# Patient Record
Sex: FEMALE | Race: WHITE | Employment: FULL TIME
[De-identification: names, ages, dates, MRNs, and addresses within clinical notes are randomized per-mention and may not be internally consistent; named-entity substitution may affect disease eponyms.]

---

## 2017-02-01 ENCOUNTER — OFFICE VISIT (OUTPATIENT)
Dept: ONCOLOGY | Facility: CLINIC | Age: 52
End: 2017-02-01

## 2017-02-01 VITALS
RESPIRATION RATE: 20 BRPM | HEART RATE: 90 BPM | HEIGHT: 66 IN | SYSTOLIC BLOOD PRESSURE: 190 MMHG | WEIGHT: 272 LBS | DIASTOLIC BLOOD PRESSURE: 91 MMHG | TEMPERATURE: 97 F | BODY MASS INDEX: 43.71 KG/M2

## 2017-02-01 DIAGNOSIS — I82.90 VENOUS THROMBOEMBOLISM: ICD-10-CM

## 2017-02-01 DIAGNOSIS — I82.5Z9 CHRONIC DEEP VEIN THROMBOSIS (DVT) OF DISTAL VEIN OF LOWER EXTREMITY, UNSPECIFIED LATERALITY (HCC): Primary | ICD-10-CM

## 2017-02-01 PROCEDURE — 99214 OFFICE O/P EST MOD 30 MIN: CPT | Performed by: INTERNAL MEDICINE

## 2017-03-24 PROBLEM — Z12.11 COLON CANCER SCREENING: Status: ACTIVE | Noted: 2017-03-24

## 2017-03-27 ENCOUNTER — ANESTHESIA EVENT (OUTPATIENT)
Dept: OPERATING ROOM | Age: 52
End: 2017-03-27
Payer: COMMERCIAL

## 2017-03-27 ENCOUNTER — ANESTHESIA (OUTPATIENT)
Dept: OPERATING ROOM | Age: 52
End: 2017-03-27
Payer: COMMERCIAL

## 2017-03-27 ENCOUNTER — HOSPITAL ENCOUNTER (OUTPATIENT)
Age: 52
Setting detail: OUTPATIENT SURGERY
Discharge: HOME | End: 2017-03-27
Attending: INTERNAL MEDICINE | Admitting: INTERNAL MEDICINE
Payer: COMMERCIAL

## 2017-03-27 VITALS
WEIGHT: 270 LBS | DIASTOLIC BLOOD PRESSURE: 75 MMHG | SYSTOLIC BLOOD PRESSURE: 132 MMHG | RESPIRATION RATE: 18 BRPM | HEART RATE: 83 BPM | OXYGEN SATURATION: 97 % | TEMPERATURE: 98.4 F | HEIGHT: 66 IN | BODY MASS INDEX: 43.39 KG/M2

## 2017-03-27 VITALS
RESPIRATION RATE: 21 BRPM | OXYGEN SATURATION: 93 % | SYSTOLIC BLOOD PRESSURE: 134 MMHG | DIASTOLIC BLOOD PRESSURE: 73 MMHG

## 2017-03-27 PROCEDURE — 3609010600 HC COLONOSCOPY POLYPECTOMY SNARE/COLD BIOPSY: Performed by: INTERNAL MEDICINE

## 2017-03-27 PROCEDURE — 3609009900 HC COLONOSCOPY W/CONTROL BLEEDING ANY METHOD: Performed by: INTERNAL MEDICINE

## 2017-03-27 PROCEDURE — 7100000011 HC PHASE II RECOVERY - ADDTL 15 MIN: Performed by: INTERNAL MEDICINE

## 2017-03-27 PROCEDURE — 88305 TISSUE EXAM BY PATHOLOGIST: CPT

## 2017-03-27 PROCEDURE — 2580000003 HC RX 258: Performed by: INTERNAL MEDICINE

## 2017-03-27 PROCEDURE — 7100000010 HC PHASE II RECOVERY - FIRST 15 MIN: Performed by: INTERNAL MEDICINE

## 2017-03-27 PROCEDURE — 45385 COLONOSCOPY W/LESION REMOVAL: CPT | Performed by: INTERNAL MEDICINE

## 2017-03-27 PROCEDURE — 2500000003 HC RX 250 WO HCPCS: Performed by: NURSE ANESTHETIST, CERTIFIED REGISTERED

## 2017-03-27 PROCEDURE — 6360000002 HC RX W HCPCS: Performed by: NURSE ANESTHETIST, CERTIFIED REGISTERED

## 2017-03-27 PROCEDURE — 3700000001 HC ADD 15 MINUTES (ANESTHESIA): Performed by: INTERNAL MEDICINE

## 2017-03-27 PROCEDURE — C1773 RET DEV, INSERTABLE: HCPCS | Performed by: INTERNAL MEDICINE

## 2017-03-27 PROCEDURE — 3700000000 HC ANESTHESIA ATTENDED CARE: Performed by: INTERNAL MEDICINE

## 2017-03-27 RX ORDER — SODIUM CHLORIDE, SODIUM LACTATE, POTASSIUM CHLORIDE, CALCIUM CHLORIDE 600; 310; 30; 20 MG/100ML; MG/100ML; MG/100ML; MG/100ML
INJECTION, SOLUTION INTRAVENOUS CONTINUOUS
Status: DISCONTINUED | OUTPATIENT
Start: 2017-03-27 | End: 2017-03-27 | Stop reason: HOSPADM

## 2017-03-27 RX ORDER — LIDOCAINE HYDROCHLORIDE 20 MG/ML
INJECTION, SOLUTION INFILTRATION; PERINEURAL PRN
Status: DISCONTINUED | OUTPATIENT
Start: 2017-03-27 | End: 2017-03-27 | Stop reason: SDUPTHER

## 2017-03-27 RX ORDER — PROPOFOL 10 MG/ML
INJECTION, EMULSION INTRAVENOUS CONTINUOUS PRN
Status: DISCONTINUED | OUTPATIENT
Start: 2017-03-27 | End: 2017-03-27 | Stop reason: SDUPTHER

## 2017-03-27 RX ORDER — PROPOFOL 10 MG/ML
INJECTION, EMULSION INTRAVENOUS PRN
Status: DISCONTINUED | OUTPATIENT
Start: 2017-03-27 | End: 2017-03-27 | Stop reason: SDUPTHER

## 2017-03-27 RX ADMIN — PROPOFOL 100 MCG/KG/MIN: 10 INJECTION, EMULSION INTRAVENOUS at 15:57

## 2017-03-27 RX ADMIN — PROPOFOL 50 MG: 10 INJECTION, EMULSION INTRAVENOUS at 16:22

## 2017-03-27 RX ADMIN — PROPOFOL 50 MG: 10 INJECTION, EMULSION INTRAVENOUS at 16:35

## 2017-03-27 RX ADMIN — SODIUM CHLORIDE, POTASSIUM CHLORIDE, SODIUM LACTATE AND CALCIUM CHLORIDE: 600; 310; 30; 20 INJECTION, SOLUTION INTRAVENOUS at 15:37

## 2017-03-27 RX ADMIN — LIDOCAINE HYDROCHLORIDE 100 MG: 20 INJECTION, SOLUTION INFILTRATION; PERINEURAL at 15:57

## 2017-03-27 ASSESSMENT — PAIN SCALES - GENERAL
PAINLEVEL_OUTOF10: 0

## 2017-03-27 ASSESSMENT — PAIN - FUNCTIONAL ASSESSMENT: PAIN_FUNCTIONAL_ASSESSMENT: 0-10

## 2017-03-29 LAB — SURGICAL PATHOLOGY REPORT: NORMAL

## 2017-05-15 ENCOUNTER — EMPLOYEE WELLNESS (OUTPATIENT)
Dept: OTHER | Age: 52
End: 2017-05-15

## 2017-05-15 LAB
CHOLESTEROL/HDL RATIO: 4.9
CHOLESTEROL: 188 MG/DL
GLUCOSE BLD-MCNC: 114 MG/DL (ref 70–99)
HDLC SERPL-MCNC: 38 MG/DL
LDL CHOLESTEROL: 128 MG/DL (ref 0–130)
PATIENT FASTING?: ABNORMAL
TRIGL SERPL-MCNC: 108 MG/DL
VLDLC SERPL CALC-MCNC: ABNORMAL MG/DL (ref 1–30)

## 2017-06-19 ENCOUNTER — OFFICE VISIT (OUTPATIENT)
Dept: PULMONOLOGY | Age: 52
End: 2017-06-19
Payer: COMMERCIAL

## 2017-06-19 VITALS
OXYGEN SATURATION: 95 % | BODY MASS INDEX: 44.2 KG/M2 | DIASTOLIC BLOOD PRESSURE: 83 MMHG | HEIGHT: 66 IN | HEART RATE: 73 BPM | RESPIRATION RATE: 16 BRPM | SYSTOLIC BLOOD PRESSURE: 144 MMHG | TEMPERATURE: 97.3 F | WEIGHT: 275 LBS

## 2017-06-19 DIAGNOSIS — I82.90 VENOUS THROMBOEMBOLISM: Primary | ICD-10-CM

## 2017-06-19 DIAGNOSIS — Z79.01 LONG TERM (CURRENT) USE OF ANTICOAGULANTS: ICD-10-CM

## 2017-06-19 DIAGNOSIS — G47.33 SLEEP APNEA, OBSTRUCTIVE: ICD-10-CM

## 2017-06-19 DIAGNOSIS — E66.01 MORBID OBESITY DUE TO EXCESS CALORIES (HCC): ICD-10-CM

## 2017-06-19 DIAGNOSIS — J45.40 MODERATE PERSISTENT ASTHMA WITHOUT COMPLICATION: ICD-10-CM

## 2017-06-19 DIAGNOSIS — J30.9 ALLERGIC RHINITIS, UNSPECIFIED ALLERGIC RHINITIS TRIGGER, UNSPECIFIED RHINITIS SEASONALITY: ICD-10-CM

## 2017-06-19 DIAGNOSIS — D68.59 THROMBOPHILIA (HCC): ICD-10-CM

## 2017-06-19 DIAGNOSIS — I10 ESSENTIAL HYPERTENSION: ICD-10-CM

## 2017-06-19 PROCEDURE — 99215 OFFICE O/P EST HI 40 MIN: CPT | Performed by: INTERNAL MEDICINE

## 2017-06-27 ENCOUNTER — HOSPITAL ENCOUNTER (OUTPATIENT)
Dept: LAB | Age: 52
Discharge: HOME | End: 2017-06-27
Payer: COMMERCIAL

## 2017-06-27 DIAGNOSIS — I10 ESSENTIAL HYPERTENSION: ICD-10-CM

## 2017-06-27 LAB
ALBUMIN SERPL-MCNC: 3.8 G/DL (ref 3.5–5.2)
ALBUMIN/GLOBULIN RATIO: 1.2 (ref 1–2.5)
ALP BLD-CCNC: 61 U/L (ref 35–104)
ALT SERPL-CCNC: 20 U/L (ref 5–33)
ANION GAP SERPL CALCULATED.3IONS-SCNC: 15 MMOL/L (ref 9–17)
AST SERPL-CCNC: 24 U/L
BILIRUB SERPL-MCNC: 0.52 MG/DL (ref 0.3–1.2)
BUN BLDV-MCNC: 13 MG/DL (ref 6–20)
BUN/CREAT BLD: 18 (ref 9–20)
CALCIUM SERPL-MCNC: 8.9 MG/DL (ref 8.6–10.4)
CHLORIDE BLD-SCNC: 101 MMOL/L (ref 98–107)
CO2: 25 MMOL/L (ref 20–31)
CREAT SERPL-MCNC: 0.73 MG/DL (ref 0.5–0.9)
GFR AFRICAN AMERICAN: >60 ML/MIN
GFR NON-AFRICAN AMERICAN: >60 ML/MIN
GFR SERPL CREATININE-BSD FRML MDRD: ABNORMAL ML/MIN/{1.73_M2}
GFR SERPL CREATININE-BSD FRML MDRD: ABNORMAL ML/MIN/{1.73_M2}
GLUCOSE BLD-MCNC: 113 MG/DL (ref 70–99)
POTASSIUM SERPL-SCNC: 4.1 MMOL/L (ref 3.7–5.3)
SODIUM BLD-SCNC: 141 MMOL/L (ref 135–144)
TOTAL PROTEIN: 7 G/DL (ref 6.4–8.3)

## 2017-06-27 PROCEDURE — 36415 COLL VENOUS BLD VENIPUNCTURE: CPT

## 2017-06-27 PROCEDURE — 80053 COMPREHEN METABOLIC PANEL: CPT

## 2018-01-23 ENCOUNTER — HOSPITAL ENCOUNTER (OUTPATIENT)
Age: 53
Discharge: HOME | End: 2018-01-23
Payer: COMMERCIAL

## 2018-01-23 DIAGNOSIS — D68.59 THROMBOPHILIA (HCC): Primary | ICD-10-CM

## 2018-01-23 DIAGNOSIS — D68.59 THROMBOPHILIA (HCC): ICD-10-CM

## 2018-01-23 LAB
FOLATE: 7.9 NG/ML
HOMOCYSTEINE: 8.5 UMOL/L
VITAMIN B-12: 615 PG/ML (ref 211–946)

## 2018-01-23 PROCEDURE — 82746 ASSAY OF FOLIC ACID SERUM: CPT

## 2018-01-23 PROCEDURE — 36415 COLL VENOUS BLD VENIPUNCTURE: CPT

## 2018-01-23 PROCEDURE — 83090 ASSAY OF HOMOCYSTEINE: CPT

## 2018-01-23 PROCEDURE — 82607 VITAMIN B-12: CPT

## 2018-02-05 ENCOUNTER — OFFICE VISIT (OUTPATIENT)
Dept: PULMONOLOGY | Age: 53
End: 2018-02-05
Payer: COMMERCIAL

## 2018-02-05 VITALS
TEMPERATURE: 97.7 F | WEIGHT: 277 LBS | SYSTOLIC BLOOD PRESSURE: 154 MMHG | BODY MASS INDEX: 44.52 KG/M2 | OXYGEN SATURATION: 96 % | DIASTOLIC BLOOD PRESSURE: 105 MMHG | RESPIRATION RATE: 16 BRPM | HEIGHT: 66 IN

## 2018-02-05 DIAGNOSIS — I82.90 VENOUS THROMBOEMBOLISM: Primary | ICD-10-CM

## 2018-02-05 DIAGNOSIS — G47.33 SLEEP APNEA, OBSTRUCTIVE: ICD-10-CM

## 2018-02-05 DIAGNOSIS — I10 ESSENTIAL HYPERTENSION: ICD-10-CM

## 2018-02-05 DIAGNOSIS — J45.40 MODERATE PERSISTENT ASTHMA WITHOUT COMPLICATION: ICD-10-CM

## 2018-02-05 DIAGNOSIS — J30.89 OTHER ALLERGIC RHINITIS: ICD-10-CM

## 2018-02-05 DIAGNOSIS — D68.59 THROMBOPHILIA (HCC): ICD-10-CM

## 2018-02-05 DIAGNOSIS — E66.01 MORBID OBESITY DUE TO EXCESS CALORIES (HCC): ICD-10-CM

## 2018-02-05 PROCEDURE — 99215 OFFICE O/P EST HI 40 MIN: CPT | Performed by: INTERNAL MEDICINE

## 2018-02-05 NOTE — PROGRESS NOTES
negative  Hematological and Lymphatic ROS:  Completed and except as mentioned above were negative  Endocrine ROS: Completed and except as mentioned above were negative  Breast ROS:  Completed and except as mentioned above were negative  Respiratory ROS:  Completed and except as mentioned above were negative  Cardiovascular ROS:  Completed and except as mentioned above were negative  Gastrointestinal ROS: Completed and except as mentioned above were negative  Genito-Urinary ROS:  Completed and except as mentioned above were negative  Musculoskeletal ROS:  Completed and except as mentioned above were negative  Neurological ROS:  Completed and except as mentioned above were negative  Dermatological ROS:  Completed and except as mentioned above were negative    NO choking, gasping, excessive daytime sleepiness, disrupted sleep  No mva    Allergies: Allergies   Allergen Reactions    Levaquin [Levofloxacin In D5w]      FLUSHED AND WHEEZING    Pcn [Penicillins]        Medications:    Current Outpatient Prescriptions:     warfarin (COUMADIN) 4 MG tablet, Take two tablets Monday through Saturday and one tablet on Sunday along with 5 mg tablet, Disp: 90 tablet, Rfl: 0    losartan (COZAAR) 50 MG tablet, Take 1 tablet by mouth daily, Disp: 90 tablet, Rfl: 0    pantoprazole (PROTONIX) 40 MG tablet, Take 1 tablet by mouth daily, Disp: 90 tablet, Rfl: 0    budesonide-formoterol (SYMBICORT) 160-4.5 MCG/ACT AERO, Inhale 2 puffs into the lungs 2 times daily, Disp: 1 Inhaler, Rfl: 2    spironolactone (ALDACTONE) 25 MG tablet, Take 1 tablet by mouth daily, Disp: 90 tablet, Rfl: 0    COUMADIN 5 MG tablet, TAKE 1 TABLET BY MOUTH DAILY. , Disp: 90 tablet, Rfl: 0    ALPRAZolam (XANAX) 0.25 MG tablet, Take 1 tablet by mouth every 6 hours as needed for Sleep or Anxiety, Disp: 60 tablet, Rfl: 2    albuterol sulfate HFA (PROAIR HFA) 108 (90 BASE) MCG/ACT inhaler, Inhale 2 puffs into the lungs 4 times daily as needed for Wheezing or NEEDED. QUESTIONS ANSWERED to her satisfaction  EXPLAINED IMPORTANCE OF COMPLIANCE WITH THERAPY, especially given that she has had history of thromboembolic disease of the lung  8. NO REFILLS PROVIDED  9. VACCINATIONS  - HAD FLU VACCINE  10. REC ANTICOAGULATION FOR LIFE BY HEME  11. F/U IN 6 MONTHS. 12.  Refill provided-none. 13.  Regarding anticoagulation, holding for the surgery for cataracts, recommend stopping it 5 days before surgery and then have the INR checked 2 days before surgery and restart when okay with ophthalmology. 14.  Patient did not want any newer oral anticoagulants. Thank you for having us involved in the care of your patient. Please call us if you have any questions or concerns.         Laurie Chang MD             2/5/2018, 9:03 AM

## 2018-02-07 ENCOUNTER — OFFICE VISIT (OUTPATIENT)
Dept: ONCOLOGY | Age: 53
End: 2018-02-07
Payer: COMMERCIAL

## 2018-02-07 VITALS
RESPIRATION RATE: 16 BRPM | DIASTOLIC BLOOD PRESSURE: 98 MMHG | TEMPERATURE: 97.2 F | HEART RATE: 93 BPM | BODY MASS INDEX: 45.16 KG/M2 | SYSTOLIC BLOOD PRESSURE: 126 MMHG | HEIGHT: 66 IN | WEIGHT: 281 LBS

## 2018-02-07 DIAGNOSIS — I82.90 VENOUS THROMBOEMBOLISM: Primary | ICD-10-CM

## 2018-02-07 PROCEDURE — 99213 OFFICE O/P EST LOW 20 MIN: CPT | Performed by: INTERNAL MEDICINE

## 2018-02-07 NOTE — PROGRESS NOTES
unprovoked DVT in the whole family. PAST MEDICAL HISTORY: has a past medical history of Acute pulmonary embolism ([facility] [address] Anxiety; Asthma; Constipation, chronic; CPAP (continuous positive airway pressure) dependence; Diabetes mellitus ([facility] [address] Embolism - blood clot; Esophageal reflux; Factor V Leiden ([facility] [address] Heart murmur; HTN (hypertension); Hyperlipidemia; Irritable bladder; MTHFR mutation (HCC); MVP (mitral valve prolapse); Palpitations; Pulmonary embolism ([facility] [address] Rhinitis, allergic; Sleep apnea; and Venous thromboembolism. PAST SURGICAL HISTORY: has a past surgical history that includes Cholecystectomy; Mandible fracture surgery (1983); Tubal ligation; Upper gastrointestinal endoscopy (1/2010); Colonoscopy (2006); colsc flx w/rmvl of tumor polyp lesion snare tq (3/27/2017); colsc flexible w/control bleeding any method (3/27/2017); and Colonoscopy (03/27/2017). CURRENT MEDICATIONS:  has a current medication list which includes the following prescription(s): warfarin, losartan, pantoprazole, budesonide-formoterol, spironolactone, coumadin, alprazolam, albuterol sulfate hfa, prevident 5000 booster plus, cetirizine, folic acid-pyridoxine-cyancobalamin, and folbic rf. ALLERGIES:  is allergic to levaquin [levofloxacin in d5w] and pcn [penicillins]. FAMILY HISTORY: Negative for any hematological or oncological conditions. SOCIAL HISTORY:  reports that she has never smoked. She has never used smokeless tobacco. She reports that she drinks alcohol. She reports that she does not use drugs. REVIEW OF SYSTEMS:  General: no fever or night sweats, Weight is stable. Eyes: No double or blurred vision. Ears: no tinnitus or hearing problem,    Throat: no dysphagia or sore throat   Respiratory: No chest pain, no shortness of breath, no cough or hemoptysis. Cardiovascular: Denies chest pain, PND or orthopnea. No L E swelling or palpitations.    Gastrointestinal:    No nausea or vomiting, abdominal pain, diarrhea or constipation. Genitourinary: Denies dysuria, hematuria, frequency, urgency or incontinence. Neurological: Denies headaches, decreased LOC, no sensory or motor focal deficits. Musculoskeletal:  No arthralgia no back pain or joint swelling. Skin: There are no rashes or bleeding. Psychiatric:  No anxiety, no depression. Endocrine: no diabetes or thyroid disease. Hematologic: no bleeding , no adenopathy. PHYSICAL EXAM: Shows a well appearing 46y.o.-year-old female who is not in pain or distress. Vital Signs: Blood pressure (!) 126/98, pulse 93, temperature 97.2 °F (36.2 °C), temperature source Temporal, resp. rate 16, height 5' 6\" (1.676 m), weight 281 lb (127.5 kg). HEENT: Normocephalic and atraumatic. Pupils are equal, round, reactive to light and accommodation. Extraocular muscles are intact. Neck: Showed no JVD, no carotid bruit . Lungs: Clear to auscultation bilaterally. Heart: Regular without any murmur. Abdomen: Soft, nontender. No hepatosplenomegaly. Extremities: Lower extremities show no edema, clubbing, or cyanosis. Neuro exam: intact cranial nerves bilaterally no motor or sensory deficit, gait is normal. Lymphatic: no adenopathy appreciated in the supraclavicular, axillary, cervical or inguinal area        Review of laboratory data:   Lab Results   Component Value Date    WBC 16.0 (H) 06/24/2015    HGB 13.6 06/24/2015    HCT 41.6 06/24/2015    MCV 83.7 06/24/2015     06/24/2015     IMPRESSION:   1. Provoked deep venous thrombosis/PE  2. Thrombophilia with heterozygous factor V Leiden mutation and compound heterozygous MTH FR mutation     Plan:   I had a long discussion with the patient, recent literature suggests that provoked thromboses should not be treated with lifelong anticoagulation even in the presence of hereditary thrombophilia.   Obviously, part of the treatment is management of the patient anxiety especially with her extensive family history of venous thromboembolism. I had a long discussion that the patient is at an increased risk of venous thromboembolism and that will be decreased Coumadin. After a very prolonged discussion, and considering pros and cons of continued anticoagulation versus stopping. The patient will continue Coumadin. She would have cataract surgery soon  We will see her back in a year for a follow-up   The patient was clearly advised to stay away from exogenous estrogen and smoking.   In she will promised to do that                               [facility]                          Cell: [phone]

## 2018-03-20 ENCOUNTER — HOSPITAL ENCOUNTER (OUTPATIENT)
Dept: VASCULAR LAB | Age: 53
Discharge: HOME | End: 2018-03-22
Payer: COMMERCIAL

## 2018-03-20 VITALS — BODY MASS INDEX: 44.68 KG/M2 | WEIGHT: 278 LBS

## 2018-03-20 DIAGNOSIS — M79.604 PAIN OF RIGHT LOWER EXTREMITY: ICD-10-CM

## 2018-03-20 PROCEDURE — 93971 EXTREMITY STUDY: CPT

## 2018-04-01 PROBLEM — H25.12 NUCLEAR SCLEROTIC CATARACT OF LEFT EYE: Status: ACTIVE | Noted: 2018-04-01

## 2018-04-02 ENCOUNTER — HOSPITAL ENCOUNTER (OUTPATIENT)
Age: 53
Setting detail: OUTPATIENT SURGERY
Discharge: HOME | End: 2018-04-02
Attending: OPHTHALMOLOGY | Admitting: OPHTHALMOLOGY
Payer: COMMERCIAL

## 2018-04-02 VITALS
RESPIRATION RATE: 18 BRPM | SYSTOLIC BLOOD PRESSURE: 121 MMHG | HEART RATE: 85 BPM | TEMPERATURE: 97.5 F | DIASTOLIC BLOOD PRESSURE: 88 MMHG | OXYGEN SATURATION: 94 % | HEIGHT: 66 IN | WEIGHT: 276 LBS | BODY MASS INDEX: 44.36 KG/M2

## 2018-04-02 PROBLEM — H25.12 NUCLEAR SCLEROTIC CATARACT OF LEFT EYE: Status: RESOLVED | Noted: 2018-04-01 | Resolved: 2018-04-02

## 2018-04-02 PROCEDURE — 2580000003 HC RX 258: Performed by: OPHTHALMOLOGY

## 2018-04-02 PROCEDURE — V2632 POST CHMBR INTRAOCULAR LENS: HCPCS | Performed by: OPHTHALMOLOGY

## 2018-04-02 PROCEDURE — 6370000000 HC RX 637 (ALT 250 FOR IP): Performed by: OPHTHALMOLOGY

## 2018-04-02 PROCEDURE — 7100000011 HC PHASE II RECOVERY - ADDTL 15 MIN: Performed by: OPHTHALMOLOGY

## 2018-04-02 PROCEDURE — 7100000010 HC PHASE II RECOVERY - FIRST 15 MIN: Performed by: OPHTHALMOLOGY

## 2018-04-02 PROCEDURE — 99153 MOD SED SAME PHYS/QHP EA: CPT | Performed by: OPHTHALMOLOGY

## 2018-04-02 PROCEDURE — 3600000013 HC SURGERY LEVEL 3 ADDTL 15MIN: Performed by: OPHTHALMOLOGY

## 2018-04-02 PROCEDURE — 6360000002 HC RX W HCPCS: Performed by: OPHTHALMOLOGY

## 2018-04-02 PROCEDURE — 3600000003 HC SURGERY LEVEL 3 BASE: Performed by: OPHTHALMOLOGY

## 2018-04-02 PROCEDURE — 99152 MOD SED SAME PHYS/QHP 5/>YRS: CPT | Performed by: OPHTHALMOLOGY

## 2018-04-02 DEVICE — LENS INTRAOCULAR BCNVX 17.5+ DIOPT 6X12.5 MM ACRYL ENVISTA: Type: IMPLANTABLE DEVICE | Status: FUNCTIONAL

## 2018-04-02 RX ORDER — SODIUM CHLORIDE 0.9 % (FLUSH) 0.9 %
10 SYRINGE (ML) INJECTION PRN
Status: CANCELLED | OUTPATIENT
Start: 2018-04-02

## 2018-04-02 RX ORDER — SODIUM CHLORIDE 0.9 % (FLUSH) 0.9 %
10 SYRINGE (ML) INJECTION EVERY 12 HOURS SCHEDULED
Status: CANCELLED | OUTPATIENT
Start: 2018-04-02

## 2018-04-02 RX ORDER — SODIUM CHLORIDE 0.9 % (FLUSH) 0.9 %
10 SYRINGE (ML) INJECTION PRN
Status: DISCONTINUED | OUTPATIENT
Start: 2018-04-02 | End: 2018-04-02 | Stop reason: HOSPADM

## 2018-04-02 RX ORDER — TROPICAMIDE 10 MG/ML
1 SOLUTION/ DROPS OPHTHALMIC 2 TIMES DAILY PRN
Status: DISCONTINUED | OUTPATIENT
Start: 2018-04-02 | End: 2018-04-02 | Stop reason: HOSPADM

## 2018-04-02 RX ORDER — PHENYLEPHRINE HCL 2.5 %
1 DROPS OPHTHALMIC (EYE) 2 TIMES DAILY PRN
Status: DISCONTINUED | OUTPATIENT
Start: 2018-04-02 | End: 2018-04-02 | Stop reason: HOSPADM

## 2018-04-02 RX ORDER — ONDANSETRON 2 MG/ML
4 INJECTION INTRAMUSCULAR; INTRAVENOUS EVERY 6 HOURS PRN
Status: CANCELLED | OUTPATIENT
Start: 2018-04-02

## 2018-04-02 RX ORDER — ACETAMINOPHEN 325 MG/1
650 TABLET ORAL EVERY 4 HOURS PRN
Status: CANCELLED | OUTPATIENT
Start: 2018-04-02

## 2018-04-02 RX ORDER — SODIUM CHLORIDE 9 MG/ML
INJECTION, SOLUTION INTRAVENOUS CONTINUOUS
Status: DISCONTINUED | OUTPATIENT
Start: 2018-04-02 | End: 2018-04-02 | Stop reason: HOSPADM

## 2018-04-02 RX ORDER — TETRACAINE HYDROCHLORIDE 5 MG/ML
1 SOLUTION OPHTHALMIC ONCE
Status: COMPLETED | OUTPATIENT
Start: 2018-04-02 | End: 2018-04-02

## 2018-04-02 RX ORDER — FENTANYL CITRATE 50 UG/ML
INJECTION, SOLUTION INTRAMUSCULAR; INTRAVENOUS PRN
Status: DISCONTINUED | OUTPATIENT
Start: 2018-04-02 | End: 2018-04-02 | Stop reason: HOSPADM

## 2018-04-02 RX ORDER — MIDAZOLAM HYDROCHLORIDE 1 MG/ML
INJECTION INTRAMUSCULAR; INTRAVENOUS PRN
Status: DISCONTINUED | OUTPATIENT
Start: 2018-04-02 | End: 2018-04-02 | Stop reason: HOSPADM

## 2018-04-02 RX ORDER — SODIUM CHLORIDE 0.9 % (FLUSH) 0.9 %
10 SYRINGE (ML) INJECTION EVERY 12 HOURS SCHEDULED
Status: DISCONTINUED | OUTPATIENT
Start: 2018-04-02 | End: 2018-04-02 | Stop reason: HOSPADM

## 2018-04-02 RX ADMIN — TROPICAMIDE 1 DROP: 10 SOLUTION/ DROPS OPHTHALMIC at 08:05

## 2018-04-02 RX ADMIN — PHENYLEPHRINE HYDROCHLORIDE 1 DROP: 25 SOLUTION/ DROPS OPHTHALMIC at 08:05

## 2018-04-02 RX ADMIN — PHENYLEPHRINE HYDROCHLORIDE 1 DROP: 25 SOLUTION/ DROPS OPHTHALMIC at 08:15

## 2018-04-02 RX ADMIN — TROPICAMIDE 1 DROP: 10 SOLUTION/ DROPS OPHTHALMIC at 08:15

## 2018-04-02 RX ADMIN — SODIUM CHLORIDE: 9 INJECTION, SOLUTION INTRAVENOUS at 08:25

## 2018-04-02 ASSESSMENT — PAIN - FUNCTIONAL ASSESSMENT: PAIN_FUNCTIONAL_ASSESSMENT: 0-10

## 2018-04-02 ASSESSMENT — PAIN SCALES - GENERAL
PAINLEVEL_OUTOF10: 0
PAINLEVEL_OUTOF10: 0
PAINLEVEL_OUTOF10: 2

## 2018-04-03 ENCOUNTER — EMPLOYEE WELLNESS (OUTPATIENT)
Dept: OTHER | Age: 53
End: 2018-04-03

## 2018-04-03 LAB
CHOLESTEROL/HDL RATIO: 4.3
CHOLESTEROL: 171 MG/DL
GLUCOSE BLD-MCNC: 115 MG/DL (ref 70–99)
HDLC SERPL-MCNC: 40 MG/DL
LDL CHOLESTEROL: 110 MG/DL (ref 0–130)
PATIENT FASTING?: YES
TRIGL SERPL-MCNC: 106 MG/DL
VLDLC SERPL CALC-MCNC: ABNORMAL MG/DL (ref 1–30)

## 2018-04-10 VITALS — BODY MASS INDEX: 44.55 KG/M2 | WEIGHT: 276 LBS

## 2018-06-20 PROBLEM — F41.1 GAD (GENERALIZED ANXIETY DISORDER): Status: ACTIVE | Noted: 2018-06-20

## 2018-08-01 ENCOUNTER — OFFICE VISIT (OUTPATIENT)
Dept: OBGYN | Age: 53
End: 2018-08-01
Payer: COMMERCIAL

## 2018-08-01 VITALS
WEIGHT: 278 LBS | DIASTOLIC BLOOD PRESSURE: 82 MMHG | BODY MASS INDEX: 44.68 KG/M2 | HEIGHT: 66 IN | SYSTOLIC BLOOD PRESSURE: 136 MMHG

## 2018-08-01 DIAGNOSIS — Z00.00 WELLNESS EXAMINATION: Primary | ICD-10-CM

## 2018-08-01 DIAGNOSIS — Z01.419 WOMEN'S ANNUAL ROUTINE GYNECOLOGICAL EXAMINATION: ICD-10-CM

## 2018-08-01 LAB — HGB, POC: 13.1

## 2018-08-01 PROCEDURE — 99396 PREV VISIT EST AGE 40-64: CPT | Performed by: ADVANCED PRACTICE MIDWIFE

## 2018-08-01 ASSESSMENT — ENCOUNTER SYMPTOMS
VOMITING: 0
ABDOMINAL PAIN: 0
SHORTNESS OF BREATH: 0
NAUSEA: 0
BACK PAIN: 0
DIARRHEA: 0
CONSTIPATION: 0
SORE THROAT: 0

## 2018-08-01 NOTE — PROGRESS NOTES
pantoprazole (PROTONIX) 40 MG tablet, Take 1 tablet by mouth daily, Disp: 90 tablet, Rfl: 0    losartan (COZAAR) 50 MG tablet, Take 1 tablet by mouth daily, Disp: 90 tablet, Rfl: 0    spironolactone (ALDACTONE) 25 MG tablet, Take 1 tablet by mouth daily, Disp: 90 tablet, Rfl: 0    albuterol sulfate HFA (PROAIR HFA) 108 (90 Base) MCG/ACT inhaler, Inhale 2 puffs into the lungs 4 times daily as needed for Wheezing or Shortness of Breath, Disp: 1 Inhaler, Rfl: 11    PREVIDENT 5000 BOOSTER PLUS 1.1 % PSTE, , Disp: , Rfl: 0    cetirizine (ZYRTEC) 10 MG tablet, Take 10 mg by mouth daily.   , Disp: , Rfl:     History   Sexual Activity    Sexual activity: Yes    Partners: Male    Birth control/ protection: Surgical       Any bleeding or pain with intercourse: No    Last Yearly:  2016    Last pap: 2016    Last HPV: 2015    Last Mammogram: 2017    Last Dexascan na    Do you do self breast exams: No    Past Medical History:   Diagnosis Date    Acute pulmonary embolism ([facility] [address] 11/2/2015    Anxiety     Asthma     Constipation, chronic     CPAP (continuous positive airway pressure) dependence     Diabetes mellitus ([facility] [address]     pt states she was prediabetic at one point    Embolism - blood clot     right lung and right leg    Esophageal reflux     Factor V Leiden ([facility] [address]     Heart murmur     HTN (hypertension)     Hyperlipidemia     Irritable bladder     MTHFR mutation (HCC)     MVP (mitral valve prolapse)     Palpitations     Pulmonary embolism (HCC) 10/15/2012    Rhinitis, allergic 10/15/2012    Sleep apnea     Venous thromboembolism 5/2/2016       Past Surgical History:   Procedure Laterality Date    CATARACT REMOVAL WITH IMPLANT Left 04/02/2018    CHOLECYSTECTOMY      around 2000    COLONOSCOPY  2006    COLONOSCOPY  03/27/2017    -polyp(adenomatous polyp)    MANDIBLE FRACTURE SURGERY  1983    NC COLSC FLEXIBLE W/CONTROL BLEEDING ANY METHOD  3/27/2017    COLONOSCOPY CONTROL HEMORRHAGE performed by Yeimi Graham MD at [address] FLX W/RMVL OF TUMOR POLYP LESION SNARE TQ  3/27/2017    COLONOSCOPY POLYPECTOMY SNARE/COLD BIOPSY performed by Yeimi Graham MD at [facility] Left 4/2/2018    EYE CATARACT EMULSIFICATION IOL IMPLANT performed by Jae Cheng MD at [address] [facility] ENDOSCOPY  1/2010    with biopsy       Family History   Problem Relation Age of Onset    Cancer Mother         breast ca   [facility] Diabetes Mother     Depression Mother     Thyroid Disease Mother     High Blood Pressure Mother     Heart Disease Mother     Cancer Father         prostate ca    COPD Father     Heart Failure Father     Heart Disease Father     High Blood Pressure Father     Deep Vein Thrombosis Sister     Breast Cancer Sister     Deep Vein Thrombosis Sister        Chief Complaint   Patient presents with    Gynecologic Exam     Yearly exam. Last pap 06/08/2016 negative, HPV not detected 05/2015. PE:  Vital Signs  Blood pressure 136/82, height 5' 6\" (1.676 m), weight 278 lb (126.1 kg), last menstrual period 07/21/2018, not currently breastfeeding. Labs:    Results for orders placed or performed in visit on 08/01/18   POCT hemoglobin   Result Value Ref Range    Hemoglobin 13.1          NURSE: Lizbet PETER    HPI: here for annual exam, denies c/o, had plantar fascitis this year left foot    Review of systems:  PT denies fever, chills, nausea and vomiting     Review of Systems   Constitutional: Negative. HENT: Negative for congestion and sore throat. Respiratory: Negative for shortness of breath. Cardiovascular: Negative for chest pain. Gastrointestinal: Negative for abdominal pain, constipation, diarrhea, nausea and vomiting. Genitourinary: Negative for dysuria. Musculoskeletal: Negative for back pain. Skin: Negative for rash. Neurological: Negative for headaches.    Psychiatric/Behavioral: Negative for depression. Objective  Lymphatic:   no lymphadenopathy  Heent:   negative   Cor: regular rate and rhythm, no murmurs              Pul:clear to auscultation bilaterally- no wheezes, rales or rhonchi, normal air movement, no respiratory distress      GI: Abdomen soft, non-tender. BS normal. No masses,  No organomegaly           Extremities: normal strength, tone, and muscle mass   Breasts: Breast:normal appearance, no masses or tenderness   Pelvic Exam: GENITAL/URINARY:  External Genitalia:  General appearance; normal, Hair distribution; normal, Lesions absent  Urethral Meatus:  Size normal, Location normal, Lesions absent, Prolapse absent  Urethra: Fullness absent, Masses absent  Bladder:  Fullness absent, Masses absent, Tenderness absent, Cystocele absent  Vagina:  General appearance normal, Estrogen effect normal, Discharge absent, Lesions absent, Pelvic support normal  Cervix:  General appearance normal, Lesions absent, Discharge absent, Tenderness absent, Enlargement absent, Nodularity absent  Uterus:  Size normal, Tenderness absent  [place]: Masses absent, Tenderness absent  Anus/Perineum:  Lesions absent and Masses absent                                    Vaginal discharge: no vaginal discharge                            Assessment and Plan          Diagnosis Orders   1. Wellness examination  POCT hemoglobin    POCT Urinalysis no Micro   2. Women's annual routine gynecological examination  BRENNA DIGITAL SCREEN W CAD BILATERAL             I have discontinued Ms. Radha Fournier RF and folic acid-pyridoxine-cyancobalamin. I am also having her maintain her cetirizine, PREVIDENT 5000 BOOSTER PLUS, losartan, spironolactone, albuterol sulfate HFA, pantoprazole, budesonide-formoterol, COUMADIN, and COUMADIN. Return in about 1 year (around 8/1/2019) for yearly. She was also counseled on her preventative health maintenance recommendations and follow-up.      There are no Patient Instructions on file for this visit.     Laura Mccarthy,8/1/2018 9:30 AM

## 2018-08-06 ENCOUNTER — OFFICE VISIT (OUTPATIENT)
Dept: PULMONOLOGY | Age: 53
End: 2018-08-06
Payer: COMMERCIAL

## 2018-08-06 VITALS
OXYGEN SATURATION: 96 % | BODY MASS INDEX: 44.2 KG/M2 | DIASTOLIC BLOOD PRESSURE: 88 MMHG | WEIGHT: 275 LBS | HEIGHT: 66 IN | SYSTOLIC BLOOD PRESSURE: 161 MMHG | TEMPERATURE: 98.5 F | HEART RATE: 79 BPM | RESPIRATION RATE: 16 BRPM

## 2018-08-06 DIAGNOSIS — J30.89 OTHER ALLERGIC RHINITIS: ICD-10-CM

## 2018-08-06 DIAGNOSIS — G47.33 SLEEP APNEA, OBSTRUCTIVE: ICD-10-CM

## 2018-08-06 DIAGNOSIS — E66.01 MORBID OBESITY DUE TO EXCESS CALORIES (HCC): ICD-10-CM

## 2018-08-06 DIAGNOSIS — D68.59 THROMBOPHILIA (HCC): ICD-10-CM

## 2018-08-06 DIAGNOSIS — Z79.01 LONG TERM (CURRENT) USE OF ANTICOAGULANTS: ICD-10-CM

## 2018-08-06 DIAGNOSIS — I10 ESSENTIAL HYPERTENSION: ICD-10-CM

## 2018-08-06 DIAGNOSIS — I82.90 VENOUS THROMBOEMBOLISM: Primary | ICD-10-CM

## 2018-08-06 DIAGNOSIS — J45.40 MODERATE PERSISTENT ASTHMA WITHOUT COMPLICATION: ICD-10-CM

## 2018-08-06 PROCEDURE — 99215 OFFICE O/P EST HI 40 MIN: CPT | Performed by: INTERNAL MEDICINE

## 2018-08-06 NOTE — PATIENT INSTRUCTIONS
SURVEY:    You may be receiving a survey from [employer] regarding your visit today. Please complete the survey to enable us to provide the highest quality of care to you and your family. If you cannot score us a very good on any question, please call the office to discuss how we could have made your experience a very good one. Thank you. Please recheck your blood pressure when you go home and make sure you take your prescribed medication if applicable . Please follow up with your PCP or ER if needed.

## 2018-08-06 NOTE — PROGRESS NOTES
PULMONARY OP  PROGRESS NOTE      Patient:  Alycia Bee  YOB: 1965    MRN: [id number]     Acct:        Pt seen and Chart reviewed. Ms. Alycia Bee is here in followup for    Diagnosis Orders   1. Venous thromboembolism     2. Sleep apnea, obstructive     3. Moderate persistent asthma without complication     4. Essential hypertension     5. Thrombophilia ([address]     6. Morbid obesity due to excess calories (HCC)     7. Other allergic rhinitis     8. Long term (current) use of anticoagulants        Patient has been doing well since last visit. Pt has not been hospitalized or been to er since last visit. Has been using meds as recommended. Using cpap at 9 cm h2o since having sleep study. Has been using CPAP Almost everyday  She may miss it here and there  On coumadin for VTE  Denied any shortness of breath or wheezing. No cough or any sputum production. No hemoptysis. No epistaxis or sore throat. No daytime fatigue or tiredness or sleepiness. No orthopnea or PND. No hematemesis, melena or hematochezia. No vaginal bleeding. No motor vehicle accidents. No symptoms of narcolepsy. Occasionally feels stuffy related to change in weather. No nighttime symptoms of bronchial asthma. Hardly needs any albuterol  Continues to make efforts to lose weight  Had high blood pressure upon arrival and upon rechecking   She was going to have it rechecked by Dr. Lydia Yang tomorrow. Patient having symptoms of acid reflux intermittently.   She has had a change in her suppertime secondary to a hobby and she is currently scheduled her suppertime to her usual time and see if that would help her get better    Subjective:   Review of Systems -   General ROS: Completed and except as mentioned above were negative   Psychological ROS:  Completed and except as mentioned above were negative  Ophthalmic ROS:  Completed and except as mentioned above were INSTRUCTIONS  USE HUMIDIFIER AS NEEDED. QUESTIONS ANSWERED to her satisfaction  EXPLAINED IMPORTANCE OF COMPLIANCE WITH THERAPY, especially given that she has had history of thromboembolic disease of the lung  8. NO REFILLS PROVIDED  9. VACCINATIONS  - HAD FLU VACCINE  10. REC ANTICOAGULATION FOR LIFE BY HEME. Patient does not want to change to the newer Oral anticoagulants  11. F/U IN 6 MONTHS. 12.  Refill provided-none. 13.  Recommend changing the supper time to 4 hours before bedtime to help decrease acid reflux symptoms. 14.  Patient had the blood pressure rechecked at home or at Dr. Jose Denise office. Thank you for having us involved in the care of your patient. Please call us if you have any questions or concerns.         Altaf Calvert MD             8/6/2018, 9:22 AM

## 2018-08-27 ENCOUNTER — HOSPITAL ENCOUNTER (OUTPATIENT)
Dept: WOMENS IMAGING | Age: 53
Discharge: HOME | End: 2018-08-29
Payer: COMMERCIAL

## 2018-08-27 DIAGNOSIS — Z01.419 WOMEN'S ANNUAL ROUTINE GYNECOLOGICAL EXAMINATION: ICD-10-CM

## 2018-08-27 PROCEDURE — 77067 SCR MAMMO BI INCL CAD: CPT

## 2018-09-26 PROBLEM — Z12.11 COLON CANCER SCREENING: Status: RESOLVED | Noted: 2017-03-24 | Resolved: 2018-09-26

## 2019-01-21 DIAGNOSIS — D68.59 THROMBOPHILIA (HCC): Primary | ICD-10-CM

## 2019-01-31 ENCOUNTER — HOSPITAL ENCOUNTER (OUTPATIENT)
Age: 54
Discharge: HOME | End: 2019-01-31
Payer: COMMERCIAL

## 2019-01-31 DIAGNOSIS — D68.59 THROMBOPHILIA (HCC): ICD-10-CM

## 2019-01-31 LAB — HOMOCYSTEINE: 10.4 UMOL/L

## 2019-01-31 PROCEDURE — 83090 ASSAY OF HOMOCYSTEINE: CPT

## 2019-01-31 PROCEDURE — 36415 COLL VENOUS BLD VENIPUNCTURE: CPT

## 2019-03-01 ENCOUNTER — OFFICE VISIT (OUTPATIENT)
Dept: ONCOLOGY | Age: 54
End: 2019-03-01
Payer: COMMERCIAL

## 2019-03-01 VITALS
SYSTOLIC BLOOD PRESSURE: 118 MMHG | DIASTOLIC BLOOD PRESSURE: 71 MMHG | TEMPERATURE: 98.1 F | WEIGHT: 246 LBS | RESPIRATION RATE: 18 BRPM | BODY MASS INDEX: 39.71 KG/M2 | HEART RATE: 76 BPM

## 2019-03-01 DIAGNOSIS — I82.90 VENOUS THROMBOEMBOLISM: ICD-10-CM

## 2019-03-01 DIAGNOSIS — D68.59 THROMBOPHILIA (HCC): Primary | ICD-10-CM

## 2019-03-01 PROCEDURE — 99213 OFFICE O/P EST LOW 20 MIN: CPT | Performed by: INTERNAL MEDICINE

## 2019-03-06 ENCOUNTER — EMPLOYEE WELLNESS (OUTPATIENT)
Dept: OTHER | Age: 54
End: 2019-03-06

## 2019-03-06 LAB
CHOLESTEROL/HDL RATIO: 4.1
CHOLESTEROL: 165 MG/DL
GLUCOSE BLD-MCNC: 111 MG/DL (ref 70–99)
HDLC SERPL-MCNC: 40 MG/DL
LDL CHOLESTEROL: 107 MG/DL (ref 0–130)
PATIENT FASTING?: YES
TRIGL SERPL-MCNC: 89 MG/DL
VLDLC SERPL CALC-MCNC: ABNORMAL MG/DL (ref 1–30)

## 2019-05-04 ENCOUNTER — ANTI-COAG VISIT (OUTPATIENT)
Dept: PHARMACY | Age: 54
End: 2019-05-04

## 2019-05-04 DIAGNOSIS — Z79.01 LONG TERM CURRENT USE OF ANTICOAGULANT THERAPY: ICD-10-CM

## 2019-05-04 DIAGNOSIS — I82.90 VENOUS THROMBOEMBOLISM: ICD-10-CM

## 2019-05-04 DIAGNOSIS — D68.59 THROMBOPHILIA (HCC): ICD-10-CM

## 2019-05-06 VITALS — WEIGHT: 242 LBS | BODY MASS INDEX: 39.06 KG/M2

## 2019-05-31 ENCOUNTER — HOSPITAL ENCOUNTER (OUTPATIENT)
Dept: PHARMACY | Age: 54
Setting detail: THERAPIES SERIES
Discharge: HOME | End: 2019-05-31
Payer: COMMERCIAL

## 2019-05-31 DIAGNOSIS — D68.59 THROMBOPHILIA (HCC): ICD-10-CM

## 2019-05-31 DIAGNOSIS — Z79.01 LONG TERM CURRENT USE OF ANTICOAGULANT THERAPY: ICD-10-CM

## 2019-05-31 DIAGNOSIS — I82.90 VENOUS THROMBOEMBOLISM: ICD-10-CM

## 2019-06-03 ENCOUNTER — ANTI-COAG VISIT (OUTPATIENT)
Dept: PHARMACY | Age: 54
End: 2019-06-03

## 2019-06-03 DIAGNOSIS — D68.59 THROMBOPHILIA (HCC): ICD-10-CM

## 2019-06-03 DIAGNOSIS — Z79.01 LONG TERM CURRENT USE OF ANTICOAGULANT THERAPY: ICD-10-CM

## 2019-06-03 DIAGNOSIS — I82.90 VENOUS THROMBOEMBOLISM: ICD-10-CM

## 2019-08-07 ENCOUNTER — HOSPITAL ENCOUNTER (OUTPATIENT)
Dept: LAB | Age: 54
Discharge: HOME | End: 2019-08-07
Payer: COMMERCIAL

## 2019-08-07 DIAGNOSIS — I10 ESSENTIAL HYPERTENSION: ICD-10-CM

## 2019-08-07 LAB
ALBUMIN SERPL-MCNC: 3.9 G/DL (ref 3.5–5.2)
ALBUMIN/GLOBULIN RATIO: 1.2 (ref 1–2.5)
ALP BLD-CCNC: 56 U/L (ref 35–104)
ALT SERPL-CCNC: 14 U/L (ref 5–33)
ANION GAP SERPL CALCULATED.3IONS-SCNC: 10 MMOL/L (ref 9–17)
AST SERPL-CCNC: 17 U/L
BILIRUB SERPL-MCNC: 0.67 MG/DL (ref 0.3–1.2)
BUN BLDV-MCNC: 14 MG/DL (ref 6–20)
BUN/CREAT BLD: 16 (ref 9–20)
CALCIUM SERPL-MCNC: 9 MG/DL (ref 8.6–10.4)
CHLORIDE BLD-SCNC: 105 MMOL/L (ref 98–107)
CO2: 25 MMOL/L (ref 20–31)
CREAT SERPL-MCNC: 0.88 MG/DL (ref 0.5–0.9)
GFR AFRICAN AMERICAN: >60 ML/MIN
GFR NON-AFRICAN AMERICAN: >60 ML/MIN
GFR SERPL CREATININE-BSD FRML MDRD: ABNORMAL ML/MIN/{1.73_M2}
GFR SERPL CREATININE-BSD FRML MDRD: ABNORMAL ML/MIN/{1.73_M2}
GLUCOSE BLD-MCNC: 112 MG/DL (ref 70–99)
POTASSIUM SERPL-SCNC: 4.2 MMOL/L (ref 3.7–5.3)
SODIUM BLD-SCNC: 140 MMOL/L (ref 135–144)
TOTAL PROTEIN: 7.1 G/DL (ref 6.4–8.3)
TSH SERPL DL<=0.05 MIU/L-ACNC: 2.26 MIU/L (ref 0.3–5)

## 2019-08-07 PROCEDURE — 84443 ASSAY THYROID STIM HORMONE: CPT

## 2019-08-07 PROCEDURE — 36415 COLL VENOUS BLD VENIPUNCTURE: CPT

## 2019-08-07 PROCEDURE — 80053 COMPREHEN METABOLIC PANEL: CPT

## 2019-08-08 ENCOUNTER — HOSPITAL ENCOUNTER (OUTPATIENT)
Age: 54
Setting detail: SPECIMEN
Discharge: HOME | End: 2019-08-08
Payer: COMMERCIAL

## 2019-08-08 ENCOUNTER — OFFICE VISIT (OUTPATIENT)
Dept: OBGYN | Age: 54
End: 2019-08-08
Payer: COMMERCIAL

## 2019-08-08 VITALS
BODY MASS INDEX: 39.7 KG/M2 | WEIGHT: 247 LBS | SYSTOLIC BLOOD PRESSURE: 126 MMHG | HEIGHT: 66 IN | DIASTOLIC BLOOD PRESSURE: 84 MMHG

## 2019-08-08 DIAGNOSIS — Z01.419 WOMEN'S ANNUAL ROUTINE GYNECOLOGICAL EXAMINATION: ICD-10-CM

## 2019-08-08 DIAGNOSIS — Z01.419 WOMEN'S ANNUAL ROUTINE GYNECOLOGICAL EXAMINATION: Primary | ICD-10-CM

## 2019-08-08 PROCEDURE — G0145 SCR C/V CYTO,THINLAYER,RESCR: HCPCS

## 2019-08-08 PROCEDURE — 99396 PREV VISIT EST AGE 40-64: CPT | Performed by: ADVANCED PRACTICE MIDWIFE

## 2019-08-08 ASSESSMENT — ENCOUNTER SYMPTOMS
BACK PAIN: 0
DIARRHEA: 0
ABDOMINAL PAIN: 0
CONSTIPATION: 0
SORE THROAT: 0
SHORTNESS OF BREATH: 0
NAUSEA: 0

## 2019-08-08 ASSESSMENT — PATIENT HEALTH QUESTIONNAIRE - PHQ9
SUM OF ALL RESPONSES TO PHQ QUESTIONS 1-9: 0
1. LITTLE INTEREST OR PLEASURE IN DOING THINGS: 0
SUM OF ALL RESPONSES TO PHQ9 QUESTIONS 1 & 2: 0
2. FEELING DOWN, DEPRESSED OR HOPELESS: 0
SUM OF ALL RESPONSES TO PHQ QUESTIONS 1-9: 0

## 2019-08-08 NOTE — PROGRESS NOTES
25 MG tablet, Take 1 tablet by mouth daily, Disp: 90 tablet, Rfl: 1    albuterol sulfate HFA (PROAIR HFA) 108 (90 Base) MCG/ACT inhaler, Inhale 2 puffs into the lungs 4 times daily as needed for Wheezing or Shortness of Breath, Disp: 1 Inhaler, Rfl: 11    PREVIDENT 5000 BOOSTER PLUS 1.1 % PSTE, , Disp: , Rfl: 0    cetirizine (ZYRTEC) 10 MG tablet, Take 10 mg by mouth daily.   , Disp: , Rfl:     budesonide-formoterol (SYMBICORT) 160-4.5 MCG/ACT AERO, Inhale 2 puffs into the lungs 2 times daily, Disp: 1 Inhaler, Rfl: 2    Social History     Substance and Sexual Activity   Sexual Activity Yes    Partners: Male    Birth control/protection: Surgical       Any bleeding or pain with intercourse: No    Last Yearly:  2018    Last pap: 2016    Last HPV: 2015    Last Mammogram: 08/27/2018    Last Dexascan na    Last colorectal screen- type:colonoscopy  date  2017    Do you do self breast exams: Yes    Past Medical History:   Diagnosis Date    Acute pulmonary embolism ([address] 11/2/2015    Anxiety     Asthma     Constipation, chronic     CPAP (continuous positive airway pressure) dependence     Diabetes mellitus ([address]     pt states she was prediabetic at one point    Embolism - blood clot     right lung and right leg    Esophageal reflux     Factor V Leiden ([facility] [address]     Heart murmur     HTN (hypertension)     Hyperlipidemia     Irritable bladder     MTHFR mutation (HCC)     MVP (mitral valve prolapse)     Palpitations     Pulmonary embolism (HCC) 10/15/2012    Rhinitis, allergic 10/15/2012    Sleep apnea     Venous thromboembolism 5/2/2016       Past Surgical History:   Procedure Laterality Date    CATARACT REMOVAL WITH IMPLANT Left 04/02/2018    CHOLECYSTECTOMY      around 2000    COLONOSCOPY  2006    COLONOSCOPY  03/27/2017    -polyp(adenomatous polyp)    MANDIBLE FRACTURE SURGERY  1983    NM COLSC FLEXIBLE W/CONTROL BLEEDING ANY METHOD  3/27/2017    COLONOSCOPY CONTROL HEMORRHAGE performed

## 2019-08-14 LAB — CYTOLOGY REPORT: NORMAL

## 2019-09-05 ENCOUNTER — HOSPITAL ENCOUNTER (OUTPATIENT)
Dept: WOMENS IMAGING | Age: 54
Discharge: HOME | End: 2019-09-07
Payer: COMMERCIAL

## 2019-09-05 DIAGNOSIS — Z01.419 WOMEN'S ANNUAL ROUTINE GYNECOLOGICAL EXAMINATION: ICD-10-CM

## 2019-09-05 PROCEDURE — 77063 BREAST TOMOSYNTHESIS BI: CPT

## 2020-02-20 ENCOUNTER — APPOINTMENT (OUTPATIENT)
Dept: NON INVASIVE DIAGNOSTICS | Age: 55
DRG: 311 | End: 2020-02-20
Attending: FAMILY MEDICINE
Payer: COMMERCIAL

## 2020-02-20 ENCOUNTER — APPOINTMENT (OUTPATIENT)
Dept: GENERAL RADIOLOGY | Age: 55
DRG: 311 | End: 2020-02-20
Attending: FAMILY MEDICINE
Payer: COMMERCIAL

## 2020-02-20 ENCOUNTER — HOSPITAL ENCOUNTER (INPATIENT)
Age: 55
LOS: 1 days | Discharge: HOME | DRG: 311 | End: 2020-02-21
Attending: FAMILY MEDICINE | Admitting: FAMILY MEDICINE
Payer: COMMERCIAL

## 2020-02-20 PROBLEM — R07.9 CHEST PAIN: Status: ACTIVE | Noted: 2020-02-20

## 2020-02-20 PROBLEM — R00.0 TACHYCARDIA: Status: ACTIVE | Noted: 2020-02-20

## 2020-02-20 PROBLEM — I24.9 ACUTE CORONARY SYNDROME (HCC): Status: ACTIVE | Noted: 2020-02-20

## 2020-02-20 LAB
EKG ATRIAL RATE: 113 BPM
EKG P AXIS: 43 DEGREES
EKG P-R INTERVAL: 196 MS
EKG Q-T INTERVAL: 344 MS
EKG QRS DURATION: 124 MS
EKG QTC CALCULATION (BAZETT): 459 MS
EKG R AXIS: -39 DEGREES
EKG T AXIS: 33 DEGREES
EKG VENTRICULAR RATE: 107 BPM
HCT VFR BLD CALC: 44 % (ref 36.3–47.1)
HEMOGLOBIN: 14.5 G/DL (ref 11.9–15.1)
LV EF: 55 %
LVEF MODALITY: NORMAL
MCH RBC QN AUTO: 28.6 PG (ref 25.2–33.5)
MCHC RBC AUTO-ENTMCNC: 33 G/DL (ref 28.4–34.8)
MCV RBC AUTO: 86.8 FL (ref 82.6–102.9)
NRBC AUTOMATED: 0 PER 100 WBC
PDW BLD-RTO: 13.2 % (ref 11.8–14.4)
PLATELET # BLD: 283 K/UL (ref 138–453)
PMV BLD AUTO: 10.1 FL (ref 8.1–13.5)
RBC # BLD: 5.07 M/UL (ref 3.95–5.11)
TROPONIN INTERP: NORMAL
TROPONIN INTERP: NORMAL
TROPONIN T: NORMAL NG/ML
TROPONIN T: NORMAL NG/ML
TROPONIN, HIGH SENSITIVITY: <6 NG/L (ref 0–14)
TROPONIN, HIGH SENSITIVITY: <6 NG/L (ref 0–14)
WBC # BLD: 9.5 K/UL (ref 3.5–11.3)

## 2020-02-20 PROCEDURE — 93306 TTE W/DOPPLER COMPLETE: CPT

## 2020-02-20 PROCEDURE — 84484 ASSAY OF TROPONIN QUANT: CPT

## 2020-02-20 PROCEDURE — 85027 COMPLETE CBC AUTOMATED: CPT

## 2020-02-20 PROCEDURE — G0378 HOSPITAL OBSERVATION PER HR: HCPCS

## 2020-02-20 PROCEDURE — 94760 N-INVAS EAR/PLS OXIMETRY 1: CPT

## 2020-02-20 PROCEDURE — 1200000000 HC SEMI PRIVATE

## 2020-02-20 PROCEDURE — 93005 ELECTROCARDIOGRAM TRACING: CPT | Performed by: FAMILY MEDICINE

## 2020-02-20 PROCEDURE — 71046 X-RAY EXAM CHEST 2 VIEWS: CPT

## 2020-02-20 PROCEDURE — 36415 COLL VENOUS BLD VENIPUNCTURE: CPT

## 2020-02-20 PROCEDURE — 2580000003 HC RX 258: Performed by: FAMILY MEDICINE

## 2020-02-20 PROCEDURE — 6370000000 HC RX 637 (ALT 250 FOR IP): Performed by: FAMILY MEDICINE

## 2020-02-20 PROCEDURE — 94664 DEMO&/EVAL PT USE INHALER: CPT

## 2020-02-20 PROCEDURE — G0379 DIRECT REFER HOSPITAL OBSERV: HCPCS

## 2020-02-20 PROCEDURE — 99253 IP/OBS CNSLTJ NEW/EST LOW 45: CPT | Performed by: FAMILY MEDICINE

## 2020-02-20 PROCEDURE — 93010 ELECTROCARDIOGRAM REPORT: CPT | Performed by: FAMILY MEDICINE

## 2020-02-20 RX ORDER — FAMOTIDINE 10 MG
20 TABLET ORAL 2 TIMES DAILY
Status: DISCONTINUED | OUTPATIENT
Start: 2020-02-20 | End: 2020-02-21 | Stop reason: HOSPADM

## 2020-02-20 RX ORDER — ATORVASTATIN CALCIUM 40 MG/1
40 TABLET, FILM COATED ORAL NIGHTLY
Status: DISCONTINUED | OUTPATIENT
Start: 2020-02-20 | End: 2020-02-21 | Stop reason: HOSPADM

## 2020-02-20 RX ORDER — ACETAMINOPHEN 650 MG/1
650 SUPPOSITORY RECTAL EVERY 6 HOURS PRN
Status: DISCONTINUED | OUTPATIENT
Start: 2020-02-20 | End: 2020-02-21 | Stop reason: HOSPADM

## 2020-02-20 RX ORDER — LOSARTAN POTASSIUM 50 MG/1
50 TABLET ORAL DAILY
Status: DISCONTINUED | OUTPATIENT
Start: 2020-02-21 | End: 2020-02-21 | Stop reason: HOSPADM

## 2020-02-20 RX ORDER — POLYETHYLENE GLYCOL 3350 17 G/17G
17 POWDER, FOR SOLUTION ORAL DAILY PRN
Status: DISCONTINUED | OUTPATIENT
Start: 2020-02-20 | End: 2020-02-21 | Stop reason: HOSPADM

## 2020-02-20 RX ORDER — ASPIRIN 81 MG/1
81 TABLET, CHEWABLE ORAL DAILY
Status: DISCONTINUED | OUTPATIENT
Start: 2020-02-21 | End: 2020-02-21 | Stop reason: HOSPADM

## 2020-02-20 RX ORDER — SODIUM CHLORIDE 0.9 % (FLUSH) 0.9 %
10 SYRINGE (ML) INJECTION EVERY 12 HOURS SCHEDULED
Status: DISCONTINUED | OUTPATIENT
Start: 2020-02-20 | End: 2020-02-21 | Stop reason: HOSPADM

## 2020-02-20 RX ORDER — ASPIRIN 81 MG/1
324 TABLET, CHEWABLE ORAL ONCE
Status: COMPLETED | OUTPATIENT
Start: 2020-02-20 | End: 2020-02-20

## 2020-02-20 RX ORDER — SPIRONOLACTONE 25 MG/1
25 TABLET ORAL DAILY
Status: DISCONTINUED | OUTPATIENT
Start: 2020-02-21 | End: 2020-02-21 | Stop reason: HOSPADM

## 2020-02-20 RX ORDER — CETIRIZINE HYDROCHLORIDE 10 MG/1
10 TABLET ORAL DAILY PRN
Status: DISCONTINUED | OUTPATIENT
Start: 2020-02-20 | End: 2020-02-21 | Stop reason: HOSPADM

## 2020-02-20 RX ORDER — SODIUM CHLORIDE 0.9 % (FLUSH) 0.9 %
10 SYRINGE (ML) INJECTION PRN
Status: DISCONTINUED | OUTPATIENT
Start: 2020-02-20 | End: 2020-02-21 | Stop reason: HOSPADM

## 2020-02-20 RX ORDER — SODIUM CHLORIDE 9 MG/ML
INJECTION, SOLUTION INTRAVENOUS CONTINUOUS
Status: DISCONTINUED | OUTPATIENT
Start: 2020-02-20 | End: 2020-02-21 | Stop reason: HOSPADM

## 2020-02-20 RX ORDER — PROMETHAZINE HYDROCHLORIDE 25 MG/1
12.5 TABLET ORAL EVERY 6 HOURS PRN
Status: DISCONTINUED | OUTPATIENT
Start: 2020-02-20 | End: 2020-02-21 | Stop reason: HOSPADM

## 2020-02-20 RX ORDER — BUDESONIDE AND FORMOTEROL FUMARATE DIHYDRATE 160; 4.5 UG/1; UG/1
2 AEROSOL RESPIRATORY (INHALATION) 2 TIMES DAILY
Status: DISCONTINUED | OUTPATIENT
Start: 2020-02-20 | End: 2020-02-21 | Stop reason: HOSPADM

## 2020-02-20 RX ORDER — ACETAMINOPHEN 325 MG/1
650 TABLET ORAL EVERY 6 HOURS PRN
Status: DISCONTINUED | OUTPATIENT
Start: 2020-02-20 | End: 2020-02-21 | Stop reason: HOSPADM

## 2020-02-20 RX ORDER — PANTOPRAZOLE SODIUM 40 MG/1
40 TABLET, DELAYED RELEASE ORAL
Status: DISCONTINUED | OUTPATIENT
Start: 2020-02-20 | End: 2020-02-21 | Stop reason: HOSPADM

## 2020-02-20 RX ORDER — NITROGLYCERIN 0.4 MG/1
0.4 TABLET SUBLINGUAL EVERY 5 MIN PRN
Status: DISCONTINUED | OUTPATIENT
Start: 2020-02-20 | End: 2020-02-21 | Stop reason: HOSPADM

## 2020-02-20 RX ORDER — ONDANSETRON 2 MG/ML
4 INJECTION INTRAMUSCULAR; INTRAVENOUS EVERY 6 HOURS PRN
Status: DISCONTINUED | OUTPATIENT
Start: 2020-02-20 | End: 2020-02-21 | Stop reason: HOSPADM

## 2020-02-20 RX ORDER — DOXYCYCLINE HYCLATE 100 MG/1
100 CAPSULE ORAL 2 TIMES DAILY
Status: DISCONTINUED | OUTPATIENT
Start: 2020-02-20 | End: 2020-02-21 | Stop reason: HOSPADM

## 2020-02-20 RX ORDER — ALBUTEROL SULFATE 90 UG/1
2 AEROSOL, METERED RESPIRATORY (INHALATION) 4 TIMES DAILY PRN
Status: DISCONTINUED | OUTPATIENT
Start: 2020-02-20 | End: 2020-02-21 | Stop reason: HOSPADM

## 2020-02-20 RX ADMIN — Medication 10 ML: at 11:49

## 2020-02-20 RX ADMIN — SODIUM CHLORIDE: 9 INJECTION, SOLUTION INTRAVENOUS at 11:44

## 2020-02-20 RX ADMIN — ACETAMINOPHEN 650 MG: 325 TABLET, FILM COATED ORAL at 17:51

## 2020-02-20 RX ADMIN — METOPROLOL TARTRATE 25 MG: 25 TABLET ORAL at 11:45

## 2020-02-20 RX ADMIN — FAMOTIDINE 20 MG: 10 TABLET ORAL at 20:39

## 2020-02-20 RX ADMIN — APIXABAN 5 MG: 5 TABLET, FILM COATED ORAL at 20:39

## 2020-02-20 RX ADMIN — ATORVASTATIN CALCIUM 40 MG: 40 TABLET, FILM COATED ORAL at 20:39

## 2020-02-20 RX ADMIN — ASPIRIN 81 MG 324 MG: 81 TABLET ORAL at 11:45

## 2020-02-20 RX ADMIN — FAMOTIDINE 20 MG: 10 TABLET ORAL at 11:45

## 2020-02-20 RX ADMIN — PANTOPRAZOLE SODIUM 40 MG: 40 TABLET, DELAYED RELEASE ORAL at 20:39

## 2020-02-20 ASSESSMENT — PAIN SCALES - GENERAL
PAINLEVEL_OUTOF10: 0
PAINLEVEL_OUTOF10: 6
PAINLEVEL_OUTOF10: 0

## 2020-02-20 ASSESSMENT — PAIN DESCRIPTION - PAIN TYPE: TYPE: ACUTE PAIN

## 2020-02-20 ASSESSMENT — PAIN DESCRIPTION - DESCRIPTORS: DESCRIPTORS: HEADACHE

## 2020-02-20 ASSESSMENT — PAIN DESCRIPTION - LOCATION: LOCATION: HEAD

## 2020-02-20 NOTE — PROGRESS NOTES
hours.    Invalid input(s): CA,  AST,  BILITOT, OSMO   Lab Results   Component Value Date    TRINH 3.9 08/07/2019        Nutrition Risk Level:  Moderate    Nutrient Needs:  · Estimated Daily Total Kcal: 7225-5320  · Estimated Daily Protein (g): 70-80(1.2-1.4)  · Estimated Daily Total Fluid (ml/day): > 1800ml    Nutrition Diagnosis:   · Problem: Predicted suboptimal energy intake  · Etiology: related to Alteration in GI function     Signs and symptoms:  as evidenced by Nausea(constipation, report of excessive belching)    Objective Information:  · Nutrition-Focused Physical Findings: Obese  · Wound Type: None  · Current Nutrition Therapies:  · Oral Diet Orders: Cardiac   · Oral Diet intake: Unable to assess  · Oral Nutrition Supplement (ONS) Orders: None  · ONS intake: Unable to assess  · Anthropometric Measures:  · Ht: 5' 6\" (167.6 cm)   · Current Body Wt: 261 lb 7.5 oz (118.6 kg)  · Admission Body Wt: 261 lb 7.5 oz (118.6 kg)  · Usual Body Wt: 247 lb (112 kg)(on 8/8/19)  · % Weight Change:  ,  Wt gain of 5.3% x 6 months  · Ideal Body Wt: 130 lb (59 kg), % Ideal Body 201%  · BMI Classification: BMI > or equal to 40.0 Obese Class III    Nutrition Interventions:   Continue current diet  Continued Inpatient Monitoring, Education Completed, Coordination of Care    Nutrition Evaluation:   · Evaluation: Goals set   · Goals: PO > 75% of meals    · Monitoring: Meal Intake, Diet Tolerance, Weight, Pertinent Labs, Constipation      Electronically signed by Pawan Alegria RD, [id number] on 2/20/20 at 2:22 PM    Contact Number: [phone]

## 2020-02-20 NOTE — PLAN OF CARE
Problem: Cardiac:  Goal: Ability to maintain an adequate cardiac output will improve  Description  Ability to maintain an adequate cardiac output will improve  Outcome: Ongoing  Note:   Telemetry monitoring per orders. Vitals per routine. Goal: Risk factors for ineffective tissue perfusion will decrease  Description  Risk factors for ineffective tissue perfusion will decrease  Outcome: Ongoing  Note:   Skin pink and warm. Pulses palpable. Problem: Fluid Volume:  Goal: Will show no signs or symptoms of fluid imbalance  Description  Will show no signs or symptoms of fluid imbalance  Outcome: Ongoing  Note:   Monitor intake and output. IV fluids per orders.

## 2020-02-20 NOTE — CONSULTS
Aj Ellison am scribing for and in the presence of Kylee Saldaña. Toy WEISS, MS, F.A.C.C..    Patient: Belem Moreno  : 1965  Date of Admission: 2020  Primary Care Physician: Martha Saunders  Today's Date: 2020    REASON FOR CONSULTATION/CC: Chest discomfort     HPI: I had the pleasure of seeing Ms. Keena Caba today who is a 47 y.o. female with a history of intermittent chest discomfort leading to this consultation. She has felt heart palpitations in the past that has caused her some shortness of breath. The last month she has had a burning feeling between her shoulder blades. It lasts for a few minutes to a couple hours. She is unsure if anything makes it worse. Her breathing is getting better, but she was sick with a stomach bug and upper respirator infection. She is eating and drinking better. She broke out into a sweat around . She is unsure if this is heart related or flu related. No previous heart related history other than a heart murmur. Patient is on Eliquis due to pulmonary embolism. Her brother has a history of bypass surgery, however no other family history. Chest Discomfort: Ms. Keena Caba describes the discomfort as a sharp quality; occurring randomly; of mild-moderate intensity; Associated symptoms: none; Exacerbating factors: none; Remediating factors: none Duration: seconds    She denied any current chest pain, shortness of breath, abdominal pain, bleeding problems, problems with her medications or any other concerns at this time.      Past Medical History:   Diagnosis Date    Acute pulmonary embolism ([address] 2015    Anxiety     Asthma     Constipation, chronic     CPAP (continuous positive airway pressure) dependence     Diabetes mellitus ([address]     pt states she was prediabetic at one point    Embolism - blood clot     right lung and right leg    Esophageal reflux     Factor V Leiden ([address]     Heart murmur     HTN (hypertension)     Hyperlipidemia     Irritable bladder     MTHFR mutation (HCC)     MVP (mitral valve prolapse)     Palpitations     Pulmonary embolism (HCC) 10/15/2012    Rhinitis, allergic 10/15/2012    Sleep apnea     Venous thromboembolism 5/2/2016       CURRENT ALLERGIES: Levaquin [levofloxacin in d5w] and Pcn [penicillins] REVIEW OF SYSTEMS: 14 systems were reviewed. Pertinent positives and negatives as above, all else negative. Past Surgical History:   Procedure Laterality Date    CATARACT REMOVAL WITH IMPLANT Left 04/02/2018    CHOLECYSTECTOMY      around 2000    COLONOSCOPY  2006    COLONOSCOPY  03/27/2017    -polyp(adenomatous polyp)    MANDIBLE FRACTURE SURGERY  1983    MT COLSC FLEXIBLE W/CONTROL BLEEDING ANY METHOD  3/27/2017    COLONOSCOPY CONTROL HEMORRHAGE performed by Nurys Kyle MD at [address] W/RMVL OF TUMOR POLYP LESION SNARE TQ  3/27/2017    COLONOSCOPY POLYPECTOMY SNARE/COLD BIOPSY performed by Nurys Kyle MD at [address] W/O ECP Left 4/2/2018    EYE CATARACT EMULSIFICATION IOL IMPLANT performed by Benita Thomas MD at [facility] ENDOSCOPY  1/2010    with biopsy    Social History:  Social History     Tobacco Use    Smoking status: Never Smoker    Smokeless tobacco: Never Used   Substance Use Topics    Alcohol use:  Yes     Alcohol/week: 0.0 standard drinks     Comment: 1 glass of wine/month    Drug use: No        OUTPATIENT MEDICATIONS:  Outpatient Medications Marked as Taking for the 2/20/20 encounter [facility] Encounter)   Medication Sig Dispense Refill    doxycycline hyclate (VIBRA-TABS) 100 MG tablet Take 1 tablet by mouth 2 times daily for 10 days 20 tablet 0    apixaban (ELIQUIS) 5 MG TABS tablet Take 1 tablet by mouth 2 times daily 180 tablet 1    pantoprazole (PROTONIX) 40 MG tablet Take 1 tablet by mouth daily (Patient taking differently: Take 40 mg by mouth nightly ) 90 tablet 0    spironolactone (ALDACTONE) 25 MG tablet Take 1 tablet by mouth daily 90 tablet 1    budesonide-formoterol (SYMBICORT) 160-4.5 MCG/ACT AERO Inhale 2 puffs into the lungs 2 times daily 1 Inhaler 2    albuterol sulfate HFA (PROAIR HFA) 108 (90 Base) MCG/ACT inhaler Inhale 2 puffs into the lungs 4 times daily as needed for Wheezing or Shortness of Breath 1 Inhaler 0    losartan (COZAAR) 25 MG tablet Take 2 tablets by mouth daily 180 tablet 1    PREVIDENT 5000 BOOSTER PLUS 1.1 % PSTE   0       cetirizine (ZYRTEC) tablet 10 mg, Daily PRN  [START ON 2/21/2020] losartan (COZAAR) tablet 50 mg, Daily  albuterol sulfate  (90 Base) MCG/ACT inhaler 2 puff, 4x Daily PRN  [START ON 2/21/2020] spironolactone (ALDACTONE) tablet 25 mg, Daily  budesonide-formoterol (SYMBICORT) 160-4.5 MCG/ACT inhaler 2 puff, BID  apixaban (ELIQUIS) tablet 5 mg, BID  pantoprazole (PROTONIX) tablet 40 mg, QAM AC  doxycycline hyclate (VIBRAMYCIN) capsule 100 mg, BID  0.9 % sodium chloride infusion, Continuous  sodium chloride flush 0.9 % injection 10 mL, 2 times per day  sodium chloride flush 0.9 % injection 10 mL, PRN  acetaminophen (TYLENOL) tablet 650 mg, Q6H PRN  acetaminophen (TYLENOL) suppository 650 mg, Q6H PRN  polyethylene glycol (GLYCOLAX) packet 17 g, Daily PRN  promethazine (PHENERGAN) tablet 12.5 mg, Q6H PRN  ondansetron (ZOFRAN) injection 4 mg, Q6H PRN  famotidine (PEPCID) tablet 20 mg, BID  atorvastatin (LIPITOR) tablet 40 mg, Nightly  metoprolol tartrate (LOPRESSOR) tablet 25 mg, BID  nitroGLYCERIN (NITROSTAT) SL tablet 0.4 mg, Q5 Min PRN        FAMILY HISTORY: family history includes Breast Cancer in her sister; COPD in her father; Cancer in her father and mother; Deep Vein Thrombosis in her sister and sister; Depression in her mother; Diabetes in her mother; Heart Disease in her father and mother; Heart Failure in her father; High Blood Pressure in her father and mother; Thyroid Disease in her mother.      PHYSICAL EXAM:   BP (!) 163/87   Pulse 101   Temp 98 °F (36.7 °C) (Temporal)   Resp 20   Ht 5' 6\" (1.676 m)   Wt 261 lb 8 oz (118.6 kg)   SpO2 97%   BMI 42.21 kg/m²  Body mass index is 42.21 kg/m². Constitutional: She is oriented to person, place, and time. She appears well-developed and well-nourished. In no acute distress. HEENT: Normocephalic and atraumatic. No JVD present. Carotid bruit is not present. No mass and no thyromegaly present. No lymphadenopathy present. Cardiovascular: Normal rate, regular rhythm, normal heart sounds. Exam reveals no gallop and no friction rubs. 1/6 systolic murmur, 2nd intercostal space on the RIGHT just lateral to the sternum. Pulmonary/Chest: Effort normal and breath sounds normal. No respiratory distress. She has no wheezes, rhonchi or rales. Abdominal: Soft, non-tender. Bowel sounds and aorta are normal. She exhibits no organomegaly, mass or bruit. Extremities: None. No cyanosis or clubbing. 2+ radial and carotid pulses. Distal extremity pulses: 2+ bilaterally. .  Neurological: She is alert and oriented to person, place, and time. No evidence of gross cranial nerve deficit. Coordination appeared normal.   Skin: Skin is warm and dry. There is no rash or diaphoresis. Psychiatric: She has a normal mood and affect.  Her speech is normal and behavior is normal.        MOST RECENT LABS ON RECORD:   Lab Results   Component Value Date    WBC 9.5 02/20/2020    HGB 14.5 02/20/2020    HCT 44.0 02/20/2020     02/20/2020    CHOL 165 03/06/2019    TRIG 89 03/06/2019    HDL 40 (L) 03/06/2019    ALT 14 08/07/2019    AST 17 08/07/2019     08/07/2019    K 4.2 08/07/2019     08/07/2019    CREATININE 0.88 08/07/2019    BUN 14 08/07/2019    CO2 25 08/07/2019    TSH 2.26 08/07/2019    INR 1.8 05/03/2019    LABA1C 5.2 09/08/2012       ASSESSMENT:  Patient Active Problem List    Diagnosis Date Noted    Chest pain 02/20/2020    Acute coronary syndrome ([address] 02/20/2020    Tachycardia

## 2020-02-20 NOTE — PROGRESS NOTES
RESPIRATORY ASSESSMENT PROTOCOL                                                                                              Patient Name: Martha Will Room#: [id number] : 1965     Admitting diagnosis: Acute coronary syndrome ([facility] [I24.9]       Medical History:   Past Medical History:   Diagnosis Date    Acute pulmonary embolism ([facility] 2015    Anxiety     Asthma     Constipation, chronic     CPAP (continuous positive airway pressure) dependence     Diabetes mellitus ([facility]     pt states she was prediabetic at one point    Embolism - blood clot     right lung and right leg    Esophageal reflux     Factor V Leiden ([facility]     Heart murmur     HTN (hypertension)     Hyperlipidemia     Irritable bladder     MTHFR mutation (HCC)     MVP (mitral valve prolapse)     Palpitations     Pulmonary embol[facility] 10/15/2012    Rhinitis, allergic 10/15/2012    Sleep apnea     Venous thromboembolism 2016       PATIENT ASSESSMENT    LABORATORY DATA  Hematology:   Lab Results   Component Value Date    WBC 9.5 2020    RBC 5.07 2020    RBC 4.67 2011    HGB 14.5 2020    HCT 44.0 2020     2020     2011     Chemistry:  No results found for: PHART, ITP3ARY, PO2ART, Y8EZCGZO, DWB6HUT, PBEA    Blood Culture:   Sputum Culture:     VITALS  Pulse: 101   Resp: 20  BP: (!) 163/87  SpO2: 97 % O2 Device: None (Room air)  Temp: 98 °F (36.7 °C)  Comment:   SKIN COLOR  [x] Normal  [] Pale  [] Dusky  [] Cyanotic      RESPIRATORY PATTERN  [x] Normal  [] Dyspnea  [] Cheyne-Gómez  [] Kussmaul  [] Biots  AMBULATORY  [x] Yes  [] No  [] With Assistance    PEAK FLOW  Predicted:     Personal Best:        VITAL CAPACITY  Predicted value:  ml  Actual Value:  ml  30% of Predicted:  ml  Patient Acuity 0 1 2 3 4 Score   Level of Concious (LOC) [x]  Alert & Oriented or Pt normal LOC []  Confused;follows directions []  Confused & uncooper-ative []  Obtunded []  Comatose 0 Respiratory Rate  (RR) [x]  Reg. rate & pattern. 12 - 20 bpm  []  Increased RR. Greater than 20 bpm   []  SOB w/ exertion or RR greater than 24 bpm []  Access- ory muscle use at rest. Abn.  resp. []  SOB at rest.   0   Bilateral Breath Sounds (BBS) [x]  Clear []  Diminish-ed bases  []  Diminish-ed t/o, or rales   []  Sporadic, scattered wheezes or rhonchi []  Persistentwheezes and, or absent BBS 0   Cough [x]  Strong, effective, & non-prod. []  Effective & prod. Less than 25 ml (2 TBSP) over past 24 hrs []  Ineffective & non-prod to less than 25 ML over past 24 hrs []  Ineffective and, or greater than 25 ml sputum prod. past 24 hrs. []  Nonspon- taneous; Requires suctioning 0   Pulmonary History  (PULM HX) []  No smoking and no chronic pulmonaryhistory []  Former smoker. Quit over 12 mos. ago []  Current smoker or quit w/ in 12 mos [x]  Pulm. History and, or 20 pk/yr smoking hx []  Admitted w/ acute pulm. dx and, or has been admitted w/ pulm. dx 2 or more times over past 12 mos 3   Surgical History this Admit  (SURG HX) [x]  No surgery []  General surgery []  Lower abdominal []  Thoracic or upper abdominal   []  Thoracic w/ pulm. disease 0   Chest X-Ray (CXR)/CT Scan [x]  Clear or not applicable []  Not available []  Atelect- asis or pleural effusions []  Localized infiltrate or pulm. edema []  Con-solidated Infiltrates, bilateral, or in more than 1 lobe 0   Slow or Forced VC, FEV1 OR PEFR (PULM FXN)  [x]  80% or greater, or not indicated []  Pt. unable to perform []  FEV1 or PEFR or VC 51-79%.   []  FEV1 or PEFR or VC  30-49%   []  FEV1 or PEFR or VC less than 30%   0   TOTAL ACUITY: 3       CARE PLAN    If Acuity Level is 2, 3, or 4 in any of the following:    [] BILATERAL BREATH SOUNDS (BBS)     [x] PULMONARY HISTORY (PULM HX)  [] PULMONARY FUNCTION (PULM FX)    Goal: Improve respiratory functions in patients with airway disease and decrease WOB    [x] AEROSOL PROTOCOL    Total Acuity:   16-32  []  Secondary

## 2020-02-20 NOTE — H&P
[facility]  History and Physical        Patient:  Adan Strickland  MRN: [id number]    Chief Complaint:  Chest tihgtness,tachycardia and palpitations    History Obtained From:  patient, electronic medical record    PCP: John Keller MD    History of Present Illness: The patient is a 47 y.o. female  With h/o htn, dvt,pe on chronic anticoagulation,deangelo who presents with increasing palpitations associated with elevated bp and feeling of chest tightness starting this am. She has cold symptoms and has been taking doxycycline and rescue inhalor.     Past Medical History:        Diagnosis Date    Acute pulmonary embolism ([facility] 11/2/2015    Anxiety     Asthma     Constipation, chronic     CPAP (continuous positive airway pressure) dependence     Diabetes mellitus ([facility]     pt states she was prediabetic at one point    Embolism - blood clot     right lung and right leg    Esophageal reflux     Factor V Leiden ([facility]     Heart murmur     HTN (hypertension)     Hyperlipidemia     Irritable bladder     MTHFR mutation (HCC)     MVP (mitral valve prolapse)     Palpitations     Pulmonary embolism (HCC) 10/15/2012    Rhinitis, allergic 10/15/2012    Sleep apnea     Venous thromboembolism 5/2/2016       Past Surgical History:        Procedure Laterality Date    CATARACT REMOVAL WITH IMPLANT Left 04/02/2018    CHOLECYSTECTOMY      around 2000    COLONOSCOPY  2006    COLONOSCOPY  03/27/2017    -polyp(adenomatous polyp)    MANDIBLE FRACTURE SURGERY  1983    FL COLSC FLEXIBLE W/CONTROL BLEEDING ANY METHOD  3/27/2017    COLONOSCOPY CONTROL HEMORRHAGE performed by Alexander Hernandez MD at [address] [place] [address] W/RMVL OF TUMOR POLYP LESION SNARE TQ  3/27/2017    COLONOSCOPY POLYPECTOMY SNARE/COLD BIOPSY performed by Alexander Hernandez MD at [facility] IO LENS PROSTH W/O ECP Left 4/2/2018    EYE CATARACT EMULSIFICATION IOL IMPLANT performed by Luisa Anaya MD at [address] GASTROINTESTINAL ENDOSCOPY  1/2010    with biopsy       Family History:       Problem Relation Age of Onset   [facility] Cancer Mother         breast ca    Diabetes Mother     Depression Mother     Thyroid Disease Mother     High Blood Pressure Mother     Heart Disease Mother     Cancer Father         prostate ca    COPD Father     Heart Failure Father     Heart Disease Father     High Blood Pressure Father     Deep Vein Thrombosis Sister     Breast Cancer Sister     Deep Vein Thrombosis Sister        Social History:   TOBACCO:   reports that she has never smoked. She has never used smokeless tobacco.  ETOH:   reports current alcohol use. OCCUPATION: sedentary    Allergies:  Levaquin [levofloxacin in d5w] and Pcn [penicillins]    Medications Prior to Admission:    Prior to Admission medications    Medication Sig Start Date End Date Taking?  Authorizing Provider   doxycycline hyclate (VIBRA-TABS) 100 MG tablet Take 1 tablet by mouth 2 times daily for 10 days 2/14/20 2/24/20 Yes Pallavi Levine MD   apixaban (ELIQUIS) 5 MG TABS tablet Take 1 tablet by mouth 2 times daily 2/10/20  Yes Pallavi Levine MD   pantoprazole (PROTONIX) 40 MG tablet Take 1 tablet by mouth daily  Patient taking differently: Take 40 mg by mouth nightly  2/10/20  Yes Pallavi Levine MD   spironolactone (ALDACTONE) 25 MG tablet Take 1 tablet by mouth daily 1/6/20  Yes Pallavi Levine MD   budesonide-formoterol (SYMBICORT) 160-4.5 MCG/ACT AERO Inhale 2 puffs into the lungs 2 times daily 1/6/20 4/7/20 Yes Pallavi Levine MD   albuterol sulfate HFA (PROAIR HFA) 108 (90 Base) MCG/ACT inhaler Inhale 2 puffs into the lungs 4 times daily as needed for Wheezing or Shortness of Breath 9/4/19  Yes Arti Burden MD   losartan (COZAAR) 25 MG tablet Take 2 tablets by mouth daily 4/29/19  Yes Pallavi Levine MD   PREVIDENT 5000 BOOSTER PLUS 1.1 % PSTE  3/12/15  Yes Historical Provider, MD cetirizine (ZYRTEC) 10 MG tablet Take 10 mg by mouth daily. Historical Provider, MD       Review of Systems:  Constitutional:negative  for fevers, and negative for chills. Eyes: negative for visual disturbance   ENT: negative for sore throat, positive nasal congestion, and negative for earache  Respiratory: positive for shortness of breath, positive for cough, and negative for wheezing  Cardiovascular: positive for chest pain, positive for palpitations, and negative for syncope  Gastrointestinal: negative for abdominal pain, negative for nausea,negative for vomiting, negative for diarrhea, negative for constipation, and negative for hematochezia or melena  Genitourinary: negative for dysuria, negative for urinary urgency, negative for urinary frequency, and negative for hematuria  Skin: negative for skin rash, and negative for skin lesions  Neurological: negative for unilateral weakness, numbness or tingling. Physical Exam:    Vitals:   Vitals:    02/20/20 1144   BP: (!) 163/87   Pulse: 101   Resp:    Temp:    SpO2:      Weight: 261 lb 8 oz (118.6 kg)   Height: 5' 6\" (167.6 cm)     GEN:  alert and oriented to person, place and time, well-developed and well-nourished, in no acute distress  EYES: No gross abnormalities.  and PERRL  NECK: normal, supple, no lymphadenopathy,  no carotid bruits  PULM: clear to auscultation bilaterally- no wheezes, rales or rhonchi, normal air movement, no respiratory distress  COR: no murmurs, no gallops and tachycardia  ABD:  soft, non-tender, non-distended, normal bowel sounds, no masses or organomegaly  EXT:   no cyanosis, clubbing or edema present    NEURO: follows commands, FONTANA, no deficits  SKIN:  no rashes or significant lesions  -----------------------------------------------------------------  Diagnostic Data:   Lab Results   Component Value Date    WBC 9.5 02/20/2020    HGB 14.5 02/20/2020     02/20/2020       Lab Results   Component Value Date    BUN 14 08/07/2019    CREATININE 0.88 08/07/2019     08/07/2019    K 4.2 08/07/2019    CALCIUM 9.0 08/07/2019     08/07/2019    CO2 25 08/07/2019    LABGLOM >60 08/07/2019       No results found for: Chen Morehouse, EPITHUA, LEUKOCYTESUR, SPECGRAV, GLUCOSEU, KETUA, PROTEINU, HGBUR, CASTUA, CRYSTUA, BACTERIA, YEAST    Lab Results   Component Value Date    TROPONINT NOT REPORTED 02/20/2020       Xr Chest Standard (2 Vw)    Result Date: 2/20/2020  EXAMINATION: TWO XRAY VIEWS OF THE CHEST 2/20/2020 10:47 am COMPARISON: 09/07/2012 HISTORY: ORDERING SYSTEM PROVIDED HISTORY: chestpain TECHNOLOGIST PROVIDED HISTORY: If not done in er chestpain FINDINGS: Trachea is essentially midline. No lung infiltrate or consolidation. No pneumothorax or pleural effusion. Heart size is normal.     No acute abnormality. Assessment:    Principal Problem:    Acute coronary syndrome (HCC)  Active Problems:    Hyperlipidemia    Sleep apnea, obstructive    DVT, lower extremity, distal, chronic (HCC)    Essential hypertension    Tachycardia  Resolved Problems:    * No resolved hospital problems.  *      Patient Active Problem List    Diagnosis Date Noted    Chest pain 02/20/2020    Acute coronary syndrome ([facility] 02/20/2020    Tachycardia 02/20/2020    ASHTYN (generalized anxiety disorder) 06/20/2018    Colon polyp     Venous thromboembolism 05/02/2016    Long term current use of anticoagulant therapy 04/20/2016    Acute pulmonary embolism ([address] 11/02/2015    Essential hypertension 08/26/2015    DVT, lower extremity, distal, chronic ([facility] 04/20/2015    Sleep apnea, obstructive 03/02/2015    Hyperlipidemia     Thrombophilia ([facility] 07/31/2013    Constipation 05/06/2013    Obesity 05/06/2013    Bronchial asthma 10/15/2012    Sleep apnea 10/15/2012    Rhinitis, allergic 10/15/2012       Plan:     · This patient requires inpatient admission because of acute coronary syndrome requiring cardiac monitoring,ekg,troponis   · Factors

## 2020-02-21 ENCOUNTER — APPOINTMENT (OUTPATIENT)
Dept: NON INVASIVE DIAGNOSTICS | Age: 55
DRG: 311 | End: 2020-02-21
Attending: FAMILY MEDICINE
Payer: COMMERCIAL

## 2020-02-21 VITALS
DIASTOLIC BLOOD PRESSURE: 59 MMHG | TEMPERATURE: 97.4 F | HEART RATE: 70 BPM | RESPIRATION RATE: 16 BRPM | BODY MASS INDEX: 41.91 KG/M2 | SYSTOLIC BLOOD PRESSURE: 135 MMHG | OXYGEN SATURATION: 96 % | HEIGHT: 66 IN | WEIGHT: 260.8 LBS

## 2020-02-21 LAB
CHOLESTEROL/HDL RATIO: 3.8
CHOLESTEROL: 122 MG/DL
HCT VFR BLD CALC: 41.4 % (ref 36.3–47.1)
HDLC SERPL-MCNC: 32 MG/DL
HEMOGLOBIN: 13.5 G/DL (ref 11.9–15.1)
LDL CHOLESTEROL: 77 MG/DL (ref 0–130)
MCH RBC QN AUTO: 28.8 PG (ref 25.2–33.5)
MCHC RBC AUTO-ENTMCNC: 32.6 G/DL (ref 28.4–34.8)
MCV RBC AUTO: 88.5 FL (ref 82.6–102.9)
NRBC AUTOMATED: 0 PER 100 WBC
PDW BLD-RTO: 13.6 % (ref 11.8–14.4)
PLATELET # BLD: 245 K/UL (ref 138–453)
PMV BLD AUTO: 9.9 FL (ref 8.1–13.5)
RBC # BLD: 4.68 M/UL (ref 3.95–5.11)
TRIGL SERPL-MCNC: 65 MG/DL
VLDLC SERPL CALC-MCNC: ABNORMAL MG/DL (ref 1–30)
WBC # BLD: 8 K/UL (ref 3.5–11.3)

## 2020-02-21 PROCEDURE — 6370000000 HC RX 637 (ALT 250 FOR IP): Performed by: FAMILY MEDICINE

## 2020-02-21 PROCEDURE — 36415 COLL VENOUS BLD VENIPUNCTURE: CPT

## 2020-02-21 PROCEDURE — 99233 SBSQ HOSP IP/OBS HIGH 50: CPT | Performed by: FAMILY MEDICINE

## 2020-02-21 PROCEDURE — 2580000003 HC RX 258: Performed by: FAMILY MEDICINE

## 2020-02-21 PROCEDURE — 80061 LIPID PANEL: CPT

## 2020-02-21 PROCEDURE — G0378 HOSPITAL OBSERVATION PER HR: HCPCS

## 2020-02-21 PROCEDURE — 94640 AIRWAY INHALATION TREATMENT: CPT

## 2020-02-21 PROCEDURE — 85027 COMPLETE CBC AUTOMATED: CPT

## 2020-02-21 PROCEDURE — 93017 CV STRESS TEST TRACING ONLY: CPT

## 2020-02-21 PROCEDURE — 78452 HT MUSCLE IMAGE SPECT MULT: CPT

## 2020-02-21 PROCEDURE — 3430000000 HC RX DIAGNOSTIC RADIOPHARMACEUTICAL: Performed by: FAMILY MEDICINE

## 2020-02-21 PROCEDURE — A9500 TC99M SESTAMIBI: HCPCS | Performed by: FAMILY MEDICINE

## 2020-02-21 RX ORDER — LOSARTAN POTASSIUM 25 MG/1
25 TABLET ORAL DAILY
Qty: 90 TABLET | Refills: 1 | Status: SHIPPED
Start: 2020-02-21 | End: 2020-02-27 | Stop reason: SINTOL

## 2020-02-21 RX ORDER — ASPIRIN 81 MG/1
81 TABLET, CHEWABLE ORAL DAILY
Qty: 30 TABLET | Refills: 3 | Status: ON HOLD | OUTPATIENT
Start: 2020-02-22 | End: 2021-07-12 | Stop reason: ALTCHOICE

## 2020-02-21 RX ADMIN — Medication 10 MILLICURIE: at 08:55

## 2020-02-21 RX ADMIN — SODIUM CHLORIDE: 9 INJECTION, SOLUTION INTRAVENOUS at 06:53

## 2020-02-21 RX ADMIN — ACETAMINOPHEN 650 MG: 325 TABLET, FILM COATED ORAL at 08:43

## 2020-02-21 RX ADMIN — APIXABAN 5 MG: 5 TABLET, FILM COATED ORAL at 08:43

## 2020-02-21 RX ADMIN — Medication 30 MILLICURIE: at 10:58

## 2020-02-21 RX ADMIN — BUDESONIDE AND FORMOTEROL FUMARATE DIHYDRATE 2 PUFF: 160; 4.5 AEROSOL RESPIRATORY (INHALATION) at 09:58

## 2020-02-21 ASSESSMENT — PAIN DESCRIPTION - LOCATION
LOCATION: HEAD
LOCATION: HEAD

## 2020-02-21 ASSESSMENT — PAIN DESCRIPTION - DESCRIPTORS
DESCRIPTORS: HEADACHE
DESCRIPTORS: NAGGING;HEADACHE

## 2020-02-21 ASSESSMENT — PAIN DESCRIPTION - PAIN TYPE
TYPE: ACUTE PAIN
TYPE: ACUTE PAIN

## 2020-02-21 ASSESSMENT — PAIN SCALES - GENERAL
PAINLEVEL_OUTOF10: 4
PAINLEVEL_OUTOF10: 4
PAINLEVEL_OUTOF10: 5

## 2020-02-21 NOTE — PROCEDURES
sestamibi and  exercise was continued for 1 minute. Gating post-stress tomographic  imaging was performed 30-45 minutes after stress. STRESS ECG RESULTS:  The resting electrocardiogram demonstrated normal  sinus rhythm without definitive ST-segment abnormalities suggestive of  myocardial ischemia. At peak exercise and during recovery, the patient developed:    No significant ST segment changes suggestive of myocardial ischemia with  no premature atrial contractions (PACs) and no premature ventricular  contractions (PVCs). NUCLEAR IMAGING RESULTS:  The overall quality of the study is fair. Mild/moderate attenuation artifact was seen. There is no evidence of  abnormal lung uptake. Additionally, the right ventricle appears normal.  The left ventricular cavity is noted to be normal in size on the stress  images. There is no evidence of transient ischemic dilatation (TID) of  the left ventricle. Gated SPECT imaging reveals normal myocardial thickening and wall motion  with a calculated left ventricular ejection fraction of 69%. The rest images demonstrated a moderate/large perfusion abnormality of  mild/moderate intensity in the apical, anteroapical region(s) which is  most likely due to artifact. On stress imaging, a small/moderate perfusion abnormality of mild  intensity was noted in the apical region(s) which is most likely due to  artifact. IMPRESSION:  1. Most likely normal myocardial perfusion imaging with soft tissue  artifact, but without evidence of significant myocardial ischemia or  infarction. 2.  Global left ventricular systolic function was normal with an EF of  69% without regional wall motion abnormalities. 3.  No significant electrocardiographic evidence of myocardial ischemia  during EKG monitoring without significant associated arrhythmias. The patient's Duke Treadmill score is 8, which correlates with a low  risk for significant coronary artery disease.     Overall, these results are most consistent with a low risk for  significant coronary artery disease. JAZMINE MCNAMARA    D: 02/21/2020 14:23:38       T: 02/21/2020 14:24:18     BERRY/JANICE  Job#: [id number]     Doc#: Unknown    CC:  Jessica Raphael

## 2020-02-21 NOTE — PROGRESS NOTES
Discussed discharge plans with the patient. Patient is a 47year old female here with acute coronary syndrome requiring cardiac monitoring,EKG,troponin . She is alert and oriented. Patient is  and lives at home with her . She has a C-pap machine. Patient does the cooking and the cleaning. She is independent with her ADL's. Patient manages her own medications and drives. Her PCP is Dr. Gracie Graf. She has medical insurance that helps with medication costs. Patient works a full time job. The discharge plan is home with no services. Patient does not have advance directives but has the information to get them done. LSW to monitor and assist with any needs or concerns as they arise.     Yobani File, CHANCE

## 2020-02-21 NOTE — CARE COORDINATION
Explained patients right to have family, representative or physician notified of their admission. Patient has Declined for physician to be notified. Patient has Declined for family/representative to be notified. Dr Elías Martinez is PCP and Attending. Family is aware of admission.   NETTA Huynh RN

## 2020-02-21 NOTE — DISCHARGE SUMMARY
Physician Discharge Summary  Lianet King MD       Patient ID:  Shani Gutierrez  [id number]  1965    Admission date: 2/20/2020    Discharge date: 2/21/2020     Admitting Physician: Bernabe Olszewski, MD     Primary Care Physician: Bernabe Olszewski, MD     Primary Discharge Diagnoses:   Patient Active Problem List    Diagnosis Date Noted    Chest pain 02/20/2020    Acute coronary syndrome ([address] 02/20/2020    Tachycardia 02/20/2020    ASHTYN (generalized anxiety disorder) 06/20/2018    Colon polyp     Venous thromboembolism 05/02/2016    Long term current use of anticoagulant therapy 04/20/2016    Acute pulmonary embolism ([address] 11/02/2015    Essential hypertension 08/26/2015    DVT, lower extremity, distal, chronic ([address] 04/20/2015    Sleep apnea, obstructive 03/02/2015    Hyperlipidemia     Thrombophilia [facility] 07/31/2013    Constipation 05/06/2013    Obesity 05/06/2013    Bronchial asthma 10/15/2012    Sleep apnea 10/15/2012    Rhinitis, allergic 10/15/2012       Additional Diagnoses:       Diagnosis Date    Acute pulmonary embolism ([address] 11/2/2015    Anxiety     Asthma     Constipation, chronic     CPAP (continuous positive airway pressure) dependence     Diabetes mellitus ([address]     pt states she was prediabetic at one point    Embolism - blood clot     right lung and right leg    Esophageal reflux     Factor V Leiden ([address]     Heart murmur     HTN (hypertension)     Hyperlipidemia     Irritable bladder     MTHFR mutation (HCC)     MVP (mitral valve prolapse)     Palpitations     Pulmonary embolism ([facility] 10/15/2012    Rhinitis, allergic 10/15/2012    Sleep apnea     Venous thromboembolism 5/2/2016       Physical exam:      -----------------------------------------------------------------  Exam:  GEN:   A & O x3, no apparent distress  EYES: No gross abnormalities.   NECK: normal, supple, no lymphadenopathy,  no carotid bruits  PULM: clear to auscultation bilaterally- no wheezes, pantoprazole (PROTONIX) 40 MG tablet  Take 1 tablet by mouth daily             PREVIDENT 5000 BOOSTER PLUS 1.1 % PSTE               spironolactone (ALDACTONE) 25 MG tablet  Take 1 tablet by mouth daily                 · Resume all home medications unless otherwise directed  · Add metoprolol 12.5mg bid  · Stop taking     Patient Instructions:   · Activity: activity as tolerated  · Diet: low fat, low cholesterol diet  · Wound Care: none needed  · Other:   · Follow up with Dr Maricruz Norris  ·  in 5 as directed      Time Spent on discharge services is 25 minutes in the examination, evaluation, counseling and review of medications and discharge plan.     Signed:  Maricruz Norris M.D.  2/21/2020  5:24 PM

## 2020-02-21 NOTE — PROGRESS NOTES
Progress Note  Lianet King MD    SUBJECTIVE: Patient is seen for Principal Problem:    Acute coronary syndrome ([address]  Active Problems:    Hyperlipidemia    Sleep apnea, obstructive    DVT, lower extremity, distal, chronic (HCC)    Essential hypertension    Tachycardia  Resolved Problems:    * No resolved hospital problems. *     no chest pain,palpitationor shortness of breath    OBJECTIVE:    Vitals:   Vitals:    02/21/20 0630   BP: (!) 135/59   Pulse: 70   Resp: 16   Temp: 97.4 °F (36.3 °C)   SpO2: 96%     Weight: 260 lb 12.8 oz (118.3 kg)   Height: 5' 6\" (167.6 cm)   -----------------------------------------------------------------  Exam:    CONSTITUTIONAL:  awake, alert, cooperative, no apparent distress, and appears stated age  EYES:  Lids and lashes normal, pupils equal, round and reactive to light, extra ocular muscles intact, sclera clear, conjunctiva normal  ENT:  Normocephalic, without obvious abnormality, atraumatic, sinuses nontender on palpation, external ears without lesions, oral pharynx with moist mucus membranes, tonsils without erythema or exudates, gums normal and good dentition. NECK:  Supple, symmetrical, trachea midline, no adenopathy, thyroid symmetric, not enlarged and no tenderness, skin normal  HEMATOLOGIC/LYMPHATICS:  no cervical lymphadenopathy  LUNGS:  No increased work of breathing, good air exchange, clear to auscultation bilaterally, no crackles or wheezing  CARDIOVASCULAR:  Normal apical impulse, regular rate and rhythm, normal S1 and S2, no S3 or S4, and no murmur noted  ABDOMEN:  No scars, normal bowel sounds, soft, non-distended, non-tender, no masses palpated, no hepatosplenomegally    MUSCULOSKELETAL:  there is no redness, warmth, or swelling of the joints  NEUROLOGIC:  Awake, alert, oriented to name, place and time. Cranial nerves II-XII are grossly intact. Motor is 5 out of 5 bilaterally. Cerebellar finger to nose, heel to shin intact. Sensory is intact.   Babinski down going, Romberg negative, and gait is normal.  SKIN:  no bruising or bleeding  EXT:     no cyanosis, clubbing or edema present    -----------------------------------------------------------------  Diagnostic Data: Reviewed    More Recent Labs:    Results for orders placed or performed during the hospital encounter of 02/20/20   Troponin   Result Value Ref Range    Troponin, High Sensitivity <6 0 - 14 ng/L    Troponin T NOT REPORTED <0.03 ng/mL    Troponin Interp NOT REPORTED    Troponin   Result Value Ref Range    Troponin, High Sensitivity <6 0 - 14 ng/L    Troponin T NOT REPORTED <0.03 ng/mL    Troponin Interp NOT REPORTED    CBC   Result Value Ref Range    WBC 9.5 3.5 - 11.3 k/uL    RBC 5.07 3.95 - 5.11 m/uL    Hemoglobin 14.5 11.9 - 15.1 g/dL    Hematocrit 44.0 36.3 - 47.1 %    MCV 86.8 82.6 - 102.9 fL    MCH 28.6 25.2 - 33.5 pg    MCHC 33.0 28.4 - 34.8 g/dL    RDW 13.2 11.8 - 14.4 %    Platelets 271 537 - 469 k/uL    MPV 10.1 8.1 - 13.5 fL    NRBC Automated 0.0 0.0 per 100 WBC   Lipid panel - fasting   Result Value Ref Range    Cholesterol 122 <200 mg/dL    HDL 32 (L) >40 mg/dL    LDL Cholesterol 77 0 - 130 mg/dL    Chol/HDL Ratio 3.8 <5    Triglycerides 65 <150 mg/dL    VLDL NOT REPORTED 1 - 30 mg/dL   CBC   Result Value Ref Range    WBC 8.0 3.5 - 11.3 k/uL    RBC 4.68 3.95 - 5.11 m/uL    Hemoglobin 13.5 11.9 - 15.1 g/dL    Hematocrit 41.4 36.3 - 47.1 %    MCV 88.5 82.6 - 102.9 fL    MCH 28.8 25.2 - 33.5 pg    MCHC 32.6 28.4 - 34.8 g/dL    RDW 13.6 11.8 - 14.4 %    Platelets 350 705 - 759 k/uL    MPV 9.9 8.1 - 13.5 fL    NRBC Automated 0.0 0.0 per 100 WBC   EKG 12 lead   Result Value Ref Range    Ventricular Rate 107 BPM    Atrial Rate 113 BPM    P-R Interval 196 ms    QRS Duration 124 ms    Q-T Interval 344 ms    QTc Calculation (Bazett) 459 ms    P Axis 43 degrees    R Axis -39 degrees    T Axis 33 degrees       ASSESSMENT:   Patient Active Problem List    Diagnosis Date Noted    Chest pain 02/20/2020  Acute coronary syndrome ([facility] 02/20/2020    Tachycardia 02/20/2020    ASHTYN (generalized anxiety disorder) 06/20/2018    Colon polyp     Venous thromboembolism 05/02/2016    Long term current use of anticoagulant therapy 04/20/2016    Acute pulmonary embolism ([facility] 11/02/2015    Essential hypertension 08/26/2015    DVT, lower extremity, distal, chronic ([facility] 04/20/2015    Sleep apnea, obstructive 03/02/2015    Hyperlipidemia     Thrombophilia ([facility] 07/31/2013    Constipation 05/06/2013    Obesity 05/06/2013    Bronchial asthma 10/15/2012    Sleep apnea 10/15/2012    Rhinitis, allergic 10/15/2012         PLAN:  · Await stress test  · Monitor bp and heart rate      Fernando Spangler M.D.

## 2020-02-22 ENCOUNTER — CARE COORDINATION (OUTPATIENT)
Dept: CASE MANAGEMENT | Age: 55
End: 2020-02-22

## 2020-02-22 NOTE — PROGRESS NOTES
Marlon Carey am scribing for and in the presence of Dragan Franklin. Toy WEISS, MS, F.A.C.C..    Patient: Julian So  : 1965  Date of Admission: 2020  Primary Care Physician: Lilo Welch  Today's Date: 2020    REASON FOR CONSULTATION/CC: Chest discomfort     HPI: I had the pleasure of seeing Ms. Nelly Gutierrez today who is a 47 y.o. female with a history of intermittent chest discomfort leading to this consultation. She has felt heart palpitations in the past that has caused her some shortness of breath. The last month she has had a burning feeling between her shoulder blades. It lasts for a few minutes to a couple hours. She is unsure if anything makes it worse. Her breathing is getting better, but she was sick with a stomach bug and upper respirator infection. She is eating and drinking better. She broke out into a sweat around . She is unsure if this is heart related or flu related. No previous heart related history other than a heart murmur. Patient is on Eliquis due to pulmonary embolism. Her brother has a history of bypass surgery, however no other family history. Ms. Nelly Gutierrez reports that she has not and any repeat chest pain since admission. She denied any current chest pain, shortness of breath, abdominal pain, bleeding problems, problems with her medications or any other concerns at this time.      Past Medical History:   Diagnosis Date    Acute pulmonary embolism ([address] 2015    Anxiety     Asthma     Constipation, chronic     CPAP (continuous positive airway pressure) dependence     Diabetes mellitus ([address]     pt states she was prediabetic at one point    Embolism - blood clot     right lung and right leg    Esophageal reflux     Factor V Leiden ([address]     Heart murmur     HTN (hypertension)     Hyperlipidemia     Irritable bladder     MTHFR mutation (HCC)     MVP (mitral valve prolapse)     Palpitations     Pulmonary embolism ([address] 10/15/2012    Rhinitis, 90 tablet 0    spironolactone (ALDACTONE) 25 MG tablet Take 1 tablet by mouth daily 90 tablet 1    budesonide-formoterol (SYMBICORT) 160-4.5 MCG/ACT AERO Inhale 2 puffs into the lungs 2 times daily 1 Inhaler 2    albuterol sulfate HFA (PROAIR HFA) 108 (90 Base) MCG/ACT inhaler Inhale 2 puffs into the lungs 4 times daily as needed for Wheezing or Shortness of Breath 1 Inhaler 0    PREVIDENT 5000 BOOSTER PLUS 1.1 % PSTE   0       No current facility-administered medications for this encounter. FAMILY HISTORY: family history includes Breast Cancer in her sister; COPD in her father; Cancer in her father and mother; Deep Vein Thrombosis in her sister and sister; Depression in her mother; Diabetes in her mother; Heart Disease in her father and mother; Heart Failure in her father; High Blood Pressure in her father and mother; Thyroid Disease in her mother. PHYSICAL EXAM:   BP (!) 135/59   Pulse 70   Temp 97.4 °F (36.3 °C) (Temporal)   Resp 16   Ht 5' 6\" (1.676 m)   Wt 260 lb 12.8 oz (118.3 kg)   SpO2 96%   BMI 42.09 kg/m²  Body mass index is 42.09 kg/m². Constitutional: She is oriented to person, place, and time. She appears well-developed and well-nourished. In no acute distress. HEENT: Normocephalic and atraumatic. No JVD present. Carotid bruit is not present. No mass and no thyromegaly present. No lymphadenopathy present. Cardiovascular: Normal rate, regular rhythm, normal heart sounds. Exam reveals no gallop and no friction rubs. 1/6 systolic murmur, 2nd intercostal space on the RIGHT just lateral to the sternum. Pulmonary/Chest: Effort normal and breath sounds normal. No respiratory distress. She has no wheezes, rhonchi or rales. Abdominal: Soft, non-tender. Bowel sounds and aorta are normal. She exhibits no organomegaly, mass or bruit. Extremities: None. No cyanosis or clubbing. 2+ radial and carotid pulses. Distal extremity pulses: 2+ bilaterally. .  Neurological: She is alert and oriented to person, place, and time. No evidence of gross cranial nerve deficit. Coordination appeared normal.   Skin: Skin is warm and dry. There is no rash or diaphoresis. Psychiatric: She has a normal mood and affect.  Her speech is normal and behavior is normal.        MOST RECENT LABS ON RECORD:   Lab Results   Component Value Date    WBC 8.0 02/21/2020    HGB 13.5 02/21/2020    HCT 41.4 02/21/2020     02/21/2020    CHOL 122 02/21/2020    TRIG 65 02/21/2020    HDL 32 (L) 02/21/2020    ALT 14 08/07/2019    AST 17 08/07/2019     08/07/2019    K 4.2 08/07/2019     08/07/2019    CREATININE 0.88 08/07/2019    BUN 14 08/07/2019    CO2 25 08/07/2019    TSH 2.26 08/07/2019    INR 1.8 05/03/2019    LABA1C 5.2 09/08/2012       ASSESSMENT:  Patient Active Problem List    Diagnosis Date Noted    Chest pain 02/20/2020     Priority: Low    Acute coronary syndrome ([facility] 02/20/2020     Priority: Low    Tachycardia 02/20/2020     Priority: Low    ASHTYN (generalized anxiety disorder) 06/20/2018     Priority: Low    Colon polyp      Priority: Low    Venous thromboembolism 05/02/2016     Priority: Low    Long term current use of anticoagulant therapy 04/20/2016     Priority: Low    Acute pulmonary embolism ([facility] 11/02/2015     Priority: Low    Essential hypertension 08/26/2015     Priority: Low    DVT, lower extremity, distal, chronic ([facility] 04/20/2015     Priority: Low    Sleep apnea, obstructive 03/02/2015     Priority: Low    Hyperlipidemia      Priority: Low    Thrombophilia ([facility] 07/31/2013     Priority: Low    Constipation 05/06/2013     Priority: Low    Obesity 05/06/2013     Priority: Low    Bronchial asthma 10/15/2012     Priority: Low    Sleep apnea 10/15/2012     Priority: Low    Rhinitis, allergic 10/15/2012     Priority: Low        PLAN:  Most likely non cardiac chest pain: Suspect that this is due to hypertensive urgency  Despite the patient's intermittent chest discomfort, I

## 2020-02-22 NOTE — CARE COORDINATION
[facility] Transitions Initial Follow Up Call    Call within 2 business days of discharge: Yes    Patient: Gucci Azevedo Patient : 1965   MRN: <[id number]>  Reason for Admission:   Discharge Date: 20 RARS: Readmission Risk Score: 9      Last Discharge [facility]       Complaint Diagnosis Description Type Department Provider    20   Admission (Discharged) Brianda Borges MD           Spoke with: [address]: [facility]    Non-face-to-face services provided:  Obtained and reviewed discharge summary and/or continuity of care documents  Assessment and support for treatment adherence and medication management-reviewed discharge instructions and medications, 1111F order completed//ALEX   Writer spoke to patient, she stated she is doing well, no needs or concerns, reviewed discharge instructions and medications 1111F order completed, has TCM appointment scheduled for 2020, is going back to work on Monday, 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, doesn't want any more care transitions calls at this time, writer provided contact information and encouraged her to call with any needs, care transitions completed//ALEX    Care Transitions 24 Hour Call    Do you have any ongoing symptoms?:  No  Do you have a copy of your discharge instructions?:  Yes  Do you have all of your prescriptions and are they filled?:  Yes  Have you scheduled your follow up appointment?:  Yes  Were you discharged with any Home Care or Post Acute Services:  No  Do you feel like you have everything you need to keep you well at home?:  Yes  Care Transitions Interventions                                 Follow Up  Future Appointments   Date Time Provider Jael Reid   2020  8:30 AM MD Selina Avelar   3/18/2020  8:45 AM MD mikal Gutierrez [facility]   2020  8:45 AM MD Selina Avelar   2020  8:00 AM ZACARIAS Arroyo CNM OB/[facility]N [facility]       Tank Szymanski RN

## 2020-03-10 ENCOUNTER — HOSPITAL ENCOUNTER (OUTPATIENT)
Age: 55
Discharge: HOME | End: 2020-03-10
Payer: COMMERCIAL

## 2020-03-10 LAB — HOMOCYSTEINE: 8.4 UMOL/L

## 2020-03-10 PROCEDURE — 83090 ASSAY OF HOMOCYSTEINE: CPT

## 2020-03-10 PROCEDURE — 36415 COLL VENOUS BLD VENIPUNCTURE: CPT

## 2020-05-06 ENCOUNTER — OFFICE VISIT (OUTPATIENT)
Dept: ONCOLOGY | Age: 55
End: 2020-05-06
Payer: COMMERCIAL

## 2020-05-06 VITALS
SYSTOLIC BLOOD PRESSURE: 143 MMHG | HEART RATE: 67 BPM | DIASTOLIC BLOOD PRESSURE: 90 MMHG | TEMPERATURE: 97.8 F | RESPIRATION RATE: 18 BRPM | WEIGHT: 267 LBS | BODY MASS INDEX: 42.91 KG/M2 | HEIGHT: 66 IN

## 2020-05-06 PROCEDURE — 99213 OFFICE O/P EST LOW 20 MIN: CPT | Performed by: INTERNAL MEDICINE

## 2020-07-24 ENCOUNTER — EMPLOYEE WELLNESS (OUTPATIENT)
Dept: OTHER | Age: 55
End: 2020-07-24

## 2020-07-24 LAB
CHOLESTEROL/HDL RATIO: 4.4
CHOLESTEROL: 181 MG/DL
ESTIMATED AVERAGE GLUCOSE: 117 MG/DL
GLUCOSE BLD-MCNC: 126 MG/DL (ref 70–99)
HBA1C MFR BLD: 5.7 % (ref 4.8–5.9)
HDLC SERPL-MCNC: 41 MG/DL
LDL CHOLESTEROL: 121 MG/DL (ref 0–130)
PATIENT FASTING?: YES
TRIGL SERPL-MCNC: 97 MG/DL
VLDLC SERPL CALC-MCNC: ABNORMAL MG/DL (ref 1–30)

## 2020-08-11 PROBLEM — Z86.0100 HISTORY OF COLON POLYPS: Status: ACTIVE | Noted: 2020-08-11

## 2020-08-11 PROBLEM — Z86.010 HISTORY OF COLON POLYPS: Status: ACTIVE | Noted: 2020-08-11

## 2020-09-10 PROBLEM — Z12.11 COLON CANCER SCREENING: Status: RESOLVED | Noted: 2017-03-24 | Resolved: 2020-09-10

## 2020-10-19 VITALS — WEIGHT: 278 LBS | BODY MASS INDEX: 44.87 KG/M2

## 2020-11-16 ENCOUNTER — HOSPITAL ENCOUNTER (OUTPATIENT)
Dept: LAB | Age: 55
Discharge: HOME | End: 2020-11-16
Payer: COMMERCIAL

## 2020-11-16 ENCOUNTER — HOSPITAL ENCOUNTER (OUTPATIENT)
Dept: WOMENS IMAGING | Age: 55
Discharge: HOME | End: 2020-11-18
Payer: COMMERCIAL

## 2020-11-16 LAB
ABSOLUTE EOS #: 0.17 K/UL (ref 0–0.44)
ABSOLUTE IMMATURE GRANULOCYTE: <0.03 K/UL (ref 0–0.3)
ABSOLUTE LYMPH #: 2.81 K/UL (ref 1.1–3.7)
ABSOLUTE MONO #: 0.51 K/UL (ref 0.1–1.2)
ALBUMIN SERPL-MCNC: 3.9 G/DL (ref 3.5–5.2)
ALBUMIN/GLOBULIN RATIO: 1.3 (ref 1–2.5)
ALP BLD-CCNC: 58 U/L (ref 35–104)
ALT SERPL-CCNC: 16 U/L (ref 5–33)
ANION GAP SERPL CALCULATED.3IONS-SCNC: 9 MMOL/L (ref 9–17)
AST SERPL-CCNC: 18 U/L
BASOPHILS # BLD: 0 % (ref 0–2)
BASOPHILS ABSOLUTE: 0.03 K/UL (ref 0–0.2)
BILIRUB SERPL-MCNC: 0.4 MG/DL (ref 0.3–1.2)
BUN BLDV-MCNC: 12 MG/DL (ref 6–20)
BUN/CREAT BLD: 14 (ref 9–20)
CALCIUM SERPL-MCNC: 9 MG/DL (ref 8.6–10.4)
CHLORIDE BLD-SCNC: 101 MMOL/L (ref 98–107)
CO2: 26 MMOL/L (ref 20–31)
CREAT SERPL-MCNC: 0.84 MG/DL (ref 0.5–0.9)
DIFFERENTIAL TYPE: NORMAL
EOSINOPHILS RELATIVE PERCENT: 2 % (ref 1–4)
GFR AFRICAN AMERICAN: >60 ML/MIN
GFR NON-AFRICAN AMERICAN: >60 ML/MIN
GFR SERPL CREATININE-BSD FRML MDRD: ABNORMAL ML/MIN/{1.73_M2}
GFR SERPL CREATININE-BSD FRML MDRD: ABNORMAL ML/MIN/{1.73_M2}
GLUCOSE BLD-MCNC: 148 MG/DL (ref 70–99)
HCT VFR BLD CALC: 41.9 % (ref 36.3–47.1)
HEMOGLOBIN: 13.8 G/DL (ref 11.9–15.1)
IMMATURE GRANULOCYTES: 0 %
LYMPHOCYTES # BLD: 31 % (ref 24–43)
MCH RBC QN AUTO: 29.7 PG (ref 25.2–33.5)
MCHC RBC AUTO-ENTMCNC: 32.9 G/DL (ref 28.4–34.8)
MCV RBC AUTO: 90.1 FL (ref 82.6–102.9)
MONOCYTES # BLD: 6 % (ref 3–12)
NRBC AUTOMATED: 0 PER 100 WBC
PDW BLD-RTO: 13.3 % (ref 11.8–14.4)
PLATELET # BLD: 243 K/UL (ref 138–453)
PLATELET ESTIMATE: NORMAL
PMV BLD AUTO: 9.9 FL (ref 8.1–13.5)
POTASSIUM SERPL-SCNC: 3.8 MMOL/L (ref 3.7–5.3)
RBC # BLD: 4.65 M/UL (ref 3.95–5.11)
RBC # BLD: NORMAL 10*6/UL
SEG NEUTROPHILS: 61 % (ref 36–65)
SEGMENTED NEUTROPHILS ABSOLUTE COUNT: 5.42 K/UL (ref 1.5–8.1)
SODIUM BLD-SCNC: 136 MMOL/L (ref 135–144)
TOTAL PROTEIN: 6.8 G/DL (ref 6.4–8.3)
WBC # BLD: 9 K/UL (ref 3.5–11.3)
WBC # BLD: NORMAL 10*3/UL

## 2020-11-16 PROCEDURE — 80053 COMPREHEN METABOLIC PANEL: CPT

## 2020-11-16 PROCEDURE — 85025 COMPLETE CBC W/AUTO DIFF WBC: CPT

## 2020-11-16 PROCEDURE — 36415 COLL VENOUS BLD VENIPUNCTURE: CPT

## 2020-11-16 PROCEDURE — 77063 BREAST TOMOSYNTHESIS BI: CPT

## 2021-03-02 ENCOUNTER — HOSPITAL ENCOUNTER (OUTPATIENT)
Dept: PHYSICAL THERAPY | Age: 56
Setting detail: THERAPIES SERIES
Discharge: HOME | End: 2021-03-02
Payer: COMMERCIAL

## 2021-03-02 PROCEDURE — 97140 MANUAL THERAPY 1/> REGIONS: CPT

## 2021-03-02 PROCEDURE — 97162 PT EVAL MOD COMPLEX 30 MIN: CPT

## 2021-03-02 PROCEDURE — G0283 ELEC STIM OTHER THAN WOUND: HCPCS

## 2021-03-02 ASSESSMENT — PAIN DESCRIPTION - LOCATION: LOCATION: HIP;LEG;BACK

## 2021-03-02 ASSESSMENT — PAIN SCALES - GENERAL: PAINLEVEL_OUTOF10: 6

## 2021-03-03 NOTE — PROGRESS NOTES
Phone: [address] [facility]          Fax: [phone]                      Outpatient Physical Therapy                                                                      Evaluation  Date: 3/2/2021  Patient: Matt Flores  : 1965  [facility] #: [id number]  Referring Practitioner: Dr. Kamila Fernandez    Referral Date : 21     Medical Diagnosis: Acute left-sided low back pain with left-sided sciatica (M54.42)    Treatment Diagnosis: L-sided low back pain with sciatica  Onset Date: 21  PT Insurance Information: Medical [place]  Total # of Visits Approved: 12   Total # of Visits to Date: 1  No Show: 0  Canceled Appointment: 0     Subjective  Subjective: Pt states L-sided low back and leg pain started at the end of 2021 with no known cause or mechanism of injury. Pt states she does work on [employer] a lot and is typically seated and leaning forward and has tried to adjust her setup to increase height so that she doesn't have to bend forward as far. Pt states she typically has the worse pain down the leg to her foot when she sleeps at night, but also gets discomfort throughout the day which can be aggravated by certain seated positions and when trying to extend knee while seated. Pt states the pain typically goes down to the knee but can extend all the way to her foot at night. Pt states she gets cramping sensation in both lower legs as well.   Comments: Pt is a [employer] Employee who works as an .  Additional Pertinent Hx: HTN, asthma, acid reflux, anxiety/panic attacks; hx of blood clots -- on Eliquis  Pain Screening  Patient Currently in Pain: Yes  Pain Assessment  Pain Assessment: 0-10  Pain Level: 6  Pain Location: Hip, Leg, Back  Pain Orientation: Left  Pain Descriptors: Aching, Cramping, Sharp  Pain Frequency: Intermittent          Objective     Observation/Palpation  Palpation: Min/Mod TTP along L sacral border  Observation: Increased lumbar lordosis    RLE General PROM: Hip IR 20*, ER 40*  LLE General PROM: Hip IR 25*, ER 30*    Spine  Lumbar: Forward flexion fingertips reaching to mid-tib region. Ext and SB WFL with no pain. Strength RLE  Comment: Hip flex 4/5, ABD 4-/5  Strength LLE  Comment: Hip flex 4-/5, ABD 4-/5    Additional Measures  Special Tests: (+) Slump; (+) MENDY's; (+) Supine to Long Sit    Functional Outcome Measures      Self-reported disability: 15%       Assessment  Assessment: Pt is a 55 y/o female who presents to PT with sudden onset of L-side low back pain with radicular symptoms down LLE. Pt currently demonstrates decreased hip and core strength, decreased hip ROM bilaterally, TTP along L sacral border and piriformis, and tests positive with the following special tests: (+) Slump; (+) FABERs; (+) Supine to Long Sit. Pt will benefit from skilled PT services to address the above impairments and to work toward the established functional goals. Prognosis: Good        Decision Making: Medium Complexity    Patient Education  Patient Education: PT POC, HEP and sleeping modifications. Pt verbalized/demonstrated good understanding:     [X] Yes         [] No, pt required further clarification. Goals  Short term goals  Time Frame for Short term goals: 3 weeks  Short term goal 1: Pt will initiate HEP. Short term goal 2: Pt will initiate manual techniques/modalities as needed to decrease pain. Long term goals  Time Frame for Long term goals : 6 weeks  Long term goal 1: Pt will be independent and compliant with HEP. Long term goal 2: Pt will test (-) negative with Slump test to decrease neural sensitivity/irritation. Long term goal 3: Pt will test (-) Supine to Long Sit indicating improved pelvic alignment. Long term goal 4: Pt will report >/= 75% improvement in LLE symptoms to indicate decreased neural compression and improve sleep quality. Patient goals :  \"To make my legs feel better\"        Minutes Tracking:  Time In: 1630  Time Out: [address] [facility]  Minutes: 88  Timed Code Treatment Minutes: [address] [place], [place], DPT    3/2/2021

## 2021-03-03 NOTE — PLAN OF CARE
Good    Treatment Plan   Times per week: 2  Plan weeks: 4-6  [x] HP/CP      [x] Electrical Stim   [x] Therapeutic Exercise      [] Gait Training  [] Aquatics   [x] Ultrasound         [x] Patient Education/HEP   [x] Manual Therapy  [x] Traction    [x] Neuro-trell        [x] Soft Tissue Mobs            [] Home TENS  [] Iontophoresis    [] Orthotic casting/fitting      [] Dry Needling             Electronically signed by: Yojana Rascon PT, DPT    Date: 3/2/2021      ______________________________________ Date: 3/2/2021   Physician Signature

## 2021-03-09 ENCOUNTER — HOSPITAL ENCOUNTER (OUTPATIENT)
Dept: PHYSICAL THERAPY | Age: 56
Setting detail: THERAPIES SERIES
Discharge: HOME | End: 2021-03-09
Payer: COMMERCIAL

## 2021-03-09 PROCEDURE — 97140 MANUAL THERAPY 1/> REGIONS: CPT

## 2021-03-09 PROCEDURE — 97110 THERAPEUTIC EXERCISES: CPT

## 2021-03-09 PROCEDURE — G0283 ELEC STIM OTHER THAN WOUND: HCPCS

## 2021-03-09 NOTE — PROGRESS NOTES
[facility]           Fax: [phone]                           Outpatient Physical Therapy                                                                            Daily Note    Patient: Yolanda Lozada : 1965  CSN #: [id number]   Referring Practitioner:  Dr. Alice Streeter    Referral Date : 21     Date: 3/9/2021    Diagnosis: Acute left-sided low back pain with left-sided sciatica (M54.42)  Treatment Diagnosis: L-sided low back pain with sciatica    Onset Date: 21  PT Insurance Information: Medical [place]  Total # of Visits Approved: 12 Per Physician Order  Total # of Visits to Date: 2  No Show: 0  Canceled Appointment: 0      Pre-Treatment Pain:  0/10  Subjective: Pt states she thinks adding the pillow between knees at night has helped, but thinks she needs to add a thicker pillow or a second pillow to get more support. Pt states she still has most of her pain while sleeping. Exercises:  Exercise 1: HEP: piriformis stretch, bridges, sidelying clamshell; sleeping modifications  Exercise 2: Bike 8mins  Exercise 3: Piriformis stretch 3x30\"  Exercise 4: Bridges 2x10  Exercise 5: Sidelying clamshell 2x10  Exercise 6: Supine nerve glides x10    Manual:  Soft Tissue Mobalization: Warm thermoprobe L low back/SIJ and piriformis    Modalities:  Moist heat: x15min  E-stim (parameters): IFC+MHP x15min L SIJ/piriformis for pain-- R sidelying       Assessment  Assessment: Reviewed HEP with corrections made as needed for proper form. Pt cont to be TTP along L sacral border. Cont with warm thermoprobe for STM wtih IFC and MHP applied at end of tx for pain. Will progress as tolerated. Activity Tolerance  Activity Tolerance: Patient Tolerated treatment well    Patient Education  Patient Education: Reviewed HEP and instructed in ther ex progressions. Pt verbalized/demonstrated good understanding:     [x] Yes         [] No, pt required further clarification. Post Treatment Pain:  0/10      Plan  Times per week: 2  Plan weeks: 4-6      Goals  (Total # of Visits to Date: 2)      Short term goals  Time Frame for Short term goals: 3 weeks  Short term goal 1: Pt will initiate HEP. - met  Short term goal 2: Pt will initiate manual techniques/modalities as needed to decrease pain. - met/cont    Long term goals  Time Frame for Long term goals : 6 weeks  Long term goal 1: Pt will be independent and compliant with HEP. Long term goal 2: Pt will test (-) negative with Slump test to decrease neural sensitivity/irritation. Long term goal 3: Pt will test (-) Supine to Long Sit indicating improved pelvic alignment. Long term goal 4: Pt will report >/= 75% improvement in LLE symptoms to indicate decreased neural compression and improve sleep quality.     Minutes Tracking:  Time In: 6253  Time Out: 1740  Minutes: 67  Timed Code Treatment Minutes: [address] [place], [place], DPT     Date: 3/9/2021

## 2021-03-12 ENCOUNTER — HOSPITAL ENCOUNTER (OUTPATIENT)
Dept: PHYSICAL THERAPY | Age: 56
Setting detail: THERAPIES SERIES
Discharge: HOME | End: 2021-03-12
Payer: COMMERCIAL

## 2021-03-12 PROCEDURE — 97110 THERAPEUTIC EXERCISES: CPT

## 2021-03-12 PROCEDURE — G0283 ELEC STIM OTHER THAN WOUND: HCPCS

## 2021-03-12 PROCEDURE — 97140 MANUAL THERAPY 1/> REGIONS: CPT

## 2021-03-16 ENCOUNTER — HOSPITAL ENCOUNTER (OUTPATIENT)
Dept: PHYSICAL THERAPY | Age: 56
Setting detail: THERAPIES SERIES
Discharge: HOME | End: 2021-03-16
Payer: COMMERCIAL

## 2021-03-16 PROCEDURE — 97140 MANUAL THERAPY 1/> REGIONS: CPT

## 2021-03-16 PROCEDURE — G0283 ELEC STIM OTHER THAN WOUND: HCPCS

## 2021-03-16 PROCEDURE — 97110 THERAPEUTIC EXERCISES: CPT

## 2021-03-19 ENCOUNTER — HOSPITAL ENCOUNTER (OUTPATIENT)
Dept: PHYSICAL THERAPY | Age: 56
Setting detail: THERAPIES SERIES
Discharge: HOME | End: 2021-03-19
Payer: COMMERCIAL

## 2021-03-19 PROCEDURE — 97140 MANUAL THERAPY 1/> REGIONS: CPT

## 2021-03-19 PROCEDURE — G0283 ELEC STIM OTHER THAN WOUND: HCPCS

## 2021-03-19 PROCEDURE — 97110 THERAPEUTIC EXERCISES: CPT

## 2021-03-19 NOTE — PROGRESS NOTES
Phone: Milka           Fax: [phone]                           Outpatient Physical Therapy                                                                            Daily Note    Patient: Fanny Baker : 1965  CSN #: [id number]   Referring Practitioner:  Dr. Rod Munoz    Referral Date : 21     Date: 3/19/2021    Diagnosis: Acute left-sided low back pain with left-sided sciatica (M54.42)  Treatment Diagnosis: L-sided low back pain with sciatica    Onset Date: 21  PT Insurance Information: Medical [place]  Total # of Visits Approved: 12 Per Physician Order  Total # of Visits to Date: 5  No Show: 0  Canceled Appointment: 0      Pre-Treatment Pain:  0/10  Subjective: Pt cont to notice improvements in symptoms. Occasionally discomfort that wakes her at night. No pain upon arrival.    Exercises:  Exercise 2: Bike 10 mins  Exercise 3: Piriformis stretch 3x30\"  Exercise 4: Bridges 2x10  Exercise 6: Supine nerve glides x10  Exercise 7: sitting trunk flex with swiss ball - 10x 3sec    Manual:  Manual traction: Manual lumbar traction with LE's on PB  Other: Manual nerve glides on LLE    Modalities:  Moist heat: x15min  E-stim (parameters): IFC+MHP x15min L SIJ/piriformis for pain--  supine this date       Assessment  Assessment: (-) Supine to Long Sit test this date. Cont with manual lumbar traction and LLE nerve glides to decrease LLE symptoms. IFC and MHP applied at end of tx for post-ex soreness/pain. Will progress as tolerated. Activity Tolerance  Activity Tolerance: Patient Tolerated treatment well    Patient Education  Patient Education: Exercise rationale  Pt verbalized/demonstrated good understanding:     [x] Yes         [] No, pt required further clarification.        Post Treatment Pain:  0/10      Plan  Times per week: 2  Plan weeks: 4-6      Goals  (Total # of Visits to Date: 5)      Short term goals  Time Frame for Short term goals: 3

## 2021-03-23 ENCOUNTER — HOSPITAL ENCOUNTER (OUTPATIENT)
Dept: PHYSICAL THERAPY | Age: 56
Setting detail: THERAPIES SERIES
Discharge: HOME | End: 2021-03-23
Payer: COMMERCIAL

## 2021-03-23 PROCEDURE — 97110 THERAPEUTIC EXERCISES: CPT

## 2021-03-23 PROCEDURE — G0283 ELEC STIM OTHER THAN WOUND: HCPCS

## 2021-03-23 PROCEDURE — 97140 MANUAL THERAPY 1/> REGIONS: CPT

## 2021-03-24 NOTE — PROGRESS NOTES
Phone: Milka           Fax: [phone]                           Outpatient Physical Therapy                                                                            Daily Note    Patient: Brigette Velarde : 1965  [facility] #: [id number]   Referring Practitioner:  Dr. Lee Westfall    Referral Date : 21     Date: 3/23/2021    Diagnosis: Acute left-sided low back pain with left-sided sciatica (M54.42)  Treatment Diagnosis: L-sided low back pain with sciatica    Onset Date: 21  PT Insurance Information: Medical [place]  Total # of Visits Approved: 12 Per Physician Order  Total # of Visits to Date: 6  No Show: 0  Canceled Appointment: 0      Pre-Treatment Pain:  4/10  Subjective: [place] good after last session. States she was working on some susan pots over the weekend and noticed some increase soreness in left LB and hip region. Exercises:  Exercise 2: Bike 10 mins  Exercise 3: Piriformis stretch 3x30\"  Exercise 6: Supine nerve glides x10  Exercise 7: sitting trunk flex with swiss ball - 10x 3sec  Exercise 8: standing abdominal press - 10x 3sec    Manual:  Soft Tissue Mobalization: Warm thermoprobe L low back/SIJ and piriformis    Modalities:  Moist heat: x15min  E-stim (parameters): IFC+MHP x15min L SIJ/piriformis for pain--  supine this date       Assessment  Assessment: Pt tolerates ex well. Continues with min tenderness at left L5/S1 and left SI joint. Will continue to progress as pt tolerates. Activity Tolerance  Activity Tolerance: Patient Tolerated treatment well    Patient Education  Patient Education: continued stretching at home along with nerve glides  Pt verbalized/demonstrated good understanding:     [x] Yes         [] No, pt required further clarification.        Post Treatment Pain:  3/10      Plan  Times per week: 2  Plan weeks: 4-6      Goals  (Total # of Visits to Date: 6)      Short term goals  Time Frame for Short term goals: 3 weeks  Short term goal 1: Pt will initiate HEP. - met  Short term goal 2: Pt will initiate manual techniques/modalities as needed to decrease pain. - met/cont    Long term goals  Time Frame for Long term goals : 6 weeks  Long term goal 1: Pt will be independent and compliant with HEP. Long term goal 2: Pt will test (-) negative with Slump test to decrease neural sensitivity/irritation. Long term goal 3: Pt will test (-) Supine to Long Sit indicating improved pelvic alignment. - met  Long term goal 4: Pt will report >/= 75% improvement in LLE symptoms to indicate decreased neural compression and improve sleep quality.     Minutes Tracking:  Time In: 5060  Time Out: 1809  Minutes: 59  Timed Code Treatment Minutes: [address] , PT, DPT, CMPT      Date: 3/23/2021

## 2021-03-26 ENCOUNTER — HOSPITAL ENCOUNTER (OUTPATIENT)
Dept: PHYSICAL THERAPY | Age: 56
Setting detail: THERAPIES SERIES
Discharge: HOME | End: 2021-03-26
Payer: COMMERCIAL

## 2021-03-26 PROCEDURE — 97110 THERAPEUTIC EXERCISES: CPT

## 2021-03-26 NOTE — PROGRESS NOTES
Phone: Milka           Fax: [phone]                           Outpatient Physical Therapy                                                                            Daily Note    Patient: Trinidad Castillo : 1965  CSN #: [id number]   Referring Practitioner:  Dr. Anthony Goodson    Referral Date : 21     Date: 3/26/2021    Diagnosis: Acute left-sided low back pain with left-sided sciatica (M54.42)  Treatment Diagnosis: L-sided low back pain with sciatica    Onset Date: 21  PT Insurance Information: Medical [place]  Total # of Visits Approved: 12 Per Physician Order  Total # of Visits to Date: 7  No Show: 0  Canceled Appointment: 0      Pre-Treatment Pain:  2-3/10  Subjective: Pt states she had cramping in both calves this morning, which pt states her calves are still a little sore. Pt states pain level currently 2-3/10 upon arrival.    Exercises:  Exercise 2: Bike 10 mins  Exercise 3: Piriformis stretch 3x30\"  Exercise 4: Bridges 2x15  Exercise 6: Supine nerve glides x10  Exercise 7: sitting trunk flex with swiss ball - 10x 5sec  Exercise 8: standing abdominal press - 15x 3sec  Exercise 9: Sidestepping at countertop GTB around ankles x3 laps  Exercise 10: Slantboard stretch 3x30\"    Modalities:  Moist heat: x15min  E-stim (parameters): IFC+MHP x15min L SIJ/piriformis for pain--  supine this date       Assessment  Assessment: Gradual progressions made for core and hip strengthening this date, which pt tolerates well. Reviewed nerve glides as part of HEP. Instructed pt in calf stretch as well d/t increased tension and cramping. Pt denies need for modalities following tx this date. Activity Tolerance  Activity Tolerance: Patient Tolerated treatment well    Patient Education  Patient Education: Exercise progressions  Pt verbalized/demonstrated good understanding:     [x] Yes         [] No, pt required further clarification.        Post Treatment Pain: 2/10      Plan  Times per week: 2  Plan weeks: 4-6      Goals  (Total # of Visits to Date: 7)      Short term goals  Time Frame for Short term goals: 3 weeks  Short term goal 1: Pt will initiate HEP. - met  Short term goal 2: Pt will initiate manual techniques/modalities as needed to decrease pain. - met/cont    Long term goals  Time Frame for Long term goals : 6 weeks  Long term goal 1: Pt will be independent and compliant with HEP. Long term goal 2: Pt will test (-) negative with Slump test to decrease neural sensitivity/irritation. Long term goal 3: Pt will test (-) Supine to Long Sit indicating improved pelvic alignment. - met  Long term goal 4: Pt will report >/= 75% improvement in LLE symptoms to indicate decreased neural compression and improve sleep quality.     Minutes Tracking:  Time In: 4843  Time Out: 1701  Minutes: 46  Timed Code Treatment Minutes: Jose Simental, DPT     Date: 3/26/2021

## 2021-03-29 NOTE — PROGRESS NOTES
goals  Time Frame for Long term goals : 6 weeks  Long term goal 1: Pt will be independent and compliant with HEP. Long term goal 2: Pt will test (-) negative with Slump test to decrease neural sensitivity/irritation. Long term goal 3: Pt will test (-) Supine to Long Sit indicating improved pelvic alignment. Long term goal 4: Pt will report >/= 75% improvement in LLE symptoms to indicate decreased neural compression and improve sleep quality.     Minutes Tracking:  Time In: 0715  Time Out: 622 [address]  Minutes: 64  Timed Code Treatment Minutes: Pedrito Mendoza [address] , PT, DPT, CMPT      Date: 3/12/2021

## 2021-03-30 ENCOUNTER — HOSPITAL ENCOUNTER (OUTPATIENT)
Dept: PHYSICAL THERAPY | Age: 56
Setting detail: THERAPIES SERIES
Discharge: HOME | End: 2021-03-30
Payer: COMMERCIAL

## 2021-03-30 PROCEDURE — 97110 THERAPEUTIC EXERCISES: CPT

## 2021-03-30 PROCEDURE — G0283 ELEC STIM OTHER THAN WOUND: HCPCS

## 2021-04-01 NOTE — PROGRESS NOTES
Phone: Milka           Fax: [phone]                           Outpatient Physical Therapy                                                                            Daily Note    Patient: Bhargav Padilla : 1965  CSN #: [id number]   Referring Practitioner:  Dr. Hamzah Harrison    Referral Date : 21     Date: 3/30/2021    Diagnosis: Acute left-sided low back pain with left-sided sciatica (M54.42)  Treatment Diagnosis: L-sided low back pain with sciatica    Onset Date: 21  PT Insurance Information: Medical [place]  Total # of Visits Approved: 12 Per Physician Order  Total # of Visits to Date: 8  No Show: 0  Canceled Appointment: 0      Pre-Treatment Pain:  3/10  Subjective: Pt states she has not had any leg cramps since last session. States she also has not had any of the left LE pain that initially brought her in. Exercises:  Exercise 2: Bike 10 mins - 4.0  Exercise 7: sitting trunk flex with swiss ball - 10x 5sec  Exercise 8: standing abdominal press - 15x 3sec  Exercise 9: Sidestepping at [facility] around ankles x3 laps  Exercise 10: Slantboard stretch 3x30\"  Exercise 11: tband - ext and paloff press - 15x each green  Exercise 12: PPT with marches - 2x10; PPT with SLR - 2x10      Modalities:  Moist heat: x15min  E-stim (parameters): IFC+MHP x15min L SIJ/piriformis for pain--  supine this date       Assessment  Assessment: Progressed ex this date with good doug. Held MT this date. Min cueing for proper technique with ex. Will continue to progress as pt tolerates. Activity Tolerance  Activity Tolerance: Patient Tolerated treatment well    Patient Education  Patient Education: progression of ex  Pt verbalized/demonstrated good understanding:     [x] Yes         [] No, pt required further clarification.        Post Treatment Pain:  1/10      Plan  Times per week: 2  Plan weeks: 4-6      Goals  (Total # of Visits to Date: 8)      Short term goals  Time Frame for Short term goals: 3 weeks  Short term goal 1: Pt will initiate HEP. - met  Short term goal 2: Pt will initiate manual techniques/modalities as needed to decrease pain. - met/cont    Long term goals  Time Frame for Long term goals : 6 weeks  Long term goal 1: Pt will be independent and compliant with HEP. Long term goal 2: Pt will test (-) negative with Slump test to decrease neural sensitivity/irritation. Long term goal 3: Pt will test (-) Supine to Long Sit indicating improved pelvic alignment. - met  Long term goal 4: Pt will report >/= 75% improvement in LLE symptoms to indicate decreased neural compression and improve sleep quality.     Minutes Tracking:  Time In: 4319  Time Out: 05947 Karina Navas  Minutes: 63  Timed Code Treatment Minutes: Pedrito Mendoza [address] , PT, DPT, CMPT      Date: 3/30/2021

## 2021-04-02 ENCOUNTER — APPOINTMENT (OUTPATIENT)
Dept: PHYSICAL THERAPY | Age: 56
End: 2021-04-02
Payer: COMMERCIAL

## 2021-04-06 ENCOUNTER — HOSPITAL ENCOUNTER (OUTPATIENT)
Dept: PHYSICAL THERAPY | Age: 56
Setting detail: THERAPIES SERIES
Discharge: HOME | End: 2021-04-06
Payer: COMMERCIAL

## 2021-04-06 PROCEDURE — 97110 THERAPEUTIC EXERCISES: CPT

## 2021-04-06 PROCEDURE — G0283 ELEC STIM OTHER THAN WOUND: HCPCS

## 2021-04-06 NOTE — PROGRESS NOTES
Phone: Milka           Fax: [phone]                           Outpatient Physical Therapy                                                                            Daily Note    Patient: Kady Estrada : 1965  CSN #: [id number]   Referring Practitioner:  Dr. Wai Whelan    Referral Date : 21     Date: 2021    Diagnosis: Acute left-sided low back pain with left-sided sciatica (M54.42)  Treatment Diagnosis: L-sided low back pain with sciatica    Onset Date: 21  PT Insurance Information: Medical [place]  Total # of Visits Approved: 12 Per Physician Order  Total # of Visits to Date: 9  No Show: 0  Canceled Appointment: 0      Pre-Treatment Pain:  0/10  Subjective: Pt states pain has been better, but still having some dull, achey pain occasionally in the outside of L thigh. No pain in back and no pain radiating down to foot. Exercises:  Exercise 1: HEP: piriformis stretch, bridges, sidelying clamshell; sleeping modifications  Exercise 2: Bike 10 mins - 4.0  Exercise 3: Piriformis stretch 3x30\" -- manual this date  Exercise 6: Supine nerve glides x10 -- manual this date  Exercise 8: standing abdominal press - 15x 3sec  Exercise 9: Sidestepping at countertop GTB around ankles x3 laps  Exercise 11: tband - ext and paloff press - 15x each green  Exercise 12: PPT with marches - 2x10; PPT with SLR - 2x10    Manual:  Other: Manual nerve glides on LLE and manual piriformis stretch on LLE. Modalities:  Moist heat: x15min  E-stim (parameters): IFC+MHP x15min L SIJ/piriformis for pain--  supine this date       Assessment  Assessment: Pt reports 80% improvement in pain and overall function since initiation of PT. Pt cont to have dull ache along L lateral thigh, but pt reports less intense and less frequent. Updated HEP with theraband core exercises for cont progression at home. IFC and MHP applied at end of tx for post-exercise soreness.  Will progres as tolerated. Activity Tolerance  Activity Tolerance: Patient Tolerated treatment well    Patient Education  Patient Education: Updated HEP  Pt verbalized/demonstrated good understanding:     [x] Yes         [] No, pt required further clarification. Post Treatment Pain:  0/10      Plan  Times per week: 2  Plan weeks: 4-6      Goals  (Total # of Visits to Date: 5)      Short term goals  Time Frame for Short term goals: 3 weeks  Short term goal 1: Pt will initiate HEP. - met  Short term goal 2: Pt will initiate manual techniques/modalities as needed to decrease pain. - met/cont    Long term goals  Time Frame for Long term goals : 6 weeks  Long term goal 1: Pt will be independent and compliant with HEP. Long term goal 2: Pt will test (-) negative with Slump test to decrease neural sensitivity/irritation. Long term goal 3: Pt will test (-) Supine to Long Sit indicating improved pelvic alignment. - met  Long term goal 4: Pt will report >/= 75% improvement in LLE symptoms to indicate decreased neural compression and improve sleep quality. - met    Minutes Tracking:  Time In: 1710  Time Out: 1815  Minutes: 65  Timed Code Treatment Minutes: 82 Jose Krause, DPT      Date: 4/6/2021

## 2021-04-09 ENCOUNTER — HOSPITAL ENCOUNTER (OUTPATIENT)
Dept: PHYSICAL THERAPY | Age: 56
Setting detail: THERAPIES SERIES
Discharge: HOME | End: 2021-04-09
Payer: COMMERCIAL

## 2021-04-09 PROCEDURE — G0283 ELEC STIM OTHER THAN WOUND: HCPCS

## 2021-04-09 PROCEDURE — 97110 THERAPEUTIC EXERCISES: CPT

## 2021-04-09 NOTE — DISCHARGE SUMMARY
be independent and compliant with HEP. - met  Long term goal 2: Pt will test (-) negative with Slump test to decrease neural sensitivity/irritation. -met  Long term goal 3: Pt will test (-) Supine to Long Sit indicating improved pelvic alignment. - met  Long term goal 4: Pt will report >/= 75% improvement in LLE symptoms to indicate decreased neural compression and improve sleep quality. - met      Reason for Discharge  [x] Goals Achieved                        []  Poor Follow Through/Attendance                  [x]  Optimal Function Achieved     []  Patient Discharged Self    []  Hospitalization                         []  Physician discharge      Thank you for this referral      Puma Romano, PT, DPT               Date: 4/9/2021

## 2021-04-09 NOTE — PROGRESS NOTES
Phone: [facility]           Fax: [phone]                           Outpatient Physical Therapy                                                                            Daily Note    Patient: Kady Estrada : 1965  CSN #: [id number]   Referring Practitioner:  Dr. Wai Whelan    Referral Date : 21     Date: 2021    Diagnosis: Acute left-sided low back pain with left-sided sciatica (M54.42)  Treatment Diagnosis: L-sided low back pain with sciatica    Onset Date: 21  PT Insurance Information: Medical [place]  Total # of Visits Approved: 12 Per Physician Order  Total # of Visits to Date: 10  No Show: 0  Canceled Appointment: 0      Pre-Treatment Pain:  0/10  Subjective: Pt denies pain upon arrival. Pt states she thinks she is ready to be done with therapy and cont on her own. Exercises:  Exercise 1: HEP: piriformis stretch, bridges, sidelying clamshell; sleeping modifications; updated PPT, PPT with march, PPT with SLR; side stepping with TB, supine nerve glides  Exercise 2: Bike 10 mins - 4.0  Exercise 3: Piriformis stretch 3x30\" -- manual this date  Exercise 4: Bridges 2x15  Exercise 6: Supine nerve glides x10  Exercise 11: tband - ext and paloff press - 15x each green  Exercise 12: PPT x10; PPT with marches - 2x10; PPT with SLR - 2x10    Modalities:  Moist heat: x15min  E-stim (parameters): IFC+MHP x15min L SIJ/piriformis for pain--  supine this date       Assessment  Assessment: Pt has completed 10 PT visits to date. Pt pleased with overall progress and reports >/= 80% improvement in pain and overall function since initiation of PT. Pt has mild discomfort in L-side low back with Slump testing, but no radicular symptoms reported. Updated HEP this date, which pt has been compliant with thus far. Pt has met all established functional goals at this time and is in agreement with D/C from PT.     Activity Tolerance  Activity Tolerance: Patient Tolerated treatment well    Patient Education  Patient Education: Updated HEP and plan for D/C. Pt verbalized/demonstrated good understanding:     [x] Yes         [] No, pt required further clarification. Post Treatment Pain:  0/10      Plan  Times per week: 2  Plan weeks: 4-6      Goals  (Total # of Visits to Date: 10)      Short term goals  Time Frame for Short term goals: 3 weeks  Short term goal 1: Pt will initiate HEP. - met  Short term goal 2: Pt will initiate manual techniques/modalities as needed to decrease pain. - met/cont    Long term goals  Time Frame for Long term goals : 6 weeks  Long term goal 1: Pt will be independent and compliant with HEP. - met  Long term goal 2: Pt will test (-) negative with Slump test to decrease neural sensitivity/irritation. -met  Long term goal 3: Pt will test (-) Supine to Long Sit indicating improved pelvic alignment. - met  Long term goal 4: Pt will report >/= 75% improvement in LLE symptoms to indicate decreased neural compression and improve sleep quality. - met    Minutes Tracking:  Time In: 1615  Time Out: [address] [facility]  Minutes: 62  Timed Code Treatment Minutes: [id number] [facility], [address] [facility], DPT     Date: 4/9/2021

## 2021-04-13 ENCOUNTER — APPOINTMENT (OUTPATIENT)
Dept: PHYSICAL THERAPY | Age: 56
End: 2021-04-13
Payer: COMMERCIAL

## 2021-04-16 ENCOUNTER — APPOINTMENT (OUTPATIENT)
Dept: PHYSICAL THERAPY | Age: 56
End: 2021-04-16
Payer: COMMERCIAL

## 2021-04-28 ENCOUNTER — OFFICE VISIT (OUTPATIENT)
Dept: OBGYN | Age: 56
End: 2021-04-28
Payer: COMMERCIAL

## 2021-04-28 ENCOUNTER — HOSPITAL ENCOUNTER (OUTPATIENT)
Age: 56
Setting detail: SPECIMEN
Discharge: HOME | End: 2021-04-28
Payer: COMMERCIAL

## 2021-04-28 VITALS
BODY MASS INDEX: 45.96 KG/M2 | WEIGHT: 286 LBS | HEIGHT: 66 IN | DIASTOLIC BLOOD PRESSURE: 84 MMHG | SYSTOLIC BLOOD PRESSURE: 136 MMHG

## 2021-04-28 DIAGNOSIS — N91.5 HYPOMENORRHEA: ICD-10-CM

## 2021-04-28 DIAGNOSIS — Z12.4 SCREENING FOR CERVICAL CANCER: ICD-10-CM

## 2021-04-28 DIAGNOSIS — Z01.419 ENCOUNTER FOR ANNUAL ROUTINE GYNECOLOGICAL EXAMINATION: Primary | ICD-10-CM

## 2021-04-28 PROCEDURE — G0145 SCR C/V CYTO,THINLAYER,RESCR: HCPCS

## 2021-04-28 PROCEDURE — 99396 PREV VISIT EST AGE 40-64: CPT | Performed by: ADVANCED PRACTICE MIDWIFE

## 2021-04-28 RX ORDER — BUDESONIDE AND FORMOTEROL FUMARATE DIHYDRATE 160; 4.5 UG/1; UG/1
2 AEROSOL RESPIRATORY (INHALATION)
COMMUNITY
End: 2021-09-02 | Stop reason: SDUPTHER

## 2021-04-28 ASSESSMENT — ENCOUNTER SYMPTOMS
BACK PAIN: 0
ABDOMINAL PAIN: 0
SHORTNESS OF BREATH: 0

## 2021-04-28 NOTE — PROGRESS NOTES
YEARLY PHYSICAL    Date of service: 2021    Stevie Garcia  Is a 54 y.o.   female    PT's PCP is: Miranda Velarde MD     : 1965                                             Subjective:       Patient's last menstrual period was 2021 (approximate). Are your menses regular: yes    OB History    Para Term  AB Living   2 2       2   SAB TAB Ectopic Molar Multiple Live Births             2      # Outcome Date GA Lbr Daron/2nd Weight Sex Delivery Anes PTL Lv   2 Para 97 40w0d  7 lb 6 oz (3.345 kg) F Vag-Spont   EARLE      Complications: Diabetes in pregnancy   1 Para 93 40w0d  8 lb (3.629 kg) F Vag-Spont   EARLE        Social History     Tobacco Use   Smoking Status Never Smoker   Smokeless Tobacco Never Used        Social History     Substance and Sexual Activity   Alcohol Use Yes    Alcohol/week: 0.0 standard drinks    Comment: 1 glass of wine/month       Family History   Problem Relation Age of Onset    Cancer Mother         breast ca    Diabetes Mother     Depression Mother     Thyroid Disease Mother     High Blood Pressure Mother     Heart Disease Mother     Cancer Father         prostate ca    COPD Father     Heart Failure Father     Heart Disease Father     High Blood Pressure Father     Deep Vein Thrombosis Sister     Breast Cancer Sister     Deep Vein Thrombosis Sister        Any family history of breast or ovarian cancer:  Yes    Any family history of blood clots: Yes    Have you had a positive covid test: No    Have you had the covid immunization: No      Allergies: Levaquin [levofloxacin in d5w] and Pcn [penicillins]      Current Outpatient Medications:     spironolactone (ALDACTONE) 25 MG tablet, TAKE 1 TABLET BY MOUTH DAILY, Disp: 90 tablet, Rfl: 1    pantoprazole (PROTONIX) 40 MG tablet, Take 1 tablet by mouth daily, Disp: 90 tablet, Rfl: 0    olmesartan (BENICAR) 20 MG tablet, Take 1 tablet by mouth daily, Disp: 90 tablet, Rfl: 3    ELIQUIS 5 MG TABS tablet, TAKE 1 TABLET BY MOUTH TWICE DAILY, Disp: 180 tablet, Rfl: 1    albuterol sulfate HFA (PROAIR HFA) 108 (90 Base) MCG/ACT inhaler, Inhale 2 puffs into the lungs 4 times daily as needed for Wheezing or Shortness of Breath, Disp: 1 Inhaler, Rfl: 0    metoprolol tartrate (LOPRESSOR) 25 MG tablet, Take 0.5 tablets by mouth 2 times daily, Disp: 60 tablet, Rfl: 3    PREVIDENT 5000 BOOSTER PLUS 1.1 % PSTE, , Disp: , Rfl: 0    cetirizine (ZYRTEC) 10 MG tablet, Take 10 mg by mouth daily.   , Disp: , Rfl:     budesonide-formoterol (SYMBICORT) 160-4.5 MCG/ACT AERO, Inhale 2 puffs into the lungs, Disp: , Rfl:     budesonide-formoterol (SYMBICORT) 160-4.5 MCG/ACT AERO, Inhale 2 puffs into the lungs 2 times daily, Disp: 1 Inhaler, Rfl: 5    Na Sulfate-K Sulfate-Mg Sulf (SUPREP BOWEL PREP KIT) 17.5-3.13-1.6 GM/177ML SOLN, Take as directed (Patient not taking: Reported on 4/28/2021), Disp: 2 Bottle, Rfl: 0    aspirin 81 MG chewable tablet, Take 1 tablet by mouth daily (Patient not taking: Reported on 5/6/2020), Disp: 30 tablet, Rfl: 3    Social History     Substance and Sexual Activity   Sexual Activity Yes    Partners: Male    Birth control/protection: Surgical       Any bleeding or pain with intercourse: No    Last Yearly:  2019    Last pap: 2019    Last HPV: 2015    Last Mammogram: 11/2020    Last [place] R[place] never    Last colorectal screen- type:colonoscopy*  date  2017    Do you do self breast exams: Yes    Past Medical History:   Diagnosis Date    Acute pulmonary embolism ([facility] [address] 11/2/2015    Anxiety     Asthma     Constipation, chronic     CPAP (continuous positive airway pressure) dependence     Diabetes mellitus ([facility] [address]     pt states she was prediabetic at one point    Embolism - blood clot     right lung and right leg    Esophageal reflux     Factor V Leiden ([facility] [address]     Heart murmur     HTN (hypertension)     Hyperlipidemia     examination     2. Hypomenorrhea  Follicle Stimulating Hormone    Luteinizing Hormone    Estradiol   3. Screening for cervical cancer  PAP SMEAR       f/u hormonal labs dependent on results      I am having Tess Echeverria maintain her cetirizine, PreviDent 5000 Booster Plus, aspirin, metoprolol tartrate, albuterol sulfate HFA, Suprep Bowel Prep Kit, Eliquis, budesonide-formoterol, olmesartan, pantoprazole, spironolactone, and budesonide-formoterol. Return in about 1 year (around 4/28/2022) for yearly. She was also counseled on her preventative health maintenance recommendations and follow-up. There are no Patient Instructions on file for this visit.     Jarvis Mccarthy,4/28/2021 9:50 AM

## 2021-04-30 ENCOUNTER — HOSPITAL ENCOUNTER (OUTPATIENT)
Age: 56
Discharge: HOME | End: 2021-04-30
Payer: COMMERCIAL

## 2021-04-30 DIAGNOSIS — N91.5 HYPOMENORRHEA: ICD-10-CM

## 2021-04-30 PROCEDURE — 82670 ASSAY OF TOTAL ESTRADIOL: CPT

## 2021-04-30 PROCEDURE — 36415 COLL VENOUS BLD VENIPUNCTURE: CPT

## 2021-04-30 PROCEDURE — 83002 ASSAY OF GONADOTROPIN (LH): CPT

## 2021-04-30 PROCEDURE — 83001 ASSAY OF GONADOTROPIN (FSH): CPT

## 2021-05-01 LAB
ESTRADIOL LEVEL: 25 PG/ML (ref 5–50)
FOLLICLE STIMULATING HORMONE: 8.1 U/L (ref 25.8–134.8)
LH: 12.8 U/L (ref 7.7–58.5)

## 2021-05-03 LAB — CYTOLOGY REPORT: NORMAL

## 2021-05-19 ENCOUNTER — OFFICE VISIT (OUTPATIENT)
Dept: OBGYN | Age: 56
End: 2021-05-19
Payer: COMMERCIAL

## 2021-05-19 ENCOUNTER — HOSPITAL ENCOUNTER (OUTPATIENT)
Age: 56
Setting detail: SPECIMEN
Discharge: HOME | End: 2021-05-19
Payer: COMMERCIAL

## 2021-05-19 VITALS
SYSTOLIC BLOOD PRESSURE: 138 MMHG | HEIGHT: 66 IN | WEIGHT: 285 LBS | BODY MASS INDEX: 45.8 KG/M2 | DIASTOLIC BLOOD PRESSURE: 86 MMHG

## 2021-05-19 DIAGNOSIS — N92.6 IRREGULAR MENSES: Primary | ICD-10-CM

## 2021-05-19 DIAGNOSIS — N92.6 IRREGULAR MENSES: ICD-10-CM

## 2021-05-19 LAB
CONTROL: NORMAL
PREGNANCY TEST URINE, POC: NEGATIVE

## 2021-05-19 PROCEDURE — 81025 URINE PREGNANCY TEST: CPT | Performed by: ADVANCED PRACTICE MIDWIFE

## 2021-05-19 PROCEDURE — 88305 TISSUE EXAM BY PATHOLOGIST: CPT

## 2021-05-19 PROCEDURE — 58100 BIOPSY OF UTERUS LINING: CPT | Performed by: ADVANCED PRACTICE MIDWIFE

## 2021-05-19 NOTE — PROGRESS NOTES
PROBLEM VISIT     Date of service: 2021    Greer Noguera  Is a 54 y.o.  female    PT's PCP is: Rainer Evans MD     : 1965                                             Subjective:       Patient's last menstrual period was 2021 (exact date). OB History    Para Term  AB Living   2 2       2   SAB TAB Ectopic Molar Multiple Live Births             2      # Outcome Date GA Lbr Daron/2nd Weight Sex Delivery Anes PTL Lv   2 Para 97 40w0d  7 lb 6 oz (3.345 kg) F Vag-Spont   EARLE      Complications: Diabetes in pregnancy   1 Para 93 40w0d  8 lb (3.629 kg) F Vag-Spont   EARLE        Social History     Tobacco Use   Smoking Status Never Smoker   Smokeless Tobacco Never Used        Social History     Substance and Sexual Activity   Alcohol Use Yes    Alcohol/week: 0.0 standard drinks    Comment: 1 glass of wine/month       Social History     Substance and Sexual Activity   Sexual Activity Yes    Partners: Male    Birth control/protection: Surgical       Allergies: Levaquin [levofloxacin in d5w] and Pcn [penicillins]    Chief Complaint   Patient presents with    Menstrual Problem     Follow up in house ultrasound, follow up labs. h/o irregular bleeding. C/O sinus congestion. Last Yearly:  2021    Last pap: 2021    Last HPV:     Have you had a positive covid test: No    Have you had the covid immunization: No    PE:  Vital Signs  Blood pressure 138/86, height 5' 6\" (1.676 m), weight 285 lb (129.3 kg), last menstrual period 2021, not currently breastfeeding. Estimated body mass index is 46 kg/m² as calculated from the following:    Height as of this encounter: 5' 6\" (1.676 m). Weight as of this encounter: 285 lb (129.3 kg).     No data recorded      NURSE: Faisal PETER    HPI: here for f/u to w/u of irregular menses      PT denies fever, chills, nausea and vomiting       Objective: No acute distress  Excellent

## 2021-05-20 LAB — SURGICAL PATHOLOGY REPORT: NORMAL

## 2021-05-24 NOTE — RESULT ENCOUNTER NOTE
Pt. notified of results and voices understanding. Scheduled for appointment with Dr. Manuela Quintero 06/22/2021.
[Negative] : Heme/Lymph

## 2021-05-27 ENCOUNTER — OFFICE VISIT (OUTPATIENT)
Dept: PRIMARY CARE CLINIC | Age: 56
End: 2021-05-27
Payer: COMMERCIAL

## 2021-05-27 VITALS
BODY MASS INDEX: 46.55 KG/M2 | WEIGHT: 288.4 LBS | DIASTOLIC BLOOD PRESSURE: 80 MMHG | SYSTOLIC BLOOD PRESSURE: 136 MMHG | RESPIRATION RATE: 16 BRPM | HEART RATE: 84 BPM

## 2021-05-27 DIAGNOSIS — Z12.11 SCREENING FOR COLON CANCER: ICD-10-CM

## 2021-05-27 DIAGNOSIS — I10 ESSENTIAL HYPERTENSION: Primary | ICD-10-CM

## 2021-05-27 DIAGNOSIS — K21.00 GASTROESOPHAGEAL REFLUX DISEASE WITH ESOPHAGITIS WITHOUT HEMORRHAGE: ICD-10-CM

## 2021-05-27 DIAGNOSIS — H66.93 ACUTE OTITIS MEDIA, BILATERAL: ICD-10-CM

## 2021-05-27 DIAGNOSIS — J06.9 ACUTE UPPER RESPIRATORY INFECTION: ICD-10-CM

## 2021-05-27 PROCEDURE — 99214 OFFICE O/P EST MOD 30 MIN: CPT | Performed by: FAMILY MEDICINE

## 2021-05-27 RX ORDER — AZITHROMYCIN 250 MG/1
250 TABLET, FILM COATED ORAL SEE ADMIN INSTRUCTIONS
Qty: 6 TABLET | Refills: 0 | Status: SHIPPED | OUTPATIENT
Start: 2021-05-27 | End: 2021-06-01

## 2021-05-27 SDOH — ECONOMIC STABILITY: TRANSPORTATION INSECURITY
IN THE PAST 12 MONTHS, HAS LACK OF TRANSPORTATION KEPT YOU FROM MEETINGS, WORK, OR FROM GETTING THINGS NEEDED FOR DAILY LIVING?: NO

## 2021-05-27 SDOH — ECONOMIC STABILITY: FOOD INSECURITY: WITHIN THE PAST 12 MONTHS, YOU WORRIED THAT YOUR FOOD WOULD RUN OUT BEFORE YOU GOT MONEY TO BUY MORE.: NEVER TRUE

## 2021-05-27 ASSESSMENT — SOCIAL DETERMINANTS OF HEALTH (SDOH): HOW HARD IS IT FOR YOU TO PAY FOR THE VERY BASICS LIKE FOOD, HOUSING, MEDICAL CARE, AND HEATING?: NOT HARD AT ALL

## 2021-05-27 NOTE — PROGRESS NOTES
Hypertension: Patient here for follow-up of elevated blood pressure. She is not exercising and is not adherent to low salt diet. Blood pressure is well controlled at home. Cardiac symptoms fatigue and palpitations. Patient denies chest pain. Cardiovascular risk factors: hypertension and obesity (BMI >= 30 kg/m2). Use of agents associated with hypertension: none. Hyperlipidemia: Patient presents with hyperlipidemia. There is a family history of hyperlipidemia. Anxiety:  Patient states no issues at this time. Patient states that she was having sinus issues about two weeks ago. She said it moved from her head into her chest. She said that it feels itchy so it is making her cough. She said that she is using her Symbicort and her other inhaler. She also mentioned the other day when she was at work she went to go blow her nose, and her ear shut off and she got dizzy. Patient states that she randomly gets nauseous. She said that it comes out of nowhere. She said she doesn't throw up, but she has to jump up and get some water. She said that she did go through therapy for her hip and leg. She said they told her to sleep with a pillow in between her legs. She said when she goes to adjust if the pillow falls out and she goes to her back she has some pain. She said that is the only time she seems to have pain.        Allergies:  Levaquin [levofloxacin in d5w] and Pcn [penicillins]    Past Medical History:    Past Medical History:   Diagnosis Date    Acute pulmonary embolism ([facility] 11/2/2015    Anxiety     Asthma     Constipation, chronic     CPAP (continuous positive airway pressure) dependence     Diabetes mellitus ([address]     pt states she was prediabetic at one point    Embolism - blood clot     right lung and right leg    Esophageal reflux     Factor V Leiden ([facility] [address] [facility]     Heart murmur     HTN (hypertension)     Hyperlipidemia     Irritable bladder     MTHFR mutation (HCC)     MVP the same as other medications prescribed for you. Read the directions carefully, and ask your doctor or other care provider to review them with you. Where to Get Your Medications      These medications were sent to [address] Le[facility], [address] [place] [address]  [address]    Phone: [phone]   · azithromycin 250 MG tablet         Objective:  Physical Exam:  VitalsBP 136/80 (Site: Left Upper Arm, Position: Sitting)   Pulse 84   Resp 16   Wt 288 lb 6.4 oz (130.8 kg)   LMP 04/28/2021 (Exact Date)   BMI 46.55 kg/m²   GEN:   A & O x3, no apparent distress  EYES: No gross abnormalities. ENT:right and left TM red, dull, bulging, neck without nodes, pharynx erythematous without exudate and nasal mucosa congested  NECK: normal, supple, no lymphadenopathy,  no carotid bruits  PULM: clear to auscultation bilaterally- no wheezes, rales or rhonchi, normal air movement, no respiratory distress  COR: regular rate & rhythm, no murmurs and no gallops  ABD:  soft, non-tender, non-distended, normal bowel sounds, no masses or organomegaly  : deferred  EXT: normal strength, tone, and muscle mass  NEURO: Motor and sensory grossly intact  SKIN:  No skin lesions or rashes      Labs:  Lab Results   Component Value Date    BUN 12 11/16/2020    CREATININE 0.84 11/16/2020     11/16/2020    K 3.8 11/16/2020    CALCIUM 9.0 11/16/2020     11/16/2020    CO2 26 11/16/2020    LABGLOM >60 11/16/2020    CHOL 181 07/24/2020    LDLCHOLESTEROL 121 07/24/2020    HDL 41 07/24/2020    TRIG 97 07/24/2020    ALT 16 11/16/2020    AST 18 11/16/2020       Assessment:  1. Essential hypertension    2. Acute upper respiratory infection    3. Acute otitis media, bilateral    4. Gastroesophageal reflux disease with esophagitis without hemorrhage    5.  Screening for colon cancer      Patient Active Problem List   Diagnosis Code    Bronchial asthma J45.909    Sleep apnea G47.30    Rhinitis, allergic J30.9    Constipation K59.00    Obesity E66.9    Thrombophilia (HCC) D68.59    Hyperlipidemia E78.5    Sleep apnea, obstructive G47.33    DVT, lower extremity, distal, chronic (HCC) I82.5Z9    Essential hypertension I10    Acute pulmonary embolism (HCC) I26.99    Long term current use of anticoagulant therapy Z79.01    Venous thromboembolism I82.90    Colon polyp K63.5    ASHTYN (generalized anxiety disorder) F41.1    Chest pain R07.9    Acute coronary syndrome (HCC) I24.9    Tachycardia R00.0    Hypertensive urgency, malignant I16.0    History of colon polyps Z86.010     Labs reviewed : none  Other tests reviewed: none  Plan:   Diagnosis Orders   1. Essential hypertension     2. Acute upper respiratory infection  azithromycin (ZITHROMAX) 250 MG tablet   3. Acute otitis media, bilateral  azithromycin (ZITHROMAX) 250 MG tablet   4. Gastroesophageal reflux disease with esophagitis without hemorrhage  Reed Epley, MD, Gastroenterology, [place]   5. Screening for colon cancer  Reed Epley, MD, Gastroenterology, [place]       · Continue current medications  · Add zpack  · Continue prilosec  · Advised egd and colonoscopy  · Exercise for her hio pain  · Encouraged low sodium, low cholesterol, weight loss diet.     · Goal for blood pressure controll is 120/80  · Recommended regular exercise as tolerated, 3-5 times per day  · Labs: fasting lipids, ALT, AST and BMP in 3 months  · Follow up in 3 months  ·     Orders Placed This Encounter   Procedures   Reed Epley, MD, Gastroenterology, [place]     Referral Priority:   Routine     Referral Type:   Eval and Treat     Referral Reason:   Specialty Services Required     Referred to Provider:   Talon Silverman MD     Requested Specialty:   Gastroenterology     Number of Visits Requested:   1       Current Outpatient Medications   Medication Sig Dispense Refill    azithromycin (ZITHROMAX) 250 MG tablet Take 1 tablet by mouth See Admin Instructions for 5 days 500mg on day 1 followed by 250mg on days 2 - 5 6 tablet 0    budesonide-formoterol (SYMBICORT) 160-4.5 MCG/ACT AERO Inhale 2 puffs into the lungs      spironolactone (ALDACTONE) 25 MG tablet TAKE 1 TABLET BY MOUTH DAILY 90 tablet 1    pantoprazole (PROTONIX) 40 MG tablet Take 1 tablet by mouth daily 90 tablet 0    olmesartan (BENICAR) 20 MG tablet Take 1 tablet by mouth daily 90 tablet 3    budesonide-formoterol (SYMBICORT) 160-4.5 MCG/ACT AERO Inhale 2 puffs into the lungs 2 times daily 1 Inhaler 5    ELIQUIS 5 MG TABS tablet TAKE 1 TABLET BY MOUTH TWICE DAILY 180 tablet 1    Na Sulfate-K Sulfate-Mg Sulf (SUPREP BOWEL PREP KIT) 17.5-3.13-1.6 GM/177ML SOLN Take as directed (Patient not taking: Reported on 4/28/2021) 2 Bottle 0    albuterol sulfate HFA (PROAIR HFA) 108 (90 Base) MCG/ACT inhaler Inhale 2 puffs into the lungs 4 times daily as needed for Wheezing or Shortness of Breath 1 Inhaler 0    aspirin 81 MG chewable tablet Take 1 tablet by mouth daily (Patient not taking: Reported on 5/6/2020) 30 tablet 3    metoprolol tartrate (LOPRESSOR) 25 MG tablet Take 0.5 tablets by mouth 2 times daily 60 tablet 3    PREVIDENT 5000 BOOSTER PLUS 1.1 % PSTE   0    cetirizine (ZYRTEC) 10 MG tablet Take 10 mg by mouth daily. No current facility-administered medications for this visit. No follow-ups on file.       Electronically signed by Ann Moses MD on 5/27/2021 at 8:55 AM

## 2021-05-27 NOTE — PATIENT INSTRUCTIONS
SURVEY:    You may be receiving a survey from [employer] regarding your visit today. You may get this in the mail, through your MyChart, or in your email. Please complete the survey to enable us to provide the highest quality of care to you and your family. If you cannot score us a very good (5 Stars) on any question, please call the office to discuss how we could of made your experience exceptional.    Thank you!     BRITTANI Flores [facility], [address] [place] [address]    Phone: [phone]  Fax: [phone]    Office Hours:   [facility] 8-5 Wednesday: 9-11

## 2021-06-07 ENCOUNTER — TELEPHONE (OUTPATIENT)
Dept: GASTROENTEROLOGY | Age: 56
End: 2021-06-07

## 2021-06-07 NOTE — TELEPHONE ENCOUNTER
Patient LVM for a call back to schedule from her referral.  Referral is to Dr Sina Frias.     [phone]

## 2021-06-14 LAB
CHOLESTEROL/HDL RATIO: 4.3
CHOLESTEROL: 169 MG/DL
GLUCOSE BLD-MCNC: 112 MG/DL (ref 70–99)
HDLC SERPL-MCNC: 39 MG/DL
LDL CHOLESTEROL: 108 MG/DL (ref 0–130)
PATIENT FASTING?: YES
TRIGL SERPL-MCNC: 112 MG/DL
VLDLC SERPL CALC-MCNC: ABNORMAL MG/DL (ref 1–30)

## 2021-06-15 ENCOUNTER — OFFICE VISIT (OUTPATIENT)
Dept: GASTROENTEROLOGY | Age: 56
End: 2021-06-15
Payer: COMMERCIAL

## 2021-06-15 ENCOUNTER — TELEPHONE (OUTPATIENT)
Dept: GASTROENTEROLOGY | Age: 56
End: 2021-06-15

## 2021-06-15 VITALS
WEIGHT: 288 LBS | BODY MASS INDEX: 46.48 KG/M2 | HEART RATE: 92 BPM | TEMPERATURE: 97.2 F | DIASTOLIC BLOOD PRESSURE: 91 MMHG | SYSTOLIC BLOOD PRESSURE: 157 MMHG

## 2021-06-15 DIAGNOSIS — D36.9 ADENOMATOUS POLYPS: Primary | ICD-10-CM

## 2021-06-15 DIAGNOSIS — K21.9 GASTROESOPHAGEAL REFLUX DISEASE, UNSPECIFIED WHETHER ESOPHAGITIS PRESENT: ICD-10-CM

## 2021-06-15 PROCEDURE — 99204 OFFICE O/P NEW MOD 45 MIN: CPT | Performed by: INTERNAL MEDICINE

## 2021-06-15 RX ORDER — FAMOTIDINE 40 MG/1
40 TABLET, FILM COATED ORAL EVERY EVENING
Qty: 30 TABLET | Refills: 3 | Status: SHIPPED | OUTPATIENT
Start: 2021-06-15 | End: 2022-07-01 | Stop reason: SDUPTHER

## 2021-06-15 RX ORDER — POLYETHYLENE GLYCOL 3350 17 G/17G
17 POWDER, FOR SOLUTION ORAL DAILY
COMMUNITY
Start: 2021-05-01

## 2021-06-15 ASSESSMENT — ENCOUNTER SYMPTOMS
DIARRHEA: 0
ABDOMINAL DISTENTION: 0
NAUSEA: 0
ANAL BLEEDING: 0
CONSTIPATION: 1
TROUBLE SWALLOWING: 0
BLOOD IN STOOL: 0
RECTAL PAIN: 0
COUGH: 1
CHOKING: 0
WHEEZING: 0
VOMITING: 0
ABDOMINAL PAIN: 0

## 2021-06-15 NOTE — PROGRESS NOTES
resume Coumadin tomorrow, I felt it was best to apply a hemostatic clip to the area. It was very difficult to place a clip accurately due to marked respiratory motion and the first clip did not totally obliterate the vessel. We placed a second clip which was deployed prior to verifying that it had been accurately placed and it was not helpful in obliterating the vessel. Therefore, a third clip was placed in between the 2 previously applied ones with complete obliteration of the vessel. Colonoscope was then totally removed. The patient tolerated the procedure well.     Electronically signed by Sonia Hogan MD  on 3/27/2017 at 4:50 PM        Past Medical,Family, and Social History reviewed and does contribute to the patient presentingcondition. Patient's PMH/PSH,SH,PSYCH Hx, MEDs, ALLERGIES, and ROS were all reviewed and updated in the appropriate sections.     PAST MEDICAL HISTORY:  Past Medical History:   Diagnosis Date    Acute pulmonary embolism ([address] 11/2/2015    Anxiety     Asthma     Constipation, chronic     CPAP (continuous positive airway pressure) dependence     Diabetes mellitus ([address]     pt states she was prediabetic at one point    Embolism - blood clot     right lung and right leg    Esophageal reflux     Factor V Leiden ([address]     Heart murmur     HTN (hypertension)     Hyperlipidemia     Irritable bladder     MTHFR mutation     MVP (mitral valve prolapse)     Palpitations     Pulmonary embolism (HCC) 10/15/2012    Rhinitis, allergic 10/15/2012    Sleep apnea     Venous thromboembolism 5/2/2016       Past Surgical History:   Procedure Laterality Date    CATARACT REMOVAL WITH IMPLANT Left 04/02/2018    CHOLECYSTECTOMY      around 2000    COLONOSCOPY  2006    COLONOSCOPY  03/27/2017    -polyp(adenomatous polyp)    MANDIBLE FRACTURE SURGERY  1983    SC COLSC FLEXIBLE W/CONTROL BLEEDING ANY METHOD  3/27/2017    COLONOSCOPY CONTROL HEMORRHAGE performed by Huyen Branch Monique Lee MD at [address] FLX W/VL OF TUMOR POLYP LESION SNARE TQ  3/27/2017    COLONOSCOPY POLYPECTOMY SNARE/COLD BIOPSY performed by Gaby Miller MD at [facility] INSJ IO LENS PROSTH W/O ECP Left 4/2/2018    EYE CATARACT EMULSIFICATION IOL IMPLANT performed by Melanie Tinajero MD at [facility] ENDOSCOPY  1/2010    with biopsy       CURRENT MEDICATIONS:    Current Outpatient Medications:     polyethylene glycol (MIRALAX) 17 g PACK packet, Take 17 g by mouth daily, Disp: , Rfl:     budesonide-formoterol (SYMBICORT) 160-4.5 MCG/ACT AERO, Inhale 2 puffs into the lungs, Disp: , Rfl:     spironolactone (ALDACTONE) 25 MG tablet, TAKE 1 TABLET BY MOUTH DAILY, Disp: 90 tablet, Rfl: 1    pantoprazole (PROTONIX) 40 MG tablet, Take 1 tablet by mouth daily, Disp: 90 tablet, Rfl: 0    olmesartan (BENICAR) 20 MG tablet, Take 1 tablet by mouth daily, Disp: 90 tablet, Rfl: 3    ELIQUIS 5 MG TABS tablet, TAKE 1 TABLET BY MOUTH TWICE DAILY, Disp: 180 tablet, Rfl: 1    albuterol sulfate HFA (PROAIR HFA) 108 (90 Base) MCG/ACT inhaler, Inhale 2 puffs into the lungs 4 times daily as needed for Wheezing or Shortness of Breath, Disp: 1 Inhaler, Rfl: 0    aspirin 81 MG chewable tablet, Take 1 tablet by mouth daily, Disp: 30 tablet, Rfl: 3    metoprolol tartrate (LOPRESSOR) 25 MG tablet, Take 0.5 tablets by mouth 2 times daily, Disp: 60 tablet, Rfl: 3    PREVIDENT 5000 BOOSTER PLUS 1.1 % PSTE, , Disp: , Rfl: 0    cetirizine (ZYRTEC) 10 MG tablet, Take 10 mg by mouth daily.   , Disp: , Rfl:     budesonide-formoterol (SYMBICORT) 160-4.5 MCG/ACT AERO, Inhale 2 puffs into the lungs 2 times daily, Disp: 1 Inhaler, Rfl: 5    ALLERGIES:   Allergies   Allergen Reactions    Levaquin [Levofloxacin In D5w]      FLUSHED AND WHEEZING    Pcn [Penicillins]        FAMILY HISTORY:       Problem Relation Age of Onset    Cancer Mother         breast ca    Diabetes Mother     Depression Mother     Thyroid Disease Mother     High Blood Pressure Mother     Heart Disease Mother     Cancer Father         prostate ca    COPD Father     Heart Failure Father     Heart Disease Father     High Blood Pressure Father     Deep Vein Thrombosis Sister     Breast Cancer Sister     Deep Vein Thrombosis Sister          SOCIAL HISTORY:   Social History     Socioeconomic History    Marital status:      Spouse name: Not on file    Number of children: Not on file    Years of education: Not on file    Highest education level: Not on file   Occupational History    Not on file   Tobacco Use    Smoking status: Never Smoker    Smokeless tobacco: Never Used   Vaping Use    Vaping Use: Never used   Substance and Sexual Activity    Alcohol use: Yes     Alcohol/week: 0.0 standard drinks     Comment: 1 glass of wine/month    Drug use: No    Sexual activity: Yes     Partners: Male     Birth control/protection: Surgical   Other Topics Concern    Not on file   Social History Narrative    Not on file     Social Determinants of Health     Financial Resource Strain: Low Risk     Difficulty of Paying Living Expenses: Not hard at all   Food Insecurity: No Food Insecurity    Worried About Running Out of Food in the Last Year: Never true    [address] in the Last Year: Never true   Transportation Needs: No Transportation Needs    Lack of Transportation (Medical): No    Lack of Transportation (Non-Medical):  No   Physical Activity:     Days of Exercise per Week:     Minutes of Exercise per Session:    Stress:     Feeling of Stress :    Social Connections:     Frequency of Communication with Friends and Family:     Frequency of Social Gatherings with Friends and Family:     Attends [employer] Services:     Active Member of Clubs or Organizations:     Attends Club or Organization Meetings:     Marital Status:    Intimate Partner Violence:     Fear of Current or Ex-Partner:     Emotionally Abused:     Physically Abused:     Sexually Abused:        REVIEW OF SYSTEMS: A 12-point review of systemswas obtained and pertinent positives and negatives were enumerated above in the history of present illness. All other reviewed systems / symptoms were negative. Review of Systems   Constitutional: Negative for appetite change, fatigue and unexpected weight change. HENT: Negative for trouble swallowing (gagging). Respiratory: Positive for cough. Negative for choking and wheezing. Cardiovascular: Positive for chest pain (burning). Negative for palpitations and leg swelling. Gastrointestinal: Positive for constipation. Negative for abdominal distention, abdominal pain, anal bleeding, blood in stool, diarrhea, nausea, rectal pain and vomiting. Genitourinary: Negative for difficulty urinating. Allergic/Immunologic: Negative for environmental allergies and food allergies. Neurological: Negative for dizziness, weakness, light-headedness, numbness and headaches. Hematological: Does not bruise/bleed easily. Psychiatric/Behavioral: Negative for sleep disturbance. The patient is not nervous/anxious.             LABORATORY DATA: Reviewed  Lab Results   Component Value Date    WBC 9.0 11/16/2020    HGB 13.8 11/16/2020    HCT 41.9 11/16/2020    MCV 90.1 11/16/2020     11/16/2020     11/16/2020    K 3.8 11/16/2020     11/16/2020    CO2 26 11/16/2020    BUN 12 11/16/2020    CREATININE 0.84 11/16/2020    LABPROT 7.1 11/09/2012    LABALBU 3.9 11/16/2020    BILITOT 0.40 11/16/2020    ALKPHOS 58 11/16/2020    AST 18 11/16/2020    ALT 16 11/16/2020    INR 1.8 05/03/2019         Lab Results   Component Value Date    RBC 4.65 11/16/2020    HGB 13.8 11/16/2020    MCV 90.1 11/16/2020    MCH 29.7 11/16/2020    MCHC 32.9 11/16/2020    RDW 13.3 11/16/2020    MPV 9.9 11/16/2020    BASOPCT 0 11/16/2020    LYMPHSABS 2.81 11/16/2020    MONOSABS 0.51 11/16/2020    [place] 5.42 11/16/2020    EOSABS 0.17 11/16/2020    BASOSABS 0.03 11/16/2020         DIAGNOSTIC TESTING:     US Non OB Transvaginal    Result Date: 6/14/2021  UTERUS:anteverted, enlarged heterogeneous echo pattern; fibroid visualized inferior/LILIAN sadia- 2.3cm x 1.9cm x 1.7cm   ENDO:9mm in thickness   RT. OVARY:not visualized   LT. OVARY:not visualized   Attempted to visualize ovaries both TA & TV, not seen - due to over-lying bowel         BP (!) 157/91   Pulse 92   Temp 97.2 °F (36.2 °C)   Wt 288 lb (130.6 kg)   BMI 46.48 kg/m²     PHYSICAL EXAMINATION: Vital signs reviewed per the nursing documentation. Body mass index is 46.48 kg/m². Physical Exam  Vitals and nursing note reviewed. Constitutional:       General: She is not in acute distress. Appearance: She is well-developed. She is not diaphoretic. HENT:      Head: Normocephalic. Mouth/Throat:      Pharynx: No oropharyngeal exudate. Eyes:      General: No scleral icterus. Pupils: Pupils are equal, round, and reactive to light. Neck:      Thyroid: No thyromegaly. Vascular: No JVD. Trachea: No tracheal deviation. Cardiovascular:      Rate and Rhythm: Normal rate and regular rhythm. Heart sounds: Normal heart sounds. No murmur heard. Pulmonary:      Effort: Pulmonary effort is normal. No respiratory distress. Breath sounds: Normal breath sounds. No wheezing. Abdominal:      General: Bowel sounds are normal. There is no distension. Palpations: Abdomen is soft. Tenderness: There is no abdominal tenderness. There is no guarding or rebound. Comments: No ascites   Musculoskeletal:         General: Normal range of motion. Cervical back: Normal range of motion and neck supple. Skin:     General: Skin is warm. Coloration: Skin is not pale. Findings: No erythema or rash.       Comments: She is not diaphoretic   Neurological:      Mental Status: She is alert and oriented to person, place, and time.      Deep Tendon Reflexes: Reflexes are normal and symmetric. Psychiatric:         Behavior: Behavior normal.         Thought Content: Thought content normal.         Judgment: Judgment normal.         IMPRESSION: Ms. Kaylene Clark is a 54 y.o. female with      Diagnosis Orders   1. Adenomatous polyps  COLONOSCOPY W/ OR W/O BIOPSY   2. Gastroesophageal reflux disease, unspecified whether esophagitis present  EGD     Pepcid at bedtime, and switch Protonix to the morning before breakfast,  Patient promised she will talk to cardiology, and see if she needs any further testing to rule out any congestive heart failure  She is already on water pill with Aldactone I do want to increase it I told her to check with her cardiologist  We will proceed with the above make sure she does not have any esophagitis or stricturing we will add Pepcid at bedtime as stated        Diet/life style/natural hx /complication of the dx were all explained in details   Past medical, past surgical, social history, psychiatric history, medications or allergies, all reviewed and  updated    Spent 45 minutes providing patient education and counseling. Thank you for allowing me to participate in the care of Ms. Kaylene Clark. For any further questions please do not hesitate to contact me. I have reviewed and agree with the MA/RN ROS. Note is dictated utilizing voice recognition software. Unfortunately this leads to occasional typographical errors. Please contact our office if you have any questions.     Svitlana Lazo MD  [facility]  O: #[phone]

## 2021-06-15 NOTE — TELEPHONE ENCOUNTER
Writer spoke to pt over the phone informing pt she is sched for covid testing at [address] [place] [address] 7/8/21 @ 9am.  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

## 2021-06-15 NOTE — TELEPHONE ENCOUNTER
Pt called and LVM stating she was in office today and wants to know if 7/12/21 is still available for procedure.

## 2021-06-15 NOTE — TELEPHONE ENCOUNTER
Writer returned pt's phone call and spoke to pt over the phone in regards to rescheduling proc date. Pt is resched w/ Jessica at [facility] Mon 7/12/21 @ 11:45am proc time, 10:15am arrival time. Bowel prep instructions w/ new dates given to pt over the phone. Writer will call pt back w/ new covid testing date. Pt voices her understanding.

## 2021-06-17 DIAGNOSIS — J45.40 MODERATE PERSISTENT ASTHMA WITHOUT COMPLICATION: Primary | ICD-10-CM

## 2021-06-17 RX ORDER — ALBUTEROL SULFATE 90 UG/1
2 AEROSOL, METERED RESPIRATORY (INHALATION) 4 TIMES DAILY PRN
Qty: 1 INHALER | Refills: 0 | Status: SHIPPED | OUTPATIENT
Start: 2021-06-17 | End: 2022-07-26 | Stop reason: SDUPTHER

## 2021-06-21 ENCOUNTER — TELEPHONE (OUTPATIENT)
Dept: PULMONOLOGY | Age: 56
End: 2021-06-21

## 2021-06-21 ENCOUNTER — OFFICE VISIT (OUTPATIENT)
Dept: PULMONOLOGY | Age: 56
End: 2021-06-21
Payer: COMMERCIAL

## 2021-06-21 VITALS
TEMPERATURE: 98.1 F | SYSTOLIC BLOOD PRESSURE: 120 MMHG | HEIGHT: 66 IN | WEIGHT: 287 LBS | DIASTOLIC BLOOD PRESSURE: 67 MMHG | OXYGEN SATURATION: 95 % | RESPIRATION RATE: 16 BRPM | HEART RATE: 83 BPM | BODY MASS INDEX: 46.12 KG/M2

## 2021-06-21 DIAGNOSIS — I82.90 VENOUS THROMBOEMBOLISM: Primary | ICD-10-CM

## 2021-06-21 DIAGNOSIS — D68.59 THROMBOPHILIA (HCC): ICD-10-CM

## 2021-06-21 DIAGNOSIS — J45.40 MODERATE PERSISTENT ASTHMA WITHOUT COMPLICATION: ICD-10-CM

## 2021-06-21 DIAGNOSIS — I10 ESSENTIAL HYPERTENSION: ICD-10-CM

## 2021-06-21 DIAGNOSIS — G47.33 OSA (OBSTRUCTIVE SLEEP APNEA): ICD-10-CM

## 2021-06-21 DIAGNOSIS — R09.82 POST-NASAL DRIP: ICD-10-CM

## 2021-06-21 DIAGNOSIS — E66.01 MORBID OBESITY DUE TO EXCESS CALORIES (HCC): ICD-10-CM

## 2021-06-21 DIAGNOSIS — Z79.01 LONG TERM CURRENT USE OF ANTICOAGULANT THERAPY: ICD-10-CM

## 2021-06-21 PROCEDURE — 99214 OFFICE O/P EST MOD 30 MIN: CPT | Performed by: INTERNAL MEDICINE

## 2021-06-21 NOTE — PATIENT INSTRUCTIONS
SURVEY:    You may be receiving a survey from [employer] regarding your visit today. Please complete the survey to enable us to provide the highest quality of care to you and your family. If you cannot score us a very good on any question, please call the office to discuss how we could have made your experience a very good one. Thank you.

## 2021-06-21 NOTE — TELEPHONE ENCOUNTER
Manuelito Moeller called in and wanted to make Dr. Sharon Scott aware that she is not going to start the [facility] inhaler. She contacted [facility] and the Symbicort will be cheaper for her, and they don't recommend Breo with a beta blocker. She is going to continue with Symbicort.

## 2021-06-22 ENCOUNTER — TELEPHONE (OUTPATIENT)
Dept: PRIMARY CARE CLINIC | Age: 56
End: 2021-06-22

## 2021-06-22 VITALS — DIASTOLIC BLOOD PRESSURE: 86 MMHG | SYSTOLIC BLOOD PRESSURE: 138 MMHG

## 2021-07-06 ENCOUNTER — TELEPHONE (OUTPATIENT)
Dept: GASTROENTEROLOGY | Age: 56
End: 2021-07-06

## 2021-07-06 NOTE — TELEPHONE ENCOUNTER
Savannah Bahena [facility] stating she needs bowel prep instructions. Returned call to Savannah Bahena who gives email address of [email or web address]. New instructions sent.

## 2021-07-08 ENCOUNTER — HOSPITAL ENCOUNTER (OUTPATIENT)
Dept: LAB | Age: 56
Setting detail: SPECIMEN
Discharge: HOME | End: 2021-07-08
Payer: COMMERCIAL

## 2021-07-08 DIAGNOSIS — Z01.818 PREOP TESTING: ICD-10-CM

## 2021-07-08 DIAGNOSIS — Z01.818 PREOP TESTING: Primary | ICD-10-CM

## 2021-07-08 PROCEDURE — C9803 HOPD COVID-19 SPEC COLLECT: HCPCS

## 2021-07-08 PROCEDURE — U0005 INFEC AGEN DETEC AMPLI PROBE: HCPCS

## 2021-07-08 PROCEDURE — U0003 INFECTIOUS AGENT DETECTION BY NUCLEIC ACID (DNA OR RNA); SEVERE ACUTE RESPIRATORY SYNDROME CORONAVIRUS 2 (SARS-COV-2) (CORONAVIRUS DISEASE [COVID-19]), AMPLIFIED PROBE TECHNIQUE, MAKING USE OF HIGH THROUGHPUT TECHNOLOGIES AS DESCRIBED BY CMS-2020-01-R: HCPCS

## 2021-07-09 ENCOUNTER — ANESTHESIA EVENT (OUTPATIENT)
Dept: OPERATING ROOM | Age: 56
End: 2021-07-09
Payer: COMMERCIAL

## 2021-07-09 LAB
SARS-COV-2: NORMAL
SARS-COV-2: NOT DETECTED
SOURCE: NORMAL

## 2021-07-12 ENCOUNTER — ANESTHESIA (OUTPATIENT)
Dept: OPERATING ROOM | Age: 56
End: 2021-07-12
Payer: COMMERCIAL

## 2021-07-12 ENCOUNTER — HOSPITAL ENCOUNTER (OUTPATIENT)
Age: 56
Setting detail: OUTPATIENT SURGERY
Discharge: HOME | End: 2021-07-12
Attending: INTERNAL MEDICINE | Admitting: INTERNAL MEDICINE
Payer: COMMERCIAL

## 2021-07-12 VITALS
WEIGHT: 284 LBS | HEART RATE: 102 BPM | BODY MASS INDEX: 45.64 KG/M2 | OXYGEN SATURATION: 92 % | SYSTOLIC BLOOD PRESSURE: 150 MMHG | DIASTOLIC BLOOD PRESSURE: 84 MMHG | TEMPERATURE: 97.2 F | HEIGHT: 66 IN | RESPIRATION RATE: 21 BRPM

## 2021-07-12 VITALS — DIASTOLIC BLOOD PRESSURE: 109 MMHG | OXYGEN SATURATION: 93 % | SYSTOLIC BLOOD PRESSURE: 200 MMHG

## 2021-07-12 DIAGNOSIS — R11.2 INTRACTABLE NAUSEA AND VOMITING: Primary | ICD-10-CM

## 2021-07-12 LAB — HCG, PREGNANCY URINE (POC): NEGATIVE

## 2021-07-12 PROCEDURE — 43239 EGD BIOPSY SINGLE/MULTIPLE: CPT | Performed by: INTERNAL MEDICINE

## 2021-07-12 PROCEDURE — 3609010600 HC COLONOSCOPY POLYPECTOMY SNARE/COLD BIOPSY: Performed by: INTERNAL MEDICINE

## 2021-07-12 PROCEDURE — 7100000010 HC PHASE II RECOVERY - FIRST 15 MIN: Performed by: INTERNAL MEDICINE

## 2021-07-12 PROCEDURE — 3700000001 HC ADD 15 MINUTES (ANESTHESIA): Performed by: INTERNAL MEDICINE

## 2021-07-12 PROCEDURE — 3609012400 HC EGD TRANSORAL BIOPSY SINGLE/MULTIPLE: Performed by: INTERNAL MEDICINE

## 2021-07-12 PROCEDURE — 6360000002 HC RX W HCPCS: Performed by: NURSE ANESTHETIST, CERTIFIED REGISTERED

## 2021-07-12 PROCEDURE — 88305 TISSUE EXAM BY PATHOLOGIST: CPT

## 2021-07-12 PROCEDURE — 7100000011 HC PHASE II RECOVERY - ADDTL 15 MIN: Performed by: INTERNAL MEDICINE

## 2021-07-12 PROCEDURE — 2580000003 HC RX 258: Performed by: ANESTHESIOLOGY

## 2021-07-12 PROCEDURE — 7100000001 HC PACU RECOVERY - ADDTL 15 MIN: Performed by: INTERNAL MEDICINE

## 2021-07-12 PROCEDURE — 2709999900 HC NON-CHARGEABLE SUPPLY: Performed by: INTERNAL MEDICINE

## 2021-07-12 PROCEDURE — 6360000002 HC RX W HCPCS: Performed by: ANESTHESIOLOGY

## 2021-07-12 PROCEDURE — 81025 URINE PREGNANCY TEST: CPT

## 2021-07-12 PROCEDURE — 3700000000 HC ANESTHESIA ATTENDED CARE: Performed by: INTERNAL MEDICINE

## 2021-07-12 PROCEDURE — 45380 COLONOSCOPY AND BIOPSY: CPT | Performed by: INTERNAL MEDICINE

## 2021-07-12 PROCEDURE — 2500000003 HC RX 250 WO HCPCS: Performed by: NURSE ANESTHETIST, CERTIFIED REGISTERED

## 2021-07-12 PROCEDURE — 7100000000 HC PACU RECOVERY - FIRST 15 MIN: Performed by: INTERNAL MEDICINE

## 2021-07-12 RX ORDER — SUCCINYLCHOLINE/SOD CL,ISO/PF 100 MG/5ML
SYRINGE (ML) INTRAVENOUS PRN
Status: DISCONTINUED | OUTPATIENT
Start: 2021-07-12 | End: 2021-07-12 | Stop reason: SDUPTHER

## 2021-07-12 RX ORDER — PROMETHAZINE HYDROCHLORIDE 25 MG/ML
12.5 INJECTION, SOLUTION INTRAMUSCULAR; INTRAVENOUS ONCE
Status: COMPLETED | OUTPATIENT
Start: 2021-07-12 | End: 2021-07-12

## 2021-07-12 RX ORDER — DEXAMETHASONE SODIUM PHOSPHATE 10 MG/ML
INJECTION, SOLUTION INTRAMUSCULAR; INTRAVENOUS PRN
Status: DISCONTINUED | OUTPATIENT
Start: 2021-07-12 | End: 2021-07-12 | Stop reason: SDUPTHER

## 2021-07-12 RX ORDER — MEPERIDINE HYDROCHLORIDE 50 MG/ML
12.5 INJECTION INTRAMUSCULAR; INTRAVENOUS; SUBCUTANEOUS EVERY 5 MIN PRN
Status: DISCONTINUED | OUTPATIENT
Start: 2021-07-12 | End: 2021-07-12 | Stop reason: HOSPADM

## 2021-07-12 RX ORDER — SODIUM CHLORIDE, SODIUM LACTATE, POTASSIUM CHLORIDE, CALCIUM CHLORIDE 600; 310; 30; 20 MG/100ML; MG/100ML; MG/100ML; MG/100ML
INJECTION, SOLUTION INTRAVENOUS CONTINUOUS
Status: DISCONTINUED | OUTPATIENT
Start: 2021-07-12 | End: 2021-07-12 | Stop reason: HOSPADM

## 2021-07-12 RX ORDER — DIPHENHYDRAMINE HYDROCHLORIDE 50 MG/ML
12.5 INJECTION INTRAMUSCULAR; INTRAVENOUS
Status: DISCONTINUED | OUTPATIENT
Start: 2021-07-12 | End: 2021-07-12 | Stop reason: HOSPADM

## 2021-07-12 RX ORDER — SODIUM CHLORIDE 0.9 % (FLUSH) 0.9 %
10 SYRINGE (ML) INJECTION EVERY 12 HOURS SCHEDULED
Status: DISCONTINUED | OUTPATIENT
Start: 2021-07-12 | End: 2021-07-12 | Stop reason: HOSPADM

## 2021-07-12 RX ORDER — PROMETHAZINE HYDROCHLORIDE 25 MG/ML
6.25 INJECTION, SOLUTION INTRAMUSCULAR; INTRAVENOUS
Status: DISCONTINUED | OUTPATIENT
Start: 2021-07-12 | End: 2021-07-12 | Stop reason: HOSPADM

## 2021-07-12 RX ORDER — ESMOLOL HYDROCHLORIDE 10 MG/ML
INJECTION INTRAVENOUS PRN
Status: DISCONTINUED | OUTPATIENT
Start: 2021-07-12 | End: 2021-07-12 | Stop reason: SDUPTHER

## 2021-07-12 RX ORDER — MIDAZOLAM HYDROCHLORIDE 1 MG/ML
INJECTION INTRAMUSCULAR; INTRAVENOUS PRN
Status: DISCONTINUED | OUTPATIENT
Start: 2021-07-12 | End: 2021-07-12 | Stop reason: SDUPTHER

## 2021-07-12 RX ORDER — PROMETHAZINE HYDROCHLORIDE 25 MG/ML
INJECTION, SOLUTION INTRAMUSCULAR; INTRAVENOUS
Status: DISCONTINUED
Start: 2021-07-12 | End: 2021-07-12 | Stop reason: HOSPADM

## 2021-07-12 RX ORDER — SODIUM CHLORIDE 9 MG/ML
25 INJECTION, SOLUTION INTRAVENOUS PRN
Status: DISCONTINUED | OUTPATIENT
Start: 2021-07-12 | End: 2021-07-12 | Stop reason: HOSPADM

## 2021-07-12 RX ORDER — HYDRALAZINE HYDROCHLORIDE 20 MG/ML
5 INJECTION INTRAMUSCULAR; INTRAVENOUS EVERY 10 MIN PRN
Status: DISCONTINUED | OUTPATIENT
Start: 2021-07-12 | End: 2021-07-12 | Stop reason: HOSPADM

## 2021-07-12 RX ORDER — PROPOFOL 10 MG/ML
INJECTION, EMULSION INTRAVENOUS PRN
Status: DISCONTINUED | OUTPATIENT
Start: 2021-07-12 | End: 2021-07-12 | Stop reason: SDUPTHER

## 2021-07-12 RX ORDER — ONDANSETRON 2 MG/ML
INJECTION INTRAMUSCULAR; INTRAVENOUS PRN
Status: DISCONTINUED | OUTPATIENT
Start: 2021-07-12 | End: 2021-07-12 | Stop reason: SDUPTHER

## 2021-07-12 RX ORDER — HYDROCODONE BITARTRATE AND ACETAMINOPHEN 5; 325 MG/1; MG/1
1 TABLET ORAL
Status: DISCONTINUED | OUTPATIENT
Start: 2021-07-12 | End: 2021-07-12 | Stop reason: HOSPADM

## 2021-07-12 RX ORDER — METOCLOPRAMIDE HYDROCHLORIDE 5 MG/ML
10 INJECTION INTRAMUSCULAR; INTRAVENOUS
Status: DISCONTINUED | OUTPATIENT
Start: 2021-07-12 | End: 2021-07-12 | Stop reason: HOSPADM

## 2021-07-12 RX ORDER — SODIUM CHLORIDE 9 MG/ML
INJECTION, SOLUTION INTRAVENOUS CONTINUOUS
Status: DISCONTINUED | OUTPATIENT
Start: 2021-07-12 | End: 2021-07-12 | Stop reason: HOSPADM

## 2021-07-12 RX ORDER — SODIUM CHLORIDE 0.9 % (FLUSH) 0.9 %
10 SYRINGE (ML) INJECTION PRN
Status: DISCONTINUED | OUTPATIENT
Start: 2021-07-12 | End: 2021-07-12 | Stop reason: HOSPADM

## 2021-07-12 RX ADMIN — SODIUM CHLORIDE, POTASSIUM CHLORIDE, SODIUM LACTATE AND CALCIUM CHLORIDE: 600; 310; 30; 20 INJECTION, SOLUTION INTRAVENOUS at 11:00

## 2021-07-12 RX ADMIN — MIDAZOLAM HYDROCHLORIDE 2 MG: 1 INJECTION, SOLUTION INTRAMUSCULAR; INTRAVENOUS at 11:22

## 2021-07-12 RX ADMIN — ESMOLOL HYDROCHLORIDE 30 MG: 10 INJECTION, SOLUTION INTRAVENOUS at 11:12

## 2021-07-12 RX ADMIN — ONDANSETRON 4 MG: 2 INJECTION, SOLUTION INTRAMUSCULAR; INTRAVENOUS at 11:03

## 2021-07-12 RX ADMIN — PROMETHAZINE HYDROCHLORIDE 12.5 MG: 25 INJECTION INTRAMUSCULAR; INTRAVENOUS at 10:52

## 2021-07-12 RX ADMIN — Medication 100 MG: at 11:16

## 2021-07-12 RX ADMIN — PROPOFOL INJECTABLE EMULSION 200 MG: 10 INJECTION, EMULSION INTRAVENOUS at 11:16

## 2021-07-12 RX ADMIN — DEXAMETHASONE SODIUM PHOSPHATE 5 MG: 10 INJECTION, SOLUTION INTRAMUSCULAR; INTRAVENOUS at 11:24

## 2021-07-12 ASSESSMENT — PULMONARY FUNCTION TESTS
PIF_VALUE: 22
PIF_VALUE: 22
PIF_VALUE: 4
PIF_VALUE: 2
PIF_VALUE: 21
PIF_VALUE: 3
PIF_VALUE: 22
PIF_VALUE: 22
PIF_VALUE: 18
PIF_VALUE: 17
PIF_VALUE: 22
PIF_VALUE: 2
PIF_VALUE: 1
PIF_VALUE: 16
PIF_VALUE: 22
PIF_VALUE: 1
PIF_VALUE: 9
PIF_VALUE: 3
PIF_VALUE: 1
PIF_VALUE: 17
PIF_VALUE: 16
PIF_VALUE: 23
PIF_VALUE: 21
PIF_VALUE: 22
PIF_VALUE: 23
PIF_VALUE: 22
PIF_VALUE: 3
PIF_VALUE: 22
PIF_VALUE: 1
PIF_VALUE: 9
PIF_VALUE: 22
PIF_VALUE: 21
PIF_VALUE: 1
PIF_VALUE: 3
PIF_VALUE: 22
PIF_VALUE: 16
PIF_VALUE: 16
PIF_VALUE: 1
PIF_VALUE: 21
PIF_VALUE: 17
PIF_VALUE: 1
PIF_VALUE: 4
PIF_VALUE: 1
PIF_VALUE: 1
PIF_VALUE: 16
PIF_VALUE: 10
PIF_VALUE: 2
PIF_VALUE: 16
PIF_VALUE: 1
PIF_VALUE: 5
PIF_VALUE: 14
PIF_VALUE: 5
PIF_VALUE: 14
PIF_VALUE: 21
PIF_VALUE: 1

## 2021-07-12 ASSESSMENT — PAIN SCALES - GENERAL: PAINLEVEL_OUTOF10: 0

## 2021-07-12 NOTE — H&P
Procedure History and Physical    Pre-Procedural Diagnosis:    1. Adenomatous polyps  COLONOSCOPY W/ OR W/O BIOPSY   2. Gastroesophageal reflux disease, unspecified whether esophagitis present  EGD         Indications:  same    Procedure Planned: endoscopy and colonoscopy     History Obtained From:  patient    HISTORY OF PRESENT ILLNESS:       The patient is a 54 y.o. female who presents for the above procedure.         Past Medical History:    Past Medical History:   Diagnosis Date    Acute pulmonary embolism ([facility] [address] 11/2/2015    Anxiety     Asthma     Constipation, chronic     CPAP (continuous positive airway pressure) dependence     Diabetes mellitus ([facility] [address]     pt states she was prediabetic at one point    Embolism - blood clot     right lung and right leg    Esophageal reflux     Factor V Leiden ([facility] [address]     Heart murmur     HTN (hypertension)     Hyperlipidemia     Irritable bladder     MTHFR mutation     MVP (mitral valve prolapse)     Palpitations     Pulmonary embolism (HCC) 10/15/2012    Rhinitis, allergic 10/15/2012    Sleep apnea     Venous thromboembolism 5/2/2016       Past Surgical History:    Past Surgical History:   Procedure Laterality Date    CATARACT REMOVAL WITH IMPLANT Left 04/02/2018    CHOLECYSTECTOMY      around 2000    COLONOSCOPY  2006    COLONOSCOPY  03/27/2017    -polyp(adenomatous polyp)    MANDIBLE FRACTURE SURGERY  1983    NV COLSC FLEXIBLE W/CONTROL BLEEDING ANY METHOD  3/27/2017    COLONOSCOPY CONTROL HEMORRHAGE performed by Zeke Johnson MD at [address] FLX W/RMVL OF TUMOR POLYP LESION SNARE TQ  3/27/2017    COLONOSCOPY POLYPECTOMY SNARE/COLD BIOPSY performed by Zeke Johnson MD at [facility] RMVL INSJ IO LENS PROSTH W/O ECP Left 4/2/2018    EYE CATARACT EMULSIFICATION IOL IMPLANT performed by Dayo Luna MD at [address] [place] [address] ENDOSCOPY  1/2010    with biopsy Medications:  Current Facility-Administered Medications   Medication Dose Route Frequency Provider Last Rate Last Admin    0.9 % sodium chloride infusion   Intravenous Continuous Vj Blevins MD        lactated ringers infusion   Intravenous Continuous Vj Blevins MD        sodium chloride flush 0.9 % injection 10 mL  10 mL Intravenous 2 times per day Vj Blevins MD        sodium chloride flush 0.9 % injection 10 mL  10 mL Intravenous PRN Vj Blevins MD        0.9 % sodium chloride infusion  25 mL Intravenous PRN Vj Blevins MD        promethazine (PHENERGAN) 25 MG/ML injection                Allergies: Allergies   Allergen Reactions    Levaquin [Levofloxacin In D5w]      FLUSHED AND WHEEZING    Pcn [Penicillins]                  Social   Social History     Tobacco Use    Smoking status: Never Smoker    Smokeless tobacco: Never Used   Substance Use Topics    Alcohol use: Yes     Alcohol/week: 0.0 standard drinks     Comment: 1 glass of wine/month        PSYCH HISTORY:  Depression No  Anxiety No  Suicide No       Family History   Problem Relation Age of Onset    Cancer Mother         breast ca    Diabetes Mother     Depression Mother     Thyroid Disease Mother     High Blood Pressure Mother     Heart Disease Mother     Cancer Father         prostate ca    COPD Father     Heart Failure Father     Heart Disease Father     High Blood Pressure Father     Deep Vein Thrombosis Sister     Breast Cancer Sister     Deep Vein Thrombosis Sister       No family history of colon cancer, Crohn's disease, or ulcerative colitis    Problems with Sedation/Anesthesia in the past? no    REVIEW OF SYSTEMS:  12 point review of systems negative other than mentioned above.       PHYSICAL EXAM:    Vitals:  BP (!) 142/92   Pulse 113   Temp 98.5 °F (36.9 °C)   Resp 13   Ht 5' 6\" (1.676 m)   Wt 284 lb (128.8 kg)   LMP 06/17/2021   SpO2 98%   BMI 45.84 kg/m²     Focused Exam related to procedure:    General appearance: NAD, conversant   Eyes: anicteric sclerae, moist conjunctivae; no lid-lag; PERRLA   Lungs: CTA, with normal respiratory effort and no intercostal retractions   CV: RRR, no MRGs   Abdomen: Soft, non-tender; no masses or HSM   Skin: Normal temperature, turgor and texture; no rash, ulcers or subcutaneous nodules     DATA:  CBC:   Lab Results   Component Value Date    WBC 9.0 11/16/2020    HGB 13.8 11/16/2020    HCT 41.9 11/16/2020    MCV 90.1 11/16/2020     11/16/2020     BUN/Cr:   Lab Results   Component Value Date    BUN 12 11/16/2020   ,   Lab Results   Component Value Date    CREATININE 0.84 11/16/2020     Potassium:   Lab Results   Component Value Date    K 3.8 11/16/2020     PT/INR:   Lab Results   Component Value Date    INR 1.8 05/03/2019    INR 2.1 04/04/2019    INR 1.8 03/05/2019    PROTIME 21.6 05/03/2019    PROTIME 25.1 04/04/2019    PROTIME 21.6 03/05/2019       ASSESSMENT AND PLAN:       1. Patient is a 54 y.o. female with above specified procedure planned. Expected Sedation/Anesthesia Type: MAC    2. ASA ([address] Anesthesiology) Anesthesia Status: Class 1 - A normal healthy patient    3. Mallampati: I (soft palate, uvula, fauces, tonsillar pillars visible)  4. Procedure options, risks and benefits reviewed with Patient. Patient expresses understanding.     5.  Consent has been signed:  Yes    Hannah Pineda MD

## 2021-07-12 NOTE — ANESTHESIA PRE PROCEDURE
Department of Anesthesiology  Preprocedure Note       Name:  Donell Laboy   Age:  54 y.o.  :  1965                                          MRN:  [id number]         Date:  2021      Surgeon: Jeannie Tillman):  Chang Mann MD    Procedure: Procedure(s):  EGD BIOPSY  COLONOSCOPY DIAGNOSTIC    Medications prior to admission:   Prior to Admission medications    Medication Sig Start Date End Date Taking?  Authorizing Provider   Fluticasone furoate-vilanterol (BREO ELLIPTA) 200-25 MCG/INH AEPB inhaler Inhale 1 puff into the lungs daily 21  Monique Reddy MD   albuterol sulfate HFA (PROAIR HFA) 108 (90 Base) MCG/ACT inhaler Inhale 2 puffs into the lungs 4 times daily as needed for Wheezing or Shortness of Breath 21   Dorothea Castano MD   polyethylene glycol (MIRALAX) 17 g PACK packet Take 17 g by mouth daily 21   Historical Provider, MD   famotidine (PEPCID) 40 MG tablet Take 1 tablet by mouth every evening 6/15/21   Agapito Lopez MD   budesonide-formoterol (SYMBICORT) 160-4.5 MCG/ACT AERO Inhale 2 puffs into the lungs    Historical Provider, MD   spironolactone (ALDACTONE) 25 MG tablet TAKE 1 TABLET BY MOUTH DAILY 21   Dorothea Castano MD   pantoprazole (PROTONIX) 40 MG tablet Take 1 tablet by mouth daily 21   Dorothea Castano MD   olmesartan (BENICAR) 20 MG tablet Take 1 tablet by mouth daily 21   Dorothea Castano MD   budesonide-formoterol (SYMBICORT) 160-4.5 MCG/ACT AERO Inhale 2 puffs into the lungs 2 times daily 12/4/20 3/6/21  Dorothea Castano MD   ELIQUIS 5 MG TABS tablet TAKE 1 TABLET BY MOUTH TWICE DAILY 20   Dorothea Castano MD   aspirin 81 MG chewable tablet Take 1 tablet by mouth daily 20   Dorothea Castano MD   metoprolol tartrate (LOPRESSOR) 25 MG tablet Take 0.5 tablets by mouth 2 times daily 20   Dorothea Castano MD   PREVIDENT 5000 BOOSTER PLUS 1.1 % PSTE  3/12/15   Historical Provider, MD   cetirizine (ZYRTEC) 10 MG tablet Take 10 mg by mouth daily. Historical Provider, MD       Current medications:    No current facility-administered medications for this encounter. Allergies:     Allergies   Allergen Reactions    Levaquin [Levofloxacin In D5w]      FLUSHED AND WHEEZING    Pcn [Penicillins]        Problem List:    Patient Active Problem List   Diagnosis Code    Bronchial asthma J45.909    Sleep apnea G47.30    Rhinitis, allergic J30.9    Constipation K59.00    Obesity E66.9    Thrombophilia ([address] D68.59    Hyperlipidemia E78.5    Sleep apnea, obstructive G47.33    DVT, lower extremity, distal, chronic (HCC) I82.5Z9    Essential hypertension I10    Acute pulmonary embolism ([facility] [address] I26.99    Long term current use of anticoagulant therapy Z79.01    Venous thromboembolism I82.90    Colon polyp K63.5    ASHTYN (generalized anxiety disorder) F41.1    Chest pain R07.9    Acute coronary syndrome (HCC) I24.9    Tachycardia R00.0    Hypertensive urgency, malignant I16.0    History of colon polyps Z86.010       Past Medical History:        Diagnosis Date    Acute pulmonary embolism (HCC) 11/2/2015    Anxiety     Asthma     Constipation, chronic     CPAP (continuous positive airway pressure) dependence     Diabetes mellitus ([facility] [address]     pt states she was prediabetic at one point    Embolism - blood clot     right lung and right leg    Esophageal reflux     Factor V Leiden (HCC)     Heart murmur     HTN (hypertension)     Hyperlipidemia     Irritable bladder     MTHFR mutation     MVP (mitral valve prolapse)     Palpitations     Pulmonary embolism (HCC) 10/15/2012    Rhinitis, allergic 10/15/2012    Sleep apnea     Venous thromboembolism 5/2/2016       Past Surgical History:        Procedure Laterality Date    CATARACT REMOVAL WITH IMPLANT Left 04/02/2018    CHOLECYSTECTOMY      around 2000    COLONOSCOPY  2006    COLONOSCOPY  03/27/2017    -polyp(adenomatous polyp)    MANDIBLE FRACTURE SURGERY [address] BLEEDING ANY METHOD  3/27/2017    COLONOSCOPY CONTROL HEMORRHAGE performed by Corie Díaz MD at [address] FLX W/RMVL OF TUMOR POLYP LESION SNARE TQ  3/27/2017    COLONOSCOPY POLYPECTOMY SNARE/COLD BIOPSY performed by Corie Díaz MD at [address] [facility] W/O ECP Left 4/2/2018    EYE CATARACT EMULSIFICATION IOL IMPLANT performed by Doroteo Chance MD at [address] [facility] ENDOSCOPY  1/2010    with biopsy       Social History:    Social History     Tobacco Use    Smoking status: Never Smoker    Smokeless tobacco: Never Used   Substance Use Topics    Alcohol use: Yes     Alcohol/week: 0.0 standard drinks     Comment: 1 glass of wine/month                                Counseling given: Not Answered      Vital Signs (Current):   Vitals:    07/12/21 1037   BP: (!) 142/92   Pulse: 113   Resp: 13   Temp: 98.5 °F (36.9 °C)   SpO2: 98%   Weight: 284 lb (128.8 kg)   Height: 5' 6\" (1.676 m)                                              BP Readings from Last 3 Encounters:   07/12/21 (!) 142/92   06/22/21 138/86   06/21/21 120/67       NPO Status:                                                                                 BMI:   Wt Readings from Last 3 Encounters:   07/12/21 284 lb (128.8 kg)   06/21/21 287 lb (130.2 kg)   06/15/21 288 lb (130.6 kg)     Body mass index is 45.84 kg/m².     CBC:   Lab Results   Component Value Date    WBC 9.0 11/16/2020    RBC 4.65 11/16/2020    RBC 4.67 11/02/2011    HGB 13.8 11/16/2020    HCT 41.9 11/16/2020    MCV 90.1 11/16/2020    RDW 13.3 11/16/2020     11/16/2020     11/02/2011       CMP:   Lab Results   Component Value Date     11/16/2020    K 3.8 11/16/2020     11/16/2020    CO2 26 11/16/2020    BUN 12 11/16/2020    CREATININE 0.84 11/16/2020    GFRAA >60 11/16/2020    LABGLOM >60 11/16/2020    GLUCOSE 112 06/14/2021    GLUCOSE 99 11/02/2011    PROT 6.8 11/16/2020    CALCIUM 9.0 11/16/2020    BILITOT 0.40 11/16/2020    ALKPHOS 58 11/16/2020    AST 18 11/16/2020    ALT 16 11/16/2020       POC Tests: No results for input(s): POCGLU, POCNA, POCK, POCCL, POCBUN, POCHEMO, POCHCT in the last 72 hours. Coags:   Lab Results   Component Value Date    PROTIME 21.6 05/03/2019    INR 1.8 05/03/2019    INR 1.0 09/12/2012    APTT 32.4 09/12/2012       HCG (If Applicable):   Lab Results   Component Value Date    PREGTESTUR negative 05/19/2021    HCG NEGATIVE 07/12/2021        ABGs: No results found for: PHART, PO2ART, XWF7JYH, UBF0RRD, BEART, A4TUNDXY     Type & Screen (If Applicable):  No results found for: LABABO, LABRH    Drug/Infectious Status (If Applicable):  No results found for: HIV, HEPCAB    COVID-19 Screening (If Applicable):   Lab Results   Component Value Date    COVID19 Not Detected 07/08/2021           Anesthesia Evaluation  Patient summary reviewed and Nursing notes reviewed no history of anesthetic complications:   Airway: Mallampati: IV        Dental:      Comment: Permanent lower bridge    Pulmonary:normal exam    (+) sleep apnea:  asthma:                            Cardiovascular:    (+) hypertension:, hyperlipidemia                  Neuro/Psych:   (+) psychiatric history:depression/anxiety             GI/Hepatic/Renal:   (+) GERD:, morbid obesity          Endo/Other:    (+) DiabetesType II DM, , blood dyscrasia (factor 5 leiden MTHFR)::., .                 Abdominal:             Vascular:   + DVT, . Other Findings:           Anesthesia Plan      MAC     ASA 3       Induction: intravenous. MIPS: Postoperative opioids intended and Prophylactic antiemetics administered. Anesthetic plan and risks discussed with patient. Plan discussed with CRNA.                   Josephine Ochoa MD   7/12/2021

## 2021-07-12 NOTE — OP NOTE
Operative Note  PROCEDURE NOTE    DATE OF PROCEDURE: 7/12/2021    SURGEON: Gómez Valentin MD  Facility : [facility]   ASSISTANT: None  Anesthesia: General anesthesia  PREOPERATIVE DIAGNOSIS:   History of polyps    POSTOPERATIVE DIAGNOSIS: as described below    OPERATION: Total colonoscopy     ANESTHESIA: Moderate Sedation    ESTIMATED BLOOD LOSS: less than 50     COMPLICATIONS: None. SPECIMENS:  Was Obtained:   1 small sessile polyp 7 mm in the cecum removed with cold biopsy  Redundant colon  Diverticulosis  Large external hemorrhoids    HISTORY: The patient is a 54y.o. year old female with history of above preop diagnosis. I recommended colonoscopy with possible biopsy or polypectomy and I explained the risk, benefits, expected outcome, and alternatives to the procedure. Risks included but are not limited to bleeding, infection, respiratory distress, hypotension, and perforation of the colon and possibility of missing a lesion. The patient understands and is in agreement. The patient was counseled at length about the risks of natalie Covid-19 during their perioperative period and any recovery window from their procedure. The patient was made aware that natalie Covid-19  may worsen their prognosis for recovering from their procedure  and lend to a higher morbidity and/or mortality risk. All material risks, benefits, and reasonable alternatives including postponing the procedure were discussed. The patient does wish to proceed with the procedure at this time. PROCEDURE: The patient was given IV conscious sedation. The patient's SPO2 remained above 90% throughout the procedure. The colonoscope was inserted per rectum and advanced under direct vision to the cecum without difficulty. Post sedation note : The patient's SPO2 remained above 90% throughout the procedure. the vital signs remained stable , and no immediate complication form the procedure noted, patient will be ready for d/c when criteria is met . The prep was fair. Findings:  Terminal ileum: normal    Cecum/Ascending colon: abnormal: 1 small sessile polyp 7 mm in the cecum removed with cold biopsy      Transverse colon: normal    Descending/Sigmoid colon: abnormal: Redundant colon  Diverticulosis      Rectum/Anus: examined in normal and retroflexed positions and was abnormal: Large external hemorrhoids    Withdrawal Time was (minutes): 12    The colon was decompressed and the scope was removed. The patient tolerated the procedure well. Recommendations/Plan:   1. Lifestyle and dietary modifications as discussed  2. F/U Biopsies  3. F/U In OfficeYes  4. Discussed with the family  5.  Repeat colonoscopy [id number]    Electronically signed by Myles Brown MD  on 7/12/2021 at 11:50 AM

## 2021-07-12 NOTE — OP NOTE
Operative Note    PROCEDURE NOTE    DATE OF PROCEDURE: 7/12/2021     SURGEON: Anjel Keith MD  Facility: [facility]   ASSISTANT: None  Anesthesia: General anesthesia  PREOPERATIVE DIAGNOSIS:   Gagging  GERD  The patient had a spell of severe gagging when we are ready to start the procedure for which our anesthesia colleagues decided to put her under general anesthesia because she would not stop retching and gagging    Diagnosis:  Innumerable number of polyps in the fundus and the body of the stomach consistent with fundic gland polyp with simple couple of those were ranging in size from 5mm to 20 mm     Gastritis in the antrum biopsies were taken    The mucosa of the duodenal bulb was edematous with nodular appearance could be related to primary brain hyperplasia biopsies were  Taken         POSTOPERATIVE DIAGNOSIS: As described below    OPERATION: Upper GI endoscopy with Biopsy    ANESTHESIA: Moderate Sedation     ESTIMATED BLOOD LOSS: Less than 50 ml    COMPLICATIONS: None. SPECIMENS:  Was Obtained:   As below     HISTORY: The patient is a 54y.o. year old female with history of above preop diagnosis. I recommended esophagogastroduodenoscopy with possible biopsy and I explained the risk, benefits, expected outcome, and alternatives to the procedure. Risks included but are not limited to bleeding, infection, respiratory distress, hypotension, and perforation of the esophagus, stomach, or duodenum. Patient understands and is in agreement. The patient was counseled at length about the risks of natalie Covid-19 during their perioperative period and any recovery window from their procedure. The patient was made aware that natalie Covid-19  may worsen their prognosis for recovering from their procedure  and lend to a higher morbidity and/or mortality risk. All material risks, benefits, and reasonable alternatives including postponing the procedure were discussed.  The patient does wish to proceed with the procedure at this time. PROCEDURE: The patient was given IV conscious sedation. The patient's SPO2 remained above 90% throughout the procedure. The gastroscope was inserted orally and advanced under direct vision through the esophagus, through the stomach, through the pylorus, and into the descending duodenum. Post sedation note : The patient's SPO2 remained above 90% throughout the procedure. the vital signs remained stable , and no immediate complication form the procedure noted, patient will be ready for d/c when criteria is met . Findings:    Retropharyngeal area was grossly normal appearing    Esophagus: normal    Stomach: Innumerable number of polyps in the fundus and the body of the stomach consistent with fundic gland polyp with simple couple of those were ranging in size from 5mm to 20 mm     Gastritis in the antrum biopsies were taken        Duodenum:     Descending: normal    Bulb: abnormal: The mucosa of the duodenal bulb was edematous with nodular appearance could be related to primary brain hyperplasia biopsies were  Taken     The scope was removed and the patient tolerated the procedure well. Recommendations/Plan:   1. F/U Biopsies  2. F/U In Office in 3-4 weeks  3.  Discussed with the family    Electronically signed by Kimberly Mallory MD  on 7/12/2021 at 11:27 AM

## 2021-07-12 NOTE — ANESTHESIA POSTPROCEDURE EVALUATION
POST- ANESTHESIA EVALUATION       Pt Name: Anton Shows  MRN: [id number]  YOB: 1965  Date of evaluation: 7/12/2021  Time:  1:38 PM      BP (!) 150/84   Pulse 102   Temp 97.2 °F (36.2 °C) (Temporal)   Resp 21   Ht 5' 6\" (1.676 m)   Wt 284 lb (128.8 kg)   LMP 06/17/2021   SpO2 92%   BMI 45.84 kg/m²      Consciousness Level  Awake  Cardiopulmonary Status  Stable  Pain Adequately Treated YES  Nausea / Vomiting  NO  Adequate Hydration  YES  Anesthesia Related Complications NONE      Electronically signed by Sunday Landau, MD on 7/12/2021 at 1:38 PM       Department of Anesthesiology  Postprocedure Note    Patient: Anton Shows  MRN: [id number]  YOB: 1965  Date of evaluation: 7/12/2021  Time:  1:38 PM     Procedure Summary     Date: 07/12/21 Room / Location: [place] [id number] / [facility]    Anesthesia Start: 1100 Anesthesia Stop: 1205    Procedures:       EGD BIOPSY OF STOMACH, GASTRIC POLYPECTOMY AND DUODENUM BIOPSY (N/A )      COLONOSCOPY POLYPECTOMY CECUM COLD BIOPSY (N/A ) Diagnosis: (ADENOMATOUS POLYP  GERD)    Surgeons: Kimberly Mallory MD Responsible Provider: Sunday Landau, MD    Anesthesia Type: MAC ASA Status: 3          Anesthesia Type: MAC    Nate Phase I: Nate Score: 9    Nate Phase II: Nate Score: 10    Last vitals: Reviewed and per EMR flowsheets.        Anesthesia Post Evaluation

## 2021-07-13 ENCOUNTER — TELEPHONE (OUTPATIENT)
Dept: GASTROENTEROLOGY | Age: 56
End: 2021-07-13

## 2021-07-13 NOTE — TELEPHONE ENCOUNTER
Patient LM about her EGD/Colon and MRI of the brain order    Writer returned patients call and confirmed he did remove 1 polyp during her colonoscopy. Biopsy was taken during the EGD as well. Current pathology is pending, don't have the results. Informed patient we will figure out exactly why Dr. Porter Lemon placed the MRI of the brain order for her and give her a call back. Patient thanked Tushar Calvert for the returned call.

## 2021-07-14 LAB — SURGICAL PATHOLOGY REPORT: NORMAL

## 2021-07-15 NOTE — TELEPHONE ENCOUNTER
Pt called in and stated she is going to get her test done and just wants to confirm that if there is anything urgent with her results that we would call. Writer confirmed that we would, also confirmed that pt is on the cancellation list as well.

## 2021-07-15 NOTE — TELEPHONE ENCOUNTER
Writer spoke with Dr Khang Sorensen. He wants to rule out any brain issues/tumors that may be contributing to pt nausea and vomiting. Writer LM for pt stating dr ordered MRI to rule some stuff out. If she has any questions, she can return call to office.

## 2021-07-19 ENCOUNTER — HOSPITAL ENCOUNTER (OUTPATIENT)
Age: 56
End: 2021-07-19
Payer: COMMERCIAL

## 2021-07-20 ENCOUNTER — HOSPITAL ENCOUNTER (EMERGENCY)
Age: 56
Discharge: HOME | End: 2021-07-20
Attending: INTERNAL MEDICINE
Payer: COMMERCIAL

## 2021-07-20 ENCOUNTER — APPOINTMENT (OUTPATIENT)
Dept: CT IMAGING | Age: 56
End: 2021-07-20
Payer: COMMERCIAL

## 2021-07-20 VITALS
DIASTOLIC BLOOD PRESSURE: 67 MMHG | SYSTOLIC BLOOD PRESSURE: 126 MMHG | HEIGHT: 66 IN | BODY MASS INDEX: 45.32 KG/M2 | OXYGEN SATURATION: 96 % | TEMPERATURE: 98 F | HEART RATE: 111 BPM | WEIGHT: 282 LBS | RESPIRATION RATE: 23 BRPM

## 2021-07-20 DIAGNOSIS — R42 DIZZINESS: Primary | ICD-10-CM

## 2021-07-20 DIAGNOSIS — I10 ESSENTIAL HYPERTENSION: ICD-10-CM

## 2021-07-20 LAB
ABSOLUTE EOS #: 0.2 K/UL (ref 0–0.4)
ABSOLUTE IMMATURE GRANULOCYTE: NORMAL K/UL (ref 0–0.3)
ABSOLUTE LYMPH #: 3.2 K/UL (ref 1–4.8)
ABSOLUTE MONO #: 0.8 K/UL (ref 0–1)
ALBUMIN SERPL-MCNC: 4.1 G/DL (ref 3.5–5.2)
ALBUMIN/GLOBULIN RATIO: ABNORMAL (ref 1–2.5)
ALP BLD-CCNC: 75 U/L (ref 35–104)
ALT SERPL-CCNC: 19 U/L (ref 5–33)
ANION GAP SERPL CALCULATED.3IONS-SCNC: 9 MMOL/L (ref 9–17)
AST SERPL-CCNC: 19 U/L
BASOPHILS # BLD: 0 % (ref 0–2)
BASOPHILS ABSOLUTE: 0 K/UL (ref 0–0.2)
BILIRUB SERPL-MCNC: 0.54 MG/DL (ref 0.3–1.2)
BUN BLDV-MCNC: 13 MG/DL (ref 6–20)
BUN/CREAT BLD: 16 (ref 9–20)
CALCIUM SERPL-MCNC: 9.7 MG/DL (ref 8.6–10.4)
CHLORIDE BLD-SCNC: 100 MMOL/L (ref 98–107)
CO2: 27 MMOL/L (ref 20–31)
CREAT SERPL-MCNC: 0.82 MG/DL (ref 0.5–0.9)
DIFFERENTIAL TYPE: YES
EOSINOPHILS RELATIVE PERCENT: 1 % (ref 0–5)
GFR AFRICAN AMERICAN: >60 ML/MIN
GFR NON-AFRICAN AMERICAN: >60 ML/MIN
GFR SERPL CREATININE-BSD FRML MDRD: ABNORMAL ML/MIN/{1.73_M2}
GFR SERPL CREATININE-BSD FRML MDRD: ABNORMAL ML/MIN/{1.73_M2}
GLUCOSE BLD-MCNC: 102 MG/DL (ref 70–99)
HCT VFR BLD CALC: 44.8 % (ref 36–46)
HEMOGLOBIN: 15.4 G/DL (ref 12–16)
IMMATURE GRANULOCYTES: NORMAL %
LYMPHOCYTES # BLD: 30 % (ref 15–40)
MCH RBC QN AUTO: 30.1 PG (ref 26–34)
MCHC RBC AUTO-ENTMCNC: 34.5 G/DL (ref 31–37)
MCV RBC AUTO: 87.5 FL (ref 80–100)
MONOCYTES # BLD: 7 % (ref 4–8)
NRBC AUTOMATED: NORMAL PER 100 WBC
PDW BLD-RTO: 14 % (ref 12.1–15.2)
PLATELET # BLD: 287 K/UL (ref 140–450)
PLATELET ESTIMATE: NORMAL
PMV BLD AUTO: NORMAL FL (ref 6–12)
POTASSIUM SERPL-SCNC: 4.3 MMOL/L (ref 3.7–5.3)
RBC # BLD: 5.12 M/UL (ref 4–5.2)
RBC # BLD: NORMAL 10*6/UL
SARS-COV-2, RAPID: NOT DETECTED
SEG NEUTROPHILS: 62 % (ref 47–75)
SEGMENTED NEUTROPHILS ABSOLUTE COUNT: 6.5 K/UL (ref 2.5–7)
SODIUM BLD-SCNC: 136 MMOL/L (ref 135–144)
SPECIMEN DESCRIPTION: NORMAL
TOTAL PROTEIN: 7.6 G/DL (ref 6.4–8.3)
TSH SERPL DL<=0.05 MIU/L-ACNC: 1.5 MIU/L (ref 0.3–5)
WBC # BLD: 10.6 K/UL (ref 3.5–11)
WBC # BLD: NORMAL 10*3/UL

## 2021-07-20 PROCEDURE — 84443 ASSAY THYROID STIM HORMONE: CPT

## 2021-07-20 PROCEDURE — 70450 CT HEAD/BRAIN W/O DYE: CPT

## 2021-07-20 PROCEDURE — C9803 HOPD COVID-19 SPEC COLLECT: HCPCS

## 2021-07-20 PROCEDURE — 87635 SARS-COV-2 COVID-19 AMP PRB: CPT

## 2021-07-20 PROCEDURE — 80053 COMPREHEN METABOLIC PANEL: CPT

## 2021-07-20 PROCEDURE — 85025 COMPLETE CBC W/AUTO DIFF WBC: CPT

## 2021-07-20 PROCEDURE — 6370000000 HC RX 637 (ALT 250 FOR IP): Performed by: INTERNAL MEDICINE

## 2021-07-20 PROCEDURE — 36415 COLL VENOUS BLD VENIPUNCTURE: CPT

## 2021-07-20 PROCEDURE — 99284 EMERGENCY DEPT VISIT MOD MDM: CPT

## 2021-07-20 PROCEDURE — 93005 ELECTROCARDIOGRAM TRACING: CPT | Performed by: INTERNAL MEDICINE

## 2021-07-20 PROCEDURE — 2580000003 HC RX 258: Performed by: INTERNAL MEDICINE

## 2021-07-20 RX ORDER — 0.9 % SODIUM CHLORIDE 0.9 %
1000 INTRAVENOUS SOLUTION INTRAVENOUS ONCE
Status: COMPLETED | OUTPATIENT
Start: 2021-07-20 | End: 2021-07-20

## 2021-07-20 RX ORDER — ALPRAZOLAM 0.25 MG/1
0.25 TABLET ORAL PRN
COMMUNITY
End: 2021-09-02 | Stop reason: SDUPTHER

## 2021-07-20 RX ORDER — MECLIZINE HCL 12.5 MG/1
25 TABLET ORAL 3 TIMES DAILY PRN
Status: DISCONTINUED | OUTPATIENT
Start: 2021-07-20 | End: 2021-07-20 | Stop reason: HOSPADM

## 2021-07-20 RX ADMIN — SODIUM CHLORIDE 1000 ML: 9 INJECTION, SOLUTION INTRAVENOUS at 13:24

## 2021-07-20 RX ADMIN — MECLIZINE 25 MG: 12.5 TABLET ORAL at 14:08

## 2021-07-20 NOTE — ED PROVIDER NOTES
[facility] ED  EMERGENCY DEPARTMENT ENCOUNTER      Pt Name: Toma Perea  MRN: [id number]  [place] 1965  Date of evaluation: 7/20/2021  Provider: Olesya Denton MD    [address] [place] [address]       Chief Complaint   Patient presents with    Dizziness     Pt was driving from [place] to [place] and started to feel dizzy and lightheaded. concerned her blood pressure was high. HISTORY OF PRESENT ILLNESS   (Location/Symptom, Timing/Onset, Context/Setting, Quality, Duration, Modifying Factors, Severity)  Note limiting factors. Toma Perea is a 54 y.o. female who has a history of hypertension, sleep apnea, thrombophilia, sleep apnea, DVT, PE on Eliquis, colon polyps, generalized anxiety disorder, presents to the emergency department for evaluation and management of dizziness and lightheadedness. This happened right before she presented here while she was driving. She was concerned that her blood pressure might be high. She denies headache, vertigo. She states that occasionally she gets dizzy. She has had episodes of retching that is being worked up by her doctor by EGD and colonoscopy, performed 8 days ago. She is supposed to undergo an MRI of the brain to evaluate for central etiology. She has not tried anything for symptoms. She has not seen any other providers for this. This patient is being evaluated and treated during the COVID-19 pandemic. There is an area wide shortage of hospital beds. She refuses Covid vaccination. She had a negative Covid test 12 days ago before her EGD and colonoscopy, performed at [facility] a days ago. HPI    Nursing Notes were reviewed. REVIEW OF SYSTEMS    (2-9 systems for level 4, 10 or more for level 5)       REVIEW OF SYSTEMS    Constitutional: Negative for fatigue and fever. Normal oral intake. Respiratory: Negative for cough, chest tightness and shortness of breath. Cardiovascular: Negative for chest pain, palpitations and leg swelling. Gastrointestinal: Positive for vomiting, retching, resolved, negative for abdominal distention, abdominal pain, diarrhea, nausea  Endocrine: Negative for cold intolerance, heat intolerance, polydipsia, polyphagia and polyuria. Genitourinary: Negative for difficulty urinating, dysuria, hematuria and urgency. Musculoskeletal: Negative for arthralgias, back pain and neck pain. Neurological: Positive for dizziness/lightheadedness with position change, negative for vertigo, speech difficulty, weakness, focal neuro deficits, distal tingling/numbness and headaches. Hematological: Positive for anticoagulation with Eliquis, negative for adenopathy. Psychiatric/Behavioral: Positive for mild anxiety negative for agitation, depression, behavioral problems, confusion, hallucinations and suicidal ideas. Except as noted above the remainder of the review of systems was reviewed and negative.        PASTMEDICAL HISTORY     Past Medical History:   Diagnosis Date    Acute pulmonary embolism ([facility] 11/2/2015    Anxiety     Asthma     Constipation, chronic     CPAP (continuous positive airway pressure) dependence     Diabetes mellitus ([facility]     pt states she was prediabetic at one point    Embolism - blood clot     right lung and right leg    Esophageal reflux     Factor V Leiden ([facility]     Heart murmur     HTN (hypertension)     Hyperlipidemia     Irritable bladder     MTHFR mutation     MVP (mitral valve prolapse)     Palpitations     Pulmonary embolism (HCC) 10/15/2012    Rhinitis, allergic 10/15/2012    Sleep apnea     Venous thromboembolism 5/2/2016         SURGICAL HISTORY       Past Surgical History:   Procedure Laterality Date    CATARACT REMOVAL WITH IMPLANT Left 04/02/2018    CHOLECYSTECTOMY      around 2000    COLONOSCOPY  07/12/2021    COLONOSCOPY N/A 7/12/2021    COLONOSCOPY POLYPECTOMY CECUM COLD BIOPSY performed by Jemma Lopez MD at [address] [place] [address] [address] BLEEDING ANY METHOD  3/27/2017    COLONOSCOPY CONTROL HEMORRHAGE performed by Sung Alvarado MD at [address] FLX W/RMVL OF TUMOR POLYP LESION SNARE TQ  3/27/2017    COLONOSCOPY POLYPECTOMY SNARE/COLD BIOPSY performed by Sung Alvarado MD at [address] [facility] W/O ECP Left 4/2/2018    EYE CATARACT EMULSIFICATION IOL IMPLANT performed by Marnie Dawson MD at [address] ENDOSCOPY  1/2010    with biopsy    UPPER GASTROINTESTINAL ENDOSCOPY N/A 7/12/2021    EGD BIOPSY OF STOMACH, GASTRIC POLYPECTOMY AND DUODENUM BIOPSY performed by Darlene Camarena MD at [facility]       Discharge Medication List as of 7/20/2021  2:22 PM      CONTINUE these medications which have NOT CHANGED    Details   pantoprazole (PROTONIX) 40 MG tablet TAKE 1 TABLET BY MOUTH ONCE DAILY, Disp-90 tablet, R-0Normal      famotidine (PEPCID) 40 MG tablet Take 1 tablet by mouth every evening, Disp-30 tablet, R-3Normal      budesonide-formoterol (SYMBICORT) 160-4.5 MCG/ACT AERO Inhale 2 puffs into the lungsHistorical Med      spironolactone (ALDACTONE) 25 MG tablet TAKE 1 TABLET BY MOUTH DAILY, Disp-90 tablet, R-1Normal      olmesartan (BENICAR) 20 MG tablet Take 1 tablet by mouth daily, Disp-90 tablet, R-3Normal      ELIQUIS 5 MG TABS tablet TAKE 1 TABLET BY MOUTH TWICE DAILY, Disp-180 tablet,R-1Normal      metoprolol tartrate (LOPRESSOR) 25 MG tablet Take 0.5 tablets by mouth 2 times daily, Disp-60 tablet, R-3Normal      ALPRAZolam (XANAX) 0.25 MG tablet Take 0.25 mg by mouth as needed for Anxiety. Historical Med      albuterol sulfate HFA (PROAIR HFA) 108 (90 Base) MCG/ACT inhaler Inhale 2 puffs into the lungs 4 times daily as needed for Wheezing or Shortness of Breath, Disp-1 Inhaler, R-0Normal      polyethylene glycol (MIRALAX) 17 g PACK packet Take 17 g by mouth dailyHistorical Med PREVIDENT 5000 BOOSTER PLUS 1.1 % PSTE R-0Historical Med      cetirizine (ZYRTEC) 10 MG tablet Take 10 mg by mouth daily. Historical Med             ALLERGIES     Levaquin [levofloxacin in d5w] and Pcn [penicillins]    FAMILY HISTORY       Family History   Problem Relation Age of Onset    Cancer Mother         breast ca    Diabetes Mother     Depression Mother     Thyroid Disease Mother     High Blood Pressure Mother     Heart Disease Mother     Cancer Father         prostate ca    COPD Father     Heart Failure Father     Heart Disease Father     High Blood Pressure Father     Deep Vein Thrombosis Sister     Breast Cancer Sister     Deep Vein Thrombosis Sister           SOCIAL HISTORY       Social History     Socioeconomic History    Marital status:      Spouse name: Not on file    Number of children: Not on file    Years of education: Not on file    Highest education level: Not on file   Occupational History    Not on file   Tobacco Use    Smoking status: Never Smoker    Smokeless tobacco: Never Used   Vaping Use    Vaping Use: Never used   Substance and Sexual Activity    Alcohol use: Yes     Alcohol/week: 0.0 standard drinks     Comment: 1 glass of wine/month    Drug use: No    Sexual activity: Yes     Partners: Male     Birth control/protection: Surgical   Other Topics Concern    Not on file   Social History Narrative    Not on file     Social Determinants of Health     Financial Resource Strain: Low Risk     Difficulty of Paying Living Expenses: Not hard at all   Food Insecurity: No Food Insecurity    Worried About Running Out of Food in the Last Year: Never true    [address] in the Last Year: Never true   Transportation Needs: No Transportation Needs    Lack of Transportation (Medical): No    Lack of Transportation (Non-Medical):  No   Physical Activity:     Days of Exercise per Week:     Minutes of Exercise per Session:    Stress:     Feeling of Stress : Social Connections:     Frequency of Communication with Friends and Family:     Frequency of Social Gatherings with Friends and Family:     Attends Cheondoism Services:     Active Member of Clubs or Organizations:     Attends Club or Organization Meetings:     Marital Status:    Intimate Partner Violence:     Fear of Current or Ex-Partner:     Emotionally Abused:     Physically Abused:     Sexually Abused:        SCREENINGS    [place] Coma Scale  Eye Opening: Spontaneous  Best Verbal Response: Oriented  Best Motor Response: Obeys commands  [place] Coma Scale Score: 15        PHYSICAL EXAM    (up to 7 for level 4, 8 or more for level 5)     ED Triage Vitals [07/20/21 1224]   BP Temp Temp Source Pulse Resp SpO2 Height Weight   (!) 176/95 98 °F (36.7 °C) Oral 117 20 99 % 5' 6\" (1.676 m) 282 lb (127.9 kg)       Physical Exam  Physical Exam   Constitutional:  Appears well, well-developed and well-nourished. No distress noted. Non toxic in appearance. Morbidly obese. HENT:     Head: Normocephalic and atraumatic. Right Ear: External ear normal.     LeftEar: External ear normal.     Mouth/Throat: Oropharynx is clear and mucosa moist.  Eyes: Conjunctivae and EOM are normal.  No nystagmus noted. Pupils are equal, round, and reactive to light. No scleral icterus. There is no tearing or drainage. Neck: Normal range of motion. Neck supple. No tracheal deviation present. Cardiovascular: Increased rate, regular rhythm, normal heartsounds and intact distal pulses. Exam reveals no gallop or friction rub. No murmur heard. Pulmonary/Chest: Effort normal and breath sounds are symmetric and normal. No respiratory distress. There are no wheezes, rales or rhonchi. Abdominal: Soft. Bowel sounds are normal. No distension or no mass exhibitted. There is no tenderness, rebound, rigidity or guarding. Genitourinary:   No CVA tenderness noted on examination. Musculoskeletal: Normal range of motion.  No edema, tenderness or deformity. Lymphadenopathy:  No cervical adenopathy. Neurological:   alert and oriented to person, place, and time. Normal speech, normal comprehension, normal cognition,  Reflexes are normal.  There are no cranial nerve deficits. Normal muscle tone, motor and sensory function including SILT exhibited. Coordination normal and gait normal. Strength 5/5 in all extremities and torso. Normal finger to nose testing. Skin: Skin is warm and dry. No rash noted. No diaphoresis. No erythema. No pallor. Psychiatric: Pleasant and cooperative. Appears anxious but he has a normal affect. Behavior is  normal. Judgment and thought content normal.     DIAGNOSTIC RESULTS     EKG: All EKG's are interpreted by the Emergency Department Physician who either signs or Co-signs this chart in the absence of a cardiologist.    A 12-lead EKG was performed at 1250. There is sinus tachycardia with a ventricular rate 104 bpm.  First-degree AV block is noted with a NC interval of 210 ms. There is a normal QRS duration, QT and QTC and axis. Left anterior fascicular block is noted. No acute ST segment or T wave changes are identified. RADIOLOGY:   Non-plain film images such as CT, Ultrasoundand MRI are read by the radiologist. Plain radiographic images are visualized and preliminarily interpreted by the emergency physician with the below findings:    Not indicated. Interpretation per the Radiologist below, if available at the time of this note:    CT Head WO Contrast   Final Result      No acute change.                      ED BEDSIDE ULTRASOUND:   Performed by ED Physician - none    LABS:  Labs Reviewed   COMPREHENSIVE METABOLIC PANEL W/ REFLEX TO MG FOR LOW K - Abnormal; Notable for the following components:       Result Value    Glucose 102 (*)     All other components within normal limits   COVID-19, RAPID   CBC WITH AUTO DIFFERENTIAL   TSH WITHOUT REFLEX       All other labs were within normal range or not returned as of this dictation. EMERGENCY DEPARTMENT COURSE and DIFFERENTIAL DIAGNOSIS/MDM:   Vitals:    Vitals:    07/20/21 1347 07/20/21 1403 07/20/21 1404 07/20/21 1406   BP: (!) 156/84 (!) 121/57 125/77 126/67   Pulse: 108 98 107 111   Resp: 16 20 19 23   Temp:       TempSrc:       SpO2:  94% 95% 96%   Weight:       Height:           Noted    MDM    CRITICAL CARE TIME   Total Critical Care time was 0 minutes. EDCOURSE       CONSULTS:  None    PROCEDURES:  Unless otherwise noted below, none     Procedures      Summation    Danise Duverney is a 54 y.o. female who has a history of hypertension, sleep apnea, thrombophilia, sleep apnea, DVT, PE, anticoagulation use, colon polyps, generalized anxiety disorder, presented with dizziness and hypertension possibly coupled by anxiety. Orthostatic vital signs are normal.  Her blood pressure improved significantly as per dizziness resolved. No evidence of abnormal cerebral findings. CBC, electrolytes and TSH are normal.  She has no chest pain or shortness of breath to suggest ACS and is on anticoagulation chronically making PE improbable. She is improved with IV fluid hydration and meclizine, otherwise well, well hydrated, nontoxic, hemodynamically stable, neurologically intact, and satisfactory for discharge for outpatient management. Findings discussed at length with patient and family. I gave them ample opportunity to ask questions for which they did not have any. The patient was evaluated during the global COVID-19  pandemic, and that diagnosis was suspected/considered upon their initial presentation. Their evaluation, treatment and testing was consistent with current guidelines for patients who present with complaints or symptoms that may be related to COVID-19 . The patient did meet criteria for COVID-19 testing per current protocol. Covid negative.     I instructed the patient to followup with the PCP for evaluation of response to management of acute problem including, hypertension, anxiety. I instructed the patient to return to the ER if his condition worsens, if there is any concern for altered mental status, difficulty breathing, dehydration or loss of function. ED Medicationsadministered this visit:    Medications   0.9 % sodium chloride bolus (0 mLs Intravenous Stopped 7/20/21 1424)       New Prescriptions from this visit:    Discharge Medication List as of 7/20/2021  2:22 PM          Follow-up:  Carlo Mg MD  [facility] [address]  [phone]    Schedule an appointment as soon as possible for a visit in 3 days      [facility] ED  [address] [id number]  [phone]  Go to   If symptoms worsen        Final Impression:   1. Dizziness    2. Essential hypertension               (Please note that portions of this note werecompleted with a voice recognition program.  Efforts were made to edit the dictations but occasionally words are mis-transcribed.)    FINAL IMPRESSION      1. Dizziness    2.  Essential hypertension          DISPOSITION/PLAN   DISPOSITION        PATIENT REFERRED TO:  Carlo Mg MD  [facility] [address]  [phone]    Schedule an appointment as soon as possible for a visit in 3 days      [facility] ED  [address] [facility]  [phone]  Go to   If symptoms worsen      DISCHARGE MEDICATIONS:  Discharge Medication List as of 7/20/2021  2:22 PM             (Please note that portions of this note were completed with a voice recognition program.  Efforts were made to edit the dictations but occasionally words are mis-transcribed.)    Benjie Cruz MD (electronically signed)  Attending Emergency Physician            Benjie Cruz MD  07/21/21 0230

## 2021-07-21 LAB
EKG ATRIAL RATE: 104 BPM
EKG P AXIS: 67 DEGREES
EKG P-R INTERVAL: 210 MS
EKG Q-T INTERVAL: 354 MS
EKG QRS DURATION: 124 MS
EKG QTC CALCULATION (BAZETT): 465 MS
EKG R AXIS: -62 DEGREES
EKG T AXIS: 67 DEGREES
EKG VENTRICULAR RATE: 104 BPM

## 2021-07-21 PROCEDURE — 93010 ELECTROCARDIOGRAM REPORT: CPT | Performed by: INTERNAL MEDICINE

## 2021-07-22 ENCOUNTER — TELEPHONE (OUTPATIENT)
Dept: PRIMARY CARE CLINIC | Age: 56
End: 2021-07-22

## 2021-07-22 ENCOUNTER — HOSPITAL ENCOUNTER (OUTPATIENT)
Dept: MRI IMAGING | Age: 56
Discharge: HOME | End: 2021-07-24
Payer: COMMERCIAL

## 2021-07-22 DIAGNOSIS — R11.2 INTRACTABLE NAUSEA AND VOMITING: ICD-10-CM

## 2021-07-22 PROCEDURE — 70553 MRI BRAIN STEM W/O & W/DYE: CPT

## 2021-07-22 PROCEDURE — 6360000004 HC RX CONTRAST MEDICATION: Performed by: INTERNAL MEDICINE

## 2021-07-22 PROCEDURE — A9579 GAD-BASE MR CONTRAST NOS,1ML: HCPCS | Performed by: INTERNAL MEDICINE

## 2021-07-22 RX ADMIN — GADOTERIDOL 20 ML: 279.3 INJECTION, SOLUTION INTRAVENOUS at 16:32

## 2021-07-22 NOTE — TELEPHONE ENCOUNTER
[facility] Transitions Initial Follow Up Call    Call within 2 business days of discharge: Yes     Patient: Carter Arlette Patient : 1965 MRN: [id number]    [unfilled]    RARS: No data recorded     Spoke with: Daria Gomez    Discharge department/facility: [facility]    Non-face-to-face services provided:  Scheduled appointment with PCP-2021. Obtained and reviewed discharge summary and/or continuity of care documents     Patient reports she her dizziness has resolved. Reviewed when to call PCP/go to ED for change in condition.     Follow Up  Future Appointments   Date Time Provider Jael Reid   2021  3:00 PM [facility]   2021  9:00 AM MD [facility]   8/3/2021  3:00 PM DO AJ Evans [facility]   2021  8:30 AM MD [facility]   2021  9:30 AM Oneyda Patel MD [facility]   2022  8:00 AM ZACARIAS Flores [facility]   2022 12:00 PM Rachell Bradley MD [facility]       Velvet Justice, DIOMEDES

## 2021-07-26 ENCOUNTER — OFFICE VISIT (OUTPATIENT)
Dept: PRIMARY CARE CLINIC | Age: 56
End: 2021-07-26
Payer: COMMERCIAL

## 2021-07-26 VITALS — BODY MASS INDEX: 46.71 KG/M2 | WEIGHT: 289.4 LBS | HEART RATE: 94 BPM

## 2021-07-26 DIAGNOSIS — R42 DIZZINESS: ICD-10-CM

## 2021-07-26 DIAGNOSIS — R00.0 TACHYCARDIA: ICD-10-CM

## 2021-07-26 DIAGNOSIS — I10 ESSENTIAL HYPERTENSION: Primary | ICD-10-CM

## 2021-07-26 PROCEDURE — 99214 OFFICE O/P EST MOD 30 MIN: CPT | Performed by: FAMILY MEDICINE

## 2021-07-26 RX ORDER — SPIRONOLACTONE 25 MG/1
25 TABLET ORAL DAILY
Qty: 90 TABLET | Refills: 0 | Status: SHIPPED | OUTPATIENT
Start: 2021-07-26 | End: 2021-11-09

## 2021-07-26 ASSESSMENT — PATIENT HEALTH QUESTIONNAIRE - PHQ9
SUM OF ALL RESPONSES TO PHQ QUESTIONS 1-9: 0
SUM OF ALL RESPONSES TO PHQ QUESTIONS 1-9: 0
1. LITTLE INTEREST OR PLEASURE IN DOING THINGS: 0
2. FEELING DOWN, DEPRESSED OR HOPELESS: 0
SUM OF ALL RESPONSES TO PHQ QUESTIONS 1-9: 0
SUM OF ALL RESPONSES TO PHQ9 QUESTIONS 1 & 2: 0

## 2021-07-29 ENCOUNTER — HOSPITAL ENCOUNTER (OUTPATIENT)
Dept: NON INVASIVE DIAGNOSTICS | Age: 56
Discharge: HOME | End: 2021-07-29
Payer: COMMERCIAL

## 2021-07-29 DIAGNOSIS — R00.0 TACHYCARDIA: ICD-10-CM

## 2021-07-29 DIAGNOSIS — R42 DIZZINESS: ICD-10-CM

## 2021-07-29 PROCEDURE — 93225 XTRNL ECG REC<48 HRS REC: CPT

## 2021-07-29 PROCEDURE — 93226 XTRNL ECG REC<48 HR SCAN A/R: CPT

## 2021-08-03 ENCOUNTER — OFFICE VISIT (OUTPATIENT)
Dept: OBGYN | Age: 56
End: 2021-08-03
Payer: COMMERCIAL

## 2021-08-03 VITALS
WEIGHT: 290 LBS | HEIGHT: 66 IN | DIASTOLIC BLOOD PRESSURE: 84 MMHG | SYSTOLIC BLOOD PRESSURE: 132 MMHG | BODY MASS INDEX: 46.61 KG/M2

## 2021-08-03 DIAGNOSIS — N92.6 IRREGULAR MENSES: Primary | ICD-10-CM

## 2021-08-03 DIAGNOSIS — D25.9 UTERINE LEIOMYOMA, UNSPECIFIED LOCATION: ICD-10-CM

## 2021-08-03 LAB
ACQUISITION DURATION: NORMAL S
AVERAGE HEART RATE: 70 BPM
EKG DIAGNOSIS: NORMAL
HOLTER MAX HEART RATE: 100 BPM
HOOKUP DATE: NORMAL
HOOKUP TIME: NORMAL
MAX HEART RATE TIME/DATE: NORMAL
MIN HEART RATE TIME/DATE: NORMAL
MIN HEART RATE: 54 BPM
NUMBER OF QRS COMPLEXES: NORMAL
NUMBER OF SUPRAVENTRICULAR COUPLETS: 0
NUMBER OF SUPRAVENTRICULAR ECTOPICS: 0
NUMBER OF SUPRAVENTRICULAR ISOLATED BEATS: 0
NUMBER OF VENTRICULAR BIGEMINAL CYCLES: 0
NUMBER OF VENTRICULAR COUPLETS: 0
NUMBER OF VENTRICULAR ECTOPICS: 310

## 2021-08-03 PROCEDURE — 99213 OFFICE O/P EST LOW 20 MIN: CPT | Performed by: OBSTETRICS & GYNECOLOGY

## 2021-08-03 NOTE — PROGRESS NOTES
DATE OF VISIT:  8/3/21    PATIENT NAME:  Derrick Lang     YOB: 1965    REASON FOR VISIT:    Chief Complaint   Patient presents with    Follow-up     Patient is being seen to discuss surgical options. She had an US and endo bx with Lizzette Walton and wants to discuss. Periods have been irregular. HISTORY OF PRESENT ILLNESS:  Pt continues to have irregular menses; labs show not menopausal; usn showed fibroid:UTERUS:anteverted, enlarged heterogeneous echo pattern; fibroid visualized inferior/LILIAN sadia- 2.3cm x 1.9cm x 1.7cm, ENDO: 9mm in thickness; pt interested in surgical options - disc'd ablation, tlh, iud; pt states that she also has pelvic pain and cramping that can start the week before her cycle; disc'd that ablations do improve 90% of menorrhagia but do not always address the pain; pamphlets given for both          Patient's last menstrual period was 07/12/2021. Vitals:    08/03/21 1516   BP: 132/84   Weight: 290 lb (131.5 kg)   Height: 5' 6\" (1.676 m)     Body mass index is 46.81 kg/m². Allergies   Allergen Reactions    Levaquin [Levofloxacin In D5w]      FLUSHED AND WHEEZING    Pcn [Penicillins]      Current Outpatient Medications   Medication Sig Dispense Refill    metoprolol tartrate (LOPRESSOR) 25 MG tablet TAKE 1/2 TABLET (12.5 MG) BY MOUTH 2 TIMES EACH DAY 60 tablet 3    spironolactone (ALDACTONE) 25 MG tablet Take 1 tablet by mouth daily 90 tablet 0    ALPRAZolam (XANAX) 0.25 MG tablet Take 0.25 mg by mouth as needed for Anxiety.       pantoprazole (PROTONIX) 40 MG tablet TAKE 1 TABLET BY MOUTH ONCE DAILY 90 tablet 0    albuterol sulfate HFA (PROAIR HFA) 108 (90 Base) MCG/ACT inhaler Inhale 2 puffs into the lungs 4 times daily as needed for Wheezing or Shortness of Breath 1 Inhaler 0    polyethylene glycol (MIRALAX) 17 g PACK packet Take 17 g by mouth daily      famotidine (PEPCID) 40 MG tablet Take 1 tablet by mouth every evening 30 tablet 3    budesonide-formoterol (SYMBICORT) 160-4.5 MCG/ACT AERO Inhale 2 puffs into the lungs      olmesartan (BENICAR) 20 MG tablet Take 1 tablet by mouth daily 90 tablet 3    ELIQUIS 5 MG TABS tablet TAKE 1 TABLET BY MOUTH TWICE DAILY 180 tablet 1    PREVIDENT 5000 BOOSTER PLUS 1.1 % PSTE   0    cetirizine (ZYRTEC) 10 MG tablet Take 10 mg by mouth daily.  budesonide-formoterol (SYMBICORT) 160-4.5 MCG/ACT AERO Inhale 2 puffs into the lungs 2 times daily 1 Inhaler 5     No current facility-administered medications for this visit. Social History     Socioeconomic History    Marital status:      Spouse name: None    Number of children: None    Years of education: None    Highest education level: None   Occupational History    None   Tobacco Use    Smoking status: Never Smoker    Smokeless tobacco: Never Used   Vaping Use    Vaping Use: Never used   Substance and Sexual Activity    Alcohol use: Yes     Alcohol/week: 0.0 standard drinks     Comment: 1 glass of wine/month    Drug use: No    Sexual activity: Yes     Partners: Male     Birth control/protection: Surgical   Other Topics Concern    None   Social History Narrative    None     Social Determinants of Health     Financial Resource Strain: Low Risk     Difficulty of Paying Living Expenses: Not hard at all   Food Insecurity: No Food Insecurity    Worried About Running Out of Food in the Last Year: Never true    [address] in the Last Year: Never true   Transportation Needs: No Transportation Needs    Lack of Transportation (Medical): No    Lack of Transportation (Non-Medical):  No   Physical Activity:     Days of Exercise per Week:     Minutes of Exercise per Session:    Stress:     Feeling of Stress :    Social Connections:     Frequency of Communication with Friends and Family:     Frequency of Social Gatherings with Friends and Family:     Attends Judaism Services:     Active Member of Clubs or Organizations:     Attends Club or Organization Meetings:     Marital Status:    Intimate Partner Violence:     Fear of Current or Ex-Partner:     Emotionally Abused:     Physically Abused:     Sexually Abused:        REVIEW OF SYSTEMS:  Review of Systems    PHYSICAL EXAM:  /84   Ht 5' 6\" (1.676 m)   Wt 290 lb (131.5 kg)   LMP 07/12/2021   BMI 46.81 kg/m²   Physical Exam  Constitutional:       Appearance: Normal appearance. HENT:      Head: Normocephalic and atraumatic. Eyes:      Extraocular Movements: Extraocular movements intact. Pupils: Pupils are equal, round, and reactive to light. Cardiovascular:      Rate and Rhythm: Normal rate. Pulmonary:      Effort: Pulmonary effort is normal.   Musculoskeletal:         General: Normal range of motion. Cervical back: Normal range of motion. Neurological:      Mental Status: She is alert and oriented to person, place, and time. Skin:     General: Skin is warm and dry. Psychiatric:         Mood and Affect: Mood normal.         Behavior: Behavior normal.         Thought Content: Thought content normal.         Judgment: Judgment normal.       The patient, Karime Pillai is a 54 y.o. female, was seen with a total time spent of 20 minutes for the visit on this date of service by the E/M provider. The time component had both face to face and non face to face time spent in determining the total time component. Counseling and education regarding her diagnosis listed below and her options regarding those diagnoses were also included in determining her time component. Diagnosis Orders   1. Irregular menses     2. Uterine leiomyoma, unspecified location          The patient had her preventative health maintenance recommendations and follow-up reviewed with her at the completion of her visit. ASSESSMENT:      1. Irregular menses    2. Uterine leiomyoma, unspecified location        PLAN:  No orders of the defined types were placed in this encounter.     Return for BA to coordinate .        Electronically signed by Jayna Gauthier DO on 08/03/21

## 2021-08-18 DIAGNOSIS — N91.5 HYPOMENORRHEA: ICD-10-CM

## 2021-08-18 DIAGNOSIS — D25.9 UTERINE LEIOMYOMA, UNSPECIFIED LOCATION: ICD-10-CM

## 2021-08-18 DIAGNOSIS — Z01.818 PRE-OP TESTING: Primary | ICD-10-CM

## 2021-08-18 DIAGNOSIS — E11.9 CONTROLLED TYPE 2 DIABETES MELLITUS WITHOUT COMPLICATION, WITHOUT LONG-TERM CURRENT USE OF INSULIN (HCC): ICD-10-CM

## 2021-08-18 DIAGNOSIS — N92.6 IRREGULAR MENSES: ICD-10-CM

## 2021-08-18 DIAGNOSIS — I10 ESSENTIAL HYPERTENSION: ICD-10-CM

## 2021-08-25 ENCOUNTER — TELEPHONE (OUTPATIENT)
Dept: OBGYN CLINIC | Age: 56
End: 2021-08-25

## 2021-08-25 NOTE — TELEPHONE ENCOUNTER
Spoke to [facility] at [facility] regarding patient's upcoming procedure ([id number]). The procedure does not require a PA. Reference # is [id number].

## 2021-08-31 ENCOUNTER — OFFICE VISIT (OUTPATIENT)
Dept: PRIMARY CARE CLINIC | Age: 56
End: 2021-08-31
Payer: COMMERCIAL

## 2021-08-31 VITALS
WEIGHT: 293 LBS | BODY MASS INDEX: 47.29 KG/M2 | RESPIRATION RATE: 16 BRPM | HEART RATE: 72 BPM | SYSTOLIC BLOOD PRESSURE: 128 MMHG | DIASTOLIC BLOOD PRESSURE: 80 MMHG

## 2021-08-31 DIAGNOSIS — I10 ESSENTIAL HYPERTENSION: Primary | ICD-10-CM

## 2021-08-31 DIAGNOSIS — J45.40 MODERATE PERSISTENT ASTHMA WITHOUT COMPLICATION: ICD-10-CM

## 2021-08-31 DIAGNOSIS — K21.00 GASTROESOPHAGEAL REFLUX DISEASE WITH ESOPHAGITIS WITHOUT HEMORRHAGE: ICD-10-CM

## 2021-08-31 PROCEDURE — 99214 OFFICE O/P EST MOD 30 MIN: CPT | Performed by: FAMILY MEDICINE

## 2021-08-31 NOTE — PROGRESS NOTES
Hypertension: Patient here for follow-up of elevated blood pressure. She is somewhat exercising and is adherent to low salt diet. She does not add salt to anything. Blood pressure is well controlled at home. Cardiac symptoms chest pain and fatigue. Patient denies palpitations. Cardiovascular risk factors: hypertension and obesity (BMI >= 30 kg/m2). Use of agents associated with hypertension: none. Anxiety: She states that she has had to take Xanax a few times this month. Patient was also having dizziness and tachycardia at her last appointment. She said that she has had a few instances where she feels her heart beat, and feels like it is racing. She also mentioned that she still is gagging like she talked about at her last appointment. She said it is an everyday thing. She tries to drink something if it happens.        Allergies:  Levaquin [levofloxacin in d5w] and Pcn [penicillins]    Past Medical History:    Past Medical History:   Diagnosis Date    Acute pulmonary embolism ([facility] [address] 11/2/2015    Anxiety     Asthma     Constipation, chronic     CPAP (continuous positive airway pressure) dependence     Diabetes mellitus ([address]     pt states she was prediabetic at one point    Embolism - blood clot     right lung and right leg    Esophageal reflux     Factor V Leiden ([facility] [address]     Heart murmur     HTN (hypertension)     Hyperlipidemia     Irritable bladder     MTHFR mutation     MVP (mitral valve prolapse)     Palpitations     Pulmonary embolism (HCC) 10/15/2012    Rhinitis, allergic 10/15/2012    Sleep apnea     Venous thromboembolism 5/2/2016       Past Surgical History:    Past Surgical History:   Procedure Laterality Date    CATARACT REMOVAL WITH IMPLANT Left 04/02/2018    CHOLECYSTECTOMY      around 2000    COLONOSCOPY  07/12/2021    COLONOSCOPY N/A 7/12/2021    COLONOSCOPY POLYPECTOMY CECUM COLD BIOPSY performed by Greg Villalobos MD at [address] [facility] SURGERY  1983    IA COLSC FLEXIBLE W/CONTROL BLEEDING ANY METHOD  3/27/2017    COLONOSCOPY CONTROL HEMORRHAGE performed by Link Dasilva MD at [address] FLX W/RMVL OF TUMOR POLYP LESION SNARE TQ  3/27/2017    COLONOSCOPY POLYPECTOMY SNARE/COLD BIOPSY performed by Link Dasilva MD at [address] W/O ECP Left 4/2/2018    EYE CATARACT EMULSIFICATION IOL IMPLANT performed by Nancy Peoples MD at [address] ENDOSCOPY  1/2010    with biopsy    UPPER GASTROINTESTINAL ENDOSCOPY N/A 7/12/2021    EGD BIOPSY OF STOMACH, GASTRIC POLYPECTOMY AND DUODENUM BIOPSY performed by Lonell Simmonds, MD at [facility] History:   Social History     Tobacco Use    Smoking status: Never Smoker    Smokeless tobacco: Never Used   Substance Use Topics    Alcohol use:  Yes     Alcohol/week: 0.0 standard drinks     Comment: 1 glass of wine/month       Family History:   Family History   Problem Relation Age of Onset   Ada Sinclair Cancer Mother         breast ca    Diabetes Mother     Depression Mother     Thyroid Disease Mother     High Blood Pressure Mother     Heart Disease Mother     Cancer Father         prostate ca    COPD Father     Heart Failure Father     Heart Disease Father     High Blood Pressure Father     Deep Vein Thrombosis Sister     Breast Cancer Sister     Deep Vein Thrombosis Sister          Review of Systems:  Constitutional: negative for fevers or chills  Eyes: negative for visual disturbance   ENT: negative for sore throat or nasal congestion  Respiratory: asthma well controlled  Cardiovascular: negative for chest pain or palpitations  Gastrointestinal: positive for gerd symptoms,negative for abd pain, nausea, vomiting, diarrhea or constipation  Genitourinary: negative for dysuria, urgency or frequency  Integument/breast: negative for skin rash or lesions  Neurological: negative for unilateral weakness, numbness or tingling. Skeletal Muscular: no joint pain     Medication List          Accurate as of August 31, 2021  9:03 AM. If you have any questions, ask your nurse or doctor. CONTINUE taking these medications    albuterol sulfate  (90 Base) MCG/ACT inhaler  Commonly known as: ProAir HFA  Inhale 2 puffs into the lungs 4 times daily as needed for Wheezing or Shortness of Breath     ALPRAZolam 0.25 MG tablet  Commonly known as: XANAX     cetirizine 10 MG tablet  Commonly known as: ZYRTEC     Eliquis 5 MG Tabs tablet  Generic drug: apixaban  TAKE 1 TABLET BY MOUTH TWICE DAILY     famotidine 40 MG tablet  Commonly known as: PEPCID  Take 1 tablet by mouth every evening     metoprolol tartrate 25 MG tablet  Commonly known as: LOPRESSOR  TAKE 1/2 TABLET (12.5 MG) BY MOUTH 2 TIMES EACH DAY     olmesartan 20 MG tablet  Commonly known as: BENICAR  Take 1 tablet by mouth daily     pantoprazole 40 MG tablet  Commonly known as: PROTONIX  TAKE 1 TABLET BY MOUTH ONCE DAILY     polyethylene glycol 17 g Pack packet  Commonly known as: MIRALAX     PreviDent 5000 Booster Plus 1.1 % Pste  Generic drug: Sodium Fluoride     spironolactone 25 MG tablet  Commonly known as: ALDACTONE  Take 1 tablet by mouth daily     Symbicort 160-4.5 MCG/ACT Aero  Generic drug: budesonide-formoterol            Objective:  Physical Exam:  VitalsBP 128/80 (Site: Left Upper Arm, Position: Sitting)   Pulse 72   Resp 16   Wt 293 lb (132.9 kg)   BMI 47.29 kg/m²   GEN:   A & O x3, no apparent distress  EYES: No gross abnormalities.   ENT:ENT exam normal, no neck nodes or sinus tenderness  NECK: normal, supple, no lymphadenopathy,  no carotid bruits  PULM: clear to auscultation bilaterally- no wheezes, rales or rhonchi, normal air movement, no respiratory distress  COR: regular rate & rhythm, no murmurs and no gallops  ABD:  soft, non-tender  : deferred  EXT: normal strength, tone, and muscle mass  NEURO: Motor and sensory grossly intact  SKIN:  No skin lesions or rashes      Labs:  Lab Results   Component Value Date    BUN 13 07/20/2021    CREATININE 0.82 07/20/2021     07/20/2021    K 4.3 07/20/2021    CALCIUM 9.7 07/20/2021     07/20/2021    CO2 27 07/20/2021    LABGLOM >60 07/20/2021    CHOL 169 06/14/2021    LDLCHOLESTEROL 108 06/14/2021    HDL 39 (L) 06/14/2021    TRIG 112 06/14/2021    ALT 19 07/20/2021    AST 19 07/20/2021       Assessment:  1. Essential hypertension    2. Moderate persistent asthma without complication    3. Gastroesophageal reflux disease with esophagitis without hemorrhage      Patient Active Problem List   Diagnosis Code    Bronchial asthma J45.909    Sleep apnea G47.30    Rhinitis, allergic J30.9    Constipation K59.00    Obesity E66.9    Thrombophilia ([facility] [address] D68.59    Hyperlipidemia E78.5    Sleep apnea, obstructive G47.33    DVT, lower extremity, distal, chronic (HCC) I82.5Z9    Essential hypertension I10    Acute pulmonary embolism (HCC) I26.99    Long term current use of anticoagulant therapy Z79.01    Venous thromboembolism I82.90    Colon polyp K63.5    ASHTYN (generalized anxiety disorder) F41.1    Chest pain R07.9    Acute coronary syndrome (HCC) I24.9    Tachycardia R00.0    Hypertensive urgency, malignant I16.0    History of colon polyps Z86.010     Plan:   Diagnosis Orders   1. Essential hypertension -well controlled with mtoprolol ,benicar and aldectone    2. Moderate persistent asthma without complication - well contolled with symbicort    3. Gastroesophageal reflux disease with esophagitis without hemorrhage - antireflux measures, to also f/u with gastroenterologist        · Continue current medications  · discussed antireflux measures   · Encouraged low sodium, low cholesterol, weight loss diet.     · Goal for blood pressure controll is 120/80  · Recommended regular exercise as tolerated, 3-5 times per day  · Labs reviewed- cmp  · Follow up in 3 months  ·     No orders of the defined types were placed in this encounter. Current Outpatient Medications   Medication Sig Dispense Refill    metoprolol tartrate (LOPRESSOR) 25 MG tablet TAKE 1/2 TABLET (12.5 MG) BY MOUTH 2 TIMES EACH DAY 60 tablet 3    spironolactone (ALDACTONE) 25 MG tablet Take 1 tablet by mouth daily 90 tablet 0    ALPRAZolam (XANAX) 0.25 MG tablet Take 0.25 mg by mouth as needed for Anxiety.  pantoprazole (PROTONIX) 40 MG tablet TAKE 1 TABLET BY MOUTH ONCE DAILY 90 tablet 0    albuterol sulfate HFA (PROAIR HFA) 108 (90 Base) MCG/ACT inhaler Inhale 2 puffs into the lungs 4 times daily as needed for Wheezing or Shortness of Breath 1 Inhaler 0    polyethylene glycol (MIRALAX) 17 g PACK packet Take 17 g by mouth daily      famotidine (PEPCID) 40 MG tablet Take 1 tablet by mouth every evening 30 tablet 3    budesonide-formoterol (SYMBICORT) 160-4.5 MCG/ACT AERO Inhale 2 puffs into the lungs      olmesartan (BENICAR) 20 MG tablet Take 1 tablet by mouth daily 90 tablet 3    ELIQUIS 5 MG TABS tablet TAKE 1 TABLET BY MOUTH TWICE DAILY 180 tablet 1    PREVIDENT 5000 BOOSTER PLUS 1.1 % PSTE   0    cetirizine (ZYRTEC) 10 MG tablet Take 10 mg by mouth daily. No current facility-administered medications for this visit. No follow-ups on file.       Electronically signed by Killian Grubbs MD on 8/31/2021 at 9:03 AM

## 2021-08-31 NOTE — PATIENT INSTRUCTIONS
SURVEY:    You may be receiving a survey from [employer] regarding your visit today. You may get this in the mail, through your [employer], or in your email. Please complete the survey to enable us to provide the highest quality of care to you and your family. If you cannot score us a very good (5 Stars) on any question, please call the office to discuss how we could of made your experience exceptional.    Thank you! BRITTANI Adkins, DIOMEDES Engel, [address] [place] [address] Merlin Navas    Phone: [phone]  Fax: [phone]    Office Hours:   [place], [place], F: 8-5 Wednesday: 9-11  SURVEY:    You may be receiving a survey from [employer] regarding your visit today. You may get this in the mail, through your [employer], or in your email. Please complete the survey to enable us to provide the highest quality of care to you and your family. If you cannot score us a very good (5 Stars) on any question, please call the office to discuss how we could of made your experience exceptional.    Thank you!     BRITTANI Adkins, DIOMEDES Engel, [address] [place] [address] Merlin Navas    Phone: [phone]  Fax: [phone]    Office Hours:   [place], [place], F: 8-5 Wednesday: 9-11

## 2021-09-02 DIAGNOSIS — F41.1 GAD (GENERALIZED ANXIETY DISORDER): Primary | ICD-10-CM

## 2021-09-02 RX ORDER — ALPRAZOLAM 0.25 MG/1
0.25 TABLET ORAL DAILY PRN
Qty: 90 TABLET | Refills: 0 | Status: SHIPPED | OUTPATIENT
Start: 2021-09-02 | End: 2021-12-01

## 2021-09-02 RX ORDER — BUDESONIDE AND FORMOTEROL FUMARATE DIHYDRATE 160; 4.5 UG/1; UG/1
2 AEROSOL RESPIRATORY (INHALATION) 2 TIMES DAILY
Qty: 6 EACH | Refills: 0 | Status: SHIPPED | OUTPATIENT
Start: 2021-09-02 | End: 2022-02-23 | Stop reason: SDUPTHER

## 2021-09-09 ENCOUNTER — TELEPHONE (OUTPATIENT)
Dept: PRIMARY CARE CLINIC | Age: 56
End: 2021-09-09

## 2021-09-09 DIAGNOSIS — Z20.822 COVID-19 RULED OUT: Primary | ICD-10-CM

## 2021-09-09 NOTE — TELEPHONE ENCOUNTER
Patient called to report she has had fever and cough X3 days. Patient did call [facility] Nurse line and had COVID test ordered yesterday, but it is not in the system at this time. Patient's daughter is positive for COVID. Patient informed of treatment of COVID symptoms.

## 2021-09-13 ENCOUNTER — HOSPITAL ENCOUNTER (EMERGENCY)
Age: 56
Discharge: HOME | DRG: 177 | End: 2021-09-13
Attending: EMERGENCY MEDICINE
Payer: COMMERCIAL

## 2021-09-13 ENCOUNTER — TELEPHONE (OUTPATIENT)
Dept: PRIMARY CARE CLINIC | Age: 56
End: 2021-09-13

## 2021-09-13 ENCOUNTER — TELEPHONE (OUTPATIENT)
Dept: GASTROENTEROLOGY | Age: 56
End: 2021-09-13

## 2021-09-13 ENCOUNTER — APPOINTMENT (OUTPATIENT)
Dept: GENERAL RADIOLOGY | Age: 56
DRG: 177 | End: 2021-09-13
Payer: COMMERCIAL

## 2021-09-13 VITALS
TEMPERATURE: 102.1 F | RESPIRATION RATE: 22 BRPM | OXYGEN SATURATION: 95 % | HEART RATE: 97 BPM | SYSTOLIC BLOOD PRESSURE: 132 MMHG | DIASTOLIC BLOOD PRESSURE: 85 MMHG

## 2021-09-13 DIAGNOSIS — R07.89 CHEST TIGHTNESS: ICD-10-CM

## 2021-09-13 DIAGNOSIS — J12.82 PNEUMONIA DUE TO COVID-19 VIRUS: Primary | ICD-10-CM

## 2021-09-13 DIAGNOSIS — Z20.822 CLOSE EXPOSURE TO COVID-19 VIRUS: Primary | ICD-10-CM

## 2021-09-13 DIAGNOSIS — U07.1 PNEUMONIA DUE TO COVID-19 VIRUS: Primary | ICD-10-CM

## 2021-09-13 PROCEDURE — 71045 X-RAY EXAM CHEST 1 VIEW: CPT

## 2021-09-13 PROCEDURE — 99283 EMERGENCY DEPT VISIT LOW MDM: CPT

## 2021-09-13 PROCEDURE — 6370000000 HC RX 637 (ALT 250 FOR IP): Performed by: EMERGENCY MEDICINE

## 2021-09-13 RX ORDER — AZITHROMYCIN 250 MG/1
TABLET, FILM COATED ORAL
Qty: 1 PACKET | Refills: 0 | Status: SHIPPED | OUTPATIENT
Start: 2021-09-13 | End: 2021-09-13 | Stop reason: SDUPTHER

## 2021-09-13 RX ORDER — ONDANSETRON 4 MG/1
4 TABLET, ORALLY DISINTEGRATING ORAL EVERY 8 HOURS PRN
Qty: 20 TABLET | Refills: 0 | Status: SHIPPED | OUTPATIENT
Start: 2021-09-13 | End: 2021-10-11

## 2021-09-13 RX ORDER — METHYLPREDNISOLONE 4 MG/1
TABLET ORAL
Qty: 21 TABLET | Refills: 0 | Status: SHIPPED | OUTPATIENT
Start: 2021-09-13 | End: 2021-09-13 | Stop reason: SDUPTHER

## 2021-09-13 RX ORDER — ONDANSETRON 4 MG/1
4 TABLET, ORALLY DISINTEGRATING ORAL EVERY 8 HOURS PRN
Qty: 20 TABLET | Refills: 0 | Status: SHIPPED | OUTPATIENT
Start: 2021-09-13 | End: 2021-09-13 | Stop reason: SDUPTHER

## 2021-09-13 RX ORDER — ONDANSETRON 4 MG/1
4 TABLET, ORALLY DISINTEGRATING ORAL ONCE
Status: COMPLETED | OUTPATIENT
Start: 2021-09-13 | End: 2021-09-13

## 2021-09-13 RX ORDER — ALBUTEROL SULFATE 2.5 MG/3ML
2.5 SOLUTION RESPIRATORY (INHALATION) EVERY 6 HOURS PRN
Qty: 60 EACH | Refills: 3 | Status: SHIPPED | OUTPATIENT
Start: 2021-09-13 | End: 2021-12-02

## 2021-09-13 RX ORDER — ALBUTEROL SULFATE 2.5 MG/3ML
2.5 SOLUTION RESPIRATORY (INHALATION) EVERY 6 HOURS PRN
Qty: 60 EACH | Refills: 3 | Status: SHIPPED | OUTPATIENT
Start: 2021-09-13 | End: 2021-09-13 | Stop reason: SDUPTHER

## 2021-09-13 RX ORDER — AZITHROMYCIN 250 MG/1
TABLET, FILM COATED ORAL
Qty: 1 PACKET | Refills: 0 | Status: ON HOLD | OUTPATIENT
Start: 2021-09-13 | End: 2021-09-26 | Stop reason: HOSPADM

## 2021-09-13 RX ORDER — METHYLPREDNISOLONE 4 MG/1
TABLET ORAL
Qty: 21 TABLET | Refills: 0 | Status: ON HOLD | OUTPATIENT
Start: 2021-09-13 | End: 2021-09-26 | Stop reason: HOSPADM

## 2021-09-13 RX ADMIN — ONDANSETRON 4 MG: 4 TABLET, ORALLY DISINTEGRATING ORAL at 16:41

## 2021-09-13 NOTE — TELEPHONE ENCOUNTER
VO per Dr. Max Chairez, go ahead and order CXR. Rusty Alfaro, I have ordered these tests if you would just like to notify the patients.

## 2021-09-13 NOTE — TELEPHONE ENCOUNTER
Patient calling in stating that she is having worsening chest tightness. Patient states she is having chills, sweats, fatigue, headache, body aches, and persistent cough. Patient requesting stat chest x ray to make sure nothing else is going on.

## 2021-09-14 ENCOUNTER — CARE COORDINATION (OUTPATIENT)
Dept: OTHER | Facility: CLINIC | Age: 56
End: 2021-09-14

## 2021-09-14 NOTE — CARE COORDINATION
Patient contacted regarding COVID-19 diagnosis. Discussed COVID-19 related testing which was done but not available in Epic at this time. Test results were positive. Patient informed of results, if available? Test was done on 21 by occupational health in the [place] [address] ED dept, Pt said they told her it would not be able to be seen in [address] [place] [address] Rd. Pt said she was testedon 21 and they did not call her until 21 to tell her she was positive. Ambulatory Care Manager contacted the patient by telephone to perform post discharge assessment. Call within 2 business days of discharge: Yes. Verified name and  with patient as identifiers. Provided introduction to self, and explanation of the CTN/ACM role, and reason for call due to risk factors for infection and/or exposure to COVID-19. Symptoms reviewed with patient who verbalized the following symptoms: fever, fatigue, cough, shortness of breath and nausea. Due to no new or worsening symptoms encounter was not routed to provider for escalation. Discussed follow-up appointments. If no appointment was previously scheduled, appointment scheduling offered: Yes. [facility] follow up appointment(s):   Future Appointments   Date Time Provider Jael Reid   2021  4:45 PM [facility]selmo Blanco DO [facility]   2021  8:30 AM Kristyn Arvizu MD [facility]   2021  9:30 AM Anselmo Blanco DO [facility]   2022  8:00 AM ZACARIAS Jo CNM [facility]   2022 12:00 PM Alisha Alanis MD [facility]       Non-face-to-face services provided:  Obtained and reviewed discharge summary and/or continuity of care documents     Advance Care Planning:   Does patient have an Advance Directive:  not on file. Educated patient about risk for severe COVID-19 due to risk factors according to CDC guidelines.  ACM reviewed discharge instructions, medical action plan and red flag symptoms with the patient who verbalized understanding. Discussed COVID vaccination status: No. Education provided on COVID-19 vaccination as appropriate. Discussed exposure protocols and quarantine with CDC Guidelines. Patient was given an opportunity to verbalize any questions and concerns and agrees to contact ACM or health care provider for questions related to their healthcare. Reviewed and educated patient on any new and changed medications related to discharge diagnosis     Was patient discharged with a pulse oximeter? No pt bought one herself prior to going to the hospital.   Pt said she is using her nebulizer we reviewed her discharge instructions and she had not gone through them yet, pt said she will go through them. Encouraged patient to call her pcp and request a VV follow up. She agreed . Pt said she is drinking and is urinating, she has support for grocery delivery and anything her and her  need.  is also positive. But not as sick as she is she said. I encouraged pt to take the Zofran for her nausea as she had not yet taken any but said she is nauseated. Pt understands she is to quarantine for 10 days from the first day of symptoms. ACM provided contact information. Plan for follow-up call in 3-5 days based on severity of symptoms and risk factors.     Rodrigo Mackey BSN, RN- [facility]  Associate Care Manager  [phone]

## 2021-09-15 ENCOUNTER — APPOINTMENT (OUTPATIENT)
Dept: GENERAL RADIOLOGY | Age: 56
DRG: 177 | End: 2021-09-15
Payer: COMMERCIAL

## 2021-09-15 ENCOUNTER — HOSPITAL ENCOUNTER (INPATIENT)
Age: 56
LOS: 11 days | Discharge: HOME | DRG: 177 | End: 2021-09-26
Attending: FAMILY MEDICINE
Payer: COMMERCIAL

## 2021-09-15 ENCOUNTER — TELEPHONE (OUTPATIENT)
Dept: PRIMARY CARE CLINIC | Age: 56
End: 2021-09-15

## 2021-09-15 ENCOUNTER — APPOINTMENT (OUTPATIENT)
Dept: CT IMAGING | Age: 56
DRG: 177 | End: 2021-09-15
Payer: COMMERCIAL

## 2021-09-15 DIAGNOSIS — J12.82 PNEUMONIA DUE TO COVID-19 VIRUS: Primary | ICD-10-CM

## 2021-09-15 DIAGNOSIS — J96.01 ACUTE RESPIRATORY FAILURE WITH HYPOXIA (HCC): ICD-10-CM

## 2021-09-15 DIAGNOSIS — R09.02 HYPOXIA: ICD-10-CM

## 2021-09-15 DIAGNOSIS — U07.1 PNEUMONIA DUE TO COVID-19 VIRUS: Primary | ICD-10-CM

## 2021-09-15 PROBLEM — I24.9 ACUTE CORONARY SYNDROME (HCC): Status: RESOLVED | Noted: 2020-02-20 | Resolved: 2021-09-15

## 2021-09-15 PROBLEM — R07.9 CHEST PAIN: Status: RESOLVED | Noted: 2020-02-20 | Resolved: 2021-09-15

## 2021-09-15 PROBLEM — Z86.010 HISTORY OF COLON POLYPS: Chronic | Status: ACTIVE | Noted: 2020-08-11

## 2021-09-15 PROBLEM — F41.1 GAD (GENERALIZED ANXIETY DISORDER): Status: RESOLVED | Noted: 2018-06-20 | Resolved: 2021-09-15

## 2021-09-15 PROBLEM — R00.0 TACHYCARDIA: Status: RESOLVED | Noted: 2020-02-20 | Resolved: 2021-09-15

## 2021-09-15 PROBLEM — Z86.0100 HISTORY OF COLON POLYPS: Chronic | Status: ACTIVE | Noted: 2020-08-11

## 2021-09-15 LAB
ABSOLUTE EOS #: <0.03 K/UL (ref 0–0.44)
ABSOLUTE IMMATURE GRANULOCYTE: <0.03 K/UL (ref 0–0.3)
ABSOLUTE LYMPH #: 0.9 K/UL (ref 1.1–3.7)
ABSOLUTE MONO #: 0.37 K/UL (ref 0.1–1.2)
ALBUMIN SERPL-MCNC: 3.7 G/DL (ref 3.5–5.2)
ALBUMIN/GLOBULIN RATIO: 1.2 (ref 1–2.5)
ALP BLD-CCNC: 54 U/L (ref 35–104)
ALT SERPL-CCNC: 61 U/L (ref 5–33)
ANION GAP SERPL CALCULATED.3IONS-SCNC: 15 MMOL/L (ref 9–17)
AST SERPL-CCNC: 89 U/L
BASOPHILS # BLD: 0 % (ref 0–2)
BASOPHILS ABSOLUTE: <0.03 K/UL (ref 0–0.2)
BILIRUB SERPL-MCNC: 0.37 MG/DL (ref 0.3–1.2)
BNP INTERPRETATION: NORMAL
BUN BLDV-MCNC: 10 MG/DL (ref 6–20)
BUN/CREAT BLD: 12 (ref 9–20)
CALCIUM SERPL-MCNC: 8.5 MG/DL (ref 8.6–10.4)
CHLORIDE BLD-SCNC: 98 MMOL/L (ref 98–107)
CO2: 21 MMOL/L (ref 20–31)
CREAT SERPL-MCNC: 0.85 MG/DL (ref 0.5–0.9)
D-DIMER QUANTITATIVE: 1.04 MG/L FEU (ref 0–0.59)
DIFFERENTIAL TYPE: ABNORMAL
EOSINOPHILS RELATIVE PERCENT: 0 % (ref 1–4)
GFR AFRICAN AMERICAN: >60 ML/MIN
GFR NON-AFRICAN AMERICAN: >60 ML/MIN
GFR SERPL CREATININE-BSD FRML MDRD: ABNORMAL ML/MIN/{1.73_M2}
GFR SERPL CREATININE-BSD FRML MDRD: ABNORMAL ML/MIN/{1.73_M2}
GLUCOSE BLD-MCNC: 184 MG/DL (ref 70–99)
HCT VFR BLD CALC: 43.2 % (ref 36.3–47.1)
HEMOGLOBIN: 14.5 G/DL (ref 11.9–15.1)
IMMATURE GRANULOCYTES: 0 %
INR BLD: 1.3
LYMPHOCYTES # BLD: 14 % (ref 24–43)
MCH RBC QN AUTO: 29.1 PG (ref 25.2–33.5)
MCHC RBC AUTO-ENTMCNC: 33.6 G/DL (ref 28.4–34.8)
MCV RBC AUTO: 86.6 FL (ref 82.6–102.9)
MONOCYTES # BLD: 6 % (ref 3–12)
NRBC AUTOMATED: 0 PER 100 WBC
PARTIAL THROMBOPLASTIN TIME: 40.7 SEC (ref 23.9–33.8)
PDW BLD-RTO: 13.5 % (ref 11.8–14.4)
PLATELET # BLD: 157 K/UL (ref 138–453)
PLATELET ESTIMATE: ABNORMAL
PMV BLD AUTO: 10.2 FL (ref 8.1–13.5)
POTASSIUM SERPL-SCNC: 3.5 MMOL/L (ref 3.7–5.3)
PRO-BNP: 82 PG/ML
PROTHROMBIN TIME: 15.7 SEC (ref 11.5–14.2)
RBC # BLD: 4.99 M/UL (ref 3.95–5.11)
RBC # BLD: ABNORMAL 10*6/UL
SARS-COV-2, RAPID: DETECTED
SEG NEUTROPHILS: 80 % (ref 36–65)
SEGMENTED NEUTROPHILS ABSOLUTE COUNT: 5.34 K/UL (ref 1.5–8.1)
SODIUM BLD-SCNC: 134 MMOL/L (ref 135–144)
SPECIMEN DESCRIPTION: ABNORMAL
TOTAL PROTEIN: 6.9 G/DL (ref 6.4–8.3)
TROPONIN INTERP: NORMAL
TROPONIN T: NORMAL NG/ML
TROPONIN, HIGH SENSITIVITY: <6 NG/L (ref 0–14)
WBC # BLD: 6.6 K/UL (ref 3.5–11.3)
WBC # BLD: ABNORMAL 10*3/UL

## 2021-09-15 PROCEDURE — 6360000004 HC RX CONTRAST MEDICATION: Performed by: PHYSICIAN ASSISTANT

## 2021-09-15 PROCEDURE — 99283 EMERGENCY DEPT VISIT LOW MDM: CPT

## 2021-09-15 PROCEDURE — 80053 COMPREHEN METABOLIC PANEL: CPT

## 2021-09-15 PROCEDURE — 2580000003 HC RX 258: Performed by: FAMILY MEDICINE

## 2021-09-15 PROCEDURE — 83880 ASSAY OF NATRIURETIC PEPTIDE: CPT

## 2021-09-15 PROCEDURE — 85610 PROTHROMBIN TIME: CPT

## 2021-09-15 PROCEDURE — 71045 X-RAY EXAM CHEST 1 VIEW: CPT

## 2021-09-15 PROCEDURE — 85379 FIBRIN DEGRADATION QUANT: CPT

## 2021-09-15 PROCEDURE — 6370000000 HC RX 637 (ALT 250 FOR IP): Performed by: INTERNAL MEDICINE

## 2021-09-15 PROCEDURE — 1200000000 HC SEMI PRIVATE

## 2021-09-15 PROCEDURE — 87635 SARS-COV-2 COVID-19 AMP PRB: CPT

## 2021-09-15 PROCEDURE — 71260 CT THORAX DX C+: CPT

## 2021-09-15 PROCEDURE — 85730 THROMBOPLASTIN TIME PARTIAL: CPT

## 2021-09-15 PROCEDURE — 2500000003 HC RX 250 WO HCPCS: Performed by: FAMILY MEDICINE

## 2021-09-15 PROCEDURE — 85025 COMPLETE CBC W/AUTO DIFF WBC: CPT

## 2021-09-15 PROCEDURE — 93005 ELECTROCARDIOGRAM TRACING: CPT | Performed by: PHYSICIAN ASSISTANT

## 2021-09-15 PROCEDURE — 84484 ASSAY OF TROPONIN QUANT: CPT

## 2021-09-15 PROCEDURE — 36415 COLL VENOUS BLD VENIPUNCTURE: CPT

## 2021-09-15 RX ORDER — SODIUM CHLORIDE 0.9 % (FLUSH) 0.9 %
10 SYRINGE (ML) INJECTION EVERY 12 HOURS SCHEDULED
Status: DISCONTINUED | OUTPATIENT
Start: 2021-09-15 | End: 2021-09-26 | Stop reason: HOSPADM

## 2021-09-15 RX ORDER — PANTOPRAZOLE SODIUM 40 MG/1
40 TABLET, DELAYED RELEASE ORAL DAILY
Status: DISCONTINUED | OUTPATIENT
Start: 2021-09-16 | End: 2021-09-26 | Stop reason: HOSPADM

## 2021-09-15 RX ORDER — ALPRAZOLAM 0.25 MG/1
0.25 TABLET ORAL DAILY PRN
Status: DISCONTINUED | OUTPATIENT
Start: 2021-09-15 | End: 2021-09-26 | Stop reason: HOSPADM

## 2021-09-15 RX ORDER — POLYETHYLENE GLYCOL 3350 17 G/17G
17 POWDER, FOR SOLUTION ORAL DAILY PRN
Status: DISCONTINUED | OUTPATIENT
Start: 2021-09-15 | End: 2021-09-26 | Stop reason: HOSPADM

## 2021-09-15 RX ORDER — ALBUTEROL SULFATE 2.5 MG/3ML
2.5 SOLUTION RESPIRATORY (INHALATION) EVERY 6 HOURS PRN
Status: DISCONTINUED | OUTPATIENT
Start: 2021-09-15 | End: 2021-09-26 | Stop reason: HOSPADM

## 2021-09-15 RX ORDER — 0.9 % SODIUM CHLORIDE 0.9 %
30 INTRAVENOUS SOLUTION INTRAVENOUS PRN
Status: DISCONTINUED | OUTPATIENT
Start: 2021-09-15 | End: 2021-09-26 | Stop reason: HOSPADM

## 2021-09-15 RX ORDER — SPIRONOLACTONE 25 MG/1
25 TABLET ORAL DAILY
Status: DISCONTINUED | OUTPATIENT
Start: 2021-09-16 | End: 2021-09-26 | Stop reason: HOSPADM

## 2021-09-15 RX ORDER — ONDANSETRON 4 MG/1
4 TABLET, ORALLY DISINTEGRATING ORAL EVERY 8 HOURS PRN
Status: DISCONTINUED | OUTPATIENT
Start: 2021-09-15 | End: 2021-09-26 | Stop reason: HOSPADM

## 2021-09-15 RX ORDER — SODIUM CHLORIDE 0.9 % (FLUSH) 0.9 %
10 SYRINGE (ML) INJECTION PRN
Status: DISCONTINUED | OUTPATIENT
Start: 2021-09-15 | End: 2021-09-26 | Stop reason: HOSPADM

## 2021-09-15 RX ORDER — DEXAMETHASONE 4 MG/1
6 TABLET ORAL DAILY
Status: DISPENSED | OUTPATIENT
Start: 2021-09-15 | End: 2021-09-25

## 2021-09-15 RX ORDER — ONDANSETRON 2 MG/ML
4 INJECTION INTRAMUSCULAR; INTRAVENOUS EVERY 6 HOURS PRN
Status: DISCONTINUED | OUTPATIENT
Start: 2021-09-15 | End: 2021-09-26 | Stop reason: HOSPADM

## 2021-09-15 RX ORDER — GUAIFENESIN/DEXTROMETHORPHAN 100-10MG/5
5 SYRUP ORAL EVERY 4 HOURS PRN
Status: DISCONTINUED | OUTPATIENT
Start: 2021-09-15 | End: 2021-09-26 | Stop reason: HOSPADM

## 2021-09-15 RX ORDER — VITAMIN B COMPLEX
2000 TABLET ORAL DAILY
Status: DISCONTINUED | OUTPATIENT
Start: 2021-09-23 | End: 2021-09-26 | Stop reason: HOSPADM

## 2021-09-15 RX ORDER — FAMOTIDINE 20 MG/1
20 TABLET, FILM COATED ORAL 2 TIMES DAILY
Status: DISCONTINUED | OUTPATIENT
Start: 2021-09-15 | End: 2021-09-15 | Stop reason: SDUPTHER

## 2021-09-15 RX ORDER — FAMOTIDINE 20 MG/1
40 TABLET, FILM COATED ORAL EVERY EVENING
Status: DISCONTINUED | OUTPATIENT
Start: 2021-09-15 | End: 2021-09-26 | Stop reason: HOSPADM

## 2021-09-15 RX ORDER — BUDESONIDE AND FORMOTEROL FUMARATE DIHYDRATE 160; 4.5 UG/1; UG/1
2 AEROSOL RESPIRATORY (INHALATION) 2 TIMES DAILY
Status: DISCONTINUED | OUTPATIENT
Start: 2021-09-15 | End: 2021-09-26 | Stop reason: HOSPADM

## 2021-09-15 RX ORDER — VITAMIN B COMPLEX
6000 TABLET ORAL DAILY
Status: COMPLETED | OUTPATIENT
Start: 2021-09-16 | End: 2021-09-22

## 2021-09-15 RX ORDER — LOSARTAN POTASSIUM 25 MG/1
25 TABLET ORAL DAILY
Status: DISCONTINUED | OUTPATIENT
Start: 2021-09-16 | End: 2021-09-26 | Stop reason: HOSPADM

## 2021-09-15 RX ORDER — SODIUM CHLORIDE 9 MG/ML
25 INJECTION, SOLUTION INTRAVENOUS PRN
Status: DISCONTINUED | OUTPATIENT
Start: 2021-09-15 | End: 2021-09-26 | Stop reason: HOSPADM

## 2021-09-15 RX ADMIN — REMDESIVIR 200 MG: 100 INJECTION, POWDER, LYOPHILIZED, FOR SOLUTION INTRAVENOUS at 21:33

## 2021-09-15 RX ADMIN — GUAIFENESIN AND DEXTROMETHORPHAN 5 ML: 100; 10 SYRUP ORAL at 23:45

## 2021-09-15 RX ADMIN — IOPAMIDOL 75 ML: 755 INJECTION, SOLUTION INTRAVENOUS at 21:49

## 2021-09-15 RX ADMIN — FAMOTIDINE 40 MG: 20 TABLET, FILM COATED ORAL at 23:45

## 2021-09-15 ASSESSMENT — ENCOUNTER SYMPTOMS
CHEST TIGHTNESS: 0
BACK PAIN: 0
SHORTNESS OF BREATH: 1
ABDOMINAL PAIN: 0
NAUSEA: 0
WHEEZING: 0
EYE REDNESS: 0
SORE THROAT: 0
DIARRHEA: 0
CONSTIPATION: 0
BLOOD IN STOOL: 0
COUGH: 1
VOMITING: 0
RHINORRHEA: 0
EYE DISCHARGE: 0

## 2021-09-15 ASSESSMENT — PAIN DESCRIPTION - LOCATION: LOCATION: CHEST

## 2021-09-15 ASSESSMENT — PAIN DESCRIPTION - FREQUENCY: FREQUENCY: INTERMITTENT

## 2021-09-15 ASSESSMENT — PAIN DESCRIPTION - PAIN TYPE: TYPE: ACUTE PAIN

## 2021-09-15 ASSESSMENT — PAIN SCALES - GENERAL: PAINLEVEL_OUTOF10: 8

## 2021-09-15 ASSESSMENT — PAIN DESCRIPTION - DESCRIPTORS: DESCRIPTORS: BURNING

## 2021-09-15 NOTE — TELEPHONE ENCOUNTER
Patient has Covid pneumonia. Is taking meds and breathing treatments as advised. States she feels like her cough is getting a little looser. She was advised in the Er to call her MD if her pulse ox was low. Stated her readings today are 85%-90% at rest. Please advise.

## 2021-09-15 NOTE — TELEPHONE ENCOUNTER
Recommend vitamin D 2000 mg twice daily, vitamin-C 2000 twice daily, zinc 100 mg daily. Arrange for monoclonal antibodies.

## 2021-09-16 ENCOUNTER — CARE COORDINATION (OUTPATIENT)
Dept: OTHER | Facility: CLINIC | Age: 56
End: 2021-09-16

## 2021-09-16 LAB
ABSOLUTE EOS #: <0.03 K/UL (ref 0–0.44)
ABSOLUTE IMMATURE GRANULOCYTE: 0.03 K/UL (ref 0–0.3)
ABSOLUTE LYMPH #: 1.16 K/UL (ref 1.1–3.7)
ABSOLUTE MONO #: 0.49 K/UL (ref 0.1–1.2)
ANION GAP SERPL CALCULATED.3IONS-SCNC: 10 MMOL/L (ref 9–17)
BASOPHILS # BLD: 0 % (ref 0–2)
BASOPHILS ABSOLUTE: <0.03 K/UL (ref 0–0.2)
BUN BLDV-MCNC: 10 MG/DL (ref 6–20)
BUN/CREAT BLD: 15 (ref 9–20)
C-REACTIVE PROTEIN: 20 MG/L (ref 0–5)
CALCIUM SERPL-MCNC: 8 MG/DL (ref 8.6–10.4)
CHLORIDE BLD-SCNC: 99 MMOL/L (ref 98–107)
CO2: 24 MMOL/L (ref 20–31)
CREAT SERPL-MCNC: 0.65 MG/DL (ref 0.5–0.9)
D-DIMER QUANTITATIVE: 1.1 MG/L FEU (ref 0–0.59)
DIFFERENTIAL TYPE: ABNORMAL
EKG ATRIAL RATE: 104 BPM
EKG P AXIS: 21 DEGREES
EKG P-R INTERVAL: 214 MS
EKG Q-T INTERVAL: 348 MS
EKG QRS DURATION: 122 MS
EKG QTC CALCULATION (BAZETT): 457 MS
EKG R AXIS: -42 DEGREES
EKG T AXIS: 11 DEGREES
EKG VENTRICULAR RATE: 104 BPM
EOSINOPHILS RELATIVE PERCENT: 0 % (ref 1–4)
GFR AFRICAN AMERICAN: >60 ML/MIN
GFR NON-AFRICAN AMERICAN: >60 ML/MIN
GFR SERPL CREATININE-BSD FRML MDRD: ABNORMAL ML/MIN/{1.73_M2}
GFR SERPL CREATININE-BSD FRML MDRD: ABNORMAL ML/MIN/{1.73_M2}
GLUCOSE BLD-MCNC: 147 MG/DL (ref 70–99)
HCT VFR BLD CALC: 41.7 % (ref 36.3–47.1)
HEMOGLOBIN: 13.8 G/DL (ref 11.9–15.1)
IMMATURE GRANULOCYTES: 1 %
INR BLD: 1.2
LYMPHOCYTES # BLD: 18 % (ref 24–43)
MCH RBC QN AUTO: 29.5 PG (ref 25.2–33.5)
MCHC RBC AUTO-ENTMCNC: 33.1 G/DL (ref 28.4–34.8)
MCV RBC AUTO: 89.1 FL (ref 82.6–102.9)
MONOCYTES # BLD: 8 % (ref 3–12)
NRBC AUTOMATED: 0 PER 100 WBC
PARTIAL THROMBOPLASTIN TIME: 34.2 SEC (ref 23.9–33.8)
PDW BLD-RTO: 13.8 % (ref 11.8–14.4)
PLATELET # BLD: 155 K/UL (ref 138–453)
PLATELET ESTIMATE: ABNORMAL
PMV BLD AUTO: 10.4 FL (ref 8.1–13.5)
POTASSIUM SERPL-SCNC: 3.9 MMOL/L (ref 3.7–5.3)
PROTHROMBIN TIME: 14.5 SEC (ref 11.5–14.2)
RBC # BLD: 4.68 M/UL (ref 3.95–5.11)
RBC # BLD: ABNORMAL 10*6/UL
SEG NEUTROPHILS: 73 % (ref 36–65)
SEGMENTED NEUTROPHILS ABSOLUTE COUNT: 4.85 K/UL (ref 1.5–8.1)
SODIUM BLD-SCNC: 133 MMOL/L (ref 135–144)
WBC # BLD: 6.5 K/UL (ref 3.5–11.3)
WBC # BLD: ABNORMAL 10*3/UL

## 2021-09-16 PROCEDURE — 6370000000 HC RX 637 (ALT 250 FOR IP): Performed by: INTERNAL MEDICINE

## 2021-09-16 PROCEDURE — XW033E5 INTRODUCTION OF REMDESIVIR ANTI-INFECTIVE INTO PERIPHERAL VEIN, PERCUTANEOUS APPROACH, NEW TECHNOLOGY GROUP 5: ICD-10-PCS | Performed by: INTERNAL MEDICINE

## 2021-09-16 PROCEDURE — 6360000002 HC RX W HCPCS: Performed by: INTERNAL MEDICINE

## 2021-09-16 PROCEDURE — 97116 GAIT TRAINING THERAPY: CPT

## 2021-09-16 PROCEDURE — 85610 PROTHROMBIN TIME: CPT

## 2021-09-16 PROCEDURE — 85025 COMPLETE CBC W/AUTO DIFF WBC: CPT

## 2021-09-16 PROCEDURE — 85379 FIBRIN DEGRADATION QUANT: CPT

## 2021-09-16 PROCEDURE — 6370000000 HC RX 637 (ALT 250 FOR IP): Performed by: NURSE PRACTITIONER

## 2021-09-16 PROCEDURE — 99254 IP/OBS CNSLTJ NEW/EST MOD 60: CPT | Performed by: INTERNAL MEDICINE

## 2021-09-16 PROCEDURE — 86140 C-REACTIVE PROTEIN: CPT

## 2021-09-16 PROCEDURE — 94761 N-INVAS EAR/PLS OXIMETRY MLT: CPT

## 2021-09-16 PROCEDURE — 2580000003 HC RX 258: Performed by: FAMILY MEDICINE

## 2021-09-16 PROCEDURE — 2700000000 HC OXYGEN THERAPY PER DAY

## 2021-09-16 PROCEDURE — 97166 OT EVAL MOD COMPLEX 45 MIN: CPT

## 2021-09-16 PROCEDURE — 2580000003 HC RX 258: Performed by: INTERNAL MEDICINE

## 2021-09-16 PROCEDURE — 85730 THROMBOPLASTIN TIME PARTIAL: CPT

## 2021-09-16 PROCEDURE — 97161 PT EVAL LOW COMPLEX 20 MIN: CPT

## 2021-09-16 PROCEDURE — APPSS60 APP SPLIT SHARED TIME 46-60 MINUTES: Performed by: NURSE PRACTITIONER

## 2021-09-16 PROCEDURE — 93010 ELECTROCARDIOGRAM REPORT: CPT | Performed by: INTERNAL MEDICINE

## 2021-09-16 PROCEDURE — 1200000000 HC SEMI PRIVATE

## 2021-09-16 PROCEDURE — 2500000003 HC RX 250 WO HCPCS: Performed by: FAMILY MEDICINE

## 2021-09-16 PROCEDURE — 80048 BASIC METABOLIC PNL TOTAL CA: CPT

## 2021-09-16 RX ORDER — ASCORBIC ACID 500 MG
1000 TABLET ORAL 4 TIMES DAILY
Status: DISCONTINUED | OUTPATIENT
Start: 2021-09-16 | End: 2021-09-26 | Stop reason: HOSPADM

## 2021-09-16 RX ORDER — ZINC SULFATE 50(220)MG
100 CAPSULE ORAL DAILY
Status: DISCONTINUED | OUTPATIENT
Start: 2021-09-16 | End: 2021-09-26 | Stop reason: HOSPADM

## 2021-09-16 RX ORDER — ACETAMINOPHEN 325 MG/1
650 TABLET ORAL EVERY 6 HOURS PRN
Status: DISCONTINUED | OUTPATIENT
Start: 2021-09-16 | End: 2021-09-26 | Stop reason: HOSPADM

## 2021-09-16 RX ADMIN — REMDESIVIR 100 MG: 100 INJECTION, POWDER, LYOPHILIZED, FOR SOLUTION INTRAVENOUS at 21:35

## 2021-09-16 RX ADMIN — LOSARTAN POTASSIUM 25 MG: 25 TABLET, FILM COATED ORAL at 08:43

## 2021-09-16 RX ADMIN — SODIUM CHLORIDE, PRESERVATIVE FREE 10 ML: 5 INJECTION INTRAVENOUS at 21:34

## 2021-09-16 RX ADMIN — FAMOTIDINE 40 MG: 20 TABLET, FILM COATED ORAL at 17:12

## 2021-09-16 RX ADMIN — SPIRONOLACTONE 25 MG: 25 TABLET, FILM COATED ORAL at 08:43

## 2021-09-16 RX ADMIN — ALPRAZOLAM 0.25 MG: 0.25 TABLET ORAL at 12:33

## 2021-09-16 RX ADMIN — DEXAMETHASONE 6 MG: 4 TABLET ORAL at 08:43

## 2021-09-16 RX ADMIN — OXYCODONE HYDROCHLORIDE AND ACETAMINOPHEN 1000 MG: 500 TABLET ORAL at 17:12

## 2021-09-16 RX ADMIN — METOPROLOL TARTRATE 12.5 MG: 25 TABLET, FILM COATED ORAL at 21:28

## 2021-09-16 RX ADMIN — APIXABAN 5 MG: 5 TABLET, FILM COATED ORAL at 21:29

## 2021-09-16 RX ADMIN — Medication 100 MG: at 08:43

## 2021-09-16 RX ADMIN — OXYCODONE HYDROCHLORIDE AND ACETAMINOPHEN 1000 MG: 500 TABLET ORAL at 08:43

## 2021-09-16 RX ADMIN — SODIUM CHLORIDE, PRESERVATIVE FREE 10 ML: 5 INJECTION INTRAVENOUS at 08:47

## 2021-09-16 RX ADMIN — METOPROLOL TARTRATE 12.5 MG: 25 TABLET, FILM COATED ORAL at 08:43

## 2021-09-16 RX ADMIN — OXYCODONE HYDROCHLORIDE AND ACETAMINOPHEN 1000 MG: 500 TABLET ORAL at 21:29

## 2021-09-16 RX ADMIN — Medication 6000 UNITS: at 08:42

## 2021-09-16 RX ADMIN — OXYCODONE HYDROCHLORIDE AND ACETAMINOPHEN 1000 MG: 500 TABLET ORAL at 12:09

## 2021-09-16 RX ADMIN — APIXABAN 5 MG: 5 TABLET, FILM COATED ORAL at 08:43

## 2021-09-16 RX ADMIN — PANTOPRAZOLE SODIUM 40 MG: 40 TABLET, DELAYED RELEASE ORAL at 08:43

## 2021-09-16 RX ADMIN — ONDANSETRON 4 MG: 2 INJECTION INTRAMUSCULAR; INTRAVENOUS at 02:37

## 2021-09-16 RX ADMIN — ACETAMINOPHEN 650 MG: 325 TABLET ORAL at 23:02

## 2021-09-16 RX ADMIN — ONDANSETRON 4 MG: 2 INJECTION INTRAMUSCULAR; INTRAVENOUS at 07:51

## 2021-09-16 ASSESSMENT — PAIN SCALES - GENERAL: PAINLEVEL_OUTOF10: 5

## 2021-09-16 NOTE — PROGRESS NOTES
Patient was brought up to floor on ER cart at approximately 2230. She ambulated independently to the bathroom before getting into bed. Patient's personal belongings are hung in the closet. Patient denies needing any personal items locked up. She is on 3L NC and has extension tubing that reaches the bathroom. Patient has been educated about safety when walking to the bathroom and oriented to the call light and room. Patient denies questions. Writer encouraged patient to call out if she should need any assistance walking to the bathroom, with tubing, or if she feels to SOB to walk independently. Patient understands. Call light and bedside table and within reach and writer gave her water, pop, and extra pillows. Assessment and navigator are complete and vitals are stable. Writer went through patient's home medications and gave her night med as well as PRN cough syrup. She denies further needs at this time. Will continue to monitor and assess.

## 2021-09-16 NOTE — PROGRESS NOTES
Discussed discharge plans with the patient. Patient is a 54year old female here with Pneumonia due to COVID-19 virus . She is alert , oriented , pleasant and cooperative during our discussion.     Patient is  and lives at home with her . She uses no DME at this time at home. . Patient does the cooking and the cleaning. She is independent with her ADL's. Patient manages her own medications and drives. She has no outside services in the home     Her PCP is Dr. Tj Carey. She has medical insurance that helps with medication costs. Patient works a full time job at [employer] ([employer]).     The discharge plan is home with no services at this time. Patient does  have advance directives and they are on file. ALESSIOW to monitor and assist with any needs or concerns as they arise.     Conception CHANCE Hager Normal rate, regular rhythm, normal S1, S2 heart sounds heard.

## 2021-09-16 NOTE — CARE COORDINATION
Upon review of record, patient has been readmitted to hospital on 9/15/21.  Will follow    Mina ZAPATA, RN- [facility]  Associate Care Manager  [phone]

## 2021-09-16 NOTE — CONSULTS
Remdesivir Initiation & Daily Monitoring      There are no PK data available for severe renal impairment (eGFR < 30 mL/min) or on RRT. Consider risk/benefit for individual patient. Baseline CrCl: Estimated Creatinine Clearance: 134 mL/min (based on SCr of 0.65 mg/dL). Results for Mary Duenas (MRN [id number]) as of 9/16/2021 08:07   Ref. Range 7/20/2021 12:40 9/15/2021 20:40 9/16/2021 05:05   GFR Non-African American Latest Ref Range: >60 mL/min >60 >60 >60       Ensure LFTs are ordered at baseline & consider if monitoring during therapy is appropriate (this information is included in CMP or can be ordered separately). Package insert states: Perform hepatic laboratory testing in all patients before starting VEKLURY and while receiving VEKLURY as clinically appropriate. Baseline ALT: Results for Mary Duenas (MRN [id number]) as of 9/16/2021 08:07   Ref. Range 7/20/2021 12:40 9/15/2021 20:40   ALT Latest Ref Range: 5 - 33 U/L 19 61 (H)       Duration of therapy should be limited to 5 days. Anticipated stop date: 9/19/21    Also assess corticosteroid therapy: If patient has had symptoms for 7 days or more and is on supplemental oxygen, then a course of dexamethasone can also be considered. Dexamethasone indicated? yes  If yes, is dexamethasone ordered?  yes  Consider baseline CRP (steroids may be beneficial if > 20 mg/L and may be harmful if < 10 mg/L): not available  Ann Hill., 9/16/2021, 8:09 AM

## 2021-09-16 NOTE — H&P
FRACTURE SURGERY  1983    [place] COLSC FLEXIBLE W/CONTROL BLEEDING ANY METHOD  3/27/2017    COLONOSCOPY CONTROL HEMORRHAGE performed by Cecilia Gonsalez MD at [address] FL[address] W/RMVL OF TUMOR POLYP LESION SNARE TQ  3/27/2017    COLONOSCOPY POLYPECTOMY SNARE/COLD BIOPSY performed by Cecilia Gonsalez MD at [address] [facility] W/O ECP Left 4/2/2018    EYE CATARACT EMULSIFICATION IOL IMPLANT performed by Ellito Brown MD at [address] [facility] ENDOSCOPY  1/2010    with biopsy    UPPER GASTROINTESTINAL ENDOSCOPY N/A 7/12/2021    EGD BIOPSY OF STOMACH, GASTRIC POLYPECTOMY AND DUODENUM BIOPSY performed by Kaila Nelson MD at [address] [facility].       Medications Prior to Admission:    Prior to Admission medications    Medication Sig Start Date End Date Taking? Authorizing Provider   albuterol (PROVENTIL) (2.5 MG/3ML) 0.083% nebulizer solution Take 3 mLs by nebulization every 6 hours as needed for Wheezing 9/13/21  Yes Antonio Tracey MD   azithromycin (ZITHROMAX Z-ARNOLD) 250 MG tablet Take 2 tablets (500 mg) on Day 1, and then take 1 tablet (250 mg) on days 2 through 5. 9/13/21 9/17/21 Yes Antonio Tracey MD   methylPREDNISolone (MEDROL, ARNOLD,) 4 MG tablet Take by mouth. 9/13/21 9/19/21 Yes Antonio Tracey MD   ondansetron (ZOFRAN ODT) 4 MG disintegrating tablet Take 1 tablet by mouth every 8 hours as needed for Nausea or Vomiting 9/13/21  Yes Antonio Tracey MD   budesonide-formoterol [facility]) 160-4.5 MCG/ACT AERO Inhale 2 puffs into the lungs 2 times daily 9/2/21 12/1/21 Yes Kindra Cameron MD   ALPRAZolam (XANAX) 0.25 MG tablet Take 1 tablet by mouth daily as needed for Anxiety for up to 90 days.  9/2/21 12/1/21 Yes Kindra Cameron MD   metoprolol tartrate (LOPRESSOR) 25 MG tablet TAKE 1/2 TABLET (12.5 MG) BY MOUTH 2 TIMES EACH DAY 8/2/21  Yes Kindra Cameron MD   spironolactone (ALDACTONE) 25 MG tablet Take 1 tablet by mouth daily 7/26/21  Yes Dorothea Castano MD   pantoprazole (PROTONIX) 40 MG tablet TAKE 1 TABLET BY MOUTH ONCE DAILY 7/14/21  Yes Dorothea Castano MD   albuterol sulfate HFA (PROAIR HFA) 108 (90 Base) MCG/ACT inhaler Inhale 2 puffs into the lungs 4 times daily as needed for Wheezing or Shortness of Breath 6/17/21  Yes Dorothea Castano MD   polyethylene glycol (MIRALAX) 17 g PACK packet Take 17 g by mouth daily 5/1/21  Yes Historical Provider, MD   famotidine (PEPCID) 40 MG tablet Take 1 tablet by mouth every evening 6/15/21  Yes Chang Mann MD   olmesartan (BENICAR) 20 MG tablet Take 1 tablet by mouth daily 1/11/21  Yes Dorothea Castano MD   ELIQUIS 5 MG TABS tablet TAKE 1 TABLET BY MOUTH TWICE DAILY 11/9/20  Yes Dorothea Castano MD   PREVIDENT 5000 BOOSTER PLUS 1.1 % PSTE  3/12/15  Yes Historical Provider, MD   cetirizine (ZYRTEC) 10 MG tablet Take 10 mg by mouth daily. Historical Provider, MD       Allergies:  Levaquin [levofloxacin in d5w] and Pcn [penicillins]    Social History:   TOBACCO:   reports that she has never smoked. She has never used smokeless tobacco.  ETOH:   reports current alcohol use. Family History:       Problem Relation Age of Onset   [address] [facility] Cancer Mother         breast ca    Diabetes Mother     Depression Mother     Thyroid Disease Mother     High Blood Pressure Mother     Heart Disease Mother     Cancer Father         prostate ca    COPD Father     Heart Failure Father     Heart Disease Father     High Blood Pressure Father     Deep Vein Thrombosis Sister     Breast Cancer Sister     Deep Vein Thrombosis Sister        Review of Systems:  Constitutional:positive  for fevers, and negative for chills.   Respiratory: positive for shortness of breath, positive for cough, and negative for wheezing  Cardiovascular: negative for chest pain, negative for palpitations, and negative for syncope  Gastrointestinal: negative for abdominal pain, negative for nausea,negative for vomiting, negative for diarrhea, negative for constipation, and negative for hematochezia or melena  Genitourinary: negative for dysuria, negative for urinary urgency, negative for urinary frequency, and negative for hematuria  Neurological: negative for unilateral weakness, numbness or tingling. All other systems were reviewed with the patient and are negative except as stated above    Objective:    Vitals:   Temp: 98.2 °F (36.8 °C)  BP: 139/83  Resp: 22  Pulse: 81  SpO2: 92 %  -----------------------------------------------------------------  Exam:  GEN:    Awake, alert and oriented x3. EYES:  EOMI, pupils equal   NECK: Supple. No lymphadenopathy. No carotid bruit  CVS:    regular rate and rhythm, no audible murmur  PULM:  diminished with scattered rhonchi, mild acute respiratory distress  ABD:    Bowels sounds normal.  Abdomen is soft. No distention. no tenderness to palpation. EXT:   no edema bilaterally . No calf tenderness. NEURO: Moves all extremities. Motor and sensory are grossly intact  SKIN:  No rashes. No skin lesions.    -----------------------------------------------------------------  · Labarotory Data:   Lab Results   Component Value Date    WBC 6.5 09/16/2021    HGB 13.8 09/16/2021    MCV 89.1 09/16/2021     09/16/2021      Lab Results   Component Value Date    GLUCOSE 147 (H) 09/16/2021    BUN 10 09/16/2021    CREATININE 0.65 09/16/2021     (L) 09/16/2021    K 3.9 09/16/2021    CALCIUM 8.0 (L) 09/16/2021    CL 99 09/16/2021    CO2 24 09/16/2021       · EKG reviewed: my interpretation is: sinus tachycadia with 1st degree AV block, LAD        · Imaging Data:  CT CHEST PULMONARY EMBOLISM W CONTRAST   Final Result   Patchy bilateral ground-glass and airspace infiltrates most notably in the   lung periphery compatible with patient's history of COVID pneumonia. No obvious evidence of pulmonary embolism.          XR CHEST PORTABLE   Final Result   Patchy bilateral interstitial infiltrates, improved on the right however   progressive on the left compatible with patient's history of COVID.                Assessment / Plan:     · This patient requires inpatient admission because of Acute respiratory failure with hypoxia due to Covid-19 pneumonia  · Estimated length of stay is 5 days  · Discussed patient's symptoms and data results including labs and imaging studies with the ER MD at time of admission  · Start IV Remdesivir  · Start Dexamethasone  · Mucinex-DM  · Supplemental O2 to keep SpO2 > 92%  · Acapella  · Intermittent prone positioning as tolerated  · Monitor labs:  D-dimer, CRP, fibrinogen   · Hypertension  · Continue Losartan, Metoprolol  · SUSY on CPAP  · HS CPAP  · DVT prophylaxis: on longterm eliquis due to h/o DVT and PE  · Peptic ulcer prophylaxis: Pepcid  · High risk medications: Remdesivir        Core Measures:     · Decubitus ulcer present on admission: No  · CODE STATUS: FULL CODE  · Nutrition Status: morbid obesity  · Physical therapy: yes  · Old Charts reviewed: yes  · EKG Reviewed: yes  · Advance Directive Addressed: Yes - in chart    Leana Mcardle, MD , M.D.  9/16/2021  1:39 PM

## 2021-09-16 NOTE — PROGRESS NOTES
Patient give PRN zofran for nausea. Patient got up to the bathroom and while she was sitting in there, began to dry heave. Patient was pale and could only tell writer that she was feeling \"off\". Writer did vitals and they were within normal limits. Patient quickly came around and started feeling better after writer entered the room. Writer walked with patient back to the bed and positioned her for comfort. Patient was given zofran for nausea and was already falling asleep when writer left the room. Will continue to monitor and assess.

## 2021-09-16 NOTE — PROGRESS NOTES
function as evidenced by weight loss      Nutrition Interventions:   Food and/or Nutrient Delivery:  Continue Current Diet  Nutrition Education/Counseling:  No recommendation at this time   Coordination of Nutrition Care:  Continue to monitor while inpatient    Goals:  PO > 75% of meals     Lab Results   Component Value Date    LABA1C 5.7 07/24/2020     Recent Labs     09/15/21  2040 09/16/21  0505   * 133*   K 3.5* 3.9   CL 98 99   CO2 21 24   BUN 10 10   CREATININE 0.85 0.65   GLUCOSE 184* 147*   ALT 61*  --    ALKPHOS 54  --    GFR                            Lab Results   Component Value Date    LABALBU 3.7 09/15/2021      Lab Results   Component Value Date    TRIG 112 06/14/2021    HDL 39 06/14/2021     Nutrition Monitoring and Evaluation:   Behavioral-Environmental Outcomes:  None Identified   Food/Nutrient Intake Outcomes:  Food and Nutrient Intake  Physical Signs/Symptoms Outcomes:  Biochemical Data, Weight, Nausea or Vomiting     Discharge Planning:    Continue current diet     Electronically signed by Freddy Almanzar RD, LD on 9/16/21 at 8:01 AM EDT    Contact: [phone]

## 2021-09-16 NOTE — CONSULTS
/[facility] - Initial Consult Note COVID 19/ Patient  Today's Date and Time: 9/16/2021, 3:32 PM    Impression :   · COVID 19 Suspect  · COVID 19 Confirmed Infection  · Covid tests:  · 9/15/21 Positive  · 9/8/21: Positive  · History of PE on Eliquis  · Essential hypertension  · Moderate persistent asthma without complication  · Patient has not received her Covid vaccine    Patient evaluated by Telemedicine. Requesting Institution: [facility]  Provider Institution: [address] [place] [address],[address], [address] [facility] Person at [address] [facility]: Ms Tavares Hashimoto, APRN-     Recommendations:   · Monitor off antibiotics  · Remdesivir initiated 9/15/21-this may not be very effective as the patient was diagnosed over a week ago. · Decadron initiated 9/15/21  · Monitor CRP-CRP continues to increase and patient's respiratory status declines we will consider administering Actemra. Medical Decision Making/Summary/Discussion:9/16/2021     · Patient admitted with suspected COVID 19 infection  · Covid test confirmed positive. Infection Control Recommendations   · Universal Precautions  · Airborne isolation  · Droplet Isolation    Antimicrobial Stewardship Recommendations     · Discontinuation of therapy  Coordination of Outpatient Care:   · Estimated Length of IV antimicrobials:TBD  · Patient will need Midline Catheter Insertion: TBD  · Patient will need PICC line Insertion: No  · Patient will need: Home IV , [place],  SNF,  LTAC:TBD  · Patient will need outpatient wound care:No    Chief complaint/reason for consultation:   · Concern for COVID infection      History of Present Illness:   Tip Rothman is a 54y.o.-year-old female who was initially admitted on 9/15/2021.  Patient seen at the request of Dr. Delaney Anton:    This patient who tested positive for COVID-19 on 9/8/2021 presented through ER with complaints of worsening shortness of breath with associated pleuritic chest pain, cough and fatigue. She initially began having symptoms on 9/7/2021. The patient was oxygenating at 88% on room air and imaging revealed bilateral groundglass opacities. She was initiated on Decadron and remdesivir and admitted to the floor. CTA chest 9/15/2021  Impression   Patchy bilateral ground-glass and airspace infiltrates most notably in the   lung periphery compatible with patient's history of COVID pneumonia.       No obvious evidence of pulmonary embolism. Patient admitted because of concerns with COVID 19.    CURRENT EVALUATION : 9/16/2021    Afebrile  VS stable    Patient exhibiting respiratory distress. yes  Respiratory secretions:     Patient receiving supplemental oxygen. 3 L NC  02 sat 94%  RR 20      % FIO2:   PEEP:      QTc:       NEWS Score: 0-4 Low risk group; 5-6: Medium risk group; 7 or above: High risk group  Parameters 3 2 1 0 1 2 3   Age    < 65   ? 65   RR ? 8  9-11 12-20  21-24 ? 25   O2 Sats ? 91 92-93 94-95 ? 96      Suppl O2  Yes  No      SBP ? 90  101-110 111-219   ? 220   HR ? 40  41-50 51-90  111-130 ? 131   Consciousness    Alert   Drowsiness, lethargy, or confusion   Temperature ? 35.0 C (95.0 F)  35.1-36.0 C 95.1-96.9 F 36.1-38.0 C 97.0-100.4 F 38.1-39.0 C 100.5-102.3 F ? 39.1 C ? 102.4 F      NEWS Score:   9/16/21: 3 Low risk    Overall Daily Picture:      Unchanged    Presence of secondary bacterial Infection:    No   Additional antibiotics: No    Labs, X rays reviewed: 9/16/2021    BUN:10  Cr:0.65    WBC:6.5  Hb:13.8  Plat: 155    Absolute Neutrophils:4.85  Absolute Lymphocytes:1.16  Neutrophil/Lymphocyte Ratio: 4.2 high risk    CRP:Pending  Ferritin:  LDH:   D-dimer: 1.10    Pro Calcitonin:      Cultures:  Urine:  ·   Blood:  ·   Sputum :  ·   Wound:       CXR:     9/15/21        CAT:    9/15/21    Discussed with patient, RN, CC, IM.     I have personally reviewed the past medical history, past surgical history, medications, IntraVENous Q24H    budesonide-formoterol  2 puff Inhalation BID    apixaban  5 mg Oral BID    famotidine  40 mg Oral QPM    metoprolol tartrate  12.5 mg Oral BID    losartan  25 mg Oral Daily    pantoprazole  40 mg Oral Daily    spironolactone  25 mg Oral Daily    sodium chloride flush  10 mL IntraVENous 2 times per day    dexamethasone  6 mg Oral Daily    Vitamin D  6,000 Units Oral Daily    Followed by   Nathan Form ON 9/23/2021] Vitamin D  2,000 Units Oral Daily       Social History:     Social History     Socioeconomic History    Marital status:      Spouse name: Not on file    Number of children: Not on file    Years of education: Not on file    Highest education level: Not on file   Occupational History    Not on file   Tobacco Use    Smoking status: Never Smoker    Smokeless tobacco: Never Used   Vaping Use    Vaping Use: Never used   Substance and Sexual Activity    Alcohol use: Yes     Alcohol/week: 0.0 standard drinks     Comment: 1 glass of wine/month    Drug use: No    Sexual activity: Yes     Partners: Male     Birth control/protection: Surgical   Other Topics Concern    Not on file   Social History Narrative    Not on file     Social Determinants of Health     Financial Resource Strain: Low Risk     Difficulty of Paying Living Expenses: Not hard at all   Food Insecurity: No Food Insecurity    Worried About Running Out of Food in the Last Year: Never true    [address] in the Last Year: Never true   Transportation Needs: No Transportation Needs    Lack of Transportation (Medical): No    Lack of Transportation (Non-Medical):  No   Physical Activity:     Days of Exercise per Week:     Minutes of Exercise per Session:    Stress:     Feeling of Stress :    Social Connections:     Frequency of Communication with Friends and Family:     Frequency of Social Gatherings with Friends and Family:     Attends Spiritism Services:     Active Member of Clubs or Organizations:     Attends Club or Organization Meetings:     Marital Status:    Intimate Partner Violence:     Fear of Current or Ex-Partner:     Emotionally Abused:     Physically Abused:     Sexually Abused:        Family History:     Family History   Problem Relation Age of Onset    Cancer Mother         breast ca    Diabetes Mother     Depression Mother     Thyroid Disease Mother     High Blood Pressure Mother     Heart Disease Mother     Cancer Father         prostate ca    COPD Father     Heart Failure Father     Heart Disease Father     High Blood Pressure Father     Deep Vein Thrombosis Sister     Breast Cancer Sister     Deep Vein Thrombosis Sister         Allergies:   Levaquin [levofloxacin in d5w] and Pcn [penicillins]     Review of Systems:       Constitutional: Generalized malaise  Head: No headaches  Eyes: No double vision or blurry vision. No conjunctival inflammation. ENT: No sore throat or runny nose. . No hearing loss, tinnitus or vertigo. Cardiovascular: No chest pain or palpitations. Shortness of breath. CARBAJAL  Lung:  Shortness of breath & cough. No sputum production  Abdomen: No nausea, vomiting, diarrhea, or abdominal pain. [place] No cramps. Genitourinary: No increased urinary frequency, or dysuria. No hematuria. No suprapubic or CVA pain  Musculoskeletal: No muscle aches or pains. No joint effusions, swelling or deformities  Hematologic: No bleeding or bruising. Neurologic: No headache, weakness, numbness, or tingling. Integument: No rash, no ulcers. Psychiatric: No depression. Endocrine: No polyuria, no polydipsia, no polyphagia.     Physical Examination :     Patient Vitals for the past 8 hrs:   BP Temp Temp src Pulse Resp SpO2 Height   09/16/21 1415 (!) 117/56 98.1 °F (36.7 °C) Temporal 77 20 94 % --   09/16/21 0754 -- -- -- -- -- -- 5' 6\" (1.676 m)     General Appearance: Awake, alert, and in no apparent distress  Head:  Normocephalic, no trauma  Eyes: Pupils equal, round, reactive to light; sclera anicteric; conjunctivae pink. No embolic phenomena. ENT: Oropharynx clear, without erythema, exudate, or thrush. No tenderness of sinuses. Mouth/throat: mucosa pink and moist. No lesions. Dentition in good repair. Neck:Supple, without lymphadenopathy. Thyroid normal, No bruits. Pulmonary/Chest: Clear to auscultation, without wheezes, rales, or rhonchi. No dullness to percussion. Cardiovascular: Regular rate and rhythm without murmurs, rubs, or gallops. Abdomen: Soft, non tender. Bowel sounds normal. No organomegaly  All four Extremities: No cyanosis, clubbing, edema, or effusions. Neurologic: No gross sensory or motor deficits. Skin: Warm and dry with good turgor. No signs of peripheral arterial or venous insufficiency. No ulcerations. No open wounds. Medical Decision Making -Laboratory:   I have independently reviewed/ordered the following labs:    CBC with Differential:   Recent Labs     09/15/21  2040 09/16/21  0505   WBC 6.6 6.5   HGB 14.5 13.8   HCT 43.2 41.7    155   LYMPHOPCT 14* 18*   MONOPCT 6 8     BMP:   Recent Labs     09/15/21  2040 09/16/21  0505   * 133*   K 3.5* 3.9   CL 98 99   CO2 21 24   BUN 10 10   CREATININE 0.85 0.65     Hepatic Function Panel:   Recent Labs     09/15/21  2040   PROT 6.9   LABALBU 3.7   BILITOT 0.37   ALKPHOS 54   ALT 61*   AST 89*     No results for input(s): RPR in the last 72 hours. No results for input(s): HIV in the last 72 hours. No results for input(s): BC in the last 72 hours.   Lab Results   Component Value Date    RBC 4.68 09/16/2021    RBC 4.67 11/02/2011    WBC 6.5 09/16/2021     Lab Results   Component Value Date    CREATININE 0.65 09/16/2021    GLUCOSE 147 09/16/2021    GLUCOSE 99 11/02/2011       Medical Decision Making-Imaging:     Narrative   EXAMINATION:   CTA OF THE CHEST 9/15/2021 6:47 pm       TECHNIQUE:   CTA of the chest was performed after the administration of intravenous   contrast.  Multiplanar reformatted images are provided for review.  MIP   images are provided for review. Dose modulation, iterative reconstruction,   and/or weight based adjustment of the mA/kV was utilized to reduce the   radiation dose to as low as reasonably achievable.       COMPARISON:   None.       HISTORY:   ORDERING SYSTEM PROVIDED HISTORY: hypoxia, tachycardia, covid   TECHNOLOGIST PROVIDED HISTORY:   hypoxia, tachycardia, covid   Decision Support Exception - unselect if not a suspected or confirmed   emergency medical condition->Emergency Medical Condition (MA)       FINDINGS:   Pulmonary Arteries: Pulmonary arteries are less than adequately opacified for   evaluation. No evidence of intraluminal filling defect to suggest pulmonary   embolism.  Main pulmonary artery is normal in caliber.       Mediastinum: There are a few small nonspecific lymph nodes seen in the middle   mediastinum. No suspicious lymphadenopathy.       Lungs/pleura: Patchy bilateral ground-glass and airspace infiltrates most   notably in the lung periphery. ..  No pulmonary masses are noted. No pleural   effusions are identified.       Cardiomediastinal Structures: Intimal calcifications associated with the   thoracic aorta. No evidence of thoracic aortic aneurysm or dissection. Cardiac chambers are normal       Upper Abdomen: Small hiatal hernia       Soft Tissues/Bones:  There are hypertrophic degenerative changes in the   thoracic spine.       No acute osseous abnormalities.           Impression   Patchy bilateral ground-glass and airspace infiltrates most notably in the   lung periphery compatible with patient's history of COVID pneumonia.       No obvious evidence of pulmonary embolism.             Narrative   EXAMINATION:   ONE XRAY VIEW OF THE CHEST       9/15/2021 5:21 pm       COMPARISON:   09/13/2021       HISTORY:   ORDERING SYSTEM PROVIDED HISTORY: cough, covid, worsening sob   TECHNOLOGIST PROVIDED HISTORY:   cough, covid, worsening sob       FINDINGS:   . The cardiac size is normal. No  pleural effusions are seen.  Patchy   bilateral interstitial infiltrates, improved on the right however progressive   on the left.  Pulmonary vascularity appears normal. There is mild ectasia of   the thoracic aorta.  . No acute bony abnormalities. The hilar structures are   normal.           Impression   Patchy bilateral interstitial infiltrates, improved on the right however   progressive on the left compatible with patient's history of COVID.                 Medical Decision [id number]:       Medical Decision Making-Other:     Note:  · Labs, medications, radiologic studies were reviewed with personal review of films  · Large amounts of data were reviewed  · Discussed with nursing Staff, Discharge planner  · Infection Control and Prevention measures reviewed  · All prior entries were reviewed  · Administer medications as ordered  · Prognosis: Guarded  · Discharge planning reviewed      Thank you for allowing us to participate in the care of this patient. Please call with questions. ZACARIAS Youssef - CNP     ATTESTATION:    I have discussed the case, including pertinent history and exam findings with the APRN. I have evaluated the  History, physical findings and pictures of the patient and the key elements of the encounter have been performed by me. I have reviewed the laboratory data, other diagnostic studies and discussed them with the APRN. I have updated the medical record where necessary. I agree with the assessment, plan and orders as documented by the APRN.     Rosa Coleman MD.      Pager: [phone] - Office: [phone]

## 2021-09-16 NOTE — PROGRESS NOTES
Physical Therapy    Facility/Department: [facility] AT THE [facility] MED SURG  Initial Assessment    NAME: Sergio Buckley  : 1965  MRN: [id number]    Date of Service: 2021    Discharge Recommendations:  Continue to assess pending progress   PT Equipment Recommendations  Equipment Needed: No    Assessment   Body structures, Functions, Activity limitations: Decreased functional mobility ; Decreased balance;Decreased ADL status; Decreased ROM; Decreased endurance;Decreased strength;Decreased posture  Assessment: The patient was admitted with pneumonia due to COVID-19. She demonstrates LE weakness, decreased activity endurance, and was unsteady on her feet. She would benefit from skilled PT to address her deficits to improve functional mobility. Treatment Diagnosis: Decreased activity endurance  Prognosis: Good  Decision Making: Medium Complexity  REQUIRES PT FOLLOW UP: Yes  Activity Tolerance  Activity Tolerance: Patient limited by endurance  Activity Tolerance: limited by SOB       Patient Diagnosis(es): The primary encounter diagnosis was Pneumonia due to COVID-19 virus. A diagnosis of Acute respiratory failure with hypoxia (HCC) was also pertinent to this visit. has a past medical history of Acute pulmonary embolism (HCC), Anxiety, Asthma, Constipation, chronic, CPAP (continuous positive airway pressure) dependence, Diabetes mellitus ([address] Embolism - blood clot, Esophageal reflux, Factor V Leiden ([facility] [address] Heart murmur, HTN (hypertension), Hyperlipidemia, Irritable bladder, MTHFR mutation, MVP (mitral valve prolapse), Palpitations, Pulmonary embolism ([address] Rhinitis, allergic, Sleep apnea, and Venous thromboembolism. has a past surgical history that includes Cholecystectomy; Mandible fracture surgery (); Tubal ligation; Upper gastrointestinal endoscopy (2010); pr colsc flx w/rmvl of tumor polyp lesion snare tq (3/27/2017); pr colsc flexible w/control bleeding any method (3/27/2017);  Cataract removal with implant (Left, 04/02/2018); pr xcapsl ctrc rmvl insj io lens prosth w/o ecp (Left, 4/2/2018); Colonoscopy (07/12/2021); Upper gastrointestinal endoscopy (N/A, 7/12/2021); and Colonoscopy (N/A, 7/12/2021). Restrictions  Restrictions/Precautions  Restrictions/Precautions: Isolation, Fall Risk, General Precautions  Vision/Hearing  Vision: Impaired  Vision Exceptions: Wears glasses at all times  Hearing: Within functional limits     Subjective  General  Chart Reviewed: Yes  Patient assessed for rehabilitation services?: Yes  Family / Caregiver Present: No  Referring Practitioner: Nina Clark MD  Referral Date : 09/16/21  Diagnosis: Acute respiratory failure with hypoxia, J96.01  Follows Commands: Within Functional Limits  Subjective  Subjective: Patient denies pain at rest.  She reports occasional chest pain with deep breathing and coughing.   Pain Screening  Patient Currently in Pain: Denies    Orientation  Orientation  Overall Orientation Status: Within Functional Limits  Social/Functional History  Social/Functional History  Lives With: Spouse  Type of Home: House  Home Layout: One level  Home Access: Stairs to enter without rails  Entrance Stairs - Number of Steps: 2-3  Bathroom Shower/Tub: Walk-in shower  ADL Assistance: Independent  Homemaking Assistance: Independent  Homemaking Responsibilities: Yes  Ambulation Assistance: Independent  Transfer Assistance: Independent  Active : Yes  Cognition   Cognition  Overall Cognitive Status: WNL    Objective  AROM RLE (degrees)  RLE AROM: WFL  AROM LLE (degrees)  LLE AROM : WFL  Strength RLE  Comment: Grossly 4/5  Strength LLE  Comment: Grossly 4/5     Sensation  Overall Sensation Status: WFL     Transfers  Sit to Stand: Contact guard assistance  Stand to sit: Contact guard assistance  Ambulation  Ambulation?: Yes  WB Status: FWB  Ambulation 1  Surface: level tile  Other Apparatus: O2  Assistance: Contact guard assistance  Gait Deviations: Gil Venegas; Lizbeth  Distance: 20'  Stairs/Curb  Stairs?: No     Balance  Posture: Fair  Sitting - Static: Good  Sitting - Dynamic: Good  Standing - Static: Fair  Standing - Dynamic: [address]  Times per week: 7  Times per day: Twice a day  Plan Comment: 1x/day on weekends  Safety Devices  Type of devices:  All fall risk precautions in place    AM-PAC Score  AM-PAC Inpatient Mobility without Stair Climbing Raw Score : 15 (09/16/21 1639)  AM-PAC Inpatient without Stair Climbing T-Scale Score : 43.03 (09/16/21 1639)  Mobility Inpatient CMS 0-100% Score: 47.43 (09/16/21 1639)  Mobility Inpatient without Stair CMS G-Code Modifier : CK (09/16/21 1639)    Goals  Short term goals  Time Frame for Short term goals: 10 days  Short term goal 1: Patient will ambulate 48' with supervision, without LOB  Short term goal 2: Patient will perform bed mobility and transfers with MI  Short term goal 3: Patient will tolerate 20-30 minutes of therex/act to improve endurance for ADLs  Patient Goals   Patient goals : Feel better       Therapy Time   Individual Concurrent Group Co-treatment   Time In 1332         Time Out 1358         Minutes 26         Timed Code Treatment Minutes: 26 Minutes       Flavio Diaz, PT, DPT

## 2021-09-16 NOTE — ED NOTES
Patient to room 322 via cart.  Bedside report to Mellisa Lee RN  09/15/21 DIOMEDES Helton  09/15/21 8330

## 2021-09-16 NOTE — PROGRESS NOTES
Patient reports dizziness and feeling lightheaded while sitting up in bed. Assisted to [facility], voids 300 cc ronaldo urine. Patient with nausea and dry heaves after transfer. SpO2 decreases to 87% during transfer. Oxygen increased to 4L and patient slowly recovers to 90%. Deep breathing encouraged. Zofran administered. [place] at bedside. Call light in reach.

## 2021-09-16 NOTE — ED PROVIDER NOTES
[facility] ED  EMERGENCY DEPARTMENT ENCOUNTER      Pt Name: Donell Laboy  MRN: [id number]  [place] 1965  Date of evaluation: 9/15/2021  Provider: Cherelle Walls Dr     Chief Complaint   Patient presents with    Shortness of Breath     Pt is covid positive, was here on Monday for the same and was diagnosed with pneumonia. Pt states the shortness of breath started getting worse last night. HISTORY OF PRESENT ILLNESS   (Location/Symptom, Timing/Onset, Context/Setting,Quality, Duration, Modifying Factors, Severity)  Note limiting factors. Donell Laboy is a52 y.o. female who presents to the emergency department with complaints of worsening shortness of breath over the past several days. Reports that she tested positive for Covid at the end of last week. .  She states that she has been using a pulse oximeter at home and her oxygen levels have been in the 80s. States that tonight she just got completely worse and so she decided to come to the ER for further evaluation. She denies any chest pain. Reports is difficult to get a deep breath in. She states that she took her Tylenol prior to arrival as well as her antibiotic and her steroid. She denies any headache or dizziness. Just reports she does not feel well. States she is on Eliquis already due to history of PE. She has no other complaints at this time. HPI    Nursing Notes werereviewed. REVIEW OF SYSTEMS    (2-9 systems for level 4, 10 or more for level 5)     Review of Systems   Constitutional: Positive for fatigue and fever. Negative for chills and diaphoresis. HENT: Positive for congestion. Negative for ear pain, rhinorrhea and sore throat. Eyes: Negative for discharge, redness and visual disturbance. Respiratory: Positive for cough and shortness of breath. Negative for chest tightness and wheezing. Cardiovascular: Negative for chest pain and palpitations.    Gastrointestinal: Negative for abdominal pain, blood in stool, constipation, diarrhea, nausea and vomiting. Endocrine: Negative for polydipsia, polyphagia and polyuria. Genitourinary: Negative for decreased urine volume, difficulty urinating, dysuria, frequency and hematuria. Musculoskeletal: Negative for arthralgias, back pain and myalgias. Skin: Negative for pallor and rash. Allergic/Immunologic: Negative for food allergies and immunocompromised state. Neurological: Negative for dizziness, syncope, weakness and light-headedness. Hematological: Negative for adenopathy. Does not bruise/bleed easily. Psychiatric/Behavioral: Negative for behavioral problems and suicidal ideas. The patient is not nervous/anxious. Except as noted above the remainder of the review of systems was reviewed and negative.        PAST MEDICAL HISTORY     Past Medical History:   Diagnosis Date    Acute pulmonary embolism ([address] 11/2/2015    Anxiety     Asthma     Constipation, chronic     CPAP (continuous positive airway pressure) dependence     Diabetes mellitus ([facility] [address]     pt states she was prediabetic at one point    Embolism - blood clot     right lung and right leg    Esophageal reflux     Factor V Leiden ([facility] [address]     Heart murmur     HTN (hypertension)     Hyperlipidemia     Irritable bladder     MTHFR mutation     MVP (mitral valve prolapse)     Palpitations     Pulmonary embolism (HCC) 10/15/2012    Rhinitis, allergic 10/15/2012    Sleep apnea     Venous thromboembolism 5/2/2016         SURGICALHISTORY       Past Surgical History:   Procedure Laterality Date    CATARACT REMOVAL WITH IMPLANT Left 04/02/2018    CHOLECYSTECTOMY      around 2000    COLONOSCOPY  07/12/2021    COLONOSCOPY N/A 7/12/2021    COLONOSCOPY POLYPECTOMY CECUM COLD BIOPSY performed by Ty Rosenthal MD at [address] [place] [address] BLEEDING ANY METHOD  3/27/2017    COLONOSCOPY CONTROL HEMORRHAGE Stress :    Social Connections:     Frequency of Communication with Friends and Family:     Frequency of Social Gatherings with Friends and Family:     Attends Jain Services:     Active Member of Clubs or Organizations:     Attends Club or Organization Meetings:     Marital Status:    Intimate Partner Violence:     Fear of Current or Ex-Partner:     Emotionally Abused:     Physically Abused:     Sexually Abused:        SCREENINGS             PHYSICAL EXAM    (up to 7 for level 4, 8 or more for level 5)     ED Triage Vitals [09/15/21 2001]   BP Temp Temp Source Pulse Resp SpO2 Height Weight   (!) 148/67 101.3 °F (38.5 °C) Oral 113 22 92 % 5' 6\" (1.676 m) 279 lb (126.6 kg)       Physical Exam  Vitals and nursing note reviewed. Constitutional:       General: She is in acute distress. Appearance: She is well-developed. She is diaphoretic. HENT:      Head: Normocephalic and atraumatic. Right Ear: External ear normal.      Left Ear: External ear normal.      Nose: Congestion present. Mouth/Throat:      Mouth: Mucous membranes are moist.      Pharynx: No oropharyngeal exudate. Eyes:      General: No scleral icterus. Right eye: No discharge. Left eye: No discharge. Extraocular Movements: Extraocular movements intact. Conjunctiva/sclera: Conjunctivae normal.      Pupils: Pupils are equal, round, and reactive to light. Neck:      Thyroid: No thyromegaly. Trachea: No tracheal deviation. Cardiovascular:      Rate and Rhythm: Regular rhythm. Tachycardia present. Heart sounds: No friction rub. No gallop. Pulmonary:      Effort: Tachypnea and respiratory distress present. Breath sounds: No stridor. Wheezing and rhonchi present. Abdominal:      General: Bowel sounds are normal. There is no distension. Palpations: Abdomen is soft. Tenderness: There is no abdominal tenderness.  There is no right CVA tenderness, left CVA tenderness, guarding (*)     Potassium 3.5 (*)     ALT 61 (*)     AST 89 (*)     All other components within normal limits   COVID-19, RAPID   TROPONIN   BRAIN NATRIURETIC PEPTIDE       All other labs were within normal range or not returned as of this dictation. EMERGENCY DEPARTMENT COURSE and DIFFERENTIAL DIAGNOSIS/MDM:   Vitals:    Vitals:    09/15/21 2004 09/15/21 2015 09/15/21 2039 09/15/21 2106   BP: (!) 148/67      Pulse:       Resp:       Temp:       TempSrc:       SpO2: 93% 93% 94% 95%   Weight:       Height:             MDM  49-year-old female unvaccinated against COVID-19 who presents secondary to worsening cough shortness of breath. On exam she is tachypneic she is tachycardic she is febrile she is hypoxic at 88% on monitor. At this point I think she is failing at home treatment and will meet criteria for hospitalization. She is on Eliquis already due to previous history of PE. I have low suspicion for PE I think this is more likely worsening Covid pneumonia as she had bilateral infiltrates noted on the x-ray previously. She is already taken a full dose of steroids just prior to arrival as well as 1000 g of Tylenol. Will get work-up to include rule out of worsening pneumonia infection dehydration heart failure    Given patient's hypoxia in the presence of COVID-19 will admit for further evaluation and treatment. I did call and speak to Dr. Mamie Mcwilliams who is on-call for patient's primary care provider. Plan for admission. We do not have an actual Covid positive swab so we will get a Covid swab here at his recommendation as well as get a CT chest and patient will go to the floor. Patient is up-to-date on this plan and agrees. Procedures    FINAL IMPRESSION      1. Pneumonia due to COVID-19 virus    2. Acute respiratory failure with hypoxia ([address]        DISPOSITION/PLAN   DISPOSITION Admitted 09/15/2021 09:27:32 PM      PATIENT REFERRED TO:  No follow-up provider specified.     DISCHARGE MEDICATIONS:  New Prescriptions    No medications on file              Summation      Patient Course:      ED Medications administered this visit:    Medications   remdesivir 200 mg in sodium chloride 0.9 % 250 mL IVPB (200 mg IntraVENous New Bag 9/15/21 2133)     Followed by   remdesivir 100 mg in sodium chloride 0.9 % 250 mL IVPB (has no administration in time range)   0.9 % sodium chloride bolus (has no administration in time range)   sterile water injection (has no administration in time range)   sterile water injection (has no administration in time range)       New Prescriptions from this visit:    New Prescriptions    No medications on file       Follow-up:  No follow-up provider specified. Final Impression:   1. Pneumonia due to COVID-19 virus    2.  Acute respiratory failure with hypoxia ([facility] [address]               (Please note that portions of this note were completed with a voice recognition program.  Efforts were made to edit the dictations but occasionally words are mis-transcribed.)           Ileana Anaya PA-C  09/15/21 1340 Lefty Nino PA-C  09/15/21 1002

## 2021-09-16 NOTE — PROGRESS NOTES
Patient continues sitting up in chair. Some sob at rest. SpO2 maintained 94-95% on 3L O2 NC. No s/s respiratory distress. Fine crackles to right post lung base, otherwise clear. Patient reports feeling better, denies nausea or any lightheadedness at present. Again demonstrates proper use of acapella. Denies further needs at this time. Call light in reach.

## 2021-09-16 NOTE — ED PROVIDER NOTES
[facility] [place] ED  EMERGENCY DEPARTMENT ENCOUNTER      Pt Name: Donell Laboy  MRN: [id number]  [place] 1965  Date of evaluation: 9/13/2021  Provider: Christopher Ramirez MD    [address] [place] [address]       Chief Complaint   Patient presents with    Positive For Covid-19     09/08/2021; symptoms onset 09/07/2021    Shortness of Breath         HISTORY OF PRESENT ILLNESS   (Location/Symptom, Timing/Onset, Context/Setting, Quality, Duration, Modifying Factors, Severity)  Note limiting factors. Donell Laboy is a 54 y.o. female who presents to the emergency department      53 yo with Covid  19 infection presented to ED for evaluation of cough and  Nausea. No chest pain. Cough improves with albuterol. H/o DVT on Eliquis                          Nursing Notes were reviewed. REVIEW OF SYSTEMS    (2-9 systems for level 4, 10 or more for level 5)     Review of Systems   All other systems reviewed and are negative. Except as noted above the remainder of the review of systems was reviewed and negative.        PAST MEDICAL HISTORY     Past Medical History:   Diagnosis Date    Acute pulmonary embolism ([address] 11/2/2015    Anxiety     Asthma     Constipation, chronic     CPAP (continuous positive airway pressure) dependence     Diabetes mellitus ([address]     pt states she was prediabetic at one point    Embolism - blood clot     right lung and right leg    Esophageal reflux     Factor V Leiden ([address]     Heart murmur     HTN (hypertension)     Hyperlipidemia     Irritable bladder     MTHFR mutation     MVP (mitral valve prolapse)     Palpitations     Pulmonary embolism ([address] 10/15/2012    Rhinitis, allergic 10/15/2012    Sleep apnea     Venous thromboembolism 5/2/2016         SURGICAL HISTORY       Past Surgical History:   Procedure Laterality Date    CATARACT REMOVAL WITH IMPLANT Left 04/02/2018    CHOLECYSTECTOMY      around 2000    COLONOSCOPY  07/12/2021    COLONOSCOPY N/A 7/12/2021    COLONOSCOPY POLYPECTOMY CECUM COLD BIOPSY performed by Joshua Elam MD at [facility] BLEEDING ANY METHOD  3/27/2017    COLONOSCOPY CONTROL HEMORRHAGE performed by Jose Beck MD at [address] FLX W/RMVL OF TUMOR POLYP LESION SNARE TQ  3/27/2017    COLONOSCOPY POLYPECTOMY SNARE/COLD BIOPSY performed by Jose Beck MD at [facility] W/O ECP Left 4/2/2018    EYE CATARACT EMULSIFICATION IOL IMPLANT performed by Yousif Rhodes MD at [address] ENDOSCOPY  1/2010    with biopsy    UPPER GASTROINTESTINAL ENDOSCOPY N/A 7/12/2021    EGD BIOPSY OF STOMACH, GASTRIC POLYPECTOMY AND DUODENUM BIOPSY performed by Joshua Elam MD at [facility]       Discharge Medication List as of 9/13/2021  5:58 PM      CONTINUE these medications which have NOT CHANGED    Details   budesonide-formoterol (SYMBICORT) 160-4.5 MCG/ACT AERO Inhale 2 puffs into the lungs 2 times daily, Disp-6 each, R-0Normal      ALPRAZolam (XANAX) 0.25 MG tablet Take 1 tablet by mouth daily as needed for Anxiety for up to 90 days. , Disp-90 tablet, R-0Normal      metoprolol tartrate (LOPRESSOR) 25 MG tablet TAKE 1/2 TABLET (12.5 MG) BY MOUTH 2 TIMES EACH DAY, Disp-60 tablet, R-3Normal      spironolactone (ALDACTONE) 25 MG tablet Take 1 tablet by mouth daily, Disp-90 tablet, R-0Normal      pantoprazole (PROTONIX) 40 MG tablet TAKE 1 TABLET BY MOUTH ONCE DAILY, Disp-90 tablet, R-0Normal      albuterol sulfate HFA (PROAIR HFA) 108 (90 Base) MCG/ACT inhaler Inhale 2 puffs into the lungs 4 times daily as needed for Wheezing or Shortness of Breath, Disp-1 Inhaler, R-0Normal      polyethylene glycol (MIRALAX) 17 g PACK packet Take 17 g by mouth dailyHistorical Med      famotidine (PEPCID) 40 MG tablet Take 1 tablet by mouth every evening, Disp-30 tablet, R-3Normal olmesartan (BENICAR) 20 MG tablet Take 1 tablet by mouth daily, Disp-90 tablet, R-3Normal      ELIQUIS 5 MG TABS tablet TAKE 1 TABLET BY MOUTH TWICE DAILY, Disp-180 tablet,R-1Normal      PREVIDENT 5000 BOOSTER PLUS 1.1 % PSTE R-0Historical Med      cetirizine (ZYRTEC) 10 MG tablet Take 10 mg by mouth daily. Historical Med             ALLERGIES     Levaquin [levofloxacin in d5w] and Pcn [penicillins]    FAMILY HISTORY       Family History   Problem Relation Age of Onset    Cancer Mother         breast ca    Diabetes Mother     Depression Mother     Thyroid Disease Mother     High Blood Pressure Mother     Heart Disease Mother     Cancer Father         prostate ca    COPD Father     Heart Failure Father     Heart Disease Father     High Blood Pressure Father     Deep Vein Thrombosis Sister     Breast Cancer Sister     Deep Vein Thrombosis Sister           SOCIAL HISTORY       Social History     Socioeconomic History    Marital status:      Spouse name: None    Number of children: None    Years of education: None    Highest education level: None   Occupational History    None   Tobacco Use    Smoking status: Never Smoker    Smokeless tobacco: Never Used   Vaping Use    Vaping Use: Never used   Substance and Sexual Activity    Alcohol use: Yes     Alcohol/week: 0.0 standard drinks     Comment: 1 glass of wine/month    Drug use: No    Sexual activity: Yes     Partners: Male     Birth control/protection: Surgical   Other Topics Concern    None   Social History Narrative    None     Social Determinants of Health     Financial Resource Strain: Low Risk     Difficulty of Paying Living Expenses: Not hard at all   Food Insecurity: No Food Insecurity    Worried About Running Out of Food in the Last Year: Never true    [address] in the Last Year: Never true   Transportation Needs: No Transportation Needs    Lack of Transportation (Medical):  No    Lack of Transportation (Non-Medical): No   Physical Activity:     Days of Exercise per Week:     Minutes of Exercise per Session:    Stress:     Feeling of Stress :    Social Connections:     Frequency of Communication with Friends and Family:     Frequency of Social Gatherings with Friends and Family:     Attends [employer] Services:     Active Member of Clubs or Organizations:     Attends Club or Organization Meetings:     Marital Status:    Intimate Partner Violence:     Fear of Current or Ex-Partner:     Emotionally Abused:     Physically Abused:     Sexually Abused:        SCREENINGS                        PHYSICAL EXAM    (up to 7 for level 4, 8 or more for level 5)     ED Triage Vitals [09/13/21 1500]   BP Temp Temp Source Pulse Resp SpO2 Height Weight   132/85 102.1 °F (38.9 °C) Tympanic 97 22 95 % -- --       Physical Exam  Vitals and nursing note reviewed. Constitutional:       Comments: Febrile but non toxic appearing   HENT:      Head: Normocephalic and atraumatic. Pulmonary:      Comments: No wheezing. No distress. Rigo 295%  Musculoskeletal:         General: Normal range of motion. Cervical back: Normal range of motion. Skin:     General: Skin is warm and dry. Findings: No rash. Neurological:      General: No focal deficit present. Mental Status: She is oriented to person, place, and time.          DIAGNOSTIC RESULTS     EKG: All EKG's are interpreted by the Emergency Department Physician who either signs or Co-signs this chart in the absence of a cardiologist.        RADIOLOGY:   Non-plain film images such as CT, Ultrasound and MRI are read by the radiologist. Plain radiographic images are visualized and preliminarily interpreted by the emergency physician with the below findings:        Interpretation per the Radiologist below, if available at the time of this note:    XR CHEST PORTABLE   Final Result   Patchy bilateral interstitial infiltrates compatible with patient's history   of COVID pneumonia. ED BEDSIDE ULTRASOUND:   Performed by ED Physician - none    LABS:  Labs Reviewed - No data to display    All other labs were within normal range or not returned as of this dictation. EMERGENCY DEPARTMENT COURSE and DIFFERENTIAL DIAGNOSIS/MDM:   Vitals:    Vitals:    09/13/21 1500   BP: 132/85   Pulse: 97   Resp: 22   Temp: 102.1 °F (38.9 °C)   TempSrc: Tympanic   SpO2: 95%           MDM  Number of Diagnoses or Management Options  Pneumonia due to COVID-19 virus  Diagnosis management comments: 73 Yo female with Covid19 presented with cough and nausea. SaO2 95% on room  Air. Nausea  improved with Zofran. Discussed treatment options and patient wishes to be discharged home. Home care,  ED return and follow up instructions discussed      [address]   Total Critical Care time was  minutes, excluding separately reportable procedures. There was a high probability of clinically significant/life threatening deterioration in the patient's condition which required my urgent intervention. CONSULTS:  None    PROCEDURES:  Unless otherwise noted below, none     Procedures        FINAL IMPRESSION      1. Pneumonia due to COVID-19 virus          DISPOSITION/PLAN   DISPOSITION Decision To Discharge 09/13/2021 04:20:53 PM      PATIENT REFERRED TO:  [address], MD  [facility] [address]  [phone]      As needed      DISCHARGE MEDICATIONS:  Discharge Medication List as of 9/13/2021  5:58 PM        Controlled Substances Monitoring:     RX Monitoring 3/16/2020   Attestation -   Periodic Controlled Substance Monitoring No signs of potential drug abuse or diversion identified.        (Please note that portions of this note were completed with a voice recognition program.  Efforts were made to edit the dictations but occasionally words are mis-transcribed.)    Wilma Spring MD (electronically signed)  Attending Emergency Physician           Сергей Morris MD  09/16/21 [id number]

## 2021-09-16 NOTE — PROGRESS NOTES
Patient reports feeling slightly better. Denies dizziness at rest, denies nausea, states \"just tired. \" Tolerates few bites of breakfast. PO meds admin. Spo2 95% on 4L NC. Patient educated on proning and/or turning side to side, voices understanding.

## 2021-09-16 NOTE — PROGRESS NOTES
Patient up in chair. Maintaining SpO2 95%, oxygen decreased to 3L nc at this time. Educated on use of acapella, repeats demonstration and tolerates well.  Xanax given per patient request.

## 2021-09-16 NOTE — ACP (ADVANCE CARE PLANNING)
Advance Care Planning     Advance Care Planning Activator (Inpatient)  Conversation Note      Date of ACP Conversation: 9/16/2021     Conversation Conducted with: Patient with Decision Making Capacity    ACP Activator: Renan Chichi, [address]:     Current Designated Health Care Decision Maker:     Primary Decision Maker: Mabel Manzano [address]ileen 89. - [phone]    Secondary Decision Maker: Tiesha Davis - Child - [phone]    Supplemental (Other) Decision Maker: Walter Sharma Child - [phone]  Click here to complete Healthcare Decision Makers including section of the Healthcare Decision Maker Relationship (ie \"Primary\")      Care Preferences    Ventilation: \"If you were in your present state of health and suddenly became very ill and were unable to breathe on your own, what would your preference be about the use of a ventilator (breathing machine) if it were available to you? \"      Would the patient desire the use of ventilator (breathing machine)?: no    \"If your health worsens and it becomes clear that your chance of recovery is unlikely, what would your preference be about the use of a ventilator (breathing machine) if it were available to you? \"     Would the patient desire the use of ventilator (breathing machine)?: No      Resuscitation  \"CPR works best to restart the heart when there is a sudden event, like a heart attack, in someone who is otherwise healthy. Unfortunately, CPR does not typically restart the heart for people who have serious health conditions or who are very sick. \"    \"In the event your heart stopped as a result of an underlying serious health condition, would you want attempts to be made to restart your heart (answer \"yes\" for attempt to resuscitate) or would you prefer a natural death (answer \"no\" for do not attempt to resuscitate)? \" She is undecided at this time       [x] Yes   [] No   Educated Patient / Swapnil Cabrera regarding differences between Advance

## 2021-09-16 NOTE — PROGRESS NOTES
gastrointestinal endoscopy (1/2010); pr colsc flx w/rmvl of tumor polyp lesion snare tq (3/27/2017); pr colsc flexible w/control bleeding any method (3/27/2017); Cataract removal with implant (Left, 04/02/2018); pr xcapsl ctrc rmvl insj io lens prosth w/o ecp (Left, 4/2/2018); Colonoscopy (07/12/2021); Upper gastrointestinal endoscopy (N/A, 7/12/2021); and Colonoscopy (N/A, 7/12/2021). Treatment Diagnosis: generalized weakness      Restrictions  Restrictions/Precautions  Restrictions/Precautions: Isolation, Fall Risk, General Precautions    Subjective   General  Chart Reviewed: Yes  Patient assessed for rehabilitation services?: Yes  Family / Caregiver Present: No  Referring Practitioner: Dr. Ivet Lee  Diagnosis: acute respiratory failure wtih hypoxia  Subjective  Subjective: Pt reports 0/10 pain at time of evaluation. General Comment  Comments: Pt sitting upright in bedside chair on therapist arrival. Agreeable to therapy evaluation. Social/Functional History  Social/Functional History  Lives With: Spouse  Type of Home: House  Home Layout: One level  Home Access: Stairs to enter without rails  Entrance Stairs - Number of Steps: 2-3  Bathroom Shower/Tub: Walk-in shower  ADL Assistance: Independent  Homemaking Assistance: Independent  Homemaking Responsibilities: Yes  Ambulation Assistance: Independent  Transfer Assistance: Independent  Active : Yes       Objective   Vision: Within Functional Limits  Hearing: Within functional limits          Functional Mobility  Functional - Mobility Device: No device  Activity: Other (around room)  Assist Level: Contact guard assistance  Functional Mobility Comments: slight LOB x1 time. 3 L O2 oxygen  Toilet Transfers  Toilet - Technique: Ambulating  Equipment Used: Standard bedside commode  ADL  Feeding: Setup  Grooming: Minimal assistance  UE Bathing: Minimal assistance  LE Bathing:  Moderate assistance;Maximum assistance  UE Dressing: Minimal assistance  LE Dressing: Moderate assistance;Maximum assistance  Toileting: Contact guard assistance  Additional Comments: Completed toileting at [facility]. Bed mobility  Comment: Pt in chair for evaluation. Bed mobility not assessed. Transfers  Sit to stand: Stand by assistance;Contact guard assistance  Stand to sit: Contact guard assistance     Cognition  Overall Cognitive Status: WNL        Sensation  Overall Sensation Status: WFL        LUE AROM (degrees)  LUE AROM : WFL  Left Hand AROM (degrees)  Left Hand AROM: WFL  RUE AROM (degrees)  RUE AROM : WFL  Right Hand AROM (degrees)  Right Hand AROM: WFL  LUE Strength  Gross LUE Strength: WFL  LUE Strength Comment: Grossly 4-/5  RUE Strength  Gross RUE Strength: WFL  RUE Strength Comment: Grossly 4-/5                   Plan   Plan  Times per week: 7x/week  Times per day: Daily  Current Treatment Recommendations: Strengthening, Patient/Caregiver Education & Training, Balance Training, Functional Mobility Training, Endurance Training, Safety Education & Training, Self-Care / ADL      AM-PAC Score        AM-[facility] Inpatient Daily Activity Raw Score: 16 (09/16/21 1812)  AM-PAC Inpatient ADL T-Scale Score : 35.96 (09/16/21 1812)  ADL Inpatient CMS 0-100% Score: 53.32 (09/16/21 1812)  ADL Inpatient CMS G-Code Modifier : CK (09/16/21 1812)    Goals  Short term goals  Time Frame for Short term goals: 90 days  Short term goal 1: Pt will verbalize 4 ECWS strategies for implementation/use at home to improve safety and independence with ADL and functional tasks. Short term goal 2: Pt will tolerate standing for greater tahn 3 minutes without LOB or SOB while engage in functional tasks to improve safety and independence with ADL and functional mobility. Short term goal 3: Pt will complete TB ADL Otto with use of AE PRN to ensure safe return home. Short term goal 4: Pt will complete toileting routine at standard toilet Otto to improve safety and independence with ADL task.        Therapy Time Individual Concurrent Group Co-treatment   Time In 0305         Time Out 1348         Minutes LYUDMILA Resendez 2 Bisi Murphy

## 2021-09-17 PROBLEM — E44.1 MILD MALNUTRITION (HCC): Status: ACTIVE | Noted: 2021-09-17

## 2021-09-17 LAB
ABSOLUTE EOS #: 0 K/UL (ref 0–0.44)
ABSOLUTE IMMATURE GRANULOCYTE: 0.04 K/UL (ref 0–0.3)
ABSOLUTE LYMPH #: 1.06 K/UL (ref 1.1–3.7)
ABSOLUTE MONO #: 0.4 K/UL (ref 0.1–1.2)
ANION GAP SERPL CALCULATED.3IONS-SCNC: 12 MMOL/L (ref 9–17)
BASOPHILS # BLD: 0 % (ref 0–2)
BASOPHILS ABSOLUTE: 0 K/UL (ref 0–0.2)
BUN BLDV-MCNC: 12 MG/DL (ref 6–20)
BUN/CREAT BLD: 17 (ref 9–20)
C-REACTIVE PROTEIN: 38.8 MG/L (ref 0–5)
CALCIUM SERPL-MCNC: 8.3 MG/DL (ref 8.6–10.4)
CHLORIDE BLD-SCNC: 100 MMOL/L (ref 98–107)
CO2: 27 MMOL/L (ref 20–31)
CREAT SERPL-MCNC: 0.69 MG/DL (ref 0.5–0.9)
DIFFERENTIAL TYPE: ABNORMAL
EOSINOPHILS RELATIVE PERCENT: 0 % (ref 1–4)
GFR AFRICAN AMERICAN: >60 ML/MIN
GFR NON-AFRICAN AMERICAN: >60 ML/MIN
GFR SERPL CREATININE-BSD FRML MDRD: ABNORMAL ML/MIN/{1.73_M2}
GFR SERPL CREATININE-BSD FRML MDRD: ABNORMAL ML/MIN/{1.73_M2}
GLUCOSE BLD-MCNC: 142 MG/DL (ref 70–99)
HCT VFR BLD CALC: 42.4 % (ref 36.3–47.1)
HEMOGLOBIN: 13.9 G/DL (ref 11.9–15.1)
IMMATURE GRANULOCYTES: 1 %
INR BLD: 1.1
LYMPHOCYTES # BLD: 24 % (ref 24–43)
MCH RBC QN AUTO: 29.3 PG (ref 25.2–33.5)
MCHC RBC AUTO-ENTMCNC: 32.8 G/DL (ref 28.4–34.8)
MCV RBC AUTO: 89.5 FL (ref 82.6–102.9)
MONOCYTES # BLD: 9 % (ref 3–12)
MORPHOLOGY: NORMAL
NRBC AUTOMATED: 0 PER 100 WBC
PARTIAL THROMBOPLASTIN TIME: 39.6 SEC (ref 23.9–33.8)
PDW BLD-RTO: 13.8 % (ref 11.8–14.4)
PLATELET # BLD: 172 K/UL (ref 138–453)
PLATELET ESTIMATE: ABNORMAL
PMV BLD AUTO: 10.5 FL (ref 8.1–13.5)
POTASSIUM SERPL-SCNC: 4 MMOL/L (ref 3.7–5.3)
PROTHROMBIN TIME: 14.3 SEC (ref 11.5–14.2)
RBC # BLD: 4.74 M/UL (ref 3.95–5.11)
RBC # BLD: ABNORMAL 10*6/UL
SEG NEUTROPHILS: 66 % (ref 36–65)
SEGMENTED NEUTROPHILS ABSOLUTE COUNT: 2.9 K/UL (ref 1.5–8.1)
SODIUM BLD-SCNC: 139 MMOL/L (ref 135–144)
WBC # BLD: 4.4 K/UL (ref 3.5–11.3)
WBC # BLD: ABNORMAL 10*3/UL

## 2021-09-17 PROCEDURE — 2500000003 HC RX 250 WO HCPCS: Performed by: FAMILY MEDICINE

## 2021-09-17 PROCEDURE — 86140 C-REACTIVE PROTEIN: CPT

## 2021-09-17 PROCEDURE — 1200000000 HC SEMI PRIVATE

## 2021-09-17 PROCEDURE — 94640 AIRWAY INHALATION TREATMENT: CPT

## 2021-09-17 PROCEDURE — 97116 GAIT TRAINING THERAPY: CPT

## 2021-09-17 PROCEDURE — 2580000003 HC RX 258: Performed by: FAMILY MEDICINE

## 2021-09-17 PROCEDURE — 6370000000 HC RX 637 (ALT 250 FOR IP): Performed by: INTERNAL MEDICINE

## 2021-09-17 PROCEDURE — 36415 COLL VENOUS BLD VENIPUNCTURE: CPT

## 2021-09-17 PROCEDURE — 85610 PROTHROMBIN TIME: CPT

## 2021-09-17 PROCEDURE — 97110 THERAPEUTIC EXERCISES: CPT

## 2021-09-17 PROCEDURE — 6370000000 HC RX 637 (ALT 250 FOR IP): Performed by: NURSE PRACTITIONER

## 2021-09-17 PROCEDURE — 6360000002 HC RX W HCPCS: Performed by: INTERNAL MEDICINE

## 2021-09-17 PROCEDURE — 2700000000 HC OXYGEN THERAPY PER DAY

## 2021-09-17 PROCEDURE — 99233 SBSQ HOSP IP/OBS HIGH 50: CPT | Performed by: INTERNAL MEDICINE

## 2021-09-17 PROCEDURE — 85730 THROMBOPLASTIN TIME PARTIAL: CPT

## 2021-09-17 PROCEDURE — 80048 BASIC METABOLIC PNL TOTAL CA: CPT

## 2021-09-17 PROCEDURE — 2580000003 HC RX 258: Performed by: INTERNAL MEDICINE

## 2021-09-17 PROCEDURE — 85025 COMPLETE CBC W/AUTO DIFF WBC: CPT

## 2021-09-17 PROCEDURE — 94761 N-INVAS EAR/PLS OXIMETRY MLT: CPT

## 2021-09-17 RX ADMIN — Medication 6000 UNITS: at 08:02

## 2021-09-17 RX ADMIN — ALPRAZOLAM 0.25 MG: 0.25 TABLET ORAL at 23:22

## 2021-09-17 RX ADMIN — SODIUM CHLORIDE, PRESERVATIVE FREE 10 ML: 5 INJECTION INTRAVENOUS at 08:03

## 2021-09-17 RX ADMIN — Medication 100 MG: at 08:01

## 2021-09-17 RX ADMIN — FAMOTIDINE 40 MG: 20 TABLET, FILM COATED ORAL at 17:07

## 2021-09-17 RX ADMIN — PANTOPRAZOLE SODIUM 40 MG: 40 TABLET, DELAYED RELEASE ORAL at 08:01

## 2021-09-17 RX ADMIN — METOPROLOL TARTRATE 12.5 MG: 25 TABLET, FILM COATED ORAL at 08:01

## 2021-09-17 RX ADMIN — APIXABAN 5 MG: 5 TABLET, FILM COATED ORAL at 08:02

## 2021-09-17 RX ADMIN — ALBUTEROL SULFATE 2.5 MG: 2.5 SOLUTION RESPIRATORY (INHALATION) at 10:39

## 2021-09-17 RX ADMIN — REMDESIVIR 100 MG: 100 INJECTION, POWDER, LYOPHILIZED, FOR SOLUTION INTRAVENOUS at 21:18

## 2021-09-17 RX ADMIN — BUDESONIDE AND FORMOTEROL FUMARATE DIHYDRATE 2 PUFF: 160; 4.5 AEROSOL RESPIRATORY (INHALATION) at 20:15

## 2021-09-17 RX ADMIN — METOPROLOL TARTRATE 12.5 MG: 25 TABLET, FILM COATED ORAL at 21:20

## 2021-09-17 RX ADMIN — OXYCODONE HYDROCHLORIDE AND ACETAMINOPHEN 1000 MG: 500 TABLET ORAL at 21:20

## 2021-09-17 RX ADMIN — DEXAMETHASONE 6 MG: 4 TABLET ORAL at 08:01

## 2021-09-17 RX ADMIN — OXYCODONE HYDROCHLORIDE AND ACETAMINOPHEN 1000 MG: 500 TABLET ORAL at 08:01

## 2021-09-17 RX ADMIN — LOSARTAN POTASSIUM 25 MG: 25 TABLET, FILM COATED ORAL at 08:01

## 2021-09-17 RX ADMIN — SODIUM CHLORIDE, PRESERVATIVE FREE 10 ML: 5 INJECTION INTRAVENOUS at 21:19

## 2021-09-17 RX ADMIN — BUDESONIDE AND FORMOTEROL FUMARATE DIHYDRATE 2 PUFF: 160; 4.5 AEROSOL RESPIRATORY (INHALATION) at 10:07

## 2021-09-17 RX ADMIN — APIXABAN 5 MG: 5 TABLET, FILM COATED ORAL at 21:20

## 2021-09-17 RX ADMIN — SPIRONOLACTONE 25 MG: 25 TABLET, FILM COATED ORAL at 08:02

## 2021-09-17 RX ADMIN — GUAIFENESIN AND DEXTROMETHORPHAN 5 ML: 100; 10 SYRUP ORAL at 08:02

## 2021-09-17 RX ADMIN — OXYCODONE HYDROCHLORIDE AND ACETAMINOPHEN 1000 MG: 500 TABLET ORAL at 17:06

## 2021-09-17 RX ADMIN — OXYCODONE HYDROCHLORIDE AND ACETAMINOPHEN 1000 MG: 500 TABLET ORAL at 12:01

## 2021-09-17 ASSESSMENT — PAIN SCALES - GENERAL
PAINLEVEL_OUTOF10: 0

## 2021-09-17 NOTE — PROGRESS NOTES
symptoms on 9/7/2021. The patient was oxygenating at 88% on room air and imaging revealed bilateral groundglass opacities. She was initiated on Decadron and remdesivir and admitted to the floor. CTA chest 9/15/2021  Impression   Patchy bilateral ground-glass and airspace infiltrates most notably in the   lung periphery compatible with patient's history of COVID pneumonia.       No obvious evidence of pulmonary embolism. Patient admitted because of concerns with COVID 19.    CURRENT EVALUATION : 9/17/2021    Afebrile  VS stable  Fairly stable  CRP only at 20. No need for Actemra      Patient exhibiting respiratory distress. yes  Respiratory secretions:     Patient receiving supplemental oxygen. 3-->5 L NC  02 sat 94-->92%  RR 20-->18      % FIO2:   PEEP:      QTc:       NEWS Score: 0-4 Low risk group; 5-6: Medium risk group; 7 or above: High risk group  Parameters 3 2 1 0 1 2 3   Age    < 65   ? 65   RR ? 8  9-11 12-20  21-24 ? 25   O2 Sats ? 91 92-93 94-95 ? 96      Suppl O2  Yes  No      SBP ? 90  101-110 111-219   ? 220   HR ? 40  41-50 51-90  111-130 ? 131   Consciousness    Alert   Drowsiness, lethargy, or confusion   Temperature ? 35.0 C (95.0 F)  35.1-36.0 C 95.1-96.9 F 36.1-38.0 C 97.0-100.4 F 38.1-39.0 C 100.5-102.3 F ? 39.1 C ? 102.4 F      NEWS Score:   9/16/21: 3 Low risk    Overall Daily Picture:      Unchanged    Presence of secondary bacterial Infection:    No   Additional antibiotics: No    Labs, X rays reviewed: 9/17/2021    BUN:10-->12  Cr:0.65-->0.69    WBC:6.5-->4.4  Hb:13.8-->13.9  Plat: 155-->172    Absolute Neutrophils:4.85  Absolute Lymphocytes:1.16  Neutrophil/Lymphocyte Ratio: 4.2 high risk    CRP: 20  Ferritin:  LDH:   D-dimer: 1.10    Pro Calcitonin:      Cultures:  Urine:  ·   Blood:  ·   Sputum :  ·   Wound:       CXR:     9/15/21        CAT:    9/15/21    Discussed with patient, RN, CC, IM.     I have personally reviewed the past medical history, past surgical history, medications, social history, and family history, and I have updated the database accordingly.   Past Medical History:     Past Medical History:   Diagnosis Date    Acute pulmonary embolism ([facility] 11/2/2015    Anxiety     Asthma     Constipation, chronic     CPAP (continuous positive airway pressure) dependence     Diabetes mellitus ([facility] [address]     pt states she was prediabetic at one point    Embolism - blood clot     right lung and right leg    Esophageal reflux     Factor V Leiden ([facility] [address]     Heart murmur     HTN (hypertension)     Hyperlipidemia     Irritable bladder     MTHFR mutation     MVP (mitral valve prolapse)     Palpitations     Pulmonary embolism (HCC) 10/15/2012    Rhinitis, allergic 10/15/2012    Sleep apnea     Venous thromboembolism 5/2/2016       Past Surgical  History:     Past Surgical History:   Procedure Laterality Date    CATARACT REMOVAL WITH IMPLANT Left 04/02/2018    CHOLECYSTECTOMY      around 2000    COLONOSCOPY  07/12/2021    COLONOSCOPY N/A 7/12/2021    COLONOSCOPY POLYPECTOMY CECUM COLD BIOPSY performed by Raul Ahmadi MD at [address]  3/27/2017    COLONOSCOPY CONTROL HEMORRHAGE performed by Jeffrey Heimlich, MD at [address] FLX W/RMVL OF TUMOR POLYP LESION [address] [place]  3/27/2017    COLONOSCOPY POLYPECTOMY SNARE/COLD BIOPSY performed by Jeffrey Heimlich, MD at [facility] INSJ IO LENS PROSTH W/O ECP Left 4/2/2018    EYE CATARACT EMULSIFICATION IOL IMPLANT performed by Frankie Jones MD at [address] [facility] ENDOSCOPY  1/2010    with biopsy    UPPER GASTROINTESTINAL ENDOSCOPY N/A 7/12/2021    EGD BIOPSY OF STOMACH, GASTRIC POLYPECTOMY AND DUODENUM BIOPSY [facility] [address]       Medications:      zinc sulfate  100 mg Oral Daily    ascorbic acid  1,000 mg Oral 4x Daily    remdesivir IVPB  100 mg IntraVENous Q24H    budesonide-formoterol  2 puff Inhalation BID    apixaban  5 mg Oral BID    famotidine  40 mg Oral QPM    metoprolol tartrate  12.5 mg Oral BID    losartan  25 mg Oral Daily    pantoprazole  40 mg Oral Daily    spironolactone  25 mg Oral Daily    sodium chloride flush  10 mL IntraVENous 2 times per day    dexamethasone  6 mg Oral Daily    Vitamin D  6,000 Units Oral Daily    Followed by   Franca Newton ON 9/23/2021] Vitamin D  2,000 Units Oral Daily       Social History:     Social History     Socioeconomic History    Marital status:      Spouse name: Not on file    Number of children: Not on file    Years of education: Not on file    Highest education level: Not on file   Occupational History    Not on file   Tobacco Use    Smoking status: Never Smoker    Smokeless tobacco: Never Used   Vaping Use    Vaping Use: Never used   Substance and Sexual Activity    Alcohol use: Yes     Alcohol/week: 0.0 standard drinks     Comment: 1 glass of wine/month    Drug use: No    Sexual activity: Yes     Partners: Male     Birth control/protection: Surgical   Other Topics Concern    Not on file   Social History Narrative    Not on file     Social Determinants of Health     Financial Resource Strain: Low Risk     Difficulty of Paying Living Expenses: Not hard at all   Food Insecurity: No Food Insecurity    Worried About Running Out of Food in the Last Year: Never true    [address] in the Last Year: Never true   Transportation Needs: No Transportation Needs    Lack of Transportation (Medical): No    Lack of Transportation (Non-Medical):  No   Physical Activity:     Days of Exercise per Week:     Minutes of Exercise per Session:    Stress:     Feeling of Stress :    Social Connections:     Frequency of Communication with Friends and Family:     Frequency of Social Gatherings with Friends and Family:     Attends Buddhist Services:     lb (127.5 kg)     General Appearance: Awake, alert, and in no apparent distress  Head:  Normocephalic, no trauma  Eyes: Pupils equal, round, reactive to light; sclera anicteric; conjunctivae pink. No embolic phenomena. ENT: Oropharynx clear, without erythema, exudate, or thrush. No tenderness of sinuses. Mouth/throat: mucosa pink and moist. No lesions. Dentition in good repair. Neck:Supple, without lymphadenopathy. Thyroid normal, No bruits. Pulmonary/Chest: Clear to auscultation, without wheezes, rales, or rhonchi. No dullness to percussion. Cardiovascular: Regular rate and rhythm without murmurs, rubs, or gallops. Abdomen: Soft, non tender. Bowel sounds normal. No organomegaly  All four Extremities: No cyanosis, clubbing, edema, or effusions. Neurologic: No gross sensory or motor deficits. Skin: Warm and dry with good turgor. No signs of peripheral arterial or venous insufficiency. No ulcerations. No open wounds. Medical Decision Making -Laboratory:   I have independently reviewed/ordered the following labs:    CBC with Differential:   Recent Labs     09/16/21  0505 09/17/21  0600   WBC 6.5 4.4   HGB 13.8 13.9   HCT 41.7 42.4    172   LYMPHOPCT 18* PENDING   MONOPCT 8 PENDING     BMP:   Recent Labs     09/16/21  0505 09/17/21  0600   * 139   K 3.9 4.0   CL 99 100   CO2 24 27   BUN 10 12   CREATININE 0.65 0.69     Hepatic Function Panel:   Recent Labs     09/15/21  2040   PROT 6.9   LABALBU 3.7   BILITOT 0.37   ALKPHOS 54   ALT 61*   AST 89*     No results for input(s): RPR in the last 72 hours. No results for input(s): HIV in the last 72 hours. No results for input(s): BC in the last 72 hours.   Lab Results   Component Value Date    RBC 4.74 09/17/2021    RBC 4.67 11/02/2011    WBC 4.4 09/17/2021     Lab Results   Component Value Date    CREATININE 0.69 09/17/2021    GLUCOSE 142 09/17/2021    GLUCOSE 99 11/02/2011       Medical Decision Making-Imaging:     Narrative   EXAMINATION:   CTA HISTORY:   ORDERING SYSTEM PROVIDED HISTORY: cough, covid, worsening sob   TECHNOLOGIST PROVIDED HISTORY:   cough, covid, worsening sob       FINDINGS:   . The cardiac size is normal. No  pleural effusions are seen.  Patchy   bilateral interstitial infiltrates, improved on the right however progressive   on the left.  Pulmonary vascularity appears normal. There is mild ectasia of   the thoracic aorta.  . No acute bony abnormalities. The hilar structures are   normal.           Impression   Patchy bilateral interstitial infiltrates, improved on the right however   progressive on the left compatible with patient's history of COVID.                 Medical Decision [place]:       Medical Decision Making-Other:     Note:  · Labs, medications, radiologic studies were reviewed with personal review of films  · Large amounts of data were reviewed  · Discussed with nursing Staff, Discharge planner  · Infection Control and Prevention measures reviewed  · All prior entries were reviewed  · Administer medications as ordered  · Prognosis: Guarded  · Discharge planning reviewed      Thank you for allowing us to participate in the care of this patient. Please call with questions.     Kamaljit Lemon MD           Pager: [phone] - Office: [phone]

## 2021-09-17 NOTE — PROGRESS NOTES
Occupational Therapy  Facility/Department: [facility] MED SURG  Daily Treatment Note  NAME: Toma Perea  : 1965  MRN: [id number]    Date of Service: 2021    Discharge Recommendations:  Continue to assess pending progress       Assessment      OT Education: Home Exercise Program  Patient Education: educated pt on BUE AROM exercises to complete while in room, as well as importance of sitting upright in chair, in prone, and on sides. Pt with good understanding. Barriers to Learning: none  Activity Tolerance  Activity Tolerance: Patient limited by fatigue  Safety Devices  Type of devices: Left in bed;Call light within reach         Patient Diagnosis(es): The primary encounter diagnosis was Pneumonia due to COVID-19 virus. A diagnosis of Acute respiratory failure with hypoxia (HCC) was also pertinent to this visit. has a past medical history of Acute pulmonary embolism (HCC), Anxiety, Asthma, Constipation, chronic, CPAP (continuous positive airway pressure) dependence, Diabetes mellitus ([address] Embolism - blood clot, Esophageal reflux, Factor V Leiden ([address] Heart murmur, HTN (hypertension), Hyperlipidemia, Irritable bladder, MTHFR mutation, MVP (mitral valve prolapse), Palpitations, Pulmonary embolism ([address] Rhinitis, allergic, Sleep apnea, and Venous thromboembolism. has a past surgical history that includes Cholecystectomy; Mandible fracture surgery (); Tubal ligation; Upper gastrointestinal endoscopy (2010); pr colsc flx w/rmvl of tumor polyp lesion snare tq (3/27/2017); pr colsc flexible w/control bleeding any method (3/27/2017); Cataract removal with implant (Left, 2018); pr xcapsl ctrc rmvl insj io lens prosth w/o ecp (Left, 2018); Colonoscopy (2021); Upper gastrointestinal endoscopy (N/A, 2021); and Colonoscopy (N/A, 2021).     Restrictions  Restrictions/Precautions  Restrictions/Precautions: Isolation, Fall Risk, General Precautions  Subjective   General  Chart ADL Otto with use of AE PRN to ensure safe return home. Short term goal 4: Pt will complete toileting routine at standard toilet Otto to improve safety and independence with ADL task.        Therapy Time   Individual Concurrent Group Co-treatment   Time In 1455         Time Out 1511         Minutes Jacy Ribeiro [address] [place] [address]

## 2021-09-17 NOTE — PROGRESS NOTES
Comprehensive Nutrition Assessment    Type and Reason for Visit:  Reassess    Nutrition Recommendations/Plan:   1. Continue current diet. 2. Start 4 oz chocolate ensure enlive TID with meals. Nutrition Assessment:  Mild malnutrition r/t Continued inadequate oral intakes aeb weight loss 3.8% x 2 weeks, Poor PO, Pt states food just does not want to go down. She agrees to try chocoalate ensure enlive. Pt denied any other needs at this time via phone call. Glucose 142. Malnutrition Assessment:  Malnutrition Status:  Mild malnutrition    Context:  Acute Illness     Findings of the 6 clinical characteristics of malnutrition:  Energy Intake:  7 - 50% or less of estimated energy requirements for 5 or more days  Weight Loss:   (3.8% weight loss x 2 weeks)     Body Fat Loss:  Unable to assess     Muscle Mass Loss:  Unable to assess    Fluid Accumulation:  No significant fluid accumulation     Strength:  Not Performed    Estimated Daily Nutrient Needs:  Energy (kcal):  9617-1624 (12-15/kg); Weight Used for Energy Requirements:  Current     Protein (g):  118-124g (2.0-2.1g/kg); Weight Used for Protein Requirements:  Ideal        Fluid (ml/day):  2304 ml; Method Used for Fluid Requirements:  ml/Kg      Nutrition Related Findings:  unable to assess due to isolation       Wounds:  None       Current Nutrition Therapies:    ADULT DIET;  Regular    Anthropometric Measures:  · Height: 5' 6\" (167.6 cm)  · Current Body Weight: 282 lb (127.9 kg)   · Admission Body Weight: 281 lb (127.5 kg)    · Usual Body Weight: 293 lb (132.9 kg) (8/31/21)     · Ideal Body Weight: 130 lbs; % Ideal Body Weight 216.9 %   · BMI: 45.5  · BMI Categories: Obese Class 3 (BMI 40.0 or greater)       Nutrition Diagnosis:   · Mild malnutrition related to inadequate protein-energy intake as evidenced by intake 0-25%, weight loss      Nutrition Interventions:   Food and/or Nutrient Delivery:  Continue Current Diet, Start Oral Nutrition Supplement  Nutrition Education/Counseling:  No recommendation at this time   Coordination of Nutrition Care:  Continue to monitor while inpatient    Goals:  PO > 75% of meals and supplements       Recent Labs     09/15/21  2040 09/15/21  2040 09/16/21  0505 09/17/21  0600   *  --  133* 139   K 3.5*  --  3.9 4.0   CL 98  --  99 100   CO2 21  --  24 27   BUN 10  --  10 12   CREATININE 0.85  --  0.65 0.69   GLUCOSE 184*  --  147* 142*   ALT 61*  --   --   --    ALKPHOS 54  --   --   --    GFR              < >                          < > = values in this interval not displayed. Lab Results   Component Value Date    LABALBU 3.7 09/15/2021      Nutrition Monitoring and Evaluation:   Behavioral-Environmental Outcomes:  None Identified   Food/Nutrient Intake Outcomes:  Food and Nutrient Intake, Supplement Intake  Physical Signs/Symptoms Outcomes:  Biochemical Data, Weight, Nausea or Vomiting     Discharge Planning:     Too soon to determine     Electronically signed by Niles Lerma RD, LD on 9/17/21 at 9:00 AM EDT    Contact: [phone]

## 2021-09-17 NOTE — PROGRESS NOTES
Writer talked to patient about HS CPAP usage. She says she is claustrophobic and doesn't think she can tolerate a full face mask. She wears nasal pillows at home. She is going to try to have someone bring in home unit tonight. Cautious about this d/t COVID + with unfiltered CPAP usage.  Will have night shift inspect unit

## 2021-09-17 NOTE — PROGRESS NOTES
Patient woke up slightly confused for a few moments about where she was. Writer entered room to find patient standing at the foot of the bed, tachypneic and appearing dazed. Writer asked if the patient was feeling ok and patient states that she was feeling very anxious for a moment because she'd just woke from a bad dream. Patient was alert and oriented x4 and was able to ambulate to the bathroom and back to bed with mild SOB. Patient denies dizziness and just told writer she'd just felt disoriented when she first woke up. Once back in bed, patient states she felt much better and respirations were within normal limits. She is complaining of a headache but there is nothing for pain ordered. Writer will call Dr. Bronwyn Stokes and see if Tylenol can be ordered. Patient denies further needs. Will continue to monitor and assess.

## 2021-09-17 NOTE — PROGRESS NOTES
Physical Therapy  Facility/Department: [facility] AT THE [facility] MED SURG  Daily Treatment Note  NAME: Nancy Arguello  : 1965  MRN: [id number]    Date of Service: 2021    Discharge Recommendations:  Continue to assess pending progress        Assessment   Treatment Diagnosis: Decreased activity endurance  Prognosis: Good  Decision Making: Medium Complexity  PT Education: Gait Training;Transfer Training  Patient Education: Educated on above with good understanding  REQUIRES PT FOLLOW UP: Yes  Activity Tolerance  Activity Tolerance: Patient limited by endurance  Activity Tolerance: limited by SOB     Patient Diagnosis(es): The primary encounter diagnosis was Pneumonia due to COVID-19 virus. A diagnosis of Acute respiratory failure with hypoxia (HCC) was also pertinent to this visit. has a past medical history of Acute pulmonary embolism (HCC), Anxiety, Asthma, Constipation, chronic, CPAP (continuous positive airway pressure) dependence, Diabetes mellitus ([facility] [address] [address] Embolism - blood clot, Esophageal reflux, Factor V Leiden ([facility] [address] Heart murmur, HTN (hypertension), Hyperlipidemia, Irritable bladder, MTHFR mutation, MVP (mitral valve prolapse), Palpitations, Pulmonary embolism ([facility] [address] Rhinitis, allergic, Sleep apnea, and Venous thromboembolism. has a past surgical history that includes Cholecystectomy; Mandible fracture surgery (); Tubal ligation; Upper gastrointestinal endoscopy (2010); pr colsc flx w/rmvl of tumor polyp lesion snare tq (3/27/2017); pr colsc flexible w/control bleeding any method (3/27/2017); Cataract removal with implant (Left, 2018); pr xcapsl ctrc rmvl insj io lens prosth w/o ecp (Left, 2018); Colonoscopy (2021); Upper gastrointestinal endoscopy (N/A, 2021); and Colonoscopy (N/A, 2021).     Restrictions  Restrictions/Precautions  Restrictions/Precautions: Isolation, Fall Risk, General Precautions  Subjective   General  Chart Reviewed: Yes  Response To Previous Treatment: Patient with no complaints from previous session. Family / Caregiver Present: No  Referring Practitioner: Tory Ahumada MD  Subjective  Subjective: no c./o pain, just coughing          Orientation  Orientation  Overall Orientation Status: Within Functional Limits  Cognition      Objective   Bed mobility  Comment: up in chair  Transfers  Sit to Stand: Contact guard assistance  Stand to sit: Contact guard assistance  Ambulation  Ambulation?: Yes  WB Status: FWB  Ambulation 1  Surface: level tile  Device: No Device  Other Apparatus: O2  Assistance: Contact guard assistance  Gait Deviations: Staggers; Shuffles  Distance: 40 feet  Comments: SPO= 91% before amb, after amb with deep breathing techniques recovered to 89 % within 2 min     Balance  Posture: Fair  Sitting - Static: Good  Sitting - Dynamic: Good  Standing - Static: Fair  Standing - Dynamic: Fair  Exercises  Hip Flexion: x15  Hip Abduction: x15  Knee Long Arc Quad: x15  Ankle Pumps: x15  Comments: Above ex in seated position with frequent rest breaks    Goals  Short term goals  Time Frame for Short term goals: 10 days  Short term goal 1: Patient will ambulate 48' with supervision, without LOB  Short term goal 2: Patient will perform bed mobility and transfers with MI  Short term goal 3: Patient will tolerate 20-30 minutes of therex/act to improve endurance for ADLs  Patient Goals   Patient goals : Feel better    Plan    Plan  Times per week: 7  Times per day: Twice a day  Plan Comment: 1x/day on weekends  Safety Devices  Type of devices:  All fall risk precautions in place, Left in chair, Call light within reach, Chair alarm in place, Gait belt, Patient at risk for falls, Nurse notified (no alarm upon entry)     Therapy Time   Individual Concurrent Group Co-treatment   Time In 0852         Time Out 0923         Minutes [address]

## 2021-09-17 NOTE — PROGRESS NOTES
Physical Therapy  Facility/Department: [facility] THE [facility] MED SURG  Daily Treatment Note  NAME: Corby Johnson  : 1965  MRN: [id number]    Date of Service: 2021    Discharge Recommendations:  Continue to assess pending progress        Assessment   Treatment Diagnosis: Decreased activity endurance  Prognosis: Good  Decision Making: Medium Complexity  PT Education: Gait Training;Transfer Training  Patient Education: Educated on above with good understanding  REQUIRES PT FOLLOW UP: Yes  Activity Tolerance  Activity Tolerance: Patient limited by endurance  Activity Tolerance: limited by SOB     Patient Diagnosis(es): The primary encounter diagnosis was Pneumonia due to COVID-19 virus. A diagnosis of Acute respiratory failure with hypoxia (HCC) was also pertinent to this visit. has a past medical history of Acute pulmonary embolism (HCC), Anxiety, Asthma, Constipation, chronic, CPAP (continuous positive airway pressure) dependence, Diabetes mellitus ([facility] [address] Embolism - blood clot, Esophageal reflux, Factor V Leiden ([facility] [address] Heart murmur, HTN (hypertension), Hyperlipidemia, Irritable bladder, MTHFR mutation, MVP (mitral valve prolapse), Palpitations, Pulmonary embolism ([facility] [address] Rhinitis, allergic, Sleep apnea, and Venous thromboembolism. has a past surgical history that includes Cholecystectomy; Mandible fracture surgery (); Tubal ligation; Upper gastrointestinal endoscopy (2010); pr colsc flx w/rmvl of tumor polyp lesion snare tq (3/27/2017); pr colsc flexible w/control bleeding any method (3/27/2017); Cataract removal with implant (Left, 2018); pr xcapsl ctrc rmvl insj io lens prosth w/o ecp (Left, 2018); Colonoscopy (2021); Upper gastrointestinal endoscopy (N/A, 2021); and Colonoscopy (N/A, 2021).     Restrictions  Restrictions/Precautions  Restrictions/Precautions: Isolation, Fall Risk, General Precautions  Subjective   General  Chart Reviewed: Yes  Response To Previous Treatment: Patient with no complaints from previous session. Family / Caregiver Present: No  Referring Practitioner: Rowena Schroeder MD  Subjective  Subjective: no c./o pain, just coughing          Orientation  Orientation  Overall Orientation Status: Within Functional Limits  Cognition      Objective   Bed mobility  Supine to Sit: Modified independent  Sit to Supine: Modified independent  Scooting: Modified independent  Comment: up in chair  Transfers  Sit to Stand: Stand by assistance  Stand to sit: Stand by assistance  Ambulation  Ambulation?: Yes  WB Status: FWB  Ambulation 1  Surface: level tile  Device: No Device  Other Apparatus: O2  Assistance: Stand by assistance;Contact guard assistance  Gait Deviations: Staggers; Shuffles  Distance: 20 feet x 2  Comments: SPO= 94% before amb, after amb with deep breathing techniques recovered to 90% within 1-2 min     Balance  Posture: Fair  Sitting - Static: Good  Sitting - Dynamic: Good  Standing - Static: Fair  Standing - Dynamic: Fair  Exercises  Quad Sets: x20  Heelslides: x10  Gluteal Sets: x20  Hip Flexion: x10  Hip Abduction: x10  Knee Long Arc Quad: x15  Ankle Pumps: x20  Comments: Above ex in supine position with frequent rest breaks         Goals  Short term goals  Time Frame for Short term goals: 10 days  Short term goal 1: Patient will ambulate 48' with supervision, without LOB  Short term goal 2: Patient will perform bed mobility and transfers with MI  Short term goal 3: Patient will tolerate 20-30 minutes of therex/act to improve endurance for ADLs  Patient Goals   Patient goals : Feel better    Plan    Plan  Times per week: 7  Times per day: Twice a day  Plan Comment: 1x/day on weekends  Safety Devices  Type of devices:  All fall risk precautions in place, Call light within reach, Gait belt, Patient at risk for falls, Nurse notified, Left in bed (no alarm upon entry)     Therapy Time   Individual Concurrent Group Co-treatment   Time In 1429         Time Out 1196 [id number] Minutes [address] [facility]

## 2021-09-17 NOTE — PROGRESS NOTES
Progress Note    SUBJECTIVE:    Patient seen for f/u of Pneumonia due to COVID-19 virus. She seen in bed alert oriented no acute distress. Patient states she feels a little better however she does have intermittent periods where she has difficulty breathing. She states she has been trying to lay side to side however laying on her right side seems to be more difficult for her. She is afebrile. She is trying to keep her appetite up. No further complaints. ROS:   Constitutional: negative  for fevers, and negative for chills.   Respiratory: positive for shortness of breath, positive for cough, and negative for wheezing  Cardiovascular: negative for chest pain, and negative for palpitations  Gastrointestinal: negative for abdominal pain, negative for nausea,negative for vomiting, negative for diarrhea, and negative for constipation     All other systems were reviewed with the patient and are negative unless otherwise stated in HPI      OBJECTIVE:      Vitals:   Vitals:    09/17/21 1353   BP: (!) 140/64   Pulse: 70   Resp: 18   Temp: 98.4 °F (36.9 °C)   SpO2: 94%     Weight: 281 lb (127.5 kg)   Height: 5' 6\" (167.6 cm)   -----------------------------------------------------------------  Exam:    CONSTITUTIONAL:  awake, alert, cooperative, no apparent distress, and appears stated age  EYES:  Lids and lashes normal, pupils equal, round and reactive to light, extra ocular muscles intact, sclera clear, conjunctiva normal  ENT:  normocepalic, without obvious abnormality, atraumatic  NECK:  supple, symmetrical, trachea midline, skin normal and no stridor  HEMATOLOGIC/LYMPHATICS:  no cervical lymphadenopathy and no supraclavicular lymphadenopathy  LUNGS:  no increased work of breathing, decreased air exchange and crackles right base and left base  CARDIOVASCULAR:  Normal apical impulse, regular rate and rhythm, normal S1 and S2, no S3 or S4, and no murmur noted  ABDOMEN:  No scars, normal bowel sounds, soft, hositalization  · DVT prophylaxis: Eliquis   · High risk medications: Remdesivir   · Disposition:  Discharge plan is home      ZACARIAS Kaur - CNP , ZACARIAS, NP-C

## 2021-09-18 LAB
ABSOLUTE EOS #: <0.03 K/UL (ref 0–0.44)
ABSOLUTE IMMATURE GRANULOCYTE: 0.03 K/UL (ref 0–0.3)
ABSOLUTE LYMPH #: 1.15 K/UL (ref 1.1–3.7)
ABSOLUTE MONO #: 0.54 K/UL (ref 0.1–1.2)
ANION GAP SERPL CALCULATED.3IONS-SCNC: 12 MMOL/L (ref 9–17)
BASOPHILS # BLD: 0 % (ref 0–2)
BASOPHILS ABSOLUTE: <0.03 K/UL (ref 0–0.2)
BUN BLDV-MCNC: 13 MG/DL (ref 6–20)
BUN/CREAT BLD: 21 (ref 9–20)
C-REACTIVE PROTEIN: 18.3 MG/L (ref 0–5)
CALCIUM SERPL-MCNC: 8.5 MG/DL (ref 8.6–10.4)
CHLORIDE BLD-SCNC: 103 MMOL/L (ref 98–107)
CO2: 25 MMOL/L (ref 20–31)
CREAT SERPL-MCNC: 0.62 MG/DL (ref 0.5–0.9)
D-DIMER QUANTITATIVE: 1.1 MG/L FEU (ref 0–0.59)
DIFFERENTIAL TYPE: ABNORMAL
EOSINOPHILS RELATIVE PERCENT: 0 % (ref 1–4)
GFR AFRICAN AMERICAN: >60 ML/MIN
GFR NON-AFRICAN AMERICAN: >60 ML/MIN
GFR SERPL CREATININE-BSD FRML MDRD: ABNORMAL ML/MIN/{1.73_M2}
GFR SERPL CREATININE-BSD FRML MDRD: ABNORMAL ML/MIN/{1.73_M2}
GLUCOSE BLD-MCNC: 146 MG/DL (ref 70–99)
HCT VFR BLD CALC: 42.3 % (ref 36.3–47.1)
HEMOGLOBIN: 13.4 G/DL (ref 11.9–15.1)
IMMATURE GRANULOCYTES: 0 %
INR BLD: 1.2
LYMPHOCYTES # BLD: 17 % (ref 24–43)
MCH RBC QN AUTO: 29.3 PG (ref 25.2–33.5)
MCHC RBC AUTO-ENTMCNC: 31.7 G/DL (ref 28.4–34.8)
MCV RBC AUTO: 92.6 FL (ref 82.6–102.9)
MONOCYTES # BLD: 8 % (ref 3–12)
NRBC AUTOMATED: 0 PER 100 WBC
PARTIAL THROMBOPLASTIN TIME: 33.2 SEC (ref 23.9–33.8)
PDW BLD-RTO: 13.8 % (ref 11.8–14.4)
PLATELET # BLD: 236 K/UL (ref 138–453)
PLATELET ESTIMATE: ABNORMAL
PMV BLD AUTO: 10.6 FL (ref 8.1–13.5)
POTASSIUM SERPL-SCNC: 4 MMOL/L (ref 3.7–5.3)
PROTHROMBIN TIME: 15.3 SEC (ref 11.5–14.2)
RBC # BLD: 4.57 M/UL (ref 3.95–5.11)
RBC # BLD: ABNORMAL 10*6/UL
SEG NEUTROPHILS: 75 % (ref 36–65)
SEGMENTED NEUTROPHILS ABSOLUTE COUNT: 5.08 K/UL (ref 1.5–8.1)
SODIUM BLD-SCNC: 140 MMOL/L (ref 135–144)
WBC # BLD: 6.8 K/UL (ref 3.5–11.3)
WBC # BLD: ABNORMAL 10*3/UL

## 2021-09-18 PROCEDURE — 80048 BASIC METABOLIC PNL TOTAL CA: CPT

## 2021-09-18 PROCEDURE — 1200000000 HC SEMI PRIVATE

## 2021-09-18 PROCEDURE — 86140 C-REACTIVE PROTEIN: CPT

## 2021-09-18 PROCEDURE — 6370000000 HC RX 637 (ALT 250 FOR IP): Performed by: INTERNAL MEDICINE

## 2021-09-18 PROCEDURE — 85025 COMPLETE CBC W/AUTO DIFF WBC: CPT

## 2021-09-18 PROCEDURE — 94669 MECHANICAL CHEST WALL OSCILL: CPT

## 2021-09-18 PROCEDURE — 2580000003 HC RX 258: Performed by: INTERNAL MEDICINE

## 2021-09-18 PROCEDURE — 2700000000 HC OXYGEN THERAPY PER DAY

## 2021-09-18 PROCEDURE — 97110 THERAPEUTIC EXERCISES: CPT

## 2021-09-18 PROCEDURE — 97116 GAIT TRAINING THERAPY: CPT

## 2021-09-18 PROCEDURE — 85379 FIBRIN DEGRADATION QUANT: CPT

## 2021-09-18 PROCEDURE — 85730 THROMBOPLASTIN TIME PARTIAL: CPT

## 2021-09-18 PROCEDURE — 6360000002 HC RX W HCPCS: Performed by: INTERNAL MEDICINE

## 2021-09-18 PROCEDURE — 94640 AIRWAY INHALATION TREATMENT: CPT

## 2021-09-18 PROCEDURE — 36415 COLL VENOUS BLD VENIPUNCTURE: CPT

## 2021-09-18 PROCEDURE — 94761 N-INVAS EAR/PLS OXIMETRY MLT: CPT

## 2021-09-18 PROCEDURE — 99233 SBSQ HOSP IP/OBS HIGH 50: CPT | Performed by: INTERNAL MEDICINE

## 2021-09-18 PROCEDURE — 2500000003 HC RX 250 WO HCPCS: Performed by: FAMILY MEDICINE

## 2021-09-18 PROCEDURE — 6370000000 HC RX 637 (ALT 250 FOR IP): Performed by: NURSE PRACTITIONER

## 2021-09-18 PROCEDURE — 85610 PROTHROMBIN TIME: CPT

## 2021-09-18 PROCEDURE — 2580000003 HC RX 258: Performed by: FAMILY MEDICINE

## 2021-09-18 RX ADMIN — Medication 100 MG: at 09:00

## 2021-09-18 RX ADMIN — DEXAMETHASONE 6 MG: 4 TABLET ORAL at 09:00

## 2021-09-18 RX ADMIN — APIXABAN 5 MG: 5 TABLET, FILM COATED ORAL at 09:01

## 2021-09-18 RX ADMIN — BUDESONIDE AND FORMOTEROL FUMARATE DIHYDRATE 2 PUFF: 160; 4.5 AEROSOL RESPIRATORY (INHALATION) at 07:45

## 2021-09-18 RX ADMIN — REMDESIVIR 100 MG: 100 INJECTION, POWDER, LYOPHILIZED, FOR SOLUTION INTRAVENOUS at 20:30

## 2021-09-18 RX ADMIN — PANTOPRAZOLE SODIUM 40 MG: 40 TABLET, DELAYED RELEASE ORAL at 09:01

## 2021-09-18 RX ADMIN — SODIUM CHLORIDE, PRESERVATIVE FREE 10 ML: 5 INJECTION INTRAVENOUS at 20:37

## 2021-09-18 RX ADMIN — OXYCODONE HYDROCHLORIDE AND ACETAMINOPHEN 1000 MG: 500 TABLET ORAL at 09:01

## 2021-09-18 RX ADMIN — OXYCODONE HYDROCHLORIDE AND ACETAMINOPHEN 1000 MG: 500 TABLET ORAL at 12:28

## 2021-09-18 RX ADMIN — OXYCODONE HYDROCHLORIDE AND ACETAMINOPHEN 1000 MG: 500 TABLET ORAL at 17:40

## 2021-09-18 RX ADMIN — ALPRAZOLAM 0.25 MG: 0.25 TABLET ORAL at 21:32

## 2021-09-18 RX ADMIN — APIXABAN 5 MG: 5 TABLET, FILM COATED ORAL at 20:35

## 2021-09-18 RX ADMIN — METOPROLOL TARTRATE 12.5 MG: 25 TABLET, FILM COATED ORAL at 20:35

## 2021-09-18 RX ADMIN — Medication 6000 UNITS: at 09:00

## 2021-09-18 RX ADMIN — METOPROLOL TARTRATE 12.5 MG: 25 TABLET, FILM COATED ORAL at 09:01

## 2021-09-18 RX ADMIN — FAMOTIDINE 40 MG: 20 TABLET, FILM COATED ORAL at 17:40

## 2021-09-18 RX ADMIN — SODIUM CHLORIDE, PRESERVATIVE FREE 10 ML: 5 INJECTION INTRAVENOUS at 09:04

## 2021-09-18 RX ADMIN — LOSARTAN POTASSIUM 25 MG: 25 TABLET, FILM COATED ORAL at 09:01

## 2021-09-18 RX ADMIN — SPIRONOLACTONE 25 MG: 25 TABLET, FILM COATED ORAL at 09:01

## 2021-09-18 RX ADMIN — OXYCODONE HYDROCHLORIDE AND ACETAMINOPHEN 1000 MG: 500 TABLET ORAL at 20:35

## 2021-09-18 RX ADMIN — TOCILIZUMAB 810 MG: 180 INJECTION, SOLUTION SUBCUTANEOUS at 10:56

## 2021-09-18 RX ADMIN — BUDESONIDE AND FORMOTEROL FUMARATE DIHYDRATE 2 PUFF: 160; 4.5 AEROSOL RESPIRATORY (INHALATION) at 19:39

## 2021-09-18 ASSESSMENT — PAIN SCALES - GENERAL
PAINLEVEL_OUTOF10: 0

## 2021-09-18 NOTE — PROGRESS NOTES
/[facility] [address] [place] [address] Progress Note   COVID 19/ Patient  Today's Date and Time: 9/18/2021, 8:36 AM    Impression :   · COVID 19 Suspect  · COVID 19 Confirmed Infection  · Covid tests:  · 9/15/21 Positive  · 9/8/21: Positive  · History of PE on Eliquis  · Essential hypertension  · Moderate persistent asthma without complication  · Patient has not received her Covid vaccine    Patient evaluated by Telemedicine. Requesting Institution: [facility]  Provider Institution: [address] [place] [address],[address], [address] [facility] Person at [address] [facility]: Ms Tavares Hashimoto, APRN-     Recommendations:   · Monitor off antibiotics  · Remdesivir initiated 9/15/21-this may not be very effective as the patient was diagnosed over a week ago. · Decadron initiated 9/15/21  · CRP, rising, condition worsening. Actemra should be initiated    Medical Decision Making/Summary/Discussion:9/18/2021     · Patient admitted with suspected COVID 19 infection  · Covid test confirmed positive. Infection Control Recommendations   · Universal Precautions  · Airborne isolation  · Droplet Isolation    Antimicrobial Stewardship Recommendations     · Discontinuation of therapy  Coordination of Outpatient Care:   · Estimated Length of IV antimicrobials:TBD  · Patient will need Midline Catheter Insertion: TBD  · Patient will need PICC line Insertion: No  · Patient will need: Home IV , [place],  SNF,  LTAC:TBD  · Patient will need outpatient wound care:No    Chief complaint/reason for consultation:   · Concern for COVID infection      History of Present Illness:   Tip Rothman is a 54y.o.-year-old female who was initially admitted on 9/15/2021. Patient seen at the request of Dr. Delaney Anton:    This patient who tested positive for COVID-19 on 9/8/2021 presented through ER with complaints of worsening shortness of breath with associated pleuritic chest pain, cough and fatigue.   She initially began having symptoms on 9/7/2021. The patient was oxygenating at 88% on room air and imaging revealed bilateral groundglass opacities. She was initiated on Decadron and remdesivir and admitted to the floor. CTA chest 9/15/2021  Impression   Patchy bilateral ground-glass and airspace infiltrates most notably in the   lung periphery compatible with patient's history of COVID pneumonia.       No obvious evidence of pulmonary embolism. Patient admitted because of concerns with COVID 19.    CURRENT EVALUATION : 9/18/2021    Afebrile  VS stable, HTN    O2 requirements have increased  CRP rising, clinically worsening. Actemra should be initiated. Patient exhibiting respiratory distress. yes  Respiratory secretions: no    Patient receiving supplemental oxygen. 3-->5-->25 L/min High flow NC  02 sat 94-->92-->95%  RR 20-->18-->24      % FIO2: 50-->61  PEEP:      QTc:       NEWS Score: 0-4 Low risk group; 5-6: Medium risk group; 7 or above: High risk group  Parameters 3 2 1 0 1 2 3   Age    < 65   ? 65   RR ? 8  9-11 12-20  21-24 ? 25   O2 Sats ?  91 92-93 94-95 ? 96      Suppl O2  Yes  No      SBP ? 90  101-110 111-219   ? 220   HR ? 40  41-50 51-90  111-130 ? 131   Consciousness    Alert   Drowsiness, lethargy, or confusion   Temperature ? 35.0 C (95.0 F)  35.1-36.0 C 95.1-96.9 F 36.1-38.0 C 97.0-100.4 F 38.1-39.0 C 100.5-102.3 F ? 39.1 C ? 102.4 F      NEWS Score:   9/16/21: 3 Low risk    Overall Daily Picture:      Worsened    Presence of secondary bacterial Infection:    No   Additional antibiotics: No    Labs, X rays reviewed: 9/18/2021    BUN:10-->13  Cr:0.65-->0.69-->0.62    WBC:6.5-->4.4-->6.8  Hb:13.8-->13.9-->13.4  Plat: 155-->172-->236    Absolute Neutrophils:4.85  Absolute Lymphocytes:1.16  Neutrophil/Lymphocyte Ratio: 4.2 high risk    CRP: 20-->38.8  Ferritin:  LDH:   D-dimer: 1.10    Pro Calcitonin:      Cultures:  Urine:  ·   Blood:  ·   Sputum :  ·   Wound:       CXR: 9/15/21        CAT:    9/15/21    Discussed with patient, RN, CC, IM. I have personally reviewed the past medical history, past surgical history, medications, social history, and family history, and I have updated the database accordingly.   Past Medical History:     Past Medical History:   Diagnosis Date    Acute pulmonary embolism ([facility] [address] 11/2/2015    Anxiety     Asthma     Constipation, chronic     CPAP (continuous positive airway pressure) dependence     Diabetes mellitus ([facility] [address]     pt states she was prediabetic at one point    Embolism - blood clot     right lung and right leg    Esophageal reflux     Factor V Leiden ([facility] [address] [facility]     Heart murmur     HTN (hypertension)     Hyperlipidemia     Irritable bladder     MTHFR mutation     MVP (mitral valve prolapse)     Palpitations     Pulmonary embolism (HCC) 10/15/2012    Rhinitis, allergic 10/15/2012    Sleep apnea     Venous thromboembolism 5/2/2016       Past Surgical  History:     Past Surgical History:   Procedure Laterality Date    CATARACT REMOVAL WITH IMPLANT Left 04/02/2018    CHOLECYSTECTOMY      around 2000    COLONOSCOPY  07/12/2021    COLONOSCOPY N/A 7/12/2021    COLONOSCOPY POLYPECTOMY CECUM COLD BIOPSY performed by Gómez Valentin MD at [address]  3/27/2017    COLONOSCOPY CONTROL HEMORRHAGE performed by Klaus Pena MD at [address] FLX W/RMVL OF TUMOR POLYP LESION [address] [place]  3/27/2017    COLONOSCOPY POLYPECTOMY SNARE/COLD BIOPSY performed by Klaus Pena MD at [address] RMVL INSJ IO LENS PROSTH W/O ECP Left 4/2/2018    EYE CATARACT EMULSIFICATION IOL IMPLANT performed by Carlotta Flanagan MD at [address] [facility] ENDOSCOPY  1/2010    with biopsy    UPPER GASTROINTESTINAL ENDOSCOPY N/A 7/12/2021    EGD BIOPSY OF STOMACH, GASTRIC POLYPECTOMY AND DUODENUM BIOPSY performed by Mili Torres MD at [id number]  [address] [facility].       Medications:      zinc sulfate  100 mg Oral Daily    ascorbic acid  1,000 mg Oral 4x Daily    remdesivir IVPB  100 mg IntraVENous Q24H    budesonide-formoterol  2 puff Inhalation BID    apixaban  5 mg Oral BID    famotidine  40 mg Oral QPM    metoprolol tartrate  12.5 mg Oral BID    losartan  25 mg Oral Daily    pantoprazole  40 mg Oral Daily    spironolactone  25 mg Oral Daily    sodium chloride flush  10 mL IntraVENous 2 times per day    dexamethasone  6 mg Oral Daily    Vitamin D  6,000 Units Oral Daily    Followed by   Gloria Wray ON 9/23/2021] Vitamin D  2,000 Units Oral Daily       Social History:     Social History     Socioeconomic History    Marital status:      Spouse name: Not on file    Number of children: Not on file    Years of education: Not on file    Highest education level: Not on file   Occupational History    Not on file   Tobacco Use    Smoking status: Never Smoker    Smokeless tobacco: Never Used   Vaping Use    Vaping Use: Never used   Substance and Sexual Activity    Alcohol use: Yes     Alcohol/week: 0.0 standard drinks     Comment: 1 glass of wine/month    Drug use: No    Sexual activity: Yes     Partners: Male     Birth control/protection: Surgical   Other Topics Concern    Not on file   Social History Narrative    Not on file     Social Determinants of Health     Financial Resource Strain: Low Risk     Difficulty of Paying Living Expenses: Not hard at all   Food Insecurity: No Food Insecurity    Worried About Running Out of Food in the Last Year: Never true    [address] in the Last Year: Never true   Transportation Needs: No Transportation Needs    Lack of Transportation (Medical): No    Lack of Transportation (Non-Medical):  No   Physical Activity:     Days of Exercise per Week:     Minutes of Exercise per Session:    Stress:     Feeling of Stress :    Social Connections:     Frequency of Communication with Friends and Family:     Frequency of Social Gatherings with Friends and Family:     Attends [employer] Services:     Active Member of Clubs or Organizations:     Attends Club or Organization Meetings:     Marital Status:    Intimate Partner Violence:     Fear of Current or Ex-Partner:     Emotionally Abused:     Physically Abused:     Sexually Abused:        Family History:     Family History   Problem Relation Age of Onset    Cancer Mother         breast ca    Diabetes Mother     Depression Mother     Thyroid Disease Mother     High Blood Pressure Mother     Heart Disease Mother     Cancer Father         prostate ca    COPD Father     Heart Failure Father     Heart Disease Father     High Blood Pressure Father     Deep Vein Thrombosis Sister     Breast Cancer Sister     Deep Vein Thrombosis Sister         Allergies:   Levaquin [levofloxacin in d5w] and Pcn [penicillins]     Review of Systems:       Constitutional: Generalized malaise  Head: No headaches  Eyes: No double vision or blurry vision. No conjunctival inflammation. ENT: No sore throat or runny nose. . No hearing loss, tinnitus or vertigo. Cardiovascular: No chest pain or palpitations. Shortness of breath. CARBAJAL  Lung:  Shortness of breath & cough. No sputum production  Abdomen: No nausea, vomiting, diarrhea, or abdominal pain. [place] No cramps. Genitourinary: No increased urinary frequency, or dysuria. No hematuria. No suprapubic or CVA pain  Musculoskeletal: No muscle aches or pains. No joint effusions, swelling or deformities  Hematologic: No bleeding or bruising. Neurologic: No headache, weakness, numbness, or tingling. Integument: No rash, no ulcers. Psychiatric: No depression. Endocrine: No polyuria, no polydipsia, no polyphagia.     Physical Examination :     Patient Vitals for the past 8 hrs:   BP Temp Temp src Pulse Resp SpO2 Weight   09/18/21 0749 -- -- -- -- 24 -- --   09/18/21 0732 (!) 150/72 98 °F (36.7 °C) Oral 63 16 -- --   09/18/21 0410 -- -- -- -- -- -- 283 lb 15.2 oz (128.8 kg)   09/18/21 0049 -- -- -- -- 16 95 % --     General Appearance: Awake, alert, and in no apparent distress  Head:  Normocephalic, no trauma  Eyes: Pupils equal, round, reactive to light; sclera anicteric; conjunctivae pink. No embolic phenomena. ENT: Oropharynx clear, without erythema, exudate, or thrush. No tenderness of sinuses. Mouth/throat: mucosa pink and moist. No lesions. Dentition in good repair. Neck:Supple, without lymphadenopathy. Thyroid normal, No bruits. Pulmonary/Chest: Clear to auscultation, without wheezes, rales, or rhonchi. No dullness to percussion. Cardiovascular: Regular rate and rhythm without murmurs, rubs, or gallops. Abdomen: Soft, non tender. Bowel sounds normal. No organomegaly  All four Extremities: No cyanosis, clubbing, edema, or effusions. Neurologic: No gross sensory or motor deficits. Skin: Warm and dry with good turgor. No signs of peripheral arterial or venous insufficiency. No ulcerations. No open wounds. Medical Decision Making -Laboratory:   I have independently reviewed/ordered the following labs:    CBC with Differential:   Recent Labs     09/17/21  0600 09/18/21  0545   WBC 4.4 6.8   HGB 13.9 13.4   HCT 42.4 42.3    236   LYMPHOPCT 24 17*   MONOPCT 9 8     BMP:   Recent Labs     09/17/21  0600 09/18/21  0545    140   K 4.0 4.0    103   CO2 27 25   BUN 12 13   CREATININE 0.69 0.62     Hepatic Function Panel:   Recent Labs     09/15/21  2040   PROT 6.9   LABALBU 3.7   BILITOT 0.37   ALKPHOS 54   ALT 61*   AST 89*     No results for input(s): RPR in the last 72 hours. No results for input(s): HIV in the last 72 hours. No results for input(s): BC in the last 72 hours.   Lab Results   Component Value Date    RBC 4.57 09/18/2021    RBC 4.67 11/02/2011    WBC 6.8 09/18/2021     Lab Results   Component Value Date    CREATININE 0.62 09/18/2021    GLUCOSE 146 09/18/2021 GLUCOSE 99 11/02/2011       Medical Decision Making-Imaging:     Narrative   EXAMINATION:   CTA OF THE CHEST 9/15/2021 6:47 pm       TECHNIQUE:   CTA of the chest was performed after the administration of intravenous   contrast.  Multiplanar reformatted images are provided for review.  MIP   images are provided for review. Dose modulation, iterative reconstruction,   and/or weight based adjustment of the mA/kV was utilized to reduce the   radiation dose to as low as reasonably achievable.       COMPARISON:   None.       HISTORY:   ORDERING SYSTEM PROVIDED HISTORY: hypoxia, tachycardia, covid   TECHNOLOGIST PROVIDED HISTORY:   hypoxia, tachycardia, covid   Decision Support Exception - unselect if not a suspected or confirmed   emergency medical condition->Emergency Medical Condition (MA)       FINDINGS:   Pulmonary Arteries: Pulmonary arteries are less than adequately opacified for   evaluation. No evidence of intraluminal filling defect to suggest pulmonary   embolism.  Main pulmonary artery is normal in caliber.       Mediastinum: There are a few small nonspecific lymph nodes seen in the middle   mediastinum. No suspicious lymphadenopathy.       Lungs/pleura: Patchy bilateral ground-glass and airspace infiltrates most   notably in the lung periphery. ..  No pulmonary masses are noted. No pleural   effusions are identified.       Cardiomediastinal Structures: Intimal calcifications associated with the   thoracic aorta. No evidence of thoracic aortic aneurysm or dissection. Cardiac chambers are normal       Upper Abdomen: Small hiatal hernia       Soft Tissues/Bones:  There are hypertrophic degenerative changes in the   thoracic spine.       No acute osseous abnormalities.           Impression   Patchy bilateral ground-glass and airspace infiltrates most notably in the   lung periphery compatible with patient's history of COVID pneumonia.       No obvious evidence of pulmonary embolism.             Narrative EXAMINATION:   ONE XRAY VIEW OF THE CHEST       9/15/2021 5:21 pm       COMPARISON:   09/13/2021       HISTORY:   ORDERING SYSTEM PROVIDED HISTORY: cough, covid, worsening sob   TECHNOLOGIST PROVIDED HISTORY:   cough, covid, worsening sob       FINDINGS:   . The cardiac size is normal. No  pleural effusions are seen.  Patchy   bilateral interstitial infiltrates, improved on the right however progressive   on the left.  Pulmonary vascularity appears normal. There is mild ectasia of   the thoracic aorta.  . No acute bony abnormalities. The hilar structures are   normal.           Impression   Patchy bilateral interstitial infiltrates, improved on the right however   progressive on the left compatible with patient's history of COVID.                 Medical Decision Aansec-Szqnwixt-Lunaq:       Medical Decision Making-Other:     Note:  · Labs, medications, radiologic studies were reviewed with personal review of films  · Large amounts of data were reviewed  · Discussed with nursing Staff, Discharge planner  · Infection Control and Prevention measures reviewed  · All prior entries were reviewed  · Administer medications as ordered  · Prognosis: Guarded  · Discharge planning reviewed      Thank you for allowing us to participate in the care of this patient. Please call with questions.     Huy Galdamez MD           Pager: [phone] - Office: [phone]

## 2021-09-18 NOTE — PROGRESS NOTES
Paul Vera M.D. Internal Medicine progress note    Subjective  The patient is a 54 y.o. female who is seen for f/u of acute respiratory failure with hypoxia due to Covid-19 pneumonia. She is finally starting to feel a bit better today. She feels like she has more energy. Having small, soft BM's. Tolerating clears and soups, not eating much more than that. Denies any nausea or vomiting, just doesn't have much of an appetite. Review of Systems:  Constitutional:positive  for fevers, and negative for chills. Respiratory: positive for shortness of breath, positive for cough, and negative for wheezing  Cardiovascular: negative for chest pain, negative for palpitations, and negative for syncope  Gastrointestinal: negative for abdominal pain, negative for nausea,negative for vomiting, negative for diarrhea, negative for constipation, and negative for hematochezia or melena  Genitourinary: negative for dysuria, negative for urinary urgency, negative for urinary frequency, and negative for hematuria  Neurological: negative for unilateral weakness, numbness or tingling. All other systems were reviewed with the patient and are negative except as stated above    Objective:    Vitals:   Temp: 98.2 °F (36.8 °C)  BP: (!) 129/59  Resp: 18  Pulse: 59  SpO2: 98 %  -----------------------------------------------------------------  Exam:  GEN:    Awake, alert and oriented x3. EYES:  EOMI, pupils equal   NECK: Supple. No lymphadenopathy. No carotid bruit  CVS:    regular rate and rhythm, no audible murmur  PULM:  diminished with scattered rhonchi, mild acute respiratory distress  ABD:    Bowels sounds normal.  Abdomen is soft. No distention. no tenderness to palpation. EXT:   no edema bilaterally . No calf tenderness. NEURO: Moves all extremities. Motor and sensory are grossly intact  SKIN:  No rashes.   No skin lesions.    -----------------------------------------------------------------  · Labarotory Data:   Lab Results   Component Value Date    WBC 6.8 09/18/2021    HGB 13.4 09/18/2021    MCV 92.6 09/18/2021     09/18/2021      Lab Results   Component Value Date    GLUCOSE 146 (H) 09/18/2021    BUN 13 09/18/2021    CREATININE 0.62 09/18/2021     09/18/2021    K 4.0 09/18/2021    CALCIUM 8.5 (L) 09/18/2021     09/18/2021    CO2 25 09/18/2021       · EKG reviewed: my interpretation is: sinus tachycadia with 1st degree AV block, LAD        · Imaging Data:  CT CHEST PULMONARY EMBOLISM W CONTRAST   Final Result   Patchy bilateral ground-glass and airspace infiltrates most notably in the   lung periphery compatible with patient's history of COVID pneumonia. No obvious evidence of pulmonary embolism. XR CHEST PORTABLE   Final Result   Patchy bilateral interstitial infiltrates, improved on the right however   progressive on the left compatible with patient's history of COVID.                Assessment / Plan:     ·  Acute respiratory failure with hypoxia due to Covid-19 pneumonia  · Continue IV Remdesivir  · Continue Dexamethasone  · Actemra initiated by Dr. Christine Altman due to rising CRP  · Mucinex-DM  · Supplemental O2 to keep SpO2 > 92% - currently requiring heated high flow O2  · Acapella  · Intermittent prone positioning as tolerated  · Monitor labs:    · CRP 20.0 (9/16) --> 38.8 (9/17) --> 18.3 (9/18)  · D-dimer 1.04 (9/15) --> 1.10 (9/16) --> 1.10 (9/18)  · Hypertension  · Continue Losartan, Metoprolol  · SUSY on CPAP  · HS CPAP  · DVT prophylaxis: on longterm eliquis due to h/o DVT and PE  · Peptic ulcer prophylaxis: Pepcid  · High risk medications: Remdesivir   · Dispositon: pending        Almaz Noguera MD , M.D.  9/18/2021  4:26 PM

## 2021-09-18 NOTE — PROGRESS NOTES
Physical Therapy  Facility/Department: [facility] MED SURG  Daily Treatment Note  NAME: Toma Perea  : 1965  MRN: [id number]    Date of Service: 2021    Discharge Recommendations:  Continue to assess pending progress        Assessment   Treatment Diagnosis: Decreased activity endurance  Prognosis: Good  Activity Tolerance  Activity Tolerance: Patient limited by endurance     Patient Diagnosis(es): The primary encounter diagnosis was Pneumonia due to COVID-19 virus. A diagnosis of Acute respiratory failure with hypoxia (HCC) was also pertinent to this visit. has a past medical history of Acute pulmonary embolism (HCC), Anxiety, Asthma, Constipation, chronic, CPAP (continuous positive airway pressure) dependence, Diabetes mellitus ([facility] [address] Embolism - blood clot, Esophageal reflux, Factor V Leiden ([facility] [address] Heart murmur, HTN (hypertension), Hyperlipidemia, Irritable bladder, MTHFR mutation, MVP (mitral valve prolapse), Palpitations, Pulmonary embolism ([facility] [address] Rhinitis, allergic, Sleep apnea, and Venous thromboembolism. has a past surgical history that includes Cholecystectomy; Mandible fracture surgery (); Tubal ligation; Upper gastrointestinal endoscopy (2010); pr colsc flx w/rmvl of tumor polyp lesion snare tq (3/27/2017); pr colsc flexible w/control bleeding any method (3/27/2017); Cataract removal with implant (Left, 2018); pr xcapsl ctrc rmvl insj io lens prosth w/o ecp (Left, 2018); Colonoscopy (2021); Upper gastrointestinal endoscopy (N/A, 2021); and Colonoscopy (N/A, 2021). Restrictions  Restrictions/Precautions  Restrictions/Precautions: Isolation, Fall Risk, General Precautions  Subjective   General  Chart Reviewed: Yes  Response To Previous Treatment: Patient with no complaints from previous session. Family / Caregiver Present: No  Referring Practitioner: Arslan Germain MD  Subjective  Subjective: Patient agreeable to PT at this time. Orientation  Orientation  Overall Orientation Status: Within Functional Limits  Cognition      Objective   Bed mobility  Supine to Sit: Modified independent  Sit to Supine: Modified independent  Transfers  Sit to Stand: Stand by assistance  Stand to sit: Stand by assistance  Ambulation  Ambulation?: Yes  WB Status: FWB  Ambulation 1  Surface: level tile  Device: No Device  Other Apparatus: O2  Assistance: Stand by assistance;Contact guard assistance  Distance: 5ftx4  Comments: SPO= 94% before amb, after amb 84% with deep breathing techniques recovered to 90% within 1-2 min     Balance  Sitting - Static: Good  Sitting - Dynamic: Good  Standing - Static: Fair  Standing - Dynamic: Fair  Exercises  Quad Sets: x20  Heelslides: x15  Gluteal Sets: x20  Hip Flexion: x15  Hip Abduction: x15  Knee Long Arc Quad: x15  Ankle Pumps: x20  Comments: Supine/reclined B LE ther ex x15-20 completed. Goals  Short term goals  Time Frame for Short term goals: 10 days  Short term goal 1: Patient will ambulate 48' with supervision, without LOB  Short term goal 2: Patient will perform bed mobility and transfers with MI  Short term goal 3: Patient will tolerate 20-30 minutes of therex/act to improve endurance for ADLs  Patient Goals   Patient goals : Feel better    Plan    Plan  Times per week: 7  Times per day: Twice a day  Plan Comment: 1x/day on weekends  Safety Devices  Type of devices:  All fall risk precautions in place, Call light within reach, Gait belt, Patient at risk for falls, Nurse notified, Left in bed     Therapy Time   Individual Concurrent Group Co-treatment   Time In 1126         Time Out 1156         Minutes 30         Timed Code Treatment Minutes: Tacho Lawrence PT, DPT

## 2021-09-18 NOTE — PROGRESS NOTES
Occupational Therapy  Facility/Department: [facility] MED SURG  Daily Treatment Note  NAME: Nicholas Chi  : 1965  MRN: [id number]    Date of Service: 2021    Discharge Recommendations:  Continue to assess pending progress       Assessment      Activity Tolerance  Activity Tolerance: Patient Tolerated treatment well  Safety Devices  Safety Devices in place: Yes  Type of devices: Left in bed;Call light within reach         Patient Diagnosis(es): The primary encounter diagnosis was Pneumonia due to COVID-19 virus. A diagnosis of Acute respiratory failure with hypoxia (HCC) was also pertinent to this visit. has a past medical history of Acute pulmonary embolism (HCC), Anxiety, Asthma, Constipation, chronic, CPAP (continuous positive airway pressure) dependence, Diabetes mellitus ([facility] [address] Embolism - blood clot, Esophageal reflux, Factor V Leiden ([facility] [address] Heart murmur, HTN (hypertension), Hyperlipidemia, Irritable bladder, MTHFR mutation, MVP (mitral valve prolapse), Palpitations, Pulmonary embolism ([facility] [address] Rhinitis, allergic, Sleep apnea, and Venous thromboembolism. has a past surgical history that includes Cholecystectomy; Mandible fracture surgery (); Tubal ligation; Upper gastrointestinal endoscopy (2010); pr colsc flx w/rmvl of tumor polyp lesion snare tq (3/27/2017); pr colsc flexible w/control bleeding any method (3/27/2017); Cataract removal with implant (Left, 2018); pr xcapsl ctrc rmvl insj io lens prosth w/o ecp (Left, 2018); Colonoscopy (2021); Upper gastrointestinal endoscopy (N/A, 2021); and Colonoscopy (N/A, 2021).     Restrictions  Restrictions/Precautions  Restrictions/Precautions: Isolation, Fall Risk, General Precautions  Subjective   General  Chart Reviewed: Yes  Patient assessed for rehabilitation services?: Yes  Family / Caregiver Present: No  Referring Practitioner: Dr. Nolan Valdez  Diagnosis: acute respiratory failure wtih hypoxia  Subjective  Subjective: Pt reports 0/10 pain at this time. Stated that she is starting to feel better and her head is less foggy. General Comment  Comments: Pt supine in bed with HOB elevated on therapist arrival. Agreeable to OT treatment and wanting to get better. Vital Signs  Patient Currently in Pain: Denies   Orientation     Objective                                                                Type of ROM/Therapeutic Exercise  Type of ROM/Therapeutic Exercise: AROM  Comment: Educated and demonstrated AROM BUE exercises for pt to complete while in room as well. Completed 5 exercises x10 reps each with incresaed fatigue and prolonged rest breaks required betweeen. Demo'd good ability to complete exercises to [facility] joint ROM and BUE strength. Plan   Plan  Times per week: 7x/week  Times per day: Daily  Current Treatment Recommendations: Strengthening, Patient/Caregiver Education & Training, Balance Training, Functional Mobility Training, Endurance Training, Safety Education & Training, Self-Care / ADL  G-Code     OutComes Score                                                  AM-PAC Score             Goals  Short term goals  Time Frame for Short term goals: 90 days  Short term goal 1: Pt will verbalize 4 ECWS strategies for implementation/use at home to improve safety and independence with ADL and functional tasks. Short term goal 2: Pt will tolerate standing for greater tahn 3 minutes without LOB or SOB while engage in functional tasks to improve safety and independence with ADL and functional mobility. Short term goal 3: Pt will complete TB ADL Otto with use of AE PRN to ensure safe return home. Short term goal 4: Pt will complete toileting routine at standard toilet Otto to improve safety and independence with ADL task.        Therapy Time   Individual Concurrent Group Co-treatment   Time In 1110         Time Out 1125         Minutes 15         Timed Code Treatment Minutes: 15 Minutes       Yesenia Ma SHIRLEY/L [facility].[id number]

## 2021-09-18 NOTE — PROGRESS NOTES
Initial shift assessment done and VS obtained as charted in flow sheet. Pt is A&Ox4, answers questions appropriately. SPO2 93% on 5 LPM nasal canula. Pt was recently up to bathroom with assistance, output recorded in flow sheet. Pt denies any other needs at this time. Call light and bedside table within reach.

## 2021-09-18 NOTE — CONSULTS
The Pharmacist should review the patient information to make sure criteria for use is met prior to dispensing. If the documentation does not support the criteria, the pharmacist should call the MD to verify the criteria is met. It is preferred that the provider is ID or pulmonary specialist, however the pharmacist can use clinical judgement as to the appropriateness of the order.     [facility] Tocilizumab Criteria for Use    Criteria **All criteria need to be met to receive Tocilizumab for COVID-19 patients**   Age  >22 years old    Clinical Status  Within 7 days of symptom onset OR within 2 days of hospital admission   AND  Within 24 hours of vital (respiratory/cardiovascular) organ support initiation*   Concomitant Therapy Receiving systemic corticosteroids    Oxygen Saturation  <92% OR requiring oxygen    CRP   (if available) >7.5 mg/dL   (>75mg/L)   Contraindications Invasive active mycobacterial or fungal infection  Platelets < 209,722 or active bleeding   Not receiving systemic steroids    Ordering Provider Type  ID, intensivists, pulmonology (where available, see above)     *Respiratory support includes but is not limited to: Non-invasive/invasive ventilation   *Cardiovascular support includes but is not limited to: Vasopressor support     I  Weight-Based Tocilizumab Dose (x 1 dose only)  WHEN COMPOUNDED FROM IV VIAL  Patient Weight (ABW, in kg) Tocilizumab (Actemra) Dose   <= 40 kg 8 mg/kg  x 1 dose   >40 kg - <= 65 kg 400 mg x 1 dose   > 65 - <=90 kg 600 mg  x 1 dose   > 90 kg  800mg  x 1 dose     WHEN COMPOUNDED FROM SQ SYRINGE  Patient Weight (ABW, in kg) Tocilizumab (Actemra) Dose   <= 40 kg 324mg (2 syringes)   >40 kg - <= 65 kg 486mg (3 syringes)   > 65 - <=90 kg 648mg (4 syringes)   > 90 kg  810mg (5 syringes)      Received verbal order from Dr. Dev Abraham for pharmacy to order and mix Actemra infusion x 1 dose (weight based): patient's weight is >90 kg (128.8 kg on 9/18/2021); will give maximum dose of 810 mg (using 5 syringes with 162 mg/0.9 ml each) mixed in 100 ml 0.9 % NS to be infused over 120 minutes per current protocol recommendations.      Epi Leon, VeenaD

## 2021-09-19 LAB
ABSOLUTE EOS #: <0.03 K/UL (ref 0–0.44)
ABSOLUTE IMMATURE GRANULOCYTE: 0.03 K/UL (ref 0–0.3)
ABSOLUTE LYMPH #: 1.12 K/UL (ref 1.1–3.7)
ABSOLUTE MONO #: 0.48 K/UL (ref 0.1–1.2)
ANION GAP SERPL CALCULATED.3IONS-SCNC: 8 MMOL/L (ref 9–17)
BASOPHILS # BLD: 0 % (ref 0–2)
BASOPHILS ABSOLUTE: <0.03 K/UL (ref 0–0.2)
BUN BLDV-MCNC: 13 MG/DL (ref 6–20)
BUN/CREAT BLD: 23 (ref 9–20)
C-REACTIVE PROTEIN: 11.2 MG/L (ref 0–5)
CALCIUM SERPL-MCNC: 8.4 MG/DL (ref 8.6–10.4)
CHLORIDE BLD-SCNC: 105 MMOL/L (ref 98–107)
CO2: 28 MMOL/L (ref 20–31)
CREAT SERPL-MCNC: 0.57 MG/DL (ref 0.5–0.9)
DIFFERENTIAL TYPE: ABNORMAL
EOSINOPHILS RELATIVE PERCENT: 0 % (ref 1–4)
GFR AFRICAN AMERICAN: >60 ML/MIN
GFR NON-AFRICAN AMERICAN: >60 ML/MIN
GFR SERPL CREATININE-BSD FRML MDRD: ABNORMAL ML/MIN/{1.73_M2}
GFR SERPL CREATININE-BSD FRML MDRD: ABNORMAL ML/MIN/{1.73_M2}
GLUCOSE BLD-MCNC: 134 MG/DL (ref 70–99)
HCT VFR BLD CALC: 39.9 % (ref 36.3–47.1)
HEMOGLOBIN: 13.2 G/DL (ref 11.9–15.1)
IMMATURE GRANULOCYTES: 1 %
INR BLD: 1.2
LYMPHOCYTES # BLD: 18 % (ref 24–43)
MCH RBC QN AUTO: 29.4 PG (ref 25.2–33.5)
MCHC RBC AUTO-ENTMCNC: 33.1 G/DL (ref 28.4–34.8)
MCV RBC AUTO: 88.9 FL (ref 82.6–102.9)
MONOCYTES # BLD: 8 % (ref 3–12)
NRBC AUTOMATED: 0 PER 100 WBC
PARTIAL THROMBOPLASTIN TIME: 30.9 SEC (ref 23.9–33.8)
PDW BLD-RTO: 13.4 % (ref 11.8–14.4)
PLATELET # BLD: 259 K/UL (ref 138–453)
PLATELET ESTIMATE: ABNORMAL
PMV BLD AUTO: 10.3 FL (ref 8.1–13.5)
POTASSIUM SERPL-SCNC: 4.1 MMOL/L (ref 3.7–5.3)
PROTHROMBIN TIME: 14.7 SEC (ref 11.5–14.2)
RBC # BLD: 4.49 M/UL (ref 3.95–5.11)
RBC # BLD: ABNORMAL 10*6/UL
SEG NEUTROPHILS: 73 % (ref 36–65)
SEGMENTED NEUTROPHILS ABSOLUTE COUNT: 4.59 K/UL (ref 1.5–8.1)
SODIUM BLD-SCNC: 141 MMOL/L (ref 135–144)
WBC # BLD: 6.2 K/UL (ref 3.5–11.3)
WBC # BLD: ABNORMAL 10*3/UL

## 2021-09-19 PROCEDURE — 80048 BASIC METABOLIC PNL TOTAL CA: CPT

## 2021-09-19 PROCEDURE — 2580000003 HC RX 258: Performed by: FAMILY MEDICINE

## 2021-09-19 PROCEDURE — 86140 C-REACTIVE PROTEIN: CPT

## 2021-09-19 PROCEDURE — 85610 PROTHROMBIN TIME: CPT

## 2021-09-19 PROCEDURE — 6370000000 HC RX 637 (ALT 250 FOR IP): Performed by: INTERNAL MEDICINE

## 2021-09-19 PROCEDURE — 85025 COMPLETE CBC W/AUTO DIFF WBC: CPT

## 2021-09-19 PROCEDURE — 94761 N-INVAS EAR/PLS OXIMETRY MLT: CPT

## 2021-09-19 PROCEDURE — 6370000000 HC RX 637 (ALT 250 FOR IP): Performed by: NURSE PRACTITIONER

## 2021-09-19 PROCEDURE — 6360000002 HC RX W HCPCS: Performed by: INTERNAL MEDICINE

## 2021-09-19 PROCEDURE — 2700000000 HC OXYGEN THERAPY PER DAY

## 2021-09-19 PROCEDURE — 97113 AQUATIC THERAPY/EXERCISES: CPT

## 2021-09-19 PROCEDURE — 97530 THERAPEUTIC ACTIVITIES: CPT

## 2021-09-19 PROCEDURE — 1200000000 HC SEMI PRIVATE

## 2021-09-19 PROCEDURE — 99233 SBSQ HOSP IP/OBS HIGH 50: CPT | Performed by: INTERNAL MEDICINE

## 2021-09-19 PROCEDURE — APPSS30 APP SPLIT SHARED TIME 16-30 MINUTES: Performed by: NURSE PRACTITIONER

## 2021-09-19 PROCEDURE — 36415 COLL VENOUS BLD VENIPUNCTURE: CPT

## 2021-09-19 PROCEDURE — 2580000003 HC RX 258: Performed by: INTERNAL MEDICINE

## 2021-09-19 PROCEDURE — 85730 THROMBOPLASTIN TIME PARTIAL: CPT

## 2021-09-19 PROCEDURE — 2500000003 HC RX 250 WO HCPCS: Performed by: FAMILY MEDICINE

## 2021-09-19 RX ADMIN — ALPRAZOLAM 0.25 MG: 0.25 TABLET ORAL at 23:10

## 2021-09-19 RX ADMIN — OXYCODONE HYDROCHLORIDE AND ACETAMINOPHEN 1000 MG: 500 TABLET ORAL at 21:28

## 2021-09-19 RX ADMIN — Medication 100 MG: at 09:02

## 2021-09-19 RX ADMIN — SPIRONOLACTONE 25 MG: 25 TABLET, FILM COATED ORAL at 09:01

## 2021-09-19 RX ADMIN — REMDESIVIR 100 MG: 100 INJECTION, POWDER, LYOPHILIZED, FOR SOLUTION INTRAVENOUS at 22:27

## 2021-09-19 RX ADMIN — SODIUM CHLORIDE, PRESERVATIVE FREE 10 ML: 5 INJECTION INTRAVENOUS at 21:29

## 2021-09-19 RX ADMIN — METOPROLOL TARTRATE 12.5 MG: 25 TABLET, FILM COATED ORAL at 09:00

## 2021-09-19 RX ADMIN — APIXABAN 5 MG: 5 TABLET, FILM COATED ORAL at 21:28

## 2021-09-19 RX ADMIN — PANTOPRAZOLE SODIUM 40 MG: 40 TABLET, DELAYED RELEASE ORAL at 09:00

## 2021-09-19 RX ADMIN — LOSARTAN POTASSIUM 25 MG: 25 TABLET, FILM COATED ORAL at 09:00

## 2021-09-19 RX ADMIN — OXYCODONE HYDROCHLORIDE AND ACETAMINOPHEN 1000 MG: 500 TABLET ORAL at 09:01

## 2021-09-19 RX ADMIN — DEXAMETHASONE 6 MG: 4 TABLET ORAL at 09:01

## 2021-09-19 RX ADMIN — OXYCODONE HYDROCHLORIDE AND ACETAMINOPHEN 1000 MG: 500 TABLET ORAL at 17:37

## 2021-09-19 RX ADMIN — FAMOTIDINE 40 MG: 20 TABLET, FILM COATED ORAL at 17:38

## 2021-09-19 RX ADMIN — OXYCODONE HYDROCHLORIDE AND ACETAMINOPHEN 1000 MG: 500 TABLET ORAL at 12:25

## 2021-09-19 RX ADMIN — APIXABAN 5 MG: 5 TABLET, FILM COATED ORAL at 09:01

## 2021-09-19 RX ADMIN — Medication 6000 UNITS: at 09:01

## 2021-09-19 RX ADMIN — SODIUM CHLORIDE, PRESERVATIVE FREE 10 ML: 5 INJECTION INTRAVENOUS at 09:00

## 2021-09-19 RX ADMIN — BUDESONIDE AND FORMOTEROL FUMARATE DIHYDRATE 2 PUFF: 160; 4.5 AEROSOL RESPIRATORY (INHALATION) at 09:05

## 2021-09-19 ASSESSMENT — PAIN SCALES - GENERAL
PAINLEVEL_OUTOF10: 0

## 2021-09-19 NOTE — PROGRESS NOTES
wtih hypoxia  Subjective  Subjective: Pt reports 0/10 pain at this time. Stated that she is starting to feel better today and her O2 got turned down a little. General Comment  Comments: Pt supine in bed with HOB elevated on therapist arrival. Agreeable to OT treatment and wanting to get better. Vital Signs  Patient Currently in Pain: Denies   Orientation     Objective                                                                Type of ROM/Therapeutic Exercise  Comment: Educated and demonstrated AROM BUE exercises for pt to complete while in room as well. Completed 5 exercises x1 reps each. Demo'd good ability to complete exercises to maint joint ROM and BUE strength. Sp02 at 90-92% throughout                    [address] [place] [address] per week: 7x/week  Times per day: Daily  Current Treatment Recommendations: Strengthening, Patient/Caregiver Education & Training, Balance Training, Functional Mobility Training, Endurance Training, Safety Education & Training, Self-Care / ADL  G-Code     OutComes Score                                                  AM-PAC Score             Goals  Short term goals  Time Frame for Short term goals: 90 days  Short term goal 1: Pt will verbalize 4 ECWS strategies for implementation/use at home to improve safety and independence with ADL and functional tasks. Short term goal 2: Pt will tolerate standing for greater tahn 3 minutes without LOB or SOB while engage in functional tasks to improve safety and independence with ADL and functional mobility. Short term goal 3: Pt will complete TB ADL Otto with use of AE PRN to ensure safe return home. Short term goal 4: Pt will complete toileting routine at standard toilet Otto to improve safety and independence with ADL task.        Therapy Time   Individual Concurrent Group Co-treatment   Time In 0761         Time Out 1020         Minutes 25         Timed Code Treatment Minutes: [address], [address] [facility]

## 2021-09-19 NOTE — PROGRESS NOTES
Rd Mitchell M.D. Internal Medicine progress note    Subjective  The patient is a 54 y.o. female who is seen for f/u of acute respiratory failure with hypoxia due to Covid-19 pneumonia. She is finally starting to feel a bit better and is sitting up in the chair. She feels like she has more energy. Having small, soft BM's. Tolerating clears and soups, but ordered meat loaf for dinner. Denies any nausea or vomiting . Review of Systems:  Constitutional:positive  for fevers, and negative for chills. Respiratory: positive for shortness of breath, positive for cough, and negative for wheezing  Cardiovascular: negative for chest pain, negative for palpitations, and negative for syncope  Gastrointestinal: negative for abdominal pain, negative for nausea,negative for vomiting, negative for diarrhea, negative for constipation, and negative for hematochezia or melena  Genitourinary: negative for dysuria, negative for urinary urgency, negative for urinary frequency, and negative for hematuria  Neurological: negative for unilateral weakness, numbness or tingling. All other systems were reviewed with the patient and are negative except as stated above    Objective:    Vitals:   Temp: 98.3 °F (36.8 °C)  BP: 130/60  Resp: 18  Pulse: 60  SpO2: 92 %  -----------------------------------------------------------------  Exam:  GEN:    Awake, alert and oriented x3. EYES:  EOMI, pupils equal   NECK: Supple. No lymphadenopathy. No carotid bruit  CVS:    regular rate and rhythm, no audible murmur  PULM:  diminished with scattered rhonchi, mild acute respiratory distress  ABD:    Bowels sounds normal.  Abdomen is soft. No distention. no tenderness to palpation. EXT:   no edema bilaterally . No calf tenderness. NEURO: Moves all extremities. Motor and sensory are grossly intact  SKIN:  No rashes.   No skin lesions.    -----------------------------------------------------------------  · Labarotory Data:   Lab

## 2021-09-19 NOTE — PROGRESS NOTES
Physical Therapy  Facility/Department: [facility] MED SURG  Daily Treatment Note  NAME: Nicholas Chi  : 1965  MRN: [id number]    Date of Service: 2021    Discharge Recommendations:  Continue to assess pending progress        Assessment   Treatment Diagnosis: Decreased activity endurance  Prognosis: Good  PT Education: Gait Training;Transfer Training  Patient Education: Educated on above with good understanding  REQUIRES PT FOLLOW UP: Yes  Activity Tolerance  Activity Tolerance: Patient limited by endurance     Patient Diagnosis(es): The primary encounter diagnosis was Pneumonia due to COVID-19 virus. A diagnosis of Acute respiratory failure with hypoxia (HCC) was also pertinent to this visit. has a past medical history of Acute pulmonary embolism (HCC), Anxiety, Asthma, Constipation, chronic, CPAP (continuous positive airway pressure) dependence, Diabetes mellitus ([address] Embolism - blood clot, Esophageal reflux, Factor V Leiden ([facility] [address] Heart murmur, HTN (hypertension), Hyperlipidemia, Irritable bladder, MTHFR mutation, MVP (mitral valve prolapse), Palpitations, Pulmonary embolism ([facility] [address] Rhinitis, allergic, Sleep apnea, and Venous thromboembolism. has a past surgical history that includes Cholecystectomy; Mandible fracture surgery (); Tubal ligation; Upper gastrointestinal endoscopy (2010); pr colsc flx w/rmvl of tumor polyp lesion snare tq (3/27/2017); pr colsc flexible w/control bleeding any method (3/27/2017); Cataract removal with implant (Left, 2018); pr xcapsl ctrc rmvl insj io lens prosth w/o ecp (Left, 2018); Colonoscopy (2021); Upper gastrointestinal endoscopy (N/A, 2021); and Colonoscopy (N/A, 2021). Restrictions  Restrictions/Precautions  Restrictions/Precautions: Isolation, Fall Risk, General Precautions  Subjective   General  Chart Reviewed: Yes  Response To Previous Treatment: Patient with no complaints from previous session.   Family / Caregiver Present:

## 2021-09-19 NOTE — PROGRESS NOTES
Initial shift assessment done and VS obtained as charted in flow sheet. Pt denies any pain, SPO2 95% on heated high flow O2, 25 LPM at 64% FIO2. Pt assisted to commode from chair, then to bed. Pt tolerated fairly well. Output recorded. Pt assisted with positioning, denies any other needs or concerns at this time. Call light and bedside table remain within reach. Will continue to monitor.

## 2021-09-19 NOTE — PROGRESS NOTES
/[facility] [address] [place] [address] Progress Note   COVID 19/ Patient  Today's Date and Time: 9/19/2021, 10:09 AM    Impression :   · COVID 19 Suspect  · COVID 19 Confirmed Infection  · Covid tests:  · 9/15/21 Positive  · 9/8/21: Positive  · History of PE on Eliquis  · Essential hypertension  · Moderate persistent asthma without complication  · Patient has not received her Covid vaccine    Patient evaluated by Telemedicine. Requesting Institution: [facility]  Provider Institution: [address] [facility] [address] [place], [address] Person at [facility]: Ms Rodney Elana, APRN- IM    Recommendations:   · Monitor off antibiotics  · Remdesivir initiated 9/15/21-this may not be very effective as the patient was diagnosed over a week ago. · Decadron initiated 9/15/21  · Actemra given 9-18-21    Medical Decision Making/Summary/Discussion:9/19/2021     · Patient admitted with suspected COVID 19 infection  · Covid test confirmed positive. Infection Control Recommendations   · Universal Precautions  · Airborne isolation  · Droplet Isolation    Antimicrobial Stewardship Recommendations     · Discontinuation of therapy  Coordination of Outpatient Care:   · Estimated Length of IV antimicrobials:TBD  · Patient will need Midline Catheter Insertion: TBD  · Patient will need PICC line Insertion: No  · Patient will need: Home IV , [place],  SNF,  LTAC:TBD  · Patient will need outpatient wound care:No    Chief complaint/reason for consultation:   · Concern for COVID infection      History of Present Illness:   Judith Britt is a 54y.o.-year-old female who was initially admitted on 9/15/2021. Patient seen at the request of Dr. Vijaya Bhatt:    This patient who tested positive for COVID-19 on 9/8/2021 presented through ER with complaints of worsening shortness of breath with associated pleuritic chest pain, cough and fatigue.   She initially began having symptoms on 9/7/2021. The patient was oxygenating at 88% on room air and imaging revealed bilateral groundglass opacities. She was initiated on Decadron and remdesivir and admitted to the floor. CTA chest 9/15/2021  Impression   Patchy bilateral ground-glass and airspace infiltrates most notably in the   lung periphery compatible with patient's history of COVID pneumonia.       No obvious evidence of pulmonary embolism. Patient admitted because of concerns with COVID 19.    CURRENT EVALUATION : 9/19/2021    Afebrile  VS stable, HTN    The patient continues on hi flow NC with slightly less FiO2 than yesterday. Actemra administered 9/18/21  CRP is decreasing    Patient exhibiting respiratory distress. yes  Respiratory secretions: no    Patient receiving supplemental oxygen. 3-->5-->25 L/min High flow NC  02 sat 94-->92-->95-->93%  RR 20-->18-->24-->18      % FIO2: 50-->61-->49  Flow rate: 25  PEEP:      QTc:       NEWS Score: 0-4 Low risk group; 5-6: Medium risk group; 7 or above: High risk group  Parameters 3 2 1 0 1 2 3   Age    < 65   ? 65   RR ? 8  9-11 12-20  21-24 ? 25   O2 Sats ?  91 92-93 94-95 ? 96      Suppl O2  Yes  No      SBP ? 90  101-110 111-219   ? 220   HR ? 40  41-50 51-90  111-130 ? 131   Consciousness    Alert   Drowsiness, lethargy, or confusion   Temperature ? 35.0 C (95.0 F)  35.1-36.0 C 95.1-96.9 F 36.1-38.0 C 97.0-100.4 F 38.1-39.0 C 100.5-102.3 F ? 39.1 C ? 102.4 F      NEWS Score:   9/16/21: 3 Low risk    Overall Daily Picture:      Unchanged    Presence of secondary bacterial Infection:    No   Additional antibiotics: No    Labs, X rays reviewed: 9/19/2021    BUN:13  Cr:0.57    WBC:6.2  Hb:13.2  Plat: 155-->172-->236    Absolute Neutrophils:4.85  Absolute Lymphocytes:1.16  Neutrophil/Lymphocyte Ratio: 4.2 high risk    CRP: 20-->38.8-->18.3  Ferritin:  LDH:   D-dimer: 1.10    Pro Calcitonin:      Cultures:  Urine:  ·   Blood:  ·   Sputum :  ·   Wound:       CXR: Communication with Friends and Family:     Frequency of Social Gatherings with Friends and Family:     Attends Tenriism Services:     Active Member of Clubs or Organizations:     Attends Club or Organization Meetings:     Marital Status:    Intimate Partner Violence:     Fear of Current or Ex-Partner:     Emotionally Abused:     Physically Abused:     Sexually Abused:        Family History:     Family History   Problem Relation Age of Onset    Cancer Mother         breast ca    Diabetes Mother     Depression Mother     Thyroid Disease Mother     High Blood Pressure Mother     Heart Disease Mother     Cancer Father         prostate ca    COPD Father     Heart Failure Father     Heart Disease Father     High Blood Pressure Father     Deep Vein Thrombosis Sister     Breast Cancer Sister     Deep Vein Thrombosis Sister         Allergies:   Levaquin [levofloxacin in d5w] and Pcn [penicillins]     Review of Systems:       Constitutional: Generalized malaise  Head: No headaches  Eyes: No double vision or blurry vision. No conjunctival inflammation. ENT: No sore throat or runny nose. . No hearing loss, tinnitus or vertigo. Cardiovascular: No chest pain or palpitations. Shortness of breath. CARBAJAL  Lung:  Shortness of breath & cough. No sputum production  Abdomen: No nausea, vomiting, diarrhea, or abdominal pain. Crystal Melissa No cramps. Genitourinary: No increased urinary frequency, or dysuria. No hematuria. No suprapubic or CVA pain  Musculoskeletal: No muscle aches or pains. No joint effusions, swelling or deformities  Hematologic: No bleeding or bruising. Neurologic: No headache, weakness, numbness, or tingling. Integument: No rash, no ulcers. Psychiatric: No depression. Endocrine: No polyuria, no polydipsia, no polyphagia.     Physical Examination :     Patient Vitals for the past 8 hrs:   BP Temp Temp src Pulse Resp SpO2 Weight   09/19/21 0858 135/60 98.2 °F (36.8 °C) Oral 61 18 93 % --   09/19/21 0515 -- -- -- -- -- 93 % --   09/19/21 0509 -- -- -- -- -- 96 % --   09/19/21 0430 -- -- -- -- -- -- 283 lb 1.1 oz (128.4 kg)   09/19/21 0315 -- -- -- 54 -- 97 % --   09/19/21 0235 -- -- -- -- -- 96 % --     General Appearance: Awake, alert, and in no apparent distress  Head:  Normocephalic, no trauma  Eyes: Pupils equal, round, reactive to light; sclera anicteric; conjunctivae pink. No embolic phenomena. ENT: Oropharynx clear, without erythema, exudate, or thrush. No tenderness of sinuses. Mouth/throat: mucosa pink and moist. No lesions. Dentition in good repair. Neck:Supple, without lymphadenopathy. Thyroid normal, No bruits. Pulmonary/Chest: Clear to auscultation, without wheezes, rales, or rhonchi. No dullness to percussion. Cardiovascular: Regular rate and rhythm without murmurs, rubs, or gallops. Abdomen: Soft, non tender. Bowel sounds normal. No organomegaly  All four Extremities: No cyanosis, clubbing, edema, or effusions. Neurologic: No gross sensory or motor deficits. Skin: Warm and dry with good turgor. No signs of peripheral arterial or venous insufficiency. No ulcerations. No open wounds. Medical Decision Making -Laboratory:   I have independently reviewed/ordered the following labs:    CBC with Differential:   Recent Labs     09/18/21  0545 09/19/21  0530   WBC 6.8 6.2   HGB 13.4 13.2   HCT 42.3 39.9    259   LYMPHOPCT 17* 18*   MONOPCT 8 8     BMP:   Recent Labs     09/18/21  0545 09/19/21  0530    141   K 4.0 4.1    105   CO2 25 28   BUN 13 13   CREATININE 0.62 0.57     Hepatic Function Panel:   No results for input(s): PROT, LABALBU, BILIDIR, IBILI, BILITOT, ALKPHOS, ALT, AST in the last 72 hours. No results for input(s): RPR in the last 72 hours. No results for input(s): HIV in the last 72 hours. No results for input(s): BC in the last 72 hours.   Lab Results   Component Value Date    RBC 4.49 09/19/2021    RBC 4.67 11/02/2011    WBC 6.2 09/19/2021     Lab Results Component Value Date    CREATININE 0.57 09/19/2021    GLUCOSE 134 09/19/2021    GLUCOSE 99 11/02/2011       Medical Decision Making-Imaging:     Narrative   EXAMINATION:   CTA OF THE CHEST 9/15/2021 6:47 pm       TECHNIQUE:   CTA of the chest was performed after the administration of intravenous   contrast.  Multiplanar reformatted images are provided for review.  MIP   images are provided for review. Dose modulation, iterative reconstruction,   and/or weight based adjustment of the mA/kV was utilized to reduce the   radiation dose to as low as reasonably achievable.       COMPARISON:   None.       HISTORY:   ORDERING SYSTEM PROVIDED HISTORY: hypoxia, tachycardia, covid   TECHNOLOGIST PROVIDED HISTORY:   hypoxia, tachycardia, covid   Decision Support Exception - unselect if not a suspected or confirmed   emergency medical condition->Emergency Medical Condition (MA)       FINDINGS:   Pulmonary Arteries: Pulmonary arteries are less than adequately opacified for   evaluation. No evidence of intraluminal filling defect to suggest pulmonary   embolism.  Main pulmonary artery is normal in caliber.       Mediastinum: There are a few small nonspecific lymph nodes seen in the middle   mediastinum. No suspicious lymphadenopathy.       Lungs/pleura: Patchy bilateral ground-glass and airspace infiltrates most   notably in the lung periphery. ..  No pulmonary masses are noted. No pleural   effusions are identified.       Cardiomediastinal Structures: Intimal calcifications associated with the   thoracic aorta. No evidence of thoracic aortic aneurysm or dissection. Cardiac chambers are normal       Upper Abdomen: Small hiatal hernia       Soft Tissues/Bones:  There are hypertrophic degenerative changes in the   thoracic spine.       No acute osseous abnormalities.           Impression   Patchy bilateral ground-glass and airspace infiltrates most notably in the   lung periphery compatible with patient's history of COVID pneumonia.       No obvious evidence of pulmonary embolism.             Narrative   EXAMINATION:   ONE XRAY VIEW OF THE CHEST       9/15/2021 5:21 pm       COMPARISON:   09/13/2021       HISTORY:   ORDERING SYSTEM PROVIDED HISTORY: cough, covid, worsening sob   TECHNOLOGIST PROVIDED HISTORY:   cough, covid, worsening sob       FINDINGS:   . The cardiac size is normal. No  pleural effusions are seen.  Patchy   bilateral interstitial infiltrates, improved on the right however progressive   on the left.  Pulmonary vascularity appears normal. There is mild ectasia of   the thoracic aorta.  . No acute bony abnormalities. The hilar structures are   normal.           Impression   Patchy bilateral interstitial infiltrates, improved on the right however   progressive on the left compatible with patient's history of COVID.                 Medical Decision [id number]:       Medical Decision Making-Other:     Note:  · Labs, medications, radiologic studies were reviewed with personal review of films  · Large amounts of data were reviewed  · Discussed with nursing Staff, Discharge planner  · Infection Control and Prevention measures reviewed  · All prior entries were reviewed  · Administer medications as ordered  · Prognosis: Guarded  · Discharge planning reviewed      Thank you for allowing us to participate in the care of this patient. Please call with questions. Oleg Pham, APRN - CNP     ATTESTATION:    I have discussed the case, including pertinent history and exam findings with the APRN. I have evaluated the  History, physical findings and pictures of the patient and the key elements of the encounter have been performed by me. I have reviewed the laboratory data, other diagnostic studies and discussed them with the APRN. I have updated the medical record where necessary. I agree with the assessment, plan and orders as documented by the APRN.     Swapnil Ayers MD.          Pager: [phone] - Office: [phone]

## 2021-09-20 ENCOUNTER — CARE COORDINATION (OUTPATIENT)
Dept: OTHER | Facility: CLINIC | Age: 56
End: 2021-09-20

## 2021-09-20 LAB
ABSOLUTE EOS #: <0.03 K/UL (ref 0–0.44)
ABSOLUTE IMMATURE GRANULOCYTE: 0.03 K/UL (ref 0–0.3)
ABSOLUTE LYMPH #: 1.25 K/UL (ref 1.1–3.7)
ABSOLUTE MONO #: 0.59 K/UL (ref 0.1–1.2)
ANION GAP SERPL CALCULATED.3IONS-SCNC: 12 MMOL/L (ref 9–17)
BASOPHILS # BLD: 0 % (ref 0–2)
BASOPHILS ABSOLUTE: <0.03 K/UL (ref 0–0.2)
BUN BLDV-MCNC: 13 MG/DL (ref 6–20)
BUN/CREAT BLD: 23 (ref 9–20)
C-REACTIVE PROTEIN: 6.5 MG/L (ref 0–5)
CALCIUM SERPL-MCNC: 8.7 MG/DL (ref 8.6–10.4)
CHLORIDE BLD-SCNC: 103 MMOL/L (ref 98–107)
CO2: 28 MMOL/L (ref 20–31)
CREAT SERPL-MCNC: 0.56 MG/DL (ref 0.5–0.9)
DIFFERENTIAL TYPE: ABNORMAL
EOSINOPHILS RELATIVE PERCENT: 0 % (ref 1–4)
GFR AFRICAN AMERICAN: >60 ML/MIN
GFR NON-AFRICAN AMERICAN: >60 ML/MIN
GFR SERPL CREATININE-BSD FRML MDRD: ABNORMAL ML/MIN/{1.73_M2}
GFR SERPL CREATININE-BSD FRML MDRD: ABNORMAL ML/MIN/{1.73_M2}
GLUCOSE BLD-MCNC: 131 MG/DL (ref 70–99)
HCT VFR BLD CALC: 40.7 % (ref 36.3–47.1)
HEMOGLOBIN: 13.6 G/DL (ref 11.9–15.1)
IMMATURE GRANULOCYTES: 0 %
INR BLD: 1.2
LYMPHOCYTES # BLD: 16 % (ref 24–43)
MCH RBC QN AUTO: 29.4 PG (ref 25.2–33.5)
MCHC RBC AUTO-ENTMCNC: 33.4 G/DL (ref 28.4–34.8)
MCV RBC AUTO: 88.1 FL (ref 82.6–102.9)
MONOCYTES # BLD: 8 % (ref 3–12)
NRBC AUTOMATED: 0 PER 100 WBC
PARTIAL THROMBOPLASTIN TIME: 29.4 SEC (ref 23.9–33.8)
PDW BLD-RTO: 13.5 % (ref 11.8–14.4)
PLATELET # BLD: 285 K/UL (ref 138–453)
PLATELET ESTIMATE: ABNORMAL
PMV BLD AUTO: 10.3 FL (ref 8.1–13.5)
POTASSIUM SERPL-SCNC: 4.2 MMOL/L (ref 3.7–5.3)
PROTHROMBIN TIME: 15 SEC (ref 11.5–14.2)
RBC # BLD: 4.62 M/UL (ref 3.95–5.11)
RBC # BLD: ABNORMAL 10*6/UL
SEG NEUTROPHILS: 76 % (ref 36–65)
SEGMENTED NEUTROPHILS ABSOLUTE COUNT: 5.73 K/UL (ref 1.5–8.1)
SODIUM BLD-SCNC: 143 MMOL/L (ref 135–144)
WBC # BLD: 7.6 K/UL (ref 3.5–11.3)
WBC # BLD: ABNORMAL 10*3/UL

## 2021-09-20 PROCEDURE — APPSS30 APP SPLIT SHARED TIME 16-30 MINUTES: Performed by: NURSE PRACTITIONER

## 2021-09-20 PROCEDURE — 85025 COMPLETE CBC W/AUTO DIFF WBC: CPT

## 2021-09-20 PROCEDURE — 1200000000 HC SEMI PRIVATE

## 2021-09-20 PROCEDURE — 99233 SBSQ HOSP IP/OBS HIGH 50: CPT | Performed by: INTERNAL MEDICINE

## 2021-09-20 PROCEDURE — 94761 N-INVAS EAR/PLS OXIMETRY MLT: CPT

## 2021-09-20 PROCEDURE — 80048 BASIC METABOLIC PNL TOTAL CA: CPT

## 2021-09-20 PROCEDURE — 94640 AIRWAY INHALATION TREATMENT: CPT

## 2021-09-20 PROCEDURE — 6360000002 HC RX W HCPCS: Performed by: INTERNAL MEDICINE

## 2021-09-20 PROCEDURE — 2700000000 HC OXYGEN THERAPY PER DAY

## 2021-09-20 PROCEDURE — 36415 COLL VENOUS BLD VENIPUNCTURE: CPT

## 2021-09-20 PROCEDURE — 86140 C-REACTIVE PROTEIN: CPT

## 2021-09-20 PROCEDURE — 97110 THERAPEUTIC EXERCISES: CPT

## 2021-09-20 PROCEDURE — 2580000003 HC RX 258: Performed by: INTERNAL MEDICINE

## 2021-09-20 PROCEDURE — 94669 MECHANICAL CHEST WALL OSCILL: CPT

## 2021-09-20 PROCEDURE — 6370000000 HC RX 637 (ALT 250 FOR IP): Performed by: NURSE PRACTITIONER

## 2021-09-20 PROCEDURE — 85610 PROTHROMBIN TIME: CPT

## 2021-09-20 PROCEDURE — 97116 GAIT TRAINING THERAPY: CPT

## 2021-09-20 PROCEDURE — 97535 SELF CARE MNGMENT TRAINING: CPT

## 2021-09-20 PROCEDURE — 85730 THROMBOPLASTIN TIME PARTIAL: CPT

## 2021-09-20 PROCEDURE — 6370000000 HC RX 637 (ALT 250 FOR IP): Performed by: INTERNAL MEDICINE

## 2021-09-20 RX ADMIN — PANTOPRAZOLE SODIUM 40 MG: 40 TABLET, DELAYED RELEASE ORAL at 09:23

## 2021-09-20 RX ADMIN — APIXABAN 5 MG: 5 TABLET, FILM COATED ORAL at 20:30

## 2021-09-20 RX ADMIN — METOPROLOL TARTRATE 12.5 MG: 25 TABLET, FILM COATED ORAL at 09:23

## 2021-09-20 RX ADMIN — OXYCODONE HYDROCHLORIDE AND ACETAMINOPHEN 1000 MG: 500 TABLET ORAL at 14:11

## 2021-09-20 RX ADMIN — DEXAMETHASONE 6 MG: 4 TABLET ORAL at 09:22

## 2021-09-20 RX ADMIN — SPIRONOLACTONE 25 MG: 25 TABLET, FILM COATED ORAL at 09:22

## 2021-09-20 RX ADMIN — LOSARTAN POTASSIUM 25 MG: 25 TABLET, FILM COATED ORAL at 09:22

## 2021-09-20 RX ADMIN — SODIUM CHLORIDE, PRESERVATIVE FREE 10 ML: 5 INJECTION INTRAVENOUS at 09:23

## 2021-09-20 RX ADMIN — OXYCODONE HYDROCHLORIDE AND ACETAMINOPHEN 1000 MG: 500 TABLET ORAL at 20:30

## 2021-09-20 RX ADMIN — BUDESONIDE AND FORMOTEROL FUMARATE DIHYDRATE 2 PUFF: 160; 4.5 AEROSOL RESPIRATORY (INHALATION) at 10:52

## 2021-09-20 RX ADMIN — FAMOTIDINE 40 MG: 20 TABLET, FILM COATED ORAL at 17:12

## 2021-09-20 RX ADMIN — Medication 6000 UNITS: at 09:22

## 2021-09-20 RX ADMIN — APIXABAN 5 MG: 5 TABLET, FILM COATED ORAL at 09:23

## 2021-09-20 RX ADMIN — OXYCODONE HYDROCHLORIDE AND ACETAMINOPHEN 1000 MG: 500 TABLET ORAL at 09:22

## 2021-09-20 RX ADMIN — OXYCODONE HYDROCHLORIDE AND ACETAMINOPHEN 1000 MG: 500 TABLET ORAL at 17:12

## 2021-09-20 RX ADMIN — Medication 100 MG: at 09:22

## 2021-09-20 RX ADMIN — BUDESONIDE AND FORMOTEROL FUMARATE DIHYDRATE 2 PUFF: 160; 4.5 AEROSOL RESPIRATORY (INHALATION) at 21:22

## 2021-09-20 RX ADMIN — SODIUM CHLORIDE, PRESERVATIVE FREE 10 ML: 5 INJECTION INTRAVENOUS at 20:30

## 2021-09-20 ASSESSMENT — PAIN SCALES - GENERAL
PAINLEVEL_OUTOF10: 0
PAINLEVEL_OUTOF10: 0

## 2021-09-20 NOTE — PROGRESS NOTES
Results   Component Value Date    WBC 7.6 09/20/2021    HGB 13.6 09/20/2021    MCV 88.1 09/20/2021     09/20/2021      Lab Results   Component Value Date    GLUCOSE 131 (H) 09/20/2021    BUN 13 09/20/2021    CREATININE 0.56 09/20/2021     09/20/2021    K 4.2 09/20/2021    CALCIUM 8.7 09/20/2021     09/20/2021    CO2 28 09/20/2021       · EKG reviewed: my interpretation is: sinus tachycadia with 1st degree AV block, LAD        · Imaging Data:  CT CHEST PULMONARY EMBOLISM W CONTRAST   Final Result   Patchy bilateral ground-glass and airspace infiltrates most notably in the   lung periphery compatible with patient's history of COVID pneumonia. No obvious evidence of pulmonary embolism. XR CHEST PORTABLE   Final Result   Patchy bilateral interstitial infiltrates, improved on the right however   progressive on the left compatible with patient's history of COVID.                Assessment / Plan:     ·  Acute respiratory failure with hypoxia due to Covid-19 pneumonia  · Continue IV Remdesivir   · Continue Dexamethasone  · Actemra administered 9/18/21  · Mucinex-DM  · Supplemental O2 to keep SpO2 > 92% - currently requiring heated high flow O2  · Acapella  · Intermittent prone positioning as tolerated  · Monitor labs:    · CRP 20.0 (9/16) --> 38.8 (9/17) --> 18.3 (9/18) --> 11.2 (9/19) --> pending today  · D-dimer 1.04 (9/15) --> 1.10 (9/16) --> 1.10 (9/18)  · Hypertension  · Continue Losartan, Metoprolol  · SUSY on CPAP  · HS CPAP  · DVT prophylaxis: on longterm eliquis due to h/o DVT and PE  · Peptic ulcer prophylaxis: Pepcid  · High risk medications: Remdesivir   · Dispositon: pending        Eli Hall MD , MBATSHEVA.  9/20/2021  10:40 AM

## 2021-09-20 NOTE — PROGRESS NOTES
/[facility] [address] [place] [address] Progress Note   COVID 19/ Patient  Today's Date and Time: 9/20/2021, 9:40 AM    Impression :   · COVID 19 Suspect  · COVID 19 Confirmed Infection  · Covid tests:  · 9/15/21 Positive  · 9/8/21: Positive  · History of PE on Eliquis  · Essential hypertension  · Moderate persistent asthma without complication  · Patient has not received her Covid vaccine    Patient evaluated by Telemedicine. Requesting Institution: [facility]  Provider Institution: [address] [place] [address],[facility] [address] [place], [address] Person at [facility]: Ms Royce Peralessola, APRN- IM    Recommendations:   · Monitor off antibiotics  · Remdesivir initiated 9/15/21-this may not be very effective as the patient was diagnosed over a week ago. · Decadron initiated 9/15/21  · Actemra given 9-18-21    Medical Decision Making/Summary/Discussion:9/20/2021     · Patient admitted with suspected COVID 19 infection  · Covid test confirmed positive. Infection Control Recommendations   · Universal Precautions  · Airborne isolation  · Droplet Isolation    Antimicrobial Stewardship Recommendations     · Discontinuation of therapy  Coordination of Outpatient Care:   · Estimated Length of IV antimicrobials:TBD  · Patient will need Midline Catheter Insertion: TBD  · Patient will need PICC line Insertion: No  · Patient will need: Home IV , [place],  SNF,  LTAC:TBD  · Patient will need outpatient wound care:No    Chief complaint/reason for consultation:   · Concern for COVID infection      History of Present Illness:   Donell Laboy is a 54y.o.-year-old female who was initially admitted on 9/15/2021. Patient seen at the request of Dr. Janine Todd:    This patient who tested positive for COVID-19 on 9/8/2021 presented through ER with complaints of worsening shortness of breath with associated pleuritic chest pain, cough and fatigue.   She initially began having symptoms on 9/7/2021. The patient was oxygenating at 88% on room air and imaging revealed bilateral groundglass opacities. She was initiated on Decadron and remdesivir and admitted to the floor. CTA chest 9/15/2021  Impression   Patchy bilateral ground-glass and airspace infiltrates most notably in the   lung periphery compatible with patient's history of COVID pneumonia.       No obvious evidence of pulmonary embolism. Patient admitted because of concerns with COVID 19.    CURRENT EVALUATION : 9/20/2021    Afebrile  VS stable    The patient continues on hi flow NC     Actemra administered 9/18/21  CRP is decreasing    Patient exhibiting respiratory distress. yes  Respiratory secretions: no    Patient receiving supplemental oxygen. 3-->5-->25 L/min High flow NC  02 sat: 91%  RR 20      % FIO2: 50-->61-->49  Flow rate: 25  PEEP:      QTc:       NEWS Score: 0-4 Low risk group; 5-6: Medium risk group; 7 or above: High risk group  Parameters 3 2 1 0 1 2 3   Age    < 65   ? 65   RR ? 8  9-11 12-20  21-24 ? 25   O2 Sats ?  91 92-93 94-95 ? 96      Suppl O2  Yes  No      SBP ? 90  101-110 111-219   ? 220   HR ? 40  41-50 51-90  111-130 ? 131   Consciousness    Alert   Drowsiness, lethargy, or confusion   Temperature ? 35.0 C (95.0 F)  35.1-36.0 C 95.1-96.9 F 36.1-38.0 C 97.0-100.4 F 38.1-39.0 C 100.5-102.3 F ? 39.1 C ? 102.4 F      NEWS Score:   9/16/21: 3 Low risk    Overall Daily Picture:      Unchanged    Presence of secondary bacterial Infection:    No   Additional antibiotics: No    Labs, X rays reviewed: 9/20/2021    BUN:13-->13  Cr:0.57-->0.56    WBC:6.2-->7.6  Hb:13.2-->13.6  Plat: 155-->172-->236    Absolute Neutrophils:4.85  Absolute Lymphocytes:1.16  Neutrophil/Lymphocyte Ratio: 4.2 high risk    CRP: 20-->38.8-->18.3-->11.2  Ferritin:  LDH:   D-dimer: 1.10    Pro Calcitonin:      Cultures:  Urine:  ·   Blood:  ·   Sputum :  ·   Wound:       CXR: 9/15/21        CAT:    9/15/21    Discussed with patient, RN, CC, IM. I have personally reviewed the past medical history, past surgical history, medications, social history, and family history, and I have updated the database accordingly.   Past Medical History:     Past Medical History:   Diagnosis Date    Acute pulmonary embolism ([facility] [address] 11/2/2015    Anxiety     Asthma     Constipation, chronic     CPAP (continuous positive airway pressure) dependence     Diabetes mellitus ([facility] [address]     pt states she was prediabetic at one point    Embolism - blood clot     right lung and right leg    Esophageal reflux     Factor V Leiden ([facility] [address]     Heart murmur     HTN (hypertension)     Hyperlipidemia     Irritable bladder     MTHFR mutation     MVP (mitral valve prolapse)     Palpitations     Pulmonary embolism (HCC) 10/15/2012    Rhinitis, allergic 10/15/2012    Sleep apnea     Venous thromboembolism 5/2/2016       Past Surgical  History:     Past Surgical History:   Procedure Laterality Date    CATARACT REMOVAL WITH IMPLANT Left 04/02/2018    CHOLECYSTECTOMY      around 2000    COLONOSCOPY  07/12/2021    COLONOSCOPY N/A 7/12/2021    COLONOSCOPY POLYPECTOMY CECUM COLD [facility]OPSY [facility]  [facility]2017    COLONOSCOPY CONTROL HEMORRHAGE performed by Robbi Sanabria MD at [address] FLX W/RMVL OF TUMOR POLYP LESION [address] TQ  3/27/2017    COLONOSCOPY POLYPECTOMY SNARE/COLD BIOPSY performed by Robbi Sanabria MD at [facility] [address] RMVL INSJ IO LENS PROSTH W/O ECP Left 4/2/2018    EYE CATARACT EMULSIFICATION IOL IMPLANT performed by Bebe Velarde MD at [address] ENDOSCOPY  1/2010    with biopsy    UPPER GASTROINTESTINAL ENDOSCOPY N/A 7/12/2021    EGD BIOPSY OF STOMACH, GASTRIC POLYPECTOMY AND DUODENUM BIOPSY performed by Sebastien Werner MD at [address] [facility].       Medications:      zinc sulfate  100 mg Oral Daily    ascorbic acid  1,000 mg Oral 4x Daily    budesonide-formoterol  2 puff Inhalation BID    apixaban  5 mg Oral BID    famotidine  40 mg Oral QPM    metoprolol tartrate  12.5 mg Oral BID    losartan  25 mg Oral Daily    pantoprazole  40 mg Oral Daily    spironolactone  25 mg Oral Daily    sodium chloride flush  10 mL IntraVENous 2 times per day    dexamethasone  6 mg Oral Daily    Vitamin D  6,000 Units Oral Daily    Followed by   Angela Payne ON 9/23/2021] Vitamin D  2,000 Units Oral Daily       Social History:     Social History     Socioeconomic History    Marital status:      Spouse name: Not on file    Number of children: Not on file    Years of education: Not on file    Highest education level: Not on file   Occupational History    Not on file   Tobacco Use    Smoking status: Never Smoker    Smokeless tobacco: Never Used   Vaping Use    Vaping Use: Never used   Substance and Sexual Activity    Alcohol use: Yes     Alcohol/week: 0.0 standard drinks     Comment: 1 glass of wine/month    Drug use: No    Sexual activity: Yes     Partners: Male     Birth control/protection: Surgical   Other Topics Concern    Not on file   Social History Narrative    Not on file     Social Determinants of Health     Financial Resource Strain: Low Risk     Difficulty of Paying Living Expenses: Not hard at all   Food Insecurity: No Food Insecurity    Worried About Running Out of Food in the Last Year: Never true    [address] in the Last Year: Never true   Transportation Needs: No Transportation Needs    Lack of Transportation (Medical): No    Lack of Transportation (Non-Medical):  No   Physical Activity:     Days of Exercise per Week:     Minutes of Exercise per Session:    Stress:     Feeling of Stress :    Social Connections:     Frequency of Communication with Friends and Family:     Frequency of Social Gatherings with Friends and Family:     Attends Restoration Services:     Active Member of Clubs or Organizations:     Attends Club or Organization Meetings:     Marital Status:    Intimate Partner Violence:     Fear of Current or Ex-Partner:     Emotionally Abused:     Physically Abused:     Sexually Abused:        Family History:     Family History   Problem Relation Age of Onset    Cancer Mother         breast ca    Diabetes Mother     Depression Mother     Thyroid Disease Mother     High Blood Pressure Mother     Heart Disease Mother     Cancer Father         prostate ca    COPD Father     Heart Failure Father     Heart Disease Father     High Blood Pressure Father     Deep Vein Thrombosis Sister     Breast Cancer Sister     Deep Vein Thrombosis Sister         Allergies:   Levaquin [levofloxacin in d5w] and Pcn [penicillins]     Review of Systems:       Constitutional: Generalized malaise  Head: No headaches  Eyes: No double vision or blurry vision. No conjunctival inflammation. ENT: No sore throat or runny nose. . No hearing loss, tinnitus or vertigo. Cardiovascular: No chest pain or palpitations. Shortness of breath. CARBAJAL  Lung:  Shortness of breath & cough. No sputum production  Abdomen: No nausea, vomiting, diarrhea, or abdominal pain. Elise Graven No cramps. Genitourinary: No increased urinary frequency, or dysuria. No hematuria. No suprapubic or CVA pain  Musculoskeletal: No muscle aches or pains. No joint effusions, swelling or deformities  Hematologic: No bleeding or bruising. Neurologic: No headache, weakness, numbness, or tingling. Integument: No rash, no ulcers. Psychiatric: No depression. Endocrine: No polyuria, no polydipsia, no polyphagia.     Physical Examination :     Patient Vitals for the past 8 hrs:   BP Temp Temp src Pulse Resp SpO2 Weight   09/20/21 0917 (!) 136/52 96.9 °F (36.1 °C) Temporal 66 20 91 % --   09/20/21 0500 -- -- -- -- -- -- 283 lb (128.4 kg) 09/20/21 0235 -- -- -- -- -- 94 % --     General Appearance: Awake, alert, and in no apparent distress  Head:  Normocephalic, no trauma  Eyes: Pupils equal, round, reactive to light; sclera anicteric; conjunctivae pink. No embolic phenomena. ENT: Oropharynx clear, without erythema, exudate, or thrush. No tenderness of sinuses. Mouth/throat: mucosa pink and moist. No lesions. Dentition in good repair. Neck:Supple, without lymphadenopathy. Thyroid normal, No bruits. Pulmonary/Chest: Clear to auscultation, without wheezes, rales, or rhonchi. No dullness to percussion. Cardiovascular: Regular rate and rhythm without murmurs, rubs, or gallops. Abdomen: Soft, non tender. Bowel sounds normal. No organomegaly  All four Extremities: No cyanosis, clubbing, edema, or effusions. Neurologic: No gross sensory or motor deficits. Skin: Warm and dry with good turgor. No signs of peripheral arterial or venous insufficiency. No ulcerations. No open wounds. Medical Decision Making -Laboratory:   I have independently reviewed/ordered the following labs:    CBC with Differential:   Recent Labs     09/19/21  0530 09/20/21  0550   WBC 6.2 7.6   HGB 13.2 13.6   HCT 39.9 40.7    285   LYMPHOPCT 18* 16*   MONOPCT 8 8     BMP:   Recent Labs     09/19/21  0530 09/20/21  0550    143   K 4.1 4.2    103   CO2 28 28   BUN 13 13   CREATININE 0.57 0.56     Hepatic Function Panel:   No results for input(s): PROT, LABALBU, BILIDIR, IBILI, BILITOT, ALKPHOS, ALT, AST in the last 72 hours. No results for input(s): RPR in the last 72 hours. No results for input(s): HIV in the last 72 hours. No results for input(s): BC in the last 72 hours.   Lab Results   Component Value Date    RBC 4.62 09/20/2021    RBC 4.67 11/02/2011    WBC 7.6 09/20/2021     Lab Results   Component Value Date    CREATININE 0.56 09/20/2021    GLUCOSE 131 09/20/2021    GLUCOSE 99 11/02/2011       Medical Decision Making-Imaging:     Narrative EXAMINATION:   CTA OF THE CHEST 9/15/2021 6:47 pm       TECHNIQUE:   CTA of the chest was performed after the administration of intravenous   contrast.  Multiplanar reformatted images are provided for review.  MIP   images are provided for review. Dose modulation, iterative reconstruction,   and/or weight based adjustment of the mA/kV was utilized to reduce the   radiation dose to as low as reasonably achievable.       COMPARISON:   None.       HISTORY:   ORDERING SYSTEM PROVIDED HISTORY: hypoxia, tachycardia, covid   TECHNOLOGIST PROVIDED HISTORY:   hypoxia, tachycardia, covid   Decision Support Exception - unselect if not a suspected or confirmed   emergency medical condition->Emergency Medical Condition (MA)       FINDINGS:   Pulmonary Arteries: Pulmonary arteries are less than adequately opacified for   evaluation. No evidence of intraluminal filling defect to suggest pulmonary   embolism.  Main pulmonary artery is normal in caliber.       Mediastinum: There are a few small nonspecific lymph nodes seen in the middle   mediastinum. No suspicious lymphadenopathy.       Lungs/pleura: Patchy bilateral ground-glass and airspace infiltrates most   notably in the lung periphery. ..  No pulmonary masses are noted. No pleural   effusions are identified.       Cardiomediastinal Structures: Intimal calcifications associated with the   thoracic aorta. No evidence of thoracic aortic aneurysm or dissection. Cardiac chambers are normal       Upper Abdomen: Small hiatal hernia       Soft Tissues/Bones:  There are hypertrophic degenerative changes in the   thoracic spine.       No acute osseous abnormalities.           Impression   Patchy bilateral ground-glass and airspace infiltrates most notably in the   lung periphery compatible with patient's history of COVID pneumonia.       No obvious evidence of pulmonary embolism.             Narrative   EXAMINATION:   ONE XRAY VIEW OF THE CHEST       9/15/2021 5:21 pm     COMPARISON:   09/13/2021       HISTORY:   ORDERING SYSTEM PROVIDED HISTORY: cough, covid, worsening sob   TECHNOLOGIST PROVIDED HISTORY:   cough, covid, worsening sob       FINDINGS:   . The cardiac size is normal. No  pleural effusions are seen.  Patchy   bilateral interstitial infiltrates, improved on the right however progressive   on the left.  Pulmonary vascularity appears normal. There is mild ectasia of   the thoracic aorta.  . No acute bony abnormalities. The hilar structures are   normal.           Impression   Patchy bilateral interstitial infiltrates, improved on the right however   progressive on the left compatible with patient's history of COVID.                 Medical Decision Osdjyi-Suaeooun-Ctiwt:       Medical Decision Making-Other:     Note:  · Labs, medications, radiologic studies were reviewed with personal review of films  · Large amounts of data were reviewed  · Discussed with nursing Staff, Discharge planner  · Infection Control and Prevention measures reviewed  · All prior entries were reviewed  · Administer medications as ordered  · Prognosis: Guarded  · Discharge planning reviewed      Thank you for allowing us to participate in the care of this patient. Please call with questions. ZACARIAS Cantor - CNP     ATTESTATION:    I have discussed the case, including pertinent history and exam findings with the APRN. I have evaluated the  History, physical findings and pictures of the patient and the key elements of the encounter have been performed by me. I have reviewed the laboratory data, other diagnostic studies and discussed them with the APRN. I have updated the medical record where necessary. I agree with the assessment, plan and orders as documented by the APRN.     Swapnil Ayers MD.              Pager: [phone] - Office: [phone]

## 2021-09-20 NOTE — PROGRESS NOTES
Comprehensive Nutrition Assessment    Type and Reason for Visit:  Reassess    Nutrition Recommendations/Plan:   1. Continue current diet. 2. Continue current ONS. Nutrition Assessment:  Continued mild malnutrition aeb poor appetite per EMR, no flow sheet data at this time to evaluate. Phone call to Pt room unanswered. Weight is up 2# since admission. Continue ONS. Glucose has improved, on dexamethasone. Malnutrition Assessment:  Malnutrition Status:  Mild malnutrition    Context:  Acute Illness     Findings of the 6 clinical characteristics of malnutrition:  Energy Intake:  7 - 50% or less of estimated energy requirements for 5 or more days  Weight Loss:   (3.8% weight loss x 2 weeks)     Body Fat Loss:  Unable to assess     Muscle Mass Loss:  Unable to assess    Fluid Accumulation:  No significant fluid accumulation     Strength:  Not Performed    Estimated Daily Nutrient Needs:  Energy (kcal):  1749-6259 (12-15/kg); Weight Used for Energy Requirements:  Current     Protein (g):  118-124g (2.0-2.1g/kg); Weight Used for Protein Requirements:  Ideal        Fluid (ml/day):  2304 ml; Method Used for Fluid Requirements:  ml/Kg      Nutrition Related Findings:  unable to assess due to isolation       Wounds:  None       Current Nutrition Therapies:    ADULT DIET;  Regular  Adult Oral Nutrition Supplement; Standard High Calorie/High Protein Oral Supplement    Anthropometric Measures:  · Height: 5' 6\" (167.6 cm)  · Current Body Weight: 283 lb (128.4 kg)   · Admission Body Weight: 281 lb (127.5 kg)    · Usual Body Weight: 293 lb (132.9 kg) (8/31/21)     · Ideal Body Weight: 130 lbs; % Ideal Body Weight 216.9 %   · BMI: 45.7  · BMI Categories: Obese Class 3 (BMI 40.0 or greater)       Nutrition Diagnosis:   · Mild malnutrition related to inadequate protein-energy intake as evidenced by intake 0-25%, weight loss      Nutrition Interventions:   Food and/or Nutrient Delivery:  Continue Current Diet, Continue Oral Nutrition Supplement  Nutrition Education/Counseling:  No recommendation at this time   Coordination of Nutrition Care:  Continue to monitor while inpatient    Goals:  PO > 75% of meals and supplements       Recent Labs     09/18/21  0545 09/18/21  0545 09/19/21  0530 09/20/21  0550     --  141 143   K 4.0  --  4.1 4.2     --  105 103   CO2 25  --  28 28   BUN 13  --  13 13   CREATININE 0.62  --  0.57 0.56   GLUCOSE 146*  --  134* 131*   GFR              < >                          < > = values in this interval not displayed. Lab Results   Component Value Date    LABALBU 3.7 09/15/2021      Nutrition Monitoring and Evaluation:   Behavioral-Environmental Outcomes:  None Identified   Food/Nutrient Intake Outcomes:  Food and Nutrient Intake, Supplement Intake  Physical Signs/Symptoms Outcomes:  Biochemical Data, Weight, Nausea or Vomiting     Discharge Planning:     Too soon to determine     Electronically signed by Franklyn Moreno RD, WILFREDO on 9/20/21 at 1:15 PM EDT    Contact: [phone]

## 2021-09-20 NOTE — PROGRESS NOTES
Patient's color has improved and patient states she does feel better than she has been. States that she still feels winded when walking from the bed to the chair but overall has felt she is improving. Patient was given a new gown earlier and did fairly well getting to the [facility] and back to bed. Will continue to monitor and assess.

## 2021-09-20 NOTE — PROGRESS NOTES
Physical Therapy  Facility/Department: [facility] AT THE [facility] MED SURG  Daily Treatment Note  NAME: Nir Spence  : 1965  MRN: [id number]    Date of Service: 2021    Discharge Recommendations:  Continue to assess pending progress        Assessment   Treatment Diagnosis: Decreased activity endurance  Prognosis: Good  Decision Making: Medium Complexity  PT Education: Gait Training;Transfer Training  Patient Education: Educated on above with good understanding  REQUIRES PT FOLLOW UP: Yes  Activity Tolerance  Activity Tolerance: Patient limited by endurance  Activity Tolerance: limited by SOB     Patient Diagnosis(es): The primary encounter diagnosis was Pneumonia due to COVID-19 virus. A diagnosis of Acute respiratory failure with hypoxia (HCC) was also pertinent to this visit. has a past medical history of Acute pulmonary embolism (HCC), Anxiety, Asthma, Constipation, chronic, CPAP (continuous positive airway pressure) dependence, Diabetes mellitus ([facility] [address] Embolism - blood clot, Esophageal reflux, Factor V Leiden ([facility] [address] Heart murmur, HTN (hypertension), Hyperlipidemia, Irritable bladder, MTHFR mutation, MVP (mitral valve prolapse), Palpitations, Pulmonary embolism ([facility] [address] Rhinitis, allergic, Sleep apnea, and Venous thromboembolism. has a past surgical history that includes Cholecystectomy; Mandible fracture surgery (); Tubal ligation; Upper gastrointestinal endoscopy (2010); pr colsc flx w/rmvl of tumor polyp lesion snare tq (3/27/2017); pr colsc flexible w/control bleeding any method (3/27/2017); Cataract removal with implant (Left, 2018); pr xcapsl ctrc rmvl insj io lens prosth w/o ecp (Left, 2018); Colonoscopy (2021); Upper gastrointestinal endoscopy (N/A, 2021); and Colonoscopy (N/A, 2021).     Restrictions  Restrictions/Precautions  Restrictions/Precautions: Isolation, Fall Risk, General Precautions  Subjective   General  Chart Reviewed: Yes  Response To Previous Treatment: Patient with no complaints from previous session. Family / Caregiver Present: No  Referring Practitioner: Tee Pearl MD  Subjective  Subjective: SOB with talking and minimal activity          Orientation  Orientation  Overall Orientation Status: Within Normal Limits  Cognition      Objective   Bed mobility  Comment: Pt up in chair  Transfers  Sit to Stand: Stand by assistance  Stand to sit: Stand by assistance  Ambulation  Ambulation?: Yes  WB Status: FWB  Ambulation 1  Surface: level tile  Device: No Device  Other Apparatus: O2 (hi flow)  Assistance: Stand by assistance  Quality of Gait: SPO2=87% after amb but recovered to over 90% within 30 sec  Gait Deviations: Decreased step length  Distance: 15 feet x 3  Comments: Limited by high flow tubing, fatigues quickly. Balance  Posture: Fair  Sitting - Static: Good  Sitting - Dynamic: Good  Standing - Static: Fair  Standing - Dynamic: Fair  Exercises  Straight Leg Raise: x20  Quad Sets: x20  Heelslides: x20  Gluteal Sets: x20  Hip Flexion: x15  Hip Abduction: x20  Knee Long Arc Quad: x15  Ankle Pumps: x20         Goals  Short term goals  Time Frame for Short term goals: 10 days  Short term goal 1: Patient will ambulate 48' with supervision, without LOB  Short term goal 2: Patient will perform bed mobility and transfers with MI  Short term goal 3: Patient will tolerate 20-30 minutes of therex/act to improve endurance for ADLs  Patient Goals   Patient goals : Feel better    Plan    Plan  Times per week: 7  Times per day: Twice a day  Plan Comment: 1x/day on weekends  Safety Devices  Type of devices:  All fall risk precautions in place, Call light within reach, Gait belt, Patient at risk for falls, Nurse notified, Left in chair (no alarm upon entry)     Therapy Time   Individual Concurrent Group Co-treatment   Time In 1426         Time Out 1450         Minutes 24                 Raquel Abrams PTA

## 2021-09-20 NOTE — PROGRESS NOTES
Occupational Therapy  Facility/Department: [facility] AT THE [facility] MED SURG  Daily Treatment Note  NAME: Corby Johnson  : 1965  MRN: [id number]    Date of Service: 2021    Discharge Recommendations:  Continue to assess pending progress       Assessment      OT Education: Plan of Care;OT Role;Transfer Training;Energy Conservation;IADL Safety  Barriers to Learning: none  Activity Tolerance  Activity Tolerance: Patient Tolerated treatment well  Safety Devices  Safety Devices in place: Yes  Type of devices: All fall risk precautions in place; Left in chair;Call light within reach         Patient Diagnosis(es): The primary encounter diagnosis was Pneumonia due to COVID-19 virus. A diagnosis of Acute respiratory failure with hypoxia (HCC) was also pertinent to this visit. has a past medical history of Acute pulmonary embolism (HCC), Anxiety, Asthma, Constipation, chronic, CPAP (continuous positive airway pressure) dependence, Diabetes mellitus ([facility] [address] Embolism - blood clot, Esophageal reflux, Factor V Leiden ([facility] [address] Heart murmur, HTN (hypertension), Hyperlipidemia, Irritable bladder, MTHFR mutation, MVP (mitral valve prolapse), Palpitations, Pulmonary embolism ([facility] [address] Rhinitis, allergic, Sleep apnea, and Venous thromboembolism. has a past surgical history that includes Cholecystectomy; Mandible fracture surgery (); Tubal ligation; Upper gastrointestinal endoscopy (2010); pr colsc flx w/rmvl of tumor polyp lesion snare tq (3/27/2017); pr colsc flexible w/control bleeding any method (3/27/2017); Cataract removal with implant (Left, 2018); pr xcapsl ctrc rmvl insj io lens prosth w/o ecp (Left, 2018); Colonoscopy (2021); Upper gastrointestinal endoscopy (N/A, 2021); and Colonoscopy (N/A, 2021).     Restrictions  Restrictions/Precautions  Restrictions/Precautions: Isolation, Fall Risk, General Precautions  Subjective   General  Chart Reviewed: Yes  Patient assessed for rehabilitation services?: Yes  Response to previous treatment: Patient with no complaints from previous session  Family / Caregiver Present: No  Referring Practitioner: Dr. Camilla Weathers  Diagnosis: acute respiratory failure wtih hypoxia  Subjective  Subjective: Pt reports no pain, easily fatigued, Min coughing. \"I am not coughing anything up yet, but I can feel its looser. \"  General Comment  Comments: Pt supine in bed with HOB elevated on therapist arrival. Agreeable to OT treatment and wanting to get better. Orientation     Objective    Pt participated in seated sponge bath in recliner and toileting on BS. ADL  Feeding: Setup  Grooming: Setup  UE Bathing: Setup  LE Bathing: Setup  Additional Comments: Completed toileting at [facility] Set up        Balance  Sitting Balance: Independent  Standing Balance: Stand by assistance  Standing Balance  Time: ~ 1-2 min  Activity: fxl transfer  Comment: 0 LOB no AD  Functional Mobility  Functional - Mobility Device: No device  Activity: Other  Assist Level: Stand by assistance  Toilet Transfers  Toilet - Technique: Ambulating  Equipment Used: Standard bedside commode  Toilet Transfer: Stand by assistance  Toilet Transfers Comments: 0 LOB     Transfers  Sit to stand: Stand by assistance  Stand to sit: Stand by assistance                                                                 Plan   Plan  Times per week: 7x/week  Times per day: Daily  Current Treatment Recommendations: Strengthening, Patient/Caregiver Education & Training, Balance Training, Functional Mobility Training, Endurance Training, Safety Education & Training, Self-Care / ADL  G-Code     OutComes Score                                                  AM-PAC Score             Goals  Short term goals  Time Frame for Short term goals: 90 days  Short term goal 1: Pt will verbalize 4 ECWS strategies for implementation/use at home to improve safety and independence with ADL and functional tasks.   Short term goal 2: Pt will tolerate standing for

## 2021-09-20 NOTE — PROGRESS NOTES
Physical Therapy  Facility/Department: [facility] AT THE [facility] MED SURG  Daily Treatment Note  NAME: Isabel Mendosa  : 1965  MRN: [id number]    Date of Service: 2021    Discharge Recommendations:  Continue to assess pending progress        Assessment   Treatment Diagnosis: Decreased activity endurance  Prognosis: Good  Decision Making: Medium Complexity  PT Education: Gait Training;Transfer Training  REQUIRES PT FOLLOW UP: Yes  Activity Tolerance  Activity Tolerance: Patient limited by endurance     Patient Diagnosis(es): The primary encounter diagnosis was Pneumonia due to COVID-19 virus. A diagnosis of Acute respiratory failure with hypoxia (HCC) was also pertinent to this visit. has a past medical history of Acute pulmonary embolism (HCC), Anxiety, Asthma, Constipation, chronic, CPAP (continuous positive airway pressure) dependence, Diabetes mellitus ([facility] [address] Embolism - blood clot, Esophageal reflux, Factor V Leiden ([facility] [address] Heart murmur, HTN (hypertension), Hyperlipidemia, Irritable bladder, MTHFR mutation, MVP (mitral valve prolapse), Palpitations, Pulmonary embolism ([facility] [address] Rhinitis, allergic, Sleep apnea, and Venous thromboembolism. has a past surgical history that includes Cholecystectomy; Mandible fracture surgery (); Tubal ligation; Upper gastrointestinal endoscopy (2010); pr colsc flx w/rmvl of tumor polyp lesion snare tq (3/27/2017); pr colsc flexible w/control bleeding any method (3/27/2017); Cataract removal with implant (Left, 2018); pr xcapsl ctrc rmvl insj io lens prosth w/o ecp (Left, 2018); Colonoscopy (2021); Upper gastrointestinal endoscopy (N/A, 2021); and Colonoscopy (N/A, 2021). Restrictions  Restrictions/Precautions  Restrictions/Precautions: Isolation, Fall Risk, General Precautions  Subjective   General  Chart Reviewed: Yes  Response To Previous Treatment: Patient with no complaints from previous session.   Family / Caregiver Present: No  Referring Practitioner: Amberly Castano MD  Subjective  Subjective: Pt states she is feeling ok today, still having pain with deep breaths. Orientation  Orientation  Overall Orientation Status: Within Functional Limits  Cognition      Objective   Bed mobility  Supine to Sit: Modified independent  Sit to Supine: Modified independent  Transfers  Sit to Stand: Stand by assistance  Stand to sit: Stand by assistance  Ambulation  Ambulation?: Yes  WB Status: FWB  Ambulation 1  Surface: level tile  Device: No Device  Other Apparatus:  (high flow O2)  Assistance: Stand by assistance;Contact guard assistance  Distance: 10 ft  Comments: Limited by high flow tubing, fatigues quickly. Balance  Posture: Fair  Sitting - Static: Good  Sitting - Dynamic: Good  Standing - Static: Fair  Standing - Dynamic: Fair  Exercises  Straight Leg Raise: x20  Heelslides: x20  Gluteal Sets: x20  Hip Abduction: x20  Knee Long Arc Quad: x15  Ankle Pumps: x20  Comments: BLE ther ex in supine and seate, frequent RBs d/t fatigue/SOB. SPO2 89-91% throughout tx. Goals  Short term goals  Time Frame for Short term goals: 10 days  Short term goal 1: Patient will ambulate 48' with supervision, without LOB  Short term goal 2: Patient will perform bed mobility and transfers with MI  Short term goal 3: Patient will tolerate 20-30 minutes of therex/act to improve endurance for ADLs  Patient Goals   Patient goals : Feel better    Plan    Plan  Times per week: 7  Times per day: Twice a day  Plan Comment: 1x/day on weekends  Safety Devices  Type of devices:  All fall risk precautions in place, Call light within reach, Gait belt, Patient at risk for falls, Nurse notified, Left in chair (no alarm upon entry)     Therapy Time   Individual Concurrent Group Co-treatment   Time In 1155         Time Out 1218         Minutes [place], [place]

## 2021-09-21 PROBLEM — E87.0 HYPERNATREMIA: Status: ACTIVE | Noted: 2021-09-21

## 2021-09-21 LAB
ABSOLUTE EOS #: 0.04 K/UL (ref 0–0.44)
ABSOLUTE IMMATURE GRANULOCYTE: 0.09 K/UL (ref 0–0.3)
ABSOLUTE LYMPH #: 1.26 K/UL (ref 1.1–3.7)
ABSOLUTE MONO #: 0.71 K/UL (ref 0.1–1.2)
ANION GAP SERPL CALCULATED.3IONS-SCNC: 22 MMOL/L (ref 9–17)
BASOPHILS # BLD: 0 % (ref 0–2)
BASOPHILS ABSOLUTE: <0.03 K/UL (ref 0–0.2)
BUN BLDV-MCNC: 14 MG/DL (ref 6–20)
BUN/CREAT BLD: 25 (ref 9–20)
C-REACTIVE PROTEIN: 3.7 MG/L (ref 0–5)
CALCIUM SERPL-MCNC: 8.6 MG/DL (ref 8.6–10.4)
CHLORIDE BLD-SCNC: 102 MMOL/L (ref 98–107)
CO2: 28 MMOL/L (ref 20–31)
CREAT SERPL-MCNC: 0.57 MG/DL (ref 0.5–0.9)
D-DIMER QUANTITATIVE: 0.97 MG/L FEU (ref 0–0.59)
DIFFERENTIAL TYPE: ABNORMAL
EOSINOPHILS RELATIVE PERCENT: 1 % (ref 1–4)
GFR AFRICAN AMERICAN: >60 ML/MIN
GFR NON-AFRICAN AMERICAN: >60 ML/MIN
GFR SERPL CREATININE-BSD FRML MDRD: ABNORMAL ML/MIN/{1.73_M2}
GFR SERPL CREATININE-BSD FRML MDRD: ABNORMAL ML/MIN/{1.73_M2}
GLUCOSE BLD-MCNC: 135 MG/DL (ref 70–99)
HCT VFR BLD CALC: 41 % (ref 36.3–47.1)
HEMOGLOBIN: 13.6 G/DL (ref 11.9–15.1)
IMMATURE GRANULOCYTES: 1 %
INR BLD: 1.1
LYMPHOCYTES # BLD: 15 % (ref 24–43)
MCH RBC QN AUTO: 29.6 PG (ref 25.2–33.5)
MCHC RBC AUTO-ENTMCNC: 33.2 G/DL (ref 28.4–34.8)
MCV RBC AUTO: 89.3 FL (ref 82.6–102.9)
MONOCYTES # BLD: 8 % (ref 3–12)
NRBC AUTOMATED: 0 PER 100 WBC
PARTIAL THROMBOPLASTIN TIME: 28.8 SEC (ref 23.9–33.8)
PDW BLD-RTO: 13.2 % (ref 11.8–14.4)
PLATELET # BLD: 333 K/UL (ref 138–453)
PLATELET ESTIMATE: ABNORMAL
PMV BLD AUTO: 10.1 FL (ref 8.1–13.5)
POTASSIUM SERPL-SCNC: 4.2 MMOL/L (ref 3.7–5.3)
PROTHROMBIN TIME: 14.2 SEC (ref 11.5–14.2)
RBC # BLD: 4.59 M/UL (ref 3.95–5.11)
RBC # BLD: ABNORMAL 10*6/UL
SEG NEUTROPHILS: 75 % (ref 36–65)
SEGMENTED NEUTROPHILS ABSOLUTE COUNT: 6.57 K/UL (ref 1.5–8.1)
SODIUM BLD-SCNC: 152 MMOL/L (ref 135–144)
WBC # BLD: 8.7 K/UL (ref 3.5–11.3)
WBC # BLD: ABNORMAL 10*3/UL

## 2021-09-21 PROCEDURE — 2700000000 HC OXYGEN THERAPY PER DAY

## 2021-09-21 PROCEDURE — 85025 COMPLETE CBC W/AUTO DIFF WBC: CPT

## 2021-09-21 PROCEDURE — 85610 PROTHROMBIN TIME: CPT

## 2021-09-21 PROCEDURE — 6370000000 HC RX 637 (ALT 250 FOR IP): Performed by: NURSE PRACTITIONER

## 2021-09-21 PROCEDURE — 97535 SELF CARE MNGMENT TRAINING: CPT

## 2021-09-21 PROCEDURE — 99232 SBSQ HOSP IP/OBS MODERATE 35: CPT | Performed by: INTERNAL MEDICINE

## 2021-09-21 PROCEDURE — 94669 MECHANICAL CHEST WALL OSCILL: CPT

## 2021-09-21 PROCEDURE — 6370000000 HC RX 637 (ALT 250 FOR IP): Performed by: INTERNAL MEDICINE

## 2021-09-21 PROCEDURE — 2580000003 HC RX 258: Performed by: INTERNAL MEDICINE

## 2021-09-21 PROCEDURE — 94761 N-INVAS EAR/PLS OXIMETRY MLT: CPT

## 2021-09-21 PROCEDURE — 80048 BASIC METABOLIC PNL TOTAL CA: CPT

## 2021-09-21 PROCEDURE — 85730 THROMBOPLASTIN TIME PARTIAL: CPT

## 2021-09-21 PROCEDURE — 97116 GAIT TRAINING THERAPY: CPT

## 2021-09-21 PROCEDURE — 97110 THERAPEUTIC EXERCISES: CPT

## 2021-09-21 PROCEDURE — 94640 AIRWAY INHALATION TREATMENT: CPT

## 2021-09-21 PROCEDURE — 6360000002 HC RX W HCPCS: Performed by: INTERNAL MEDICINE

## 2021-09-21 PROCEDURE — 36415 COLL VENOUS BLD VENIPUNCTURE: CPT

## 2021-09-21 PROCEDURE — 85379 FIBRIN DEGRADATION QUANT: CPT

## 2021-09-21 PROCEDURE — 2580000003 HC RX 258: Performed by: FAMILY MEDICINE

## 2021-09-21 PROCEDURE — APPSS30 APP SPLIT SHARED TIME 16-30 MINUTES: Performed by: NURSE PRACTITIONER

## 2021-09-21 PROCEDURE — 1200000000 HC SEMI PRIVATE

## 2021-09-21 PROCEDURE — 86140 C-REACTIVE PROTEIN: CPT

## 2021-09-21 RX ORDER — SODIUM CHLORIDE 9 MG/ML
INJECTION, SOLUTION INTRAVENOUS CONTINUOUS
Status: DISCONTINUED | OUTPATIENT
Start: 2021-09-21 | End: 2021-09-24

## 2021-09-21 RX ADMIN — LOSARTAN POTASSIUM 25 MG: 25 TABLET, FILM COATED ORAL at 08:25

## 2021-09-21 RX ADMIN — APIXABAN 5 MG: 5 TABLET, FILM COATED ORAL at 08:25

## 2021-09-21 RX ADMIN — BUDESONIDE AND FORMOTEROL FUMARATE DIHYDRATE 2 PUFF: 160; 4.5 AEROSOL RESPIRATORY (INHALATION) at 09:32

## 2021-09-21 RX ADMIN — SODIUM CHLORIDE: 9 INJECTION, SOLUTION INTRAVENOUS at 09:40

## 2021-09-21 RX ADMIN — SODIUM CHLORIDE: 9 INJECTION, SOLUTION INTRAVENOUS at 20:25

## 2021-09-21 RX ADMIN — METOPROLOL TARTRATE 12.5 MG: 25 TABLET, FILM COATED ORAL at 08:25

## 2021-09-21 RX ADMIN — FAMOTIDINE 40 MG: 20 TABLET, FILM COATED ORAL at 16:52

## 2021-09-21 RX ADMIN — OXYCODONE HYDROCHLORIDE AND ACETAMINOPHEN 1000 MG: 500 TABLET ORAL at 20:24

## 2021-09-21 RX ADMIN — OXYCODONE HYDROCHLORIDE AND ACETAMINOPHEN 1000 MG: 500 TABLET ORAL at 13:34

## 2021-09-21 RX ADMIN — SPIRONOLACTONE 25 MG: 25 TABLET, FILM COATED ORAL at 08:25

## 2021-09-21 RX ADMIN — DEXAMETHASONE 6 MG: 4 TABLET ORAL at 08:25

## 2021-09-21 RX ADMIN — BUDESONIDE AND FORMOTEROL FUMARATE DIHYDRATE 2 PUFF: 160; 4.5 AEROSOL RESPIRATORY (INHALATION) at 20:34

## 2021-09-21 RX ADMIN — APIXABAN 5 MG: 5 TABLET, FILM COATED ORAL at 20:24

## 2021-09-21 RX ADMIN — ALPRAZOLAM 0.25 MG: 0.25 TABLET ORAL at 20:24

## 2021-09-21 RX ADMIN — PANTOPRAZOLE SODIUM 40 MG: 40 TABLET, DELAYED RELEASE ORAL at 08:25

## 2021-09-21 RX ADMIN — Medication 6000 UNITS: at 08:25

## 2021-09-21 RX ADMIN — OXYCODONE HYDROCHLORIDE AND ACETAMINOPHEN 1000 MG: 500 TABLET ORAL at 09:41

## 2021-09-21 RX ADMIN — SODIUM CHLORIDE, PRESERVATIVE FREE 10 ML: 5 INJECTION INTRAVENOUS at 09:41

## 2021-09-21 RX ADMIN — Medication 100 MG: at 08:24

## 2021-09-21 RX ADMIN — OXYCODONE HYDROCHLORIDE AND ACETAMINOPHEN 1000 MG: 500 TABLET ORAL at 16:51

## 2021-09-21 ASSESSMENT — PAIN SCALES - GENERAL
PAINLEVEL_OUTOF10: 0

## 2021-09-21 NOTE — PROGRESS NOTES
Patient resting quietly in bed with eyes closed, lying on right side. Respirations even and unlabored. No s/s respiratory distress noted. Spo2 94% on 25L high flow oxygen, fio2 48%. Call light in reach.

## 2021-09-21 NOTE — PROGRESS NOTES
Patient's SPO2 continues to run in low 90s on current high flow settings. Writer discussed weaning with patient, in that this will not occur until patient's SPO2 increases to a more appropriate level.

## 2021-09-21 NOTE — PROGRESS NOTES
Occupational Therapy  Facility/Department: [facility] AT THE [facility] MED SURG  Daily Treatment Note  NAME: Anton Shows  : 1965  MRN: [id number]    Date of Service: 2021    Discharge Recommendations:  Continue to assess pending progress       Assessment      Activity Tolerance  Activity Tolerance: Patient limited by fatigue  Safety Devices  Safety Devices in place: Yes  Type of devices: Call light within reach; Left in chair         Patient Diagnosis(es): The primary encounter diagnosis was Pneumonia due to COVID-19 virus. A diagnosis of Acute respiratory failure with hypoxia (HCC) was also pertinent to this visit. has a past medical history of Acute pulmonary embolism (HCC), Anxiety, Asthma, Constipation, chronic, CPAP (continuous positive airway pressure) dependence, Diabetes mellitus ([facility] [address] Embolism - blood clot, Esophageal reflux, Factor V Leiden ([facility] [address] Heart murmur, HTN (hypertension), Hyperlipidemia, Irritable bladder, MTHFR mutation, MVP (mitral valve prolapse), Palpitations, Pulmonary embolism ([facility] [address] Rhinitis, allergic, Sleep apnea, and Venous thromboembolism. has a past surgical history that includes Cholecystectomy; Mandible fracture surgery (); Tubal ligation; Upper gastrointestinal endoscopy (2010); pr colsc flx w/rmvl of tumor polyp lesion snare tq (3/27/2017); pr colsc flexible w/control bleeding any method (3/27/2017); Cataract removal with implant (Left, 2018); pr xcapsl ctrc rmvl insj io lens prosth w/o ecp (Left, 2018); Colonoscopy (2021); Upper gastrointestinal endoscopy (N/A, 2021); and Colonoscopy (N/A, 2021).     Restrictions  Restrictions/Precautions  Restrictions/Precautions: Isolation, Fall Risk, General Precautions  Subjective   General  Chart Reviewed: Yes  Patient assessed for rehabilitation services?: Yes  Response to previous treatment: Patient with no complaints from previous session  Family / Caregiver Present: No  Referring Practitioner:  Claudy Melvin  Diagnosis: acute respiratory failure wt hypoxia  Subjective  Subjective: Pt had no complaints of pain this date. General Comment  Comments: Pt sitting up in bedside chair upon arrival. Pt agreed to participate in therapy session. Orientation     Objective    ADL  Grooming:  (Min A to wash hair with shower cap.)  UE Dressing: Minimal assistance (Min A to doff/don hospital gown d/t heart monitor and IV tubing.)                                                                                Plan   Plan  Times per week: 7x/week  Times per day: Daily  Current Treatment Recommendations: Strengthening, Patient/Caregiver Education & Training, Balance Training, Functional Mobility Training, Endurance Training, Safety Education & Training, Self-Care / ADL  G-Code     OutComes Score                                                  AM-PAC Score             Goals  Short term goals  Time Frame for Short term goals: 90 days  Short term goal 1: Pt will verbalize 4 ECWS strategies for implementation/use at home to improve safety and independence with ADL and functional tasks. Short term goal 2: Pt will tolerate standing for greater tahn 3 minutes without LOB or SOB while engage in functional tasks to improve safety and independence with ADL and functional mobility. Short term goal 3: Pt will complete TB ADL [place] with use of AE PRN to ensure safe return home. Short term goal 4: Pt will complete toileting routine at standard toilet [place] to improve safety and independence with ADL task.        Therapy Time   Individual Concurrent Group Co-treatment   Time In 1040         Time Out 1108         Minutes 28                 AMY Faith/SHANELLE

## 2021-09-21 NOTE — PROGRESS NOTES
entering)     Therapy Time   Individual Concurrent Group Co-treatment   Time In 1345         Time Out 1410         Minutes [address], [place]

## 2021-09-21 NOTE — PROGRESS NOTES
/[facility] [address] [place] [address] Progress Note   COVID 19/ Patient  Today's Date and Time: 9/21/2021, 5:26 PM    Impression :   · COVID 19 Suspect  · COVID 19 Confirmed Infection  · Covid tests:  · 9/15/21 Positive  · 9/8/21: Positive  · History of PE on Eliquis  · Essential hypertension  · Moderate persistent asthma without complication  · Patient has not received her Covid vaccine    Patient evaluated by Telemedicine. Requesting Institution: [facility]  Provider Institution: [address] [place] [address],[facility] [address] [place], [address] [facility] Person at [facility] Kimberly: Ms Stanton Oswald, APRN- IM    Recommendations:   · Monitor off antibiotics  · Remdesivir initiated 9/15/21-this may not be very effective as the patient was diagnosed over a week ago. · Decadron initiated 9/15/21  · Actemra given 9-18-21    Medical Decision Making/Summary/Discussion:9/21/2021     · Patient admitted with suspected COVID 19 infection  · Covid test confirmed positive. Infection Control Recommendations   · Universal Precautions  · Airborne isolation  · Droplet Isolation    Antimicrobial Stewardship Recommendations     · Discontinuation of therapy  Coordination of Outpatient Care:   · Estimated Length of IV antimicrobials:TBD  · Patient will need Midline Catheter Insertion: TBD  · Patient will need PICC line Insertion: No  · Patient will need: Home IV , [place],  SNF,  LTAC:TBD  · Patient will need outpatient wound care:No    Chief complaint/reason for consultation:   · Concern for COVID infection      History of Present Illness:   Maxx Anguiano is a 54y.o.-year-old female who was initially admitted on 9/15/2021. Patient seen at the request of Dr. Loni Araya:    This patient who tested positive for COVID-19 on 9/8/2021 presented through ER with complaints of worsening shortness of breath with associated pleuritic chest pain, cough and fatigue.   She initially began having symptoms on 9/7/2021. The patient was oxygenating at 88% on room air and imaging revealed bilateral groundglass opacities. She was initiated on Decadron and remdesivir and admitted to the floor. CTA chest 9/15/2021  Impression   Patchy bilateral ground-glass and airspace infiltrates most notably in the   lung periphery compatible with patient's history of COVID pneumonia.       No obvious evidence of pulmonary embolism. Patient admitted because of concerns with COVID 19.    CURRENT EVALUATION : 9/21/2021    Afebrile  VS stable    The patient continues on hi flow NC     Actemra administered 9/18/21  CRP is decreasing    Patient exhibiting respiratory distress. yes  Respiratory secretions: no    Patient receiving supplemental oxygen. 3-->5-->25 L/min High flow NC  02 sat: 91%  RR 20-->18      % FIO2: 50-->61-->49  Flow rate: 25  PEEP:      QTc:       NEWS Score: 0-4 Low risk group; 5-6: Medium risk group; 7 or above: High risk group  Parameters 3 2 1 0 1 2 3   Age    < 65   ? 65   RR ? 8  9-11 12-20  21-24 ? 25   O2 Sats ?  91 92-93 94-95 ? 96      Suppl O2  Yes  No      SBP ? 90  101-110 111-219   ? 220   HR ? 40  41-50 51-90  111-130 ? 131   Consciousness    Alert   Drowsiness, lethargy, or confusion   Temperature ? 35.0 C (95.0 F)  35.1-36.0 C 95.1-96.9 F 36.1-38.0 C 97.0-100.4 F 38.1-39.0 C 100.5-102.3 F ? 39.1 C ? 102.4 F      NEWS Score:   9/16/21: 3 Low risk    Overall Daily Picture:      Unchanged    Presence of secondary bacterial Infection:    No   Additional antibiotics: No    Labs, X rays reviewed: 9/21/2021    BUN:13-->13-->14  Cr:0.57-->0.56-->0.57    WBC:6.2-->7.6-->8.7  Hb:13.2-->13.6  Plat: 155-->172-->236    Absolute Neutrophils:4.85  Absolute Lymphocytes:1.16  Neutrophil/Lymphocyte Ratio: 4.2 high risk    CRP: 20-->38.8-->18.3-->11.2-->3.7  Ferritin:  LDH:   D-dimer: 1.10    Pro Calcitonin:      Cultures:  Urine:  ·   Blood:  ·   Sputum :  ·   Wound:       CXR: 9/15/21        CAT:    9/15/21    Discussed with patient, RN, CC, IM. I have personally reviewed the past medical history, past surgical history, medications, social history, and family history, and I have updated the database accordingly.   Past Medical History:     Past Medical History:   Diagnosis Date    Acute pulmonary embolism ([facility] 11/2/2015    Anxiety     Asthma     Constipation, chronic     CPAP (continuous positive airway pressure) dependence     Diabetes mellitus ([facility]     pt states she was prediabetic at one point    Embolism - blood clot     right lung and right leg    Esophageal reflux     Factor V Leiden ([facility]     Heart murmur     HTN (hypertension)     Hyperlipidemia     Irritable bladder     MTHFR mutation     MVP (mitral valve prolapse)     Palpitations     Pulmonary embolism (HCC) 10/15/2012    Rhinitis, allergic 10/15/2012    Sleep apnea     Venous thromboembolism 5/2/2016       Past Surgical  History:     Past Surgical History:   Procedure Laterality Date    CATARACT REMOVAL WITH IMPLANT Left 04/02/2018    CHOLECYSTECTOMY      around 2000    COLONOSCOPY  07/12/2021    COLONOSCOPY N/A 7/12/2021    COLONOSCOPY POLYPECTOMY CECUM COLD BIOPSY performed by Lyndsey Porter MD at [place] [address]  3/27/2017    COLONOSCOPY CONTROL HEMORRHAGE performed by Adela Pradhan MD at [address] FLX W/RMVL OF TUMOR POLYP LESION [address] TQ  3/27/2017    COLONOSCOPY POLYPECTOMY SNARE/COLD BIOPSY performed by Adela Pradhan MD at [facility] RMVL INSJ IO LENS PROSTH W/O ECP Left 4/2/2018    EYE CATARACT EMULSIFICATION IOL IMPLANT performed by Melo Andersen MD at [facility] ENDOSCOPY  1/2010    with biopsy    UPPER GASTROINTESTINAL ENDOSCOPY N/A 7/12/2021    EGD BIOPSY OF STOMACH, GASTRIC POLYPECTOMY AND DUODENUM BIOPSY performed by Kaila Nelson MD at [address] [facility].       Medications:      zinc sulfate  100 mg Oral Daily    ascorbic acid  1,000 mg Oral 4x Daily    budesonide-formoterol  2 puff Inhalation BID    apixaban  5 mg Oral BID    famotidine  40 mg Oral QPM    metoprolol tartrate  12.5 mg Oral BID    losartan  25 mg Oral Daily    pantoprazole  40 mg Oral Daily    spironolactone  25 mg Oral Daily    sodium chloride flush  10 mL IntraVENous 2 times per day    dexamethasone  6 mg Oral Daily    Vitamin D  6,000 Units Oral Daily    Followed by   Tinnie Free ON 9/23/2021] Vitamin D  2,000 Units Oral Daily       Social History:     Social History     Socioeconomic History    Marital status:      Spouse name: Not on file    Number of children: Not on file    Years of education: Not on file    Highest education level: Not on file   Occupational History    Not on file   Tobacco Use    Smoking status: Never Smoker    Smokeless tobacco: Never Used   Vaping Use    Vaping Use: Never used   Substance and Sexual Activity    Alcohol use: Yes     Alcohol/week: 0.0 standard drinks     Comment: 1 glass of wine/month    Drug use: No    Sexual activity: Yes     Partners: Male     Birth control/protection: Surgical   Other Topics Concern    Not on file   Social History Narrative    Not on file     Social Determinants of Health     Financial Resource Strain: Low Risk     Difficulty of Paying Living Expenses: Not hard at all   Food Insecurity: No Food Insecurity    Worried About Running Out of Food in the Last Year: Never true    [address] in the Last Year: Never true   Transportation Needs: No Transportation Needs    Lack of Transportation (Medical): No    Lack of Transportation (Non-Medical):  No   Physical Activity:     Days of Exercise per Week:     Minutes of Exercise per Session:    Stress:     Feeling of Stress :    Social Connections:     Frequency of Communication with Friends and Family:     Frequency of Social Gatherings with Friends and Family:     Attends Orthodoxy Services:     Active Member of Clubs or Organizations:     Attends Club or Organization Meetings:     Marital Status:    Intimate Partner Violence:     Fear of Current or Ex-Partner:     Emotionally Abused:     Physically Abused:     Sexually Abused:        Family History:     Family History   Problem Relation Age of Onset    Cancer Mother         breast ca    Diabetes Mother     Depression Mother     Thyroid Disease Mother     High Blood Pressure Mother     Heart Disease Mother     Cancer Father         prostate ca    COPD Father     Heart Failure Father     Heart Disease Father     High Blood Pressure Father     Deep Vein Thrombosis Sister     Breast Cancer Sister     Deep Vein Thrombosis Sister         Allergies:   Levaquin [levofloxacin in d5w] and Pcn [penicillins]     Review of Systems:       Constitutional: Generalized malaise  Head: No headaches  Eyes: No double vision or blurry vision. No conjunctival inflammation. ENT: No sore throat or runny nose. . No hearing loss, tinnitus or vertigo. Cardiovascular: No chest pain or palpitations. Shortness of breath. CARBAJAL  Lung:  Shortness of breath & cough. No sputum production  Abdomen: No nausea, vomiting, diarrhea, or abdominal pain. Clenton Reas No cramps. Genitourinary: No increased urinary frequency, or dysuria. No hematuria. No suprapubic or CVA pain  Musculoskeletal: No muscle aches or pains. No joint effusions, swelling or deformities  Hematologic: No bleeding or bruising. Neurologic: No headache, weakness, numbness, or tingling. Integument: No rash, no ulcers. Psychiatric: No depression. Endocrine: No polyuria, no polydipsia, no polyphagia.     Physical Examination :     Patient Vitals for the past 8 hrs:   BP Temp Temp src Pulse Resp SpO2   09/21/21 1333 (!) 106/49 96.8 °F (36 °C) Temporal 62 18 95 %   09/21/21 0932 -- -- -- -- 22 (!) 89 %     General Appearance: Awake, alert, and in no apparent distress  Head:  Normocephalic, no trauma  Eyes: Pupils equal, round, reactive to light; sclera anicteric; conjunctivae pink. No embolic phenomena. ENT: Oropharynx clear, without erythema, exudate, or thrush. No tenderness of sinuses. Mouth/throat: mucosa pink and moist. No lesions. Dentition in good repair. Neck:Supple, without lymphadenopathy. Thyroid normal, No bruits. Pulmonary/Chest: Clear to auscultation, without wheezes, rales, or rhonchi. No dullness to percussion. Cardiovascular: Regular rate and rhythm without murmurs, rubs, or gallops. Abdomen: Soft, non tender. Bowel sounds normal. No organomegaly  All four Extremities: No cyanosis, clubbing, edema, or effusions. Neurologic: No gross sensory or motor deficits. Skin: Warm and dry with good turgor. No signs of peripheral arterial or venous insufficiency. No ulcerations. No open wounds. Medical Decision Making -Laboratory:   I have independently reviewed/ordered the following labs:    CBC with Differential:   Recent Labs     09/20/21  0550 09/21/21 0619   WBC 7.6 8.7   HGB 13.6 13.6   HCT 40.7 41.0    333   LYMPHOPCT 16* 15*   MONOPCT 8 8     BMP:   Recent Labs     09/20/21  0550 09/21/21 0619    152*   K 4.2 4.2    102   CO2 28 28   BUN 13 14   CREATININE 0.56 0.57     Hepatic Function Panel:   No results for input(s): PROT, LABALBU, BILIDIR, IBILI, BILITOT, ALKPHOS, ALT, AST in the last 72 hours. No results for input(s): RPR in the last 72 hours. No results for input(s): HIV in the last 72 hours. No results for input(s): BC in the last 72 hours.   Lab Results   Component Value Date    RBC 4.59 09/21/2021    RBC 4.67 11/02/2011    WBC 8.7 09/21/2021     Lab Results   Component Value Date    CREATININE 0.57 09/21/2021    GLUCOSE 135 09/21/2021    GLUCOSE 99 11/02/2011       Medical Decision Making-Imaging:     Narrative   EXAMINATION:   CTA OF THE CHEST 9/15/2021 6:47 pm       TECHNIQUE: CTA of the chest was performed after the administration of intravenous   contrast.  Multiplanar reformatted images are provided for review.  MIP   images are provided for review. Dose modulation, iterative reconstruction,   and/or weight based adjustment of the mA/kV was utilized to reduce the   radiation dose to as low as reasonably achievable.       COMPARISON:   None.       HISTORY:   ORDERING SYSTEM PROVIDED HISTORY: hypoxia, tachycardia, covid   TECHNOLOGIST PROVIDED HISTORY:   hypoxia, tachycardia, covid   Decision Support Exception - unselect if not a suspected or confirmed   emergency medical condition->Emergency Medical Condition (MA)       FINDINGS:   Pulmonary Arteries: Pulmonary arteries are less than adequately opacified for   evaluation. No evidence of intraluminal filling defect to suggest pulmonary   embolism.  Main pulmonary artery is normal in caliber.       Mediastinum: There are a few small nonspecific lymph nodes seen in the middle   mediastinum. No suspicious lymphadenopathy.       Lungs/pleura: Patchy bilateral ground-glass and airspace infiltrates most   notably in the lung periphery. ..  No pulmonary masses are noted. No pleural   effusions are identified.       Cardiomediastinal Structures: Intimal calcifications associated with the   thoracic aorta. No evidence of thoracic aortic aneurysm or dissection. Cardiac chambers are normal       Upper Abdomen: Small hiatal hernia       Soft Tissues/Bones:  There are hypertrophic degenerative changes in the   thoracic spine.       No acute osseous abnormalities.           Impression   Patchy bilateral ground-glass and airspace infiltrates most notably in the   lung periphery compatible with patient's history of COVID pneumonia.       No obvious evidence of pulmonary embolism.             Narrative   EXAMINATION:   ONE XRAY VIEW OF THE CHEST       9/15/2021 5:21 pm       COMPARISON:   09/13/2021       HISTORY:   ORDERING SYSTEM PROVIDED HISTORY: cough, covid, worsening sob   TECHNOLOGIST PROVIDED HISTORY:   cough, covid, worsening sob       FINDINGS:   . The cardiac size is normal. No  pleural effusions are seen.  Patchy   bilateral interstitial infiltrates, improved on the right however progressive   on the left.  Pulmonary vascularity appears normal. There is mild ectasia of   the thoracic aorta.  . No acute bony abnormalities. The hilar structures are   normal.           Impression   Patchy bilateral interstitial infiltrates, improved on the right however   progressive on the left compatible with patient's history of COVID.                 Medical Decision Zkiwra-Tpadgelq-Bxqsa:       Medical Decision Making-Other:     Note:  · Labs, medications, radiologic studies were reviewed with personal review of films  · Large amounts of data were reviewed  · Discussed with nursing Staff, Discharge planner  · Infection Control and Prevention measures reviewed  · All prior entries were reviewed  · Administer medications as ordered  · Prognosis: Guarded  · Discharge planning reviewed      Thank you for allowing us to participate in the care of this patient. Please call with questions. Nick Aguilar, APRN - CNP     ATTESTATION:    I have discussed the case, including pertinent history and exam findings with the APRN. I have evaluated the  History, physical findings and pictures of the patient and the key elements of the encounter have been performed by me. I have reviewed the laboratory data, other diagnostic studies and discussed them with the APRN. I have updated the medical record where necessary. I agree with the assessment, plan and orders as documented by the APRN.     Greg Harvey MD.          Pager: [phone] - Office: [phone]

## 2021-09-21 NOTE — PROGRESS NOTES
Physical Therapy  Facility/Department: [facility], [facility] AT THE [facility] MED SURG  Daily Treatment Note  NAME: Adam Grant  : 1965  MRN: [id number]    Date of Service: 2021    Discharge Recommendations:  Continue to assess pending progress        Assessment   Treatment Diagnosis: Decreased activity endurance  Prognosis: Good  PT Education: Gait Training;Transfer Training;Energy Conservation;General Safety  Patient Education: Educated on above with good understanding  REQUIRES PT FOLLOW UP: Yes  Activity Tolerance  Activity Tolerance: Patient limited by endurance; Patient Tolerated treatment well  Activity Tolerance: limited by SOB     Patient Diagnosis(es): The primary encounter diagnosis was Pneumonia due to COVID-19 virus. A diagnosis of Acute respiratory failure with hypoxia (HCC) was also pertinent to this visit. has a past medical history of Acute pulmonary embolism (HCC), Anxiety, Asthma, Constipation, chronic, CPAP (continuous positive airway pressure) dependence, Diabetes mellitus ([facility] Embolism - blood clot, Esophageal reflux, Factor V Leiden ([facility] Heart murmur, HTN (hypertension), Hyperlipidemia, Irritable bladder, MTHFR mutation, MVP (mitral valve prolapse), Palpitations, Pulmonary embolism ([facility] Rhinitis, allergic, Sleep apnea, and Venous thromboembolism. has a past surgical history that includes Cholecystectomy; Mandible fracture surgery (); Tubal ligation; Upper gastrointestinal endoscopy (2010); pr colsc flx w/rmvl of tumor polyp lesion snare tq (3/27/2017); pr colsc flexible w/control bleeding any method (3/27/2017); Cataract removal with implant (Left, 2018); pr xcapsl ctrc rmvl insj io lens prosth w/o ecp (Left, 2018); Colonoscopy (2021); Upper gastrointestinal endoscopy (N/A, 2021); and Colonoscopy (N/A, 2021).     Restrictions  Restrictions/Precautions  Restrictions/Precautions: Isolation, Fall Risk, General Precautions  Subjective   General  Chart Reviewed: Yes  Response To Previous Treatment: Patient with no complaints from previous session. Family / Caregiver Present: No  Referring Practitioner: Amberly Castano MD  Subjective  Subjective: SOB with talking and minimal activity, SpO2 started at 89%          Orientation  Orientation  Overall Orientation Status: Within Normal Limits  Cognition      Objective   Bed mobility  Comment: Pt sitting EOb with nursing upon entry and in chair at end of session  Transfers  Sit to Stand: Stand by assistance  Stand to sit: Stand by assistance  Ambulation  Ambulation?: Yes  Ambulation 1  Surface: level tile  Device: No Device  Other Apparatus: O2 (IV pole)  Assistance: Stand by assistance  Quality of Gait: SPO2=84% after amb but recovered to 87-90% after 1-2 minutes  Gait Deviations: Decreased step length  Distance: 15 feet x 3  Comments: Limited by high flow tubing, fatigues quickly. Balance  Posture: Fair  Sitting - Static: Good  Sitting - Dynamic: Good  Standing - Static: Fair  Standing - Dynamic: Fair  Exercises  Hip Flexion: 15  Hip Abduction: 15  Knee Long Arc Quad: 15  Ankle Pumps: 15  Comments: Seated exercises completed at EOB     G-Code     OutComes Score    AM-PAC Score    Goals  Short term goals  Time Frame for Short term goals: 10 days  Short term goal 1: Patient will ambulate 48' with supervision, without LOB  Short term goal 2: Patient will perform bed mobility and transfers with MI  Short term goal 3: Patient will tolerate 20-30 minutes of therex/act to improve endurance for ADLs  Patient Goals   Patient goals : Feel better    Plan    Plan  Times per week: 7  Times per day: Twice a day  Plan Comment: 1x/day on weekends  Safety Devices  Type of devices:  All fall risk precautions in place, Call light within reach, Gait belt, Patient at risk for falls, Nurse notified, Left in chair (no alarm upon entry)     Therapy Time   Individual Concurrent Group Co-treatment   Time In 0935         Time Out 1000         Minutes 25 Familia Swift, PTA

## 2021-09-21 NOTE — PROGRESS NOTES
Progress Note  Lianet King MD    OBJECTIVE:    Patient seen for f/u of Pneumonia due to COVID-19 virus. She feels somewhat better, has expectoration. Na high  Still on high flow oxygen     ROS:   Constitutional: negative  for fevers, and negative for chills. Respiratory: positive for shortness of breath, positive for cough, and negative for wheezing  Cardiovascular: negative for chest pain, and negative for palpitations  Gastrointestinal: negative for abdominal pain, negative for nausea,negative for vomiting, negative for diarrhea, and negative for constipation     All other systems were reviewed with the patient and are negative unless otherwise stated in HPI    OBJECTIVE:  Vitals:   Temp: 96.6 °F (35.9 °C)  BP: (!) 149/68  Resp: 20  Pulse: 51  SpO2: 95 %    24HR INTAKE/OUTPUT:      Intake/Output Summary (Last 24 hours) at 9/21/2021 0750  Last data filed at 9/21/2021 0504  Gross per 24 hour   Intake 1070 ml   Output 1750 ml   Net -680 ml     -----------------------------------------------------------------  Exam:  GEN:    Awake, alert and oriented x3. EYES:  EOMI, pupils equal   NECK: Supple. No lymphadenopathy. No carotid bruit  CVS:    regular rate and rhythm, no audible murmur  PULM:  diminished with bilat rhonchi, no acute respiratory distress  ABD:    Bowels sounds normal.  Abdomen is soft. No distention. no tenderness to palpation. EXT:   no edema bilaterally . No calf tenderness. NEURO: Moves all extremities. Motor and sensory are grossly intact  SKIN:  No rashes.   No skin lesions.    -----------------------------------------------------------------  Diagnostic Data:    · All available data reviewed  Lab Results   Component Value Date    WBC 8.7 09/21/2021    HGB 13.6 09/21/2021    MCV 89.3 09/21/2021     09/21/2021      Lab Results   Component Value Date    GLUCOSE 135 (H) 09/21/2021    BUN 14 09/21/2021    CREATININE 0.57 09/21/2021     (H) 09/21/2021    K 4.2 09/21/2021 CALCIUM 8.6 09/21/2021     09/21/2021    CO2 28 09/21/2021       PROBLEM LIST:  Principal Problem:    Pneumonia due to COVID-19 virus  Active Problems:    Mild malnutrition (HCC)    Bronchial asthma    Sleep apnea, obstructive    Essential hypertension    Long term current use of anticoagulant therapy    Hypernatremia  Resolved Problems:    * No resolved hospital problems. *      ASSESSMENT / PLAN:  Pneumonia due to COVID-19 virus  · Continue current therapy of dexamethazone,high flow oxygen  ·   · Hypernatremia- ns iv   · Nutrition status:  Well developed, well nourished with no malnutrition  · DVT prophylaxis: Eliquis   · High risk medications: eliquis   · Disposition:  Discharge plan is home    Joshua Brewster MD , M.D.  9/21/2021  7:50 AM

## 2021-09-22 LAB
ABSOLUTE EOS #: 0.19 K/UL (ref 0–0.44)
ABSOLUTE IMMATURE GRANULOCYTE: 0.12 K/UL (ref 0–0.3)
ABSOLUTE LYMPH #: 1.67 K/UL (ref 1.1–3.7)
ABSOLUTE MONO #: 0.71 K/UL (ref 0.1–1.2)
ANION GAP SERPL CALCULATED.3IONS-SCNC: 9 MMOL/L (ref 9–17)
BASOPHILS # BLD: 0 % (ref 0–2)
BASOPHILS ABSOLUTE: <0.03 K/UL (ref 0–0.2)
BUN BLDV-MCNC: 13 MG/DL (ref 6–20)
BUN/CREAT BLD: 21 (ref 9–20)
C-REACTIVE PROTEIN: <3 MG/L (ref 0–5)
CALCIUM SERPL-MCNC: 8.4 MG/DL (ref 8.6–10.4)
CHLORIDE BLD-SCNC: 102 MMOL/L (ref 98–107)
CO2: 26 MMOL/L (ref 20–31)
CREAT SERPL-MCNC: 0.61 MG/DL (ref 0.5–0.9)
DIFFERENTIAL TYPE: ABNORMAL
EOSINOPHILS RELATIVE PERCENT: 2 % (ref 1–4)
GFR AFRICAN AMERICAN: >60 ML/MIN
GFR NON-AFRICAN AMERICAN: >60 ML/MIN
GFR SERPL CREATININE-BSD FRML MDRD: ABNORMAL ML/MIN/{1.73_M2}
GFR SERPL CREATININE-BSD FRML MDRD: ABNORMAL ML/MIN/{1.73_M2}
GLUCOSE BLD-MCNC: 118 MG/DL (ref 70–99)
HCT VFR BLD CALC: 41.4 % (ref 36.3–47.1)
HEMOGLOBIN: 13.7 G/DL (ref 11.9–15.1)
IMMATURE GRANULOCYTES: 1 %
INR BLD: 1.2
LYMPHOCYTES # BLD: 15 % (ref 24–43)
MCH RBC QN AUTO: 29.5 PG (ref 25.2–33.5)
MCHC RBC AUTO-ENTMCNC: 33.1 G/DL (ref 28.4–34.8)
MCV RBC AUTO: 89 FL (ref 82.6–102.9)
MONOCYTES # BLD: 7 % (ref 3–12)
NRBC AUTOMATED: 0 PER 100 WBC
PARTIAL THROMBOPLASTIN TIME: 28.7 SEC (ref 23.9–33.8)
PDW BLD-RTO: 13.4 % (ref 11.8–14.4)
PLATELET # BLD: 338 K/UL (ref 138–453)
PLATELET ESTIMATE: ABNORMAL
PMV BLD AUTO: 10.1 FL (ref 8.1–13.5)
POTASSIUM SERPL-SCNC: 4 MMOL/L (ref 3.7–5.3)
PROTHROMBIN TIME: 14.7 SEC (ref 11.5–14.2)
RBC # BLD: 4.65 M/UL (ref 3.95–5.11)
RBC # BLD: ABNORMAL 10*6/UL
SEG NEUTROPHILS: 75 % (ref 36–65)
SEGMENTED NEUTROPHILS ABSOLUTE COUNT: 8.14 K/UL (ref 1.5–8.1)
SODIUM BLD-SCNC: 137 MMOL/L (ref 135–144)
WBC # BLD: 10.9 K/UL (ref 3.5–11.3)
WBC # BLD: ABNORMAL 10*3/UL

## 2021-09-22 PROCEDURE — 97530 THERAPEUTIC ACTIVITIES: CPT

## 2021-09-22 PROCEDURE — 36415 COLL VENOUS BLD VENIPUNCTURE: CPT

## 2021-09-22 PROCEDURE — 85730 THROMBOPLASTIN TIME PARTIAL: CPT

## 2021-09-22 PROCEDURE — 6370000000 HC RX 637 (ALT 250 FOR IP): Performed by: INTERNAL MEDICINE

## 2021-09-22 PROCEDURE — 2580000003 HC RX 258: Performed by: FAMILY MEDICINE

## 2021-09-22 PROCEDURE — 97110 THERAPEUTIC EXERCISES: CPT

## 2021-09-22 PROCEDURE — 94761 N-INVAS EAR/PLS OXIMETRY MLT: CPT

## 2021-09-22 PROCEDURE — 2700000000 HC OXYGEN THERAPY PER DAY

## 2021-09-22 PROCEDURE — 1200000000 HC SEMI PRIVATE

## 2021-09-22 PROCEDURE — 6370000000 HC RX 637 (ALT 250 FOR IP): Performed by: NURSE PRACTITIONER

## 2021-09-22 PROCEDURE — 97116 GAIT TRAINING THERAPY: CPT

## 2021-09-22 PROCEDURE — 94640 AIRWAY INHALATION TREATMENT: CPT

## 2021-09-22 PROCEDURE — 80048 BASIC METABOLIC PNL TOTAL CA: CPT

## 2021-09-22 PROCEDURE — 86140 C-REACTIVE PROTEIN: CPT

## 2021-09-22 PROCEDURE — 94669 MECHANICAL CHEST WALL OSCILL: CPT

## 2021-09-22 PROCEDURE — 99233 SBSQ HOSP IP/OBS HIGH 50: CPT | Performed by: INTERNAL MEDICINE

## 2021-09-22 PROCEDURE — 85025 COMPLETE CBC W/AUTO DIFF WBC: CPT

## 2021-09-22 PROCEDURE — 6360000002 HC RX W HCPCS: Performed by: INTERNAL MEDICINE

## 2021-09-22 PROCEDURE — 85610 PROTHROMBIN TIME: CPT

## 2021-09-22 RX ADMIN — APIXABAN 5 MG: 5 TABLET, FILM COATED ORAL at 08:48

## 2021-09-22 RX ADMIN — DEXAMETHASONE 6 MG: 4 TABLET ORAL at 08:48

## 2021-09-22 RX ADMIN — SODIUM CHLORIDE: 9 INJECTION, SOLUTION INTRAVENOUS at 22:20

## 2021-09-22 RX ADMIN — Medication 100 MG: at 08:48

## 2021-09-22 RX ADMIN — APIXABAN 5 MG: 5 TABLET, FILM COATED ORAL at 22:20

## 2021-09-22 RX ADMIN — OXYCODONE HYDROCHLORIDE AND ACETAMINOPHEN 1000 MG: 500 TABLET ORAL at 08:48

## 2021-09-22 RX ADMIN — BUDESONIDE AND FORMOTEROL FUMARATE DIHYDRATE 2 PUFF: 160; 4.5 AEROSOL RESPIRATORY (INHALATION) at 08:00

## 2021-09-22 RX ADMIN — METOPROLOL TARTRATE 12.5 MG: 25 TABLET, FILM COATED ORAL at 08:47

## 2021-09-22 RX ADMIN — FAMOTIDINE 40 MG: 20 TABLET, FILM COATED ORAL at 17:31

## 2021-09-22 RX ADMIN — LOSARTAN POTASSIUM 25 MG: 25 TABLET, FILM COATED ORAL at 08:47

## 2021-09-22 RX ADMIN — OXYCODONE HYDROCHLORIDE AND ACETAMINOPHEN 1000 MG: 500 TABLET ORAL at 22:20

## 2021-09-22 RX ADMIN — OXYCODONE HYDROCHLORIDE AND ACETAMINOPHEN 1000 MG: 500 TABLET ORAL at 13:54

## 2021-09-22 RX ADMIN — PANTOPRAZOLE SODIUM 40 MG: 40 TABLET, DELAYED RELEASE ORAL at 08:47

## 2021-09-22 RX ADMIN — OXYCODONE HYDROCHLORIDE AND ACETAMINOPHEN 1000 MG: 500 TABLET ORAL at 17:31

## 2021-09-22 RX ADMIN — Medication 6000 UNITS: at 08:48

## 2021-09-22 RX ADMIN — BUDESONIDE AND FORMOTEROL FUMARATE DIHYDRATE 2 PUFF: 160; 4.5 AEROSOL RESPIRATORY (INHALATION) at 22:25

## 2021-09-22 RX ADMIN — SPIRONOLACTONE 25 MG: 25 TABLET, FILM COATED ORAL at 08:48

## 2021-09-22 ASSESSMENT — PAIN SCALES - GENERAL
PAINLEVEL_OUTOF10: 0
PAINLEVEL_OUTOF10: 0

## 2021-09-22 NOTE — PROGRESS NOTES
/[facility] [address] [place] [address] Progress Note   COVID 19/ Patient  Today's Date and Time: 9/22/2021, 11:57 AM    Impression :   · COVID 19 Suspect  · COVID 19 Confirmed Infection  · Covid tests:  · 9/15/21 Positive  · 9/8/21: Positive  · History of PE on Eliquis  · Essential hypertension  · Moderate persistent asthma without complication  · Patient has not received her Covid vaccine    Patient evaluated by Telemedicine. Requesting Institution: Merged with [facility]  Provider Institution: [address] [place] [address],[facility] [address], [address] [facility] Person at [facility]: Ms Ronel Huitron, APRN- IM    Recommendations:   · Monitor off antibiotics  · Remdesivir initiated 9/15/21-this may not be very effective as the patient was diagnosed over a week ago. Stopped  · Decadron initiated 9/15/21. Stop date 9-25-21  · Actemra given 9-18-21    Medical Decision Making/Summary/Discussion:9/22/2021     · Patient admitted with suspected COVID 19 infection  · Covid test confirmed positive. Infection Control Recommendations   · Universal Precautions  · Airborne isolation  · Droplet Isolation    Antimicrobial Stewardship Recommendations     · Discontinuation of therapy  Coordination of Outpatient Care:   · Estimated Length of IV antimicrobials:TBD  · Patient will need Midline Catheter Insertion: TBD  · Patient will need PICC line Insertion: No  · Patient will need: Home IV , [place],  SNF,  LTAC:TBD  · Patient will need outpatient wound care:No    Chief complaint/reason for consultation:   · Concern for COVID infection      History of Present Illness:   Judith Britt is a 54y.o.-year-old female who was initially admitted on 9/15/2021. Patient seen at the request of Dr. Vijaya Bhatt:    This patient who tested positive for COVID-19 on 9/8/2021 presented through ER with complaints of worsening shortness of breath with associated pleuritic chest pain, cough and fatigue.   She initially began having symptoms on 9/7/2021. The patient was oxygenating at 88% on room air and imaging revealed bilateral groundglass opacities. She was initiated on Decadron and remdesivir and admitted to the floor. CTA chest 9/15/2021  Impression   Patchy bilateral ground-glass and airspace infiltrates most notably in the   lung periphery compatible with patient's history of COVID pneumonia.       No obvious evidence of pulmonary embolism. Patient admitted because of concerns with COVID 19.    CURRENT EVALUATION : 9/22/2021    Afebrile  VS stable    The patient continues on hi flow NC     Actemra administered 9/18/21  CRP is decreasing    Patient exhibiting respiratory distress. yes  Respiratory secretions: no    Patient receiving supplemental oxygen. 3-->5-->25 L/min High flow NC  02 sat: 90 %  RR 20-->18 --> 20      % FIO2: 50-->61-->49  Flow rate: 25  PEEP:      QTc:       NEWS Score: 0-4 Low risk group; 5-6: Medium risk group; 7 or above: High risk group  Parameters 3 2 1 0 1 2 3   Age    < 65   ? 65   RR ? 8  9-11 12-20  21-24 ? 25   O2 Sats ?  91 92-93 94-95 ? 96      Suppl O2  Yes  No      SBP ? 90  101-110 111-219   ? 220   HR ? 40  41-50 51-90  111-130 ? 131   Consciousness    Alert   Drowsiness, lethargy, or confusion   Temperature ? 35.0 C (95.0 F)  35.1-36.0 C 95.1-96.9 F 36.1-38.0 C 97.0-100.4 F 38.1-39.0 C 100.5-102.3 F ? 39.1 C ? 102.4 F      NEWS Score:   9/16/21: 3 Low risk    Overall Daily Picture:      Unchanged    Presence of secondary bacterial Infection:    No   Additional antibiotics: No    Labs, X rays reviewed: 9/22/2021    BUN:13-->13-->14 --> 13    Cr:0.57-->0.56-->0.57 --> 0.61    WBC:6.2-->7.6-->8.7 --> 10.9  Hb:13.2-->13.6 --> 13.7  Plat: 155-->172-->236 --> 338    Absolute Neutrophils:4.85 --> 8.14  Absolute Lymphocytes:1.16 --> 1.67  Neutrophil/Lymphocyte Ratio: 4.9 high risk    CRP: 20-->38.8-->18.3-->11.2-->3.7  Ferritin:  LDH:   D-dimer: 1.10 --> 0.97    Pro Calcitonin:      Cultures:  Urine:  ·   Blood:  ·   Sputum :  ·   Wound:       CXR:         9/15/21        CAT:    9/15/21    Discussed with patient, RN, CC, IM. I have personally reviewed the past medical history, past surgical history, medications, social history, and family history, and I have updated the database accordingly.   Past Medical History:     Past Medical History:   Diagnosis Date    Acute pulmonary embolism ([facility] [address] 11/2/2015    Anxiety     Asthma     Constipation, chronic     CPAP (continuous positive airway pressure) dependence     Diabetes mellitus ([facility] [address]     pt states she was prediabetic at one point    Embolism - blood clot     right lung and right leg    Esophageal reflux     Factor V Leiden ([facility] [address]     Heart murmur     HTN (hypertension)     Hyperlipidemia     Irritable bladder     MTHFR mutation     MVP (mitral valve prolapse)     Palpitations     Pulmonary embolism (HCC) 10/15/2012    Rhinitis, allergic 10/15/2012    Sleep apnea     Venous thromboembolism 5/2/2016       Past Surgical  History:     Past Surgical History:   Procedure Laterality Date    CATARACT REMOVAL WITH IMPLANT Left 04/02/2018    CHOLECYSTECTOMY      around 2000    COLONOSCOPY  07/12/2021    COLONOSCOPY N/A 7/12/2021    COLONOSCOPY POLYPECTOMY CECUM COLD BIOPSY performed by Rossi Doaln MD at [address]  3/27/2017    COLONOSCOPY CONTROL HEMORRHAGE performed by Brenda Lyles MD at [address] FLX W/RMVL OF TUMOR POLYP LESION [address] T[place]  3/27/2017    COLONOSCOPY POLYPECTOMY SNARE/COLD BIOPSY performed by Brenda Lyles MD at [address] [facility] W/O ECP Left 4/2/2018    EYE CATARACT EMULSIFICATION IOL IMPLANT performed by Anton Villanueva MD at [address] [facility] ENDOSCOPY  1/2010    with biopsy    UPPER GASTROINTESTINAL ENDOSCOPY N/A 7/12/2021    EGD BIOPSY OF STOMACH, GASTRIC POLYPECTOMY AND DUODENUM BIOPSY [facility] [address] [facility]       Medications:      zinc sulfate  100 mg Oral Daily    ascorbic acid  1,000 mg Oral 4x Daily    budesonide-formoterol  2 puff Inhalation BID    apixaban  5 mg Oral BID    famotidine  40 mg Oral QPM    metoprolol tartrate  12.5 mg Oral BID    losartan  25 mg Oral Daily    pantoprazole  40 mg Oral Daily    spironolactone  25 mg Oral Daily    sodium chloride flush  10 mL IntraVENous 2 times per day    dexamethasone  6 mg Oral Daily    [START ON 9/23/2021] Vitamin D  2,000 Units Oral Daily       Social History:     Social History     Socioeconomic History    Marital status:      Spouse name: Not on file    Number of children: Not on file    Years of education: Not on file    Highest education level: Not on file   Occupational History    Not on file   Tobacco Use    Smoking status: Never Smoker    Smokeless tobacco: Never Used   Vaping Use    Vaping Use: Never used   Substance and Sexual Activity    Alcohol use: Yes     Alcohol/week: 0.0 standard drinks     Comment: 1 glass of wine/month    Drug use: No    Sexual activity: Yes     Partners: Male     Birth control/protection: Surgical   Other Topics Concern    Not on file   Social History Narrative    Not on file     Social Determinants of Health     Financial Resource Strain: Low Risk     Difficulty of Paying Living Expenses: Not hard at all   Food Insecurity: No Food Insecurity    Worried About Running Out of Food in the Last Year: Never true    [address] in the Last Year: Never true   Transportation Needs: No Transportation Needs    Lack of Transportation (Medical): No    Lack of Transportation (Non-Medical):  No   Physical Activity:     Days of Exercise per Week:     Minutes of Exercise per Session:    Stress:     Feeling of Stress :    Social Connections:     Frequency of Communication with Friends and Family:     Frequency of Social Gatherings with Friends and Family:     Attends [employer] Services:     Active Member of Clubs or Organizations:     Attends Club or Organization Meetings:     Marital Status:    Intimate Partner Violence:     Fear of Current or Ex-Partner:     Emotionally Abused:     Physically Abused:     Sexually Abused:        Family History:     Family History   Problem Relation Age of Onset    Cancer Mother         breast ca    Diabetes Mother     Depression Mother     Thyroid Disease Mother     High Blood Pressure Mother     Heart Disease Mother     Cancer Father         prostate ca    COPD Father     Heart Failure Father     Heart Disease Father     High Blood Pressure Father     Deep Vein Thrombosis Sister     Breast Cancer Sister     Deep Vein Thrombosis Sister         Allergies:   Levaquin [levofloxacin in d5w] and Pcn [penicillins]     Review of Systems:       Constitutional: Generalized malaise  Head: No headaches  Eyes: No double vision or blurry vision. No conjunctival inflammation. ENT: No sore throat or runny nose. . No hearing loss, tinnitus or vertigo. Cardiovascular: No chest pain or palpitations. Shortness of breath. CARBAJAL  Lung:  Shortness of breath & cough. No sputum production  Abdomen: No nausea, vomiting, diarrhea, or abdominal pain. Marnell Roman No cramps. Genitourinary: No increased urinary frequency, or dysuria. No hematuria. No suprapubic or CVA pain  Musculoskeletal: No muscle aches or pains. No joint effusions, swelling or deformities  Hematologic: No bleeding or bruising. Neurologic: No headache, weakness, numbness, or tingling. Integument: No rash, no ulcers. Psychiatric: No depression. Endocrine: No polyuria, no polydipsia, no polyphagia.     Physical Examination :     Patient Vitals for the past 8 hrs:   BP Temp Temp src Pulse Resp SpO2 Weight   09/22/21 0808 115/66 97 °F (36.1 °C) Temporal 72 20 90 % --   09/22/21 0800 -- -- -- -- -- 91 % --   09/22/21 0515 -- -- -- -- -- 94 % --   09/22/21 0457 -- -- -- -- -- 94 % --   09/22/21 0430 -- -- -- -- -- -- 290 lb 4.8 oz (131.7 kg)     General Appearance: Awake, alert, and in no apparent distress  Head:  Normocephalic, no trauma  Eyes: Pupils equal, round, reactive to light; sclera anicteric; conjunctivae pink. No embolic phenomena. ENT: Oropharynx clear, without erythema, exudate, or thrush. No tenderness of sinuses. Mouth/throat: mucosa pink and moist. No lesions. Dentition in good repair. Neck:Supple, without lymphadenopathy. Thyroid normal, No bruits. Pulmonary/Chest: Clear to auscultation, without wheezes, rales, or rhonchi. No dullness to percussion. Cardiovascular: Regular rate and rhythm without murmurs, rubs, or gallops. Abdomen: Soft, non tender. Bowel sounds normal. No organomegaly  All four Extremities: No cyanosis, clubbing, edema, or effusions. Neurologic: No gross sensory or motor deficits. Skin: Warm and dry with good turgor. No signs of peripheral arterial or venous insufficiency. No ulcerations. No open wounds. Medical Decision Making -Laboratory:   I have independently reviewed/ordered the following labs:    CBC with Differential:   Recent Labs     09/21/21  0619 09/22/21  0610   WBC 8.7 10.9   HGB 13.6 13.7   HCT 41.0 41.4    338   LYMPHOPCT 15* 15*   MONOPCT 8 7     BMP:   Recent Labs     09/21/21  0619 09/22/21  0610   * 137   K 4.2 4.0    102   CO2 28 26   BUN 14 13   CREATININE 0.57 0.61     Hepatic Function Panel:   No results for input(s): PROT, LABALBU, BILIDIR, IBILI, BILITOT, ALKPHOS, ALT, AST in the last 72 hours. No results for input(s): RPR in the last 72 hours. No results for input(s): HIV in the last 72 hours. No results for input(s): BC in the last 72 hours.   Lab Results   Component Value Date    RBC 4.65 09/22/2021    RBC 4.67 11/02/2011    WBC 10.9 09/22/2021     Lab Results   Component Value Date    CREATININE 0.61 09/22/2021 GLUCOSE 118 09/22/2021    GLUCOSE 99 11/02/2011       Medical Decision Making-Imaging:     Narrative   EXAMINATION:   CTA OF THE CHEST 9/15/2021 6:47 pm       TECHNIQUE:   CTA of the chest was performed after the administration of intravenous   contrast.  Multiplanar reformatted images are provided for review.  MIP   images are provided for review. Dose modulation, iterative reconstruction,   and/or weight based adjustment of the mA/kV was utilized to reduce the   radiation dose to as low as reasonably achievable.       COMPARISON:   None.       HISTORY:   ORDERING SYSTEM PROVIDED HISTORY: hypoxia, tachycardia, covid   TECHNOLOGIST PROVIDED HISTORY:   hypoxia, tachycardia, covid   Decision Support Exception - unselect if not a suspected or confirmed   emergency medical condition->Emergency Medical Condition (MA)       FINDINGS:   Pulmonary Arteries: Pulmonary arteries are less than adequately opacified for   evaluation. No evidence of intraluminal filling defect to suggest pulmonary   embolism.  Main pulmonary artery is normal in caliber.       Mediastinum: There are a few small nonspecific lymph nodes seen in the middle   mediastinum. No suspicious lymphadenopathy.       Lungs/pleura: Patchy bilateral ground-glass and airspace infiltrates most   notably in the lung periphery. ..  No pulmonary masses are noted. No pleural   effusions are identified.       Cardiomediastinal Structures: Intimal calcifications associated with the   thoracic aorta. No evidence of thoracic aortic aneurysm or dissection. Cardiac chambers are normal       Upper Abdomen: Small hiatal hernia       Soft Tissues/Bones: There are hypertrophic degenerative changes in the   thoracic spine.       No acute osseous abnormalities.           Impression   Patchy bilateral ground-glass and airspace infiltrates most notably in the   lung periphery compatible with patient's history of COVID pneumonia.       No obvious evidence of pulmonary embolism. [phone] - Office: [phone]

## 2021-09-22 NOTE — PROGRESS NOTES
/[facility] [address] [place] [address] Progress Note   COVID 19/ Patient  Today's Date and Time: 9/22/2021, 11:36 AM    Impression :   · COVID 19 Suspect  · COVID 19 Confirmed Infection  · Covid tests:  · 9/15/21 Positive  · 9/8/21: Positive  · History of PE on Eliquis  · Essential hypertension  · Moderate persistent asthma without complication  · Patient has not received her Covid vaccine    Patient evaluated by Telemedicine. Requesting Institution: [facility]  Provider Institution: [address] [place] [address],[facility] [address], [address] Person at [facility] [place]: Ms Ronel Huitron, APRN- IM    Recommendations:   · Monitor off antibiotics  · Remdesivir initiated 9/15/21-this may not be very effective as the patient was diagnosed over a week ago. Stopped  · Decadron initiated 9/15/21. Stop date 9-25-21  · Actemra given 9-18-21    Medical Decision Making/Summary/Discussion:9/22/2021     · Patient admitted with suspected COVID 19 infection  · Covid test confirmed positive. Infection Control Recommendations   · Universal Precautions  · Airborne isolation  · Droplet Isolation    Antimicrobial Stewardship Recommendations     · Discontinuation of therapy  Coordination of Outpatient Care:   · Estimated Length of IV antimicrobials:TBD  · Patient will need Midline Catheter Insertion: TBD  · Patient will need PICC line Insertion: No  · Patient will need: Home IV , [place],  SNF,  LTAC:TBD  · Patient will need outpatient wound care:No    Chief complaint/reason for consultation:   · Concern for COVID infection      History of Present Illness:   Judith Britt is a 54y.o.-year-old female who was initially admitted on 9/15/2021. Patient seen at the request of Dr. Vijaya Bhatt:    This patient who tested positive for COVID-19 on 9/8/2021 presented through ER with complaints of worsening shortness of breath with associated pleuritic chest pain, cough and fatigue.   She initially began having symptoms on 9/7/2021. The patient was oxygenating at 88% on room air and imaging revealed bilateral groundglass opacities. She was initiated on Decadron and remdesivir and admitted to the floor. CTA chest 9/15/2021  Impression   Patchy bilateral ground-glass and airspace infiltrates most notably in the   lung periphery compatible with patient's history of COVID pneumonia.       No obvious evidence of pulmonary embolism. Patient admitted because of concerns with COVID 19.    CURRENT EVALUATION : 9/22/2021    Afebrile  VS stable    The patient continues on hi flow NC     Actemra administered 9/18/21  CRP is decreasing    Patient exhibiting respiratory distress. yes  Respiratory secretions: no    Patient receiving supplemental oxygen. 3-->5-->25 L/min High flow NC  02 sat: 90 %  RR 20-->18 --> 20      % FIO2: 50-->61-->49  Flow rate: 25  PEEP:      QTc:       NEWS Score: 0-4 Low risk group; 5-6: Medium risk group; 7 or above: High risk group  Parameters 3 2 1 0 1 2 3   Age    < 65   ? 65   RR ? 8  9-11 12-20  21-24 ? 25   O2 Sats ?  91 92-93 94-95 ? 96      Suppl O2  Yes  No      SBP ? 90  101-110 111-219   ? 220   HR ? 40  41-50 51-90  111-130 ? 131   Consciousness    Alert   Drowsiness, lethargy, or confusion   Temperature ? 35.0 C (95.0 F)  35.1-36.0 C 95.1-96.9 F 36.1-38.0 C 97.0-100.4 F 38.1-39.0 C 100.5-102.3 F ? 39.1 C ? 102.4 F      NEWS Score:   9/16/21: 3 Low risk    Overall Daily Picture:      Unchanged    Presence of secondary bacterial Infection:    No   Additional antibiotics: No    Labs, X rays reviewed: 9/22/2021    BUN:13-->13-->14 --> 13    Cr:0.57-->0.56-->0.57 --> 0.61    WBC:6.2-->7.6-->8.7 --> 10.9  Hb:13.2-->13.6 --> 13.7  Plat: 155-->172-->236 --> 338    Absolute Neutrophils:4.85 --> 8.14  Absolute Lymphocytes:1.16 --> 1.67  Neutrophil/Lymphocyte Ratio: 4.9 high risk    CRP: 20-->38.8-->18.3-->11.2-->3.7  Ferritin:  LDH:   D-dimer: 1.10 --> 0.97    Pro Calcitonin:      Cultures:  Urine:  ·   Blood:  ·   Sputum :  ·   Wound:       CXR:         9/15/21        CAT:    9/15/21    Discussed with patient, RN, CC, IM. I have personally reviewed the past medical history, past surgical history, medications, social history, and family history, and I have updated the database accordingly.   Past Medical History:     Past Medical History:   Diagnosis Date    Acute pulmonary embolism ([facility] [address] 11/2/2015    Anxiety     Asthma     Constipation, chronic     CPAP (continuous positive airway pressure) dependence     Diabetes mellitus ([facility] [address]     pt states she was prediabetic at one point    Embolism - blood clot     right lung and right leg    Esophageal reflux     Factor V Leiden ([facility] [address]     Heart murmur     HTN (hypertension)     Hyperlipidemia     Irritable bladder     MTHFR mutation     MVP (mitral valve prolapse)     Palpitations     Pulmonary embolism (HCC) 10/15/2012    Rhinitis, allergic 10/15/2012    Sleep apnea     Venous thromboembolism 5/2/2016       Past Surgical  History:     Past Surgical History:   Procedure Laterality Date    CATARACT REMOVAL WITH IMPLANT Left 04/02/2018    CHOLECYSTECTOMY      around 2000    COLONOSCOPY  07/12/2021    COLONOSCOPY N/A 7/12/2021    COLONOSCOPY POLYPECTOMY CECUM COLD BIOPSY performed by Lonell Simmonds, MD at [address]  3/27/2017    COLONOSCOPY CONTROL HEMORRHAGE performed by Link Dasilva MD at [address] FLX W/RMVL OF TUMOR POLYP LESION [address] [place]  3/27/2017    COLONOSCOPY POLYPECTOMY SNARE/COLD BIOPSY performed by Link Dasilva MD at [address] [facility] W/O ECP Left 4/2/2018    EYE CATARACT EMULSIFICATION IOL IMPLANT performed by Nancy Peoples MD at [address] ENDOSCOPY  1/2010    with biopsy    UPPER GASTROINTESTINAL ENDOSCOPY N/A 7/12/2021    EGD BIOPSY OF STOMACH, GASTRIC POLYPECTOMY AND DUODENUM BIOPSY performed by Shlomo Amaral MD at [address] [facility].       Medications:      zinc sulfate  100 mg Oral Daily    ascorbic acid  1,000 mg Oral 4x Daily    budesonide-formoterol  2 puff Inhalation BID    apixaban  5 mg Oral BID    famotidine  40 mg Oral QPM    metoprolol tartrate  12.5 mg Oral BID    losartan  25 mg Oral Daily    pantoprazole  40 mg Oral Daily    spironolactone  25 mg Oral Daily    sodium chloride flush  10 mL IntraVENous 2 times per day    dexamethasone  6 mg Oral Daily    [START ON 9/23/2021] Vitamin D  2,000 Units Oral Daily       Social History:     Social History     Socioeconomic History    Marital status:      Spouse name: Not on file    Number of children: Not on file    Years of education: Not on file    Highest education level: Not on file   Occupational History    Not on file   Tobacco Use    Smoking status: Never Smoker    Smokeless tobacco: Never Used   Vaping Use    Vaping Use: Never used   Substance and Sexual Activity    Alcohol use: Yes     Alcohol/week: 0.0 standard drinks     Comment: 1 glass of wine/month    Drug use: No    Sexual activity: Yes     Partners: Male     Birth control/protection: Surgical   Other Topics Concern    Not on file   Social History Narrative    Not on file     Social Determinants of Health     Financial Resource Strain: Low Risk     Difficulty of Paying Living Expenses: Not hard at all   Food Insecurity: No Food Insecurity    Worried About Running Out of Food in the Last Year: Never true    [address] in the Last Year: Never true   Transportation Needs: No Transportation Needs    Lack of Transportation (Medical): No    Lack of Transportation (Non-Medical):  No   Physical Activity:     Days of Exercise per Week:     Minutes of Exercise per Session:    Stress:     Feeling of Stress :    Social Connections:     Frequency of Communication with Friends and Family:     Frequency of Social Gatherings with Friends and Family:     Attends [employer] Services:     Active Member of Clubs or Organizations:     Attends Club or Organization Meetings:     Marital Status:    Intimate Partner Violence:     Fear of Current or Ex-Partner:     Emotionally Abused:     Physically Abused:     Sexually Abused:        Family History:     Family History   Problem Relation Age of Onset    Cancer Mother         breast ca    Diabetes Mother     Depression Mother     Thyroid Disease Mother     High Blood Pressure Mother     Heart Disease Mother     Cancer Father         prostate ca    COPD Father     Heart Failure Father     Heart Disease Father     High Blood Pressure Father     Deep Vein Thrombosis Sister     Breast Cancer Sister     Deep Vein Thrombosis Sister         Allergies:   Levaquin [levofloxacin in d5w] and Pcn [penicillins]     Review of Systems:       Constitutional: Generalized malaise  Head: No headaches  Eyes: No double vision or blurry vision. No conjunctival inflammation. ENT: No sore throat or runny nose. . No hearing loss, tinnitus or vertigo. Cardiovascular: No chest pain or palpitations. Shortness of breath. CARBAJAL  Lung:  Shortness of breath & cough. No sputum production  Abdomen: No nausea, vomiting, diarrhea, or abdominal pain. Felix Ra No cramps. Genitourinary: No increased urinary frequency, or dysuria. No hematuria. No suprapubic or CVA pain  Musculoskeletal: No muscle aches or pains. No joint effusions, swelling or deformities  Hematologic: No bleeding or bruising. Neurologic: No headache, weakness, numbness, or tingling. Integument: No rash, no ulcers. Psychiatric: No depression. Endocrine: No polyuria, no polydipsia, no polyphagia.     Physical Examination :     Patient Vitals for the past 8 hrs:   BP Temp Temp src Pulse Resp SpO2 Weight   09/22/21 0808 115/66 97 °F (36.1 °C) Temporal 72 20 90 %    09/22/21 0800      91 %    09/22/21 0515      94 %    09/22/21 0457      94 %    09/22/21 0430       290 lb 4.8 oz (131.7 kg)     General Appearance: Awake, alert, and in no apparent distress  Head:  Normocephalic, no trauma  Eyes: Pupils equal, round, reactive to light; sclera anicteric; conjunctivae pink. No embolic phenomena. ENT: Oropharynx clear, without erythema, exudate, or thrush. No tenderness of sinuses. Mouth/throat: mucosa pink and moist. No lesions. Dentition in good repair. Neck:Supple, without lymphadenopathy. Thyroid normal, No bruits. Pulmonary/Chest: Clear to auscultation, without wheezes, rales, or rhonchi. No dullness to percussion. Cardiovascular: Regular rate and rhythm without murmurs, rubs, or gallops. Abdomen: Soft, non tender. Bowel sounds normal. No organomegaly  All four Extremities: No cyanosis, clubbing, edema, or effusions. Neurologic: No gross sensory or motor deficits. Skin: Warm and dry with good turgor. No signs of peripheral arterial or venous insufficiency. No ulcerations. No open wounds. Medical Decision Making -Laboratory:   I have independently reviewed/ordered the following labs:    CBC with Differential:   Recent Labs     09/21/21  0619 09/22/21  0610   WBC 8.7 10.9   HGB 13.6 13.7   HCT 41.0 41.4    338   LYMPHOPCT 15* 15*   MONOPCT 8 7     BMP:   Recent Labs     09/21/21  0619 09/22/21  0610   * 137   K 4.2 4.0    102   CO2 28 26   BUN 14 13   CREATININE 0.57 0.61     Hepatic Function Panel:   No results for input(s): PROT, LABALBU, BILIDIR, IBILI, BILITOT, ALKPHOS, ALT, AST in the last 72 hours. No results for input(s): RPR in the last 72 hours. No results for input(s): HIV in the last 72 hours. No results for input(s): BC in the last 72 hours.   Lab Results   Component Value Date    RBC 4.65 09/22/2021    RBC 4.67 11/02/2011    WBC 10.9 09/22/2021     Lab Results   Component Value Date    CREATININE 0.61 09/22/2021    GLUCOSE 118 09/22/2021 GLUCOSE 99 11/02/2011       Medical Decision Making-Imaging:     Narrative   EXAMINATION:   CTA OF THE CHEST 9/15/2021 6:47 pm       TECHNIQUE:   CTA of the chest was performed after the administration of intravenous   contrast.  Multiplanar reformatted images are provided for review.  MIP   images are provided for review. Dose modulation, iterative reconstruction,   and/or weight based adjustment of the mA/kV was utilized to reduce the   radiation dose to as low as reasonably achievable.       COMPARISON:   None.       HISTORY:   ORDERING SYSTEM PROVIDED HISTORY: hypoxia, tachycardia, covid   TECHNOLOGIST PROVIDED HISTORY:   hypoxia, tachycardia, covid   Decision Support Exception - unselect if not a suspected or confirmed   emergency medical condition->Emergency Medical Condition (MA)       FINDINGS:   Pulmonary Arteries: Pulmonary arteries are less than adequately opacified for   evaluation. No evidence of intraluminal filling defect to suggest pulmonary   embolism.  Main pulmonary artery is normal in caliber.       Mediastinum: There are a few small nonspecific lymph nodes seen in the middle   mediastinum. No suspicious lymphadenopathy.       Lungs/pleura: Patchy bilateral ground-glass and airspace infiltrates most   notably in the lung periphery. ..  No pulmonary masses are noted. No pleural   effusions are identified.       Cardiomediastinal Structures: Intimal calcifications associated with the   thoracic aorta. No evidence of thoracic aortic aneurysm or dissection. Cardiac chambers are normal       Upper Abdomen: Small hiatal hernia       Soft Tissues/Bones:  There are hypertrophic degenerative changes in the   thoracic spine.       No acute osseous abnormalities.           Impression   Patchy bilateral ground-glass and airspace infiltrates most notably in the   lung periphery compatible with patient's history of COVID pneumonia.       No obvious evidence of pulmonary embolism.             Narrative resident/ student.     Leanne Finney MD.            Pager: [phone] - Office: [phone]

## 2021-09-22 NOTE — PROGRESS NOTES
Physical Therapy  Facility/Department: [facility] AT THE [facility] MED SURG  Daily Treatment Note  NAME: Nancy Arguello  : 1965  MRN: [id number]    Date of Service: 2021    Discharge Recommendations:  Continue to assess pending progress        Assessment   Treatment Diagnosis: Decreased activity endurance  Prognosis: Good  Decision Making: Medium Complexity  PT Education: Gait Training;Transfer Training;Energy Conservation;General Safety  Patient Education: Importance of ex, transfers and repositioning. REQUIRES PT FOLLOW UP: Yes  Activity Tolerance  Activity Tolerance: Patient limited by endurance; Patient Tolerated treatment well     Patient Diagnosis(es): The primary encounter diagnosis was Pneumonia due to COVID-19 virus. A diagnosis of Acute respiratory failure with hypoxia (HCC) was also pertinent to this visit. has a past medical history of Acute pulmonary embolism (HCC), Anxiety, Asthma, Constipation, chronic, CPAP (continuous positive airway pressure) dependence, Diabetes mellitus ([facility] [address] Embolism - blood clot, Esophageal reflux, Factor V Leiden ([facility] [address] Heart murmur, HTN (hypertension), Hyperlipidemia, Irritable bladder, MTHFR mutation, MVP (mitral valve prolapse), Palpitations, Pulmonary embolism ([facility] [address] Rhinitis, allergic, Sleep apnea, and Venous thromboembolism. has a past surgical history that includes Cholecystectomy; Mandible fracture surgery (); Tubal ligation; Upper gastrointestinal endoscopy (2010); pr colsc flx w/rmvl of tumor polyp lesion snare tq (3/27/2017); pr colsc flexible w/control bleeding any method (3/27/2017); Cataract removal with implant (Left, 2018); pr xcapsl ctrc rmvl insj io lens prosth w/o ecp (Left, 2018); Colonoscopy (2021); Upper gastrointestinal endoscopy (N/A, 2021); and Colonoscopy (N/A, 2021).     Restrictions  Restrictions/Precautions  Restrictions/Precautions: Isolation, Fall Risk, General Precautions  Subjective   General  Chart Reviewed: Yes  Response To Previous Treatment: Patient with no complaints from previous session. Family / Caregiver Present: No  Referring Practitioner: Efe Kramer MD  Subjective  Subjective: Pt reports shes feeling a little better in regards to her brething but shes just ready to get home. Pt denies current pain. Orientation  Orientation  Overall Orientation Status: Within Normal Limits  Cognition      Objective   Bed mobility  Comment: up in chair  Transfers  Sit to Stand: Supervision  Stand to sit: Supervision  Ambulation  Ambulation?: Yes  WB Status: FWB  Ambulation 1  Surface: level tile  Device: No Device  Other Apparatus: O2 (hi flow)  Assistance: Supervision  Quality of Gait: SPO2=88% after amb but recovered to 94%% after 1-2 minutes  Gait Deviations: Slow Fariha  Distance: 25' no AD  Comments: Limited by high flow tubing, fatigues quickly, multi directional changes with no LOB. Balance  Posture: Good  Sitting - Static: Good;+  Sitting - Dynamic: Good;+  Standing - Static: Good;+  Standing - Dynamic: Good  Exercises  Comments: Sink ex at chair x 10 reps within tolerance. Goals  Short term goals  Time Frame for Short term goals: 10 days  Short term goal 1: Patient will ambulate 48' with supervision, without LOB  Short term goal 2: Patient will perform bed mobility and transfers with MI  Short term goal 3: Patient will tolerate 20-30 minutes of therex/act to improve endurance for ADLs  Patient Goals   Patient goals : Feel better    Plan    Plan  Times per week: 7  Times per day: Twice a day  Plan Comment: 1x/day on weekends  Safety Devices  Type of devices:  All fall risk precautions in place, Call light within reach, Gait belt, Patient at risk for falls, Nurse notified, Left in chair (no alarm upon entry)     Therapy Time   Individual Concurrent Group Co-treatment   Time In 1304         Time Out 1338         Minutes 34         Timed Code Treatment Minutes: [address] [facility], PTA

## 2021-09-22 NOTE — PROGRESS NOTES
Progress Note  Lianet King MD    OBJECTIVE:    Patient seen for f/u of Pneumonia due to COVID-19 virus. She feels  better,  Na better  Still on high flow oxygen     ROS:   Constitutional: negative  for fevers, and negative for chills. Respiratory: positive for shortness of breath, positive for cough, and negative for wheezing  Cardiovascular: negative for chest pain, and negative for palpitations  Gastrointestinal: negative for abdominal pain, negative for nausea,negative for vomiting, negative for diarrhea, and negative for constipation     All other systems were reviewed with the patient and are negative unless otherwise stated in HPI    OBJECTIVE:  Vitals:   Temp: 96.8 °F (36 °C)  BP: (!) 161/56  Resp: 22  Pulse: 60  SpO2: 94 %    24HR INTAKE/OUTPUT:      Intake/Output Summary (Last 24 hours) at 9/22/2021 1945  Last data filed at 9/22/2021 0432  Gross per 24 hour   Intake 2175.41 ml   Output 800 ml   Net 1375.41 ml     -----------------------------------------------------------------  Exam:  GEN:    Awake, alert and oriented x3. EYES:  EOMI, pupils equal   NECK: Supple. No lymphadenopathy. No carotid bruit  CVS:    regular rate and rhythm, no audible murmur  PULM:  diminished with bilat rhonchi, no acute respiratory distress  ABD:    Bowels sounds normal.  Abdomen is soft. No distention. no tenderness to palpation. EXT:   no edema bilaterally . No calf tenderness. NEURO: Moves all extremities. Motor and sensory are grossly intact  SKIN:  No rashes.   No skin lesions.    -----------------------------------------------------------------  Diagnostic Data:    · All available data reviewed  Lab Results   Component Value Date    WBC 10.9 09/22/2021    HGB 13.7 09/22/2021    MCV 89.0 09/22/2021     09/22/2021      Lab Results   Component Value Date    GLUCOSE 118 (H) 09/22/2021    BUN 13 09/22/2021    CREATININE 0.61 09/22/2021     09/22/2021    K 4.0 09/22/2021    CALCIUM 8.4 (L) 09/22/2021     09/22/2021    CO2 26 09/22/2021       PROBLEM LIST:  Principal Problem:    Pneumonia due to COVID-19 virus  Active Problems:    Mild malnutrition (HCC)    Bronchial asthma    Sleep apnea, obstructive    Essential hypertension    Long term current use of anticoagulant therapy    Hypernatremia  Resolved Problems:    * No resolved hospital problems. *      ASSESSMENT / PLAN:  Pneumonia due to COVID-19 virus  · Continue current therapy of dexamethazone,high flow oxygen    · Hypernatremia- improved,decrease iv  · Nutrition status:  Well developed, well nourished with no malnutrition  · DVT prophylaxis: Eliquis   · High risk medications: eliquis   · Disposition:  Discharge plan is home    Philippe Shaw MD , M.D.  9/22/2021  7:55 AM

## 2021-09-22 NOTE — PROGRESS NOTES
Pt resting comfortably with no s/s of distress. SpO2 92% on heated high flow with FiO2 at 49% and 25L. Respirations even and unlabored. Pt denies any needs at this time.

## 2021-09-22 NOTE — PROGRESS NOTES
Occupational Therapy  Facility/Department: [facility] AT THE [facility] MED SURG  Daily Treatment Note  NAME: Mingo Bentley  : 1965  MRN: [id number]    Date of Service: 2021    Discharge Recommendations:  Continue to assess pending progress       Assessment      OT Education: OT Role;Energy Conservation  Patient Education: Pt was provided with discharge folder and educated on EC/WS strategies. Barriers to Learning: none  Activity Tolerance  Activity Tolerance: Patient limited by fatigue;Patient Tolerated treatment well  Safety Devices  Safety Devices in place: Yes  Type of devices: Call light within reach; Left in chair         Patient Diagnosis(es): The primary encounter diagnosis was Pneumonia due to COVID-19 virus. A diagnosis of Acute respiratory failure with hypoxia (HCC) was also pertinent to this visit. has a past medical history of Acute pulmonary embolism (HCC), Anxiety, Asthma, Constipation, chronic, CPAP (continuous positive airway pressure) dependence, Diabetes mellitus ([address] Embolism - blood clot, Esophageal reflux, Factor V Leiden ([facility] [address] Heart murmur, HTN (hypertension), Hyperlipidemia, Irritable bladder, MTHFR mutation, MVP (mitral valve prolapse), Palpitations, Pulmonary embolism ([address] Rhinitis, allergic, Sleep apnea, and Venous thromboembolism. has a past surgical history that includes Cholecystectomy; Mandible fracture surgery (); Tubal ligation; Upper gastrointestinal endoscopy (2010); pr colsc flx w/rmvl of tumor polyp lesion snare tq (3/27/2017); pr colsc flexible w/control bleeding any method (3/27/2017); Cataract removal with implant (Left, 2018); pr xcapsl ctrc rmvl insj io lens prosth w/o ecp (Left, 2018); Colonoscopy (2021); Upper gastrointestinal endoscopy (N/A, 2021); and Colonoscopy (N/A, 2021).     Restrictions  Restrictions/Precautions  Restrictions/Precautions: Isolation, Fall Risk, General Precautions  Subjective   General  Chart Reviewed: Yes  Patient assessed for rehabilitation services?: Yes  Response to previous treatment: Patient with no complaints from previous session  Family / Caregiver Present: No  Referring Practitioner: Dr. James Olson  Diagnosis: acute respiratory failure wtih hypoxia  Subjective  Subjective: Pt had no complaints of pain this date. General Comment  Comments: Pt sitting up in bedside chair upon arrival. Pt agreed to participate in therapy session. Orientation     Objective                                                                Type of ROM/Therapeutic Exercise  Type of ROM/Therapeutic Exercise: Free weights  Comment: Pt completed BUE strengthening exercises using a 2# weight. Pt completed shoulder flexion. shoulder abduction/adduction, elbow flexion/extension, forearm pronation/supination and scapular protraction/ retraction 15 reps x 1 set. Pt required intermittent rest breaks. Pt O2 sat steady at 91% during exercises. LUE AROM (degrees)  LUE AROM : WFL  Left Hand AROM (degrees)  Left Hand AROM: WFL  RUE AROM (degrees)  RUE AROM : WFL  Right Hand AROM (degrees)  Right Hand AROM: WFL                 Plan   Plan  Times per week: 7x/week  Times per day: Daily  Current Treatment Recommendations: Strengthening, Patient/Caregiver Education & Training, Balance Training, Functional Mobility Training, Endurance Training, Safety Education & Training, Self-Care / ADL  G-Code     OutComes Score                                                  AM-PAC Score             Goals  Short term goals  Time Frame for Short term goals: 90 days  Short term goal 1: Pt will verbalize 4 ECWS strategies for implementation/use at home to improve safety and independence with ADL and functional tasks. Short term goal 2: Pt will tolerate standing for greater tahn 3 minutes without LOB or SOB while engage in functional tasks to improve safety and independence with ADL and functional mobility.   Short term goal 3: Pt will complete TB ADL Otto with use of AE PRN to ensure safe return home. Short term goal 4: Pt will complete toileting routine at standard toilet Otto to improve safety and independence with ADL task.        Therapy Time   Individual Concurrent Group Co-treatment   Time In 1330         Time Out 1357         Minutes [address] [place], [place]

## 2021-09-22 NOTE — PROGRESS NOTES
Physical Therapy  Facility/Department: [facility] AT THE [facility] MED SURG  Daily Treatment Note  NAME: Kathleen Ang  : 1965  MRN: [id number]    Date of Service: 2021    Discharge Recommendations:  Continue to assess pending progress      Assessment   Assessment: Pt amb short distance prior to rest break needed. Pt able to complete 2 sets of 10-15 reps of standing ex prior nto rest break needed d/t breathing/ SPo2. Pt handheld dropped to 76 in standing but recovered quickly to 88. Treatment Diagnosis: Decreased activity endurance  Patient Education: Importance of ex, transfers and repositioning. REQUIRES PT FOLLOW UP: Yes  Activity Tolerance  Activity Tolerance: Patient limited by endurance; Patient Tolerated treatment well     Patient Diagnosis(es): The primary encounter diagnosis was Pneumonia due to COVID-19 virus. A diagnosis of Acute respiratory failure with hypoxia (HCC) was also pertinent to this visit. has a past medical history of Acute pulmonary embolism (HCC), Anxiety, Asthma, Constipation, chronic, CPAP (continuous positive airway pressure) dependence, Diabetes mellitus ([address] Embolism - blood clot, Esophageal reflux, Factor V Leiden ([address] Heart murmur, HTN (hypertension), Hyperlipidemia, Irritable bladder, MTHFR mutation, MVP (mitral valve prolapse), Palpitations, Pulmonary embolism ([address] Rhinitis, allergic, Sleep apnea, and Venous thromboembolism. has a past surgical history that includes Cholecystectomy; Mandible fracture surgery (); Tubal ligation; Upper gastrointestinal endoscopy (2010); pr colsc flx w/rmvl of tumor polyp lesion snare tq (3/27/2017); pr colsc flexible w/control bleeding any method (3/27/2017); Cataract removal with implant (Left, 2018); pr xcapsl ctrc rmvl insj io lens prosth w/o ecp (Left, 2018); Colonoscopy (2021);  Upper gastrointestinal endoscopy (N/A, 2021); and Colonoscopy (N/A, 7/12/2021). Restrictions  Restrictions/Precautions  Restrictions/Precautions: Isolation, Fall Risk, General Precautions  Subjective   General  Chart Reviewed: Yes  Response To Previous Treatment: Patient with no complaints from previous session. Family / Caregiver Present: No  Referring Practitioner: Mena Fowler MD  Subjective  Subjective: Pt reports shes feeling a little better in regards to her brething but shes just ready to get home. Pt denies current pain. Orientation  Orientation  Overall Orientation Status: Within Functional Limits  Cognition      Objective   Bed mobility  Rolling to Left: Modified independent  Supine to Sit: Modified independent  Sit to Supine: Modified independent  Scooting: Modified independent  Transfers  Sit to Stand: Stand by assistance  Stand to sit: Stand by assistance  Ambulation  Ambulation?: Yes  WB Status: FWB  Ambulation 1  Surface: level tile  Device: No Device  Other Apparatus: O2  Assistance: Stand by assistance  Distance: 25' no AD  Comments: Limited by high flow tubing, fatigues quickly, multi directional changes with no LOB. Exercises  Hip Flexion: 15  Hip Extension/Leg Presses: 15  Hip Abduction: 15  Comments: Sink ex at chair within tolerance. Goals  Short term goals  Time Frame for Short term goals: 10 days  Short term goal 1: Patient will ambulate 48' with supervision, without LOB  Short term goal 2: Patient will perform bed mobility and transfers with MI  Short term goal 3: Patient will tolerate 20-30 minutes of therex/act to improve endurance for ADLs  Patient Goals   Patient goals : Feel better    Plan    Plan  Times per week: 7  Times per day: Twice a day  Plan Comment: 1x/day on weekends  Safety Devices  Type of devices:  All fall risk precautions in place, Call light within reach, Gait belt, Patient at risk for falls, Nurse notified, Left in chair (no alarm on entry)     Therapy Time   Individual Concurrent Group Co-treatment   Time In

## 2021-09-23 LAB
ABSOLUTE EOS #: 0.23 K/UL (ref 0–0.44)
ABSOLUTE IMMATURE GRANULOCYTE: 0.16 K/UL (ref 0–0.3)
ABSOLUTE LYMPH #: 1.54 K/UL (ref 1.1–3.7)
ABSOLUTE MONO #: 0.79 K/UL (ref 0.1–1.2)
ALBUMIN SERPL-MCNC: 3.2 G/DL (ref 3.5–5.2)
ALBUMIN/GLOBULIN RATIO: 1.3 (ref 1–2.5)
ALP BLD-CCNC: 54 U/L (ref 35–104)
ALT SERPL-CCNC: 33 U/L (ref 5–33)
ANION GAP SERPL CALCULATED.3IONS-SCNC: 9 MMOL/L (ref 9–17)
AST SERPL-CCNC: 22 U/L
BASOPHILS # BLD: 0 % (ref 0–2)
BASOPHILS ABSOLUTE: <0.03 K/UL (ref 0–0.2)
BILIRUB SERPL-MCNC: 0.37 MG/DL (ref 0.3–1.2)
BILIRUBIN DIRECT: <0.08 MG/DL
BILIRUBIN, INDIRECT: ABNORMAL MG/DL (ref 0–1)
BUN BLDV-MCNC: 11 MG/DL (ref 6–20)
BUN/CREAT BLD: 19 (ref 9–20)
C-REACTIVE PROTEIN: <3 MG/L (ref 0–5)
CALCIUM SERPL-MCNC: 8.9 MG/DL (ref 8.6–10.4)
CHLORIDE BLD-SCNC: 105 MMOL/L (ref 98–107)
CO2: 28 MMOL/L (ref 20–31)
CREAT SERPL-MCNC: 0.58 MG/DL (ref 0.5–0.9)
DIFFERENTIAL TYPE: ABNORMAL
EOSINOPHILS RELATIVE PERCENT: 2 % (ref 1–4)
GFR AFRICAN AMERICAN: >60 ML/MIN
GFR NON-AFRICAN AMERICAN: >60 ML/MIN
GFR SERPL CREATININE-BSD FRML MDRD: ABNORMAL ML/MIN/{1.73_M2}
GFR SERPL CREATININE-BSD FRML MDRD: ABNORMAL ML/MIN/{1.73_M2}
GLOBULIN: ABNORMAL G/DL (ref 1.5–3.8)
GLUCOSE BLD-MCNC: 131 MG/DL (ref 70–99)
HCT VFR BLD CALC: 41.3 % (ref 36.3–47.1)
HEMOGLOBIN: 13.9 G/DL (ref 11.9–15.1)
IMMATURE GRANULOCYTES: 2 %
INR BLD: 1.1
LYMPHOCYTES # BLD: 14 % (ref 24–43)
MCH RBC QN AUTO: 30.1 PG (ref 25.2–33.5)
MCHC RBC AUTO-ENTMCNC: 33.7 G/DL (ref 28.4–34.8)
MCV RBC AUTO: 89.4 FL (ref 82.6–102.9)
MONOCYTES # BLD: 7 % (ref 3–12)
NRBC AUTOMATED: 0.2 PER 100 WBC
PARTIAL THROMBOPLASTIN TIME: 29.4 SEC (ref 23.9–33.8)
PDW BLD-RTO: 13.5 % (ref 11.8–14.4)
PLATELET # BLD: 336 K/UL (ref 138–453)
PLATELET ESTIMATE: ABNORMAL
PMV BLD AUTO: 10 FL (ref 8.1–13.5)
POTASSIUM SERPL-SCNC: 4.3 MMOL/L (ref 3.7–5.3)
PROTHROMBIN TIME: 13.8 SEC (ref 11.5–14.2)
RBC # BLD: 4.62 M/UL (ref 3.95–5.11)
RBC # BLD: ABNORMAL 10*6/UL
SEG NEUTROPHILS: 75 % (ref 36–65)
SEGMENTED NEUTROPHILS ABSOLUTE COUNT: 8.27 K/UL (ref 1.5–8.1)
SODIUM BLD-SCNC: 142 MMOL/L (ref 135–144)
TOTAL PROTEIN: 5.6 G/DL (ref 6.4–8.3)
WBC # BLD: 11 K/UL (ref 3.5–11.3)
WBC # BLD: ABNORMAL 10*3/UL

## 2021-09-23 PROCEDURE — 85610 PROTHROMBIN TIME: CPT

## 2021-09-23 PROCEDURE — 97116 GAIT TRAINING THERAPY: CPT

## 2021-09-23 PROCEDURE — 36415 COLL VENOUS BLD VENIPUNCTURE: CPT

## 2021-09-23 PROCEDURE — 99232 SBSQ HOSP IP/OBS MODERATE 35: CPT | Performed by: INTERNAL MEDICINE

## 2021-09-23 PROCEDURE — 6360000002 HC RX W HCPCS: Performed by: INTERNAL MEDICINE

## 2021-09-23 PROCEDURE — 6370000000 HC RX 637 (ALT 250 FOR IP): Performed by: INTERNAL MEDICINE

## 2021-09-23 PROCEDURE — 2580000003 HC RX 258: Performed by: FAMILY MEDICINE

## 2021-09-23 PROCEDURE — 97110 THERAPEUTIC EXERCISES: CPT

## 2021-09-23 PROCEDURE — 80048 BASIC METABOLIC PNL TOTAL CA: CPT

## 2021-09-23 PROCEDURE — 94640 AIRWAY INHALATION TREATMENT: CPT

## 2021-09-23 PROCEDURE — 86140 C-REACTIVE PROTEIN: CPT

## 2021-09-23 PROCEDURE — 94761 N-INVAS EAR/PLS OXIMETRY MLT: CPT

## 2021-09-23 PROCEDURE — 2700000000 HC OXYGEN THERAPY PER DAY

## 2021-09-23 PROCEDURE — 1200000000 HC SEMI PRIVATE

## 2021-09-23 PROCEDURE — 6370000000 HC RX 637 (ALT 250 FOR IP): Performed by: NURSE PRACTITIONER

## 2021-09-23 PROCEDURE — 80076 HEPATIC FUNCTION PANEL: CPT

## 2021-09-23 PROCEDURE — 85730 THROMBOPLASTIN TIME PARTIAL: CPT

## 2021-09-23 PROCEDURE — 85025 COMPLETE CBC W/AUTO DIFF WBC: CPT

## 2021-09-23 RX ADMIN — OXYCODONE HYDROCHLORIDE AND ACETAMINOPHEN 1000 MG: 500 TABLET ORAL at 08:54

## 2021-09-23 RX ADMIN — LOSARTAN POTASSIUM 25 MG: 25 TABLET, FILM COATED ORAL at 08:53

## 2021-09-23 RX ADMIN — OXYCODONE HYDROCHLORIDE AND ACETAMINOPHEN 1000 MG: 500 TABLET ORAL at 13:47

## 2021-09-23 RX ADMIN — FAMOTIDINE 40 MG: 20 TABLET, FILM COATED ORAL at 16:33

## 2021-09-23 RX ADMIN — METOPROLOL TARTRATE 12.5 MG: 25 TABLET, FILM COATED ORAL at 08:53

## 2021-09-23 RX ADMIN — SODIUM CHLORIDE: 9 INJECTION, SOLUTION INTRAVENOUS at 16:34

## 2021-09-23 RX ADMIN — Medication 2000 UNITS: at 08:53

## 2021-09-23 RX ADMIN — OXYCODONE HYDROCHLORIDE AND ACETAMINOPHEN 1000 MG: 500 TABLET ORAL at 20:16

## 2021-09-23 RX ADMIN — Medication 100 MG: at 08:53

## 2021-09-23 RX ADMIN — BUDESONIDE AND FORMOTEROL FUMARATE DIHYDRATE 2 PUFF: 160; 4.5 AEROSOL RESPIRATORY (INHALATION) at 11:39

## 2021-09-23 RX ADMIN — SPIRONOLACTONE 25 MG: 25 TABLET, FILM COATED ORAL at 08:54

## 2021-09-23 RX ADMIN — APIXABAN 5 MG: 5 TABLET, FILM COATED ORAL at 20:16

## 2021-09-23 RX ADMIN — PANTOPRAZOLE SODIUM 40 MG: 40 TABLET, DELAYED RELEASE ORAL at 08:53

## 2021-09-23 RX ADMIN — BUDESONIDE AND FORMOTEROL FUMARATE DIHYDRATE 2 PUFF: 160; 4.5 AEROSOL RESPIRATORY (INHALATION) at 21:06

## 2021-09-23 RX ADMIN — APIXABAN 5 MG: 5 TABLET, FILM COATED ORAL at 08:54

## 2021-09-23 RX ADMIN — POLYETHYLENE GLYCOL (3350) 17 G: 17 POWDER, FOR SOLUTION ORAL at 08:53

## 2021-09-23 RX ADMIN — DEXAMETHASONE 6 MG: 4 TABLET ORAL at 08:54

## 2021-09-23 RX ADMIN — OXYCODONE HYDROCHLORIDE AND ACETAMINOPHEN 1000 MG: 500 TABLET ORAL at 16:34

## 2021-09-23 ASSESSMENT — PAIN SCALES - GENERAL
PAINLEVEL_OUTOF10: 0

## 2021-09-23 NOTE — PROGRESS NOTES
Writer into room to administer morning medications. See MAR for further details. Pt asks if writer can decrease her oxygen. Heated high flow decreased to 35% at this time. Pt remains in the chair throughout medication administration. SPO2 does drop after decreasing pt's heated high flow. SPO2 dropped as low as 87% just sitting in the chair. Heated high flow increased back to 45% at this time. Glycolax packet administered per pt request. Pt denies any further needs at this time.

## 2021-09-23 NOTE — PROGRESS NOTES
Pt telemetry alarming for SPO2 of 87%. Writer into room and patient is sitting up in the chair. Respirations are labored but patient states she just got up to the chair and is trying to catch her breath. Pt then assessed and vitals obtained at this time. Pt is alert and oriented x4. Pt denies any pain this morning. SPO2 increases to 95% shortly after ambulating. Pt states she feels better and asks about decreasing her oxygen. Writer explained to patient that as long as her SPO2 remains [facility] staff can attempt to wean her oxygen. Pt verbalizes understanding. Writer noted patient has not had a BM in 3 days. Pt states \"I should probably take some miralax\". PRN glycolax will be administered with morning medications. Pt denies any further needs at this time.

## 2021-09-23 NOTE — PROGRESS NOTES
Progress Note  Lianet King MD    OBJECTIVE:    Patient seen for f/u of Pneumonia due to COVID-19 virus. She feels  better,  Na better  Oxygen requirements improving    ROS:   Constitutional: negative  for fevers, and negative for chills. Appetite improving  Respiratory: positive for shortness of breath, positive for cough, and negative for wheezing  Cardiovascular: negative for chest pain, and negative for palpitations  Gastrointestinal: negative for abdominal pain, negative for nausea,negative for vomiting, negative for diarrhea, and negative for constipation     All other systems were reviewed with the patient and are negative unless otherwise stated in HPI    OBJECTIVE:  Vitals:   Temp: 96.8 °F (36 °C)  BP: 110/65  Resp: 18  Pulse: 58  SpO2: 91 %    24HR INTAKE/OUTPUT:      Intake/Output Summary (Last 24 hours) at 9/23/2021 0750  Last data filed at 9/23/2021 0730  Gross per 24 hour   Intake 4762.4 ml   Output 2000 ml   Net 2762.4 ml     -----------------------------------------------------------------  Exam:  GEN:    Awake, alert and oriented x3. EYES:  EOMI, pupils equal   NECK: Supple. No lymphadenopathy. No carotid bruit  CVS:    regular rate and rhythm, no audible murmur  PULM:  diminished with bilat rhonchi, no acute respiratory distress  ABD:    Bowels sounds normal.  Abdomen is soft. No distention. no tenderness to palpation. EXT:   no edema bilaterally . No calf tenderness. NEURO: Moves all extremities. Motor and sensory are grossly intact  SKIN:  No rashes.   No skin lesions.    -----------------------------------------------------------------  Diagnostic Data:    · All available data reviewed  Lab Results   Component Value Date    WBC 11.0 09/23/2021    HGB 13.9 09/23/2021    MCV 89.4 09/23/2021     09/23/2021      Lab Results   Component Value Date    GLUCOSE 131 (H) 09/23/2021    BUN 11 09/23/2021    CREATININE 0.58 09/23/2021     09/23/2021    K 4.3 09/23/2021    CALCIUM 8.9 09/23/2021     09/23/2021    CO2 28 09/23/2021       PROBLEM LIST:  Principal Problem:    Pneumonia due to COVID-19 virus  Active Problems:    Mild malnutrition (HCC)    Bronchial asthma    Sleep apnea, obstructive    Essential hypertension    Long term current use of anticoagulant therapy    Hypernatremia  Resolved Problems:    * No resolved hospital problems. *      ASSESSMENT / PLAN:  Pneumonia due to COVID-19 virus  · Continue current therapy of dexamethazone,high flow oxygen- wean gradually as tolerated    · Hypernatremia- improved,Na 142  · Nutrition status:  Well developed, well nourished with no malnutrition  · DVT prophylaxis: Eliquis   · High risk medications: eliquis   · Disposition:  Discharge plan is home    Dorothea Castano MD , M.D.  9/23/2021  7:50 AM

## 2021-09-23 NOTE — PROGRESS NOTES
The patient is awake and sitting in her chair quietly. Vitals are checked and are within the normal. Physical assessment is also completed at this time. Patient's O2 saturation is above 90% on heated high flow. Respirations are regular and no SOB noted as of now. Patient denies of having any pain or discomfort at this time. Further needs are assessed. Safety precautions are in place. Will continue to monitor.

## 2021-09-23 NOTE — PROGRESS NOTES
Physical Therapy  Facility/Department: [facility] AT THE [facility] MED SURG  Daily Treatment Note  NAME: Derrick Lang  : 1965  MRN: [id number]    Date of Service: 2021    Discharge Recommendations:  Continue to assess pending progress     Patient Diagnosis(es): The primary encounter diagnosis was Pneumonia due to COVID-19 virus. A diagnosis of Acute respiratory failure with hypoxia (HCC) was also pertinent to this visit. has a past medical history of Acute pulmonary embolism (HCC), Anxiety, Asthma, Constipation, chronic, CPAP (continuous positive airway pressure) dependence, Diabetes mellitus ([facility] [address] Embolism - blood clot, Esophageal reflux, Factor V Leiden ([facility] [address] Heart murmur, HTN (hypertension), Hyperlipidemia, Irritable bladder, MTHFR mutation, MVP (mitral valve prolapse), Palpitations, Pulmonary embolism ([facility] [address] Rhinitis, allergic, Sleep apnea, and Venous thromboembolism. has a past surgical history that includes Cholecystectomy; Mandible fracture surgery (); Tubal ligation; Upper gastrointestinal endoscopy (2010); pr colsc flx w/rmvl of tumor polyp lesion snare tq (3/27/2017); pr colsc flexible w/control bleeding any method (3/27/2017); Cataract removal with implant (Left, 2018); pr xcapsl ctrc rmvl insj io lens prosth w/o ecp (Left, 2018); Colonoscopy (2021); Upper gastrointestinal endoscopy (N/A, 2021); and Colonoscopy (N/A, 2021). Restrictions  Restrictions/Precautions  Restrictions/Precautions: Isolation, Fall Risk, General Precautions  Subjective   General  Chart Reviewed: Yes  Response To Previous Treatment: Patient with no complaints from previous session. Family / Caregiver Present: No  Referring Practitioner: Eli Hall MD  Subjective  Subjective: Pt reports feeling a little better. Pleasant and agreeable to therapy.           Orientation  Orientation  Overall Orientation Status: Within Functional Limits  Cognition      Objective      Transfers  Sit to Stand:

## 2021-09-23 NOTE — PROGRESS NOTES
Physical Therapy  Facility/Department: [facility] AT THE [facility] MED SURG  Daily Treatment Note  NAME: Chari Gaucher  : 1965  MRN: [id number]    Date of Service: 2021    Discharge Recommendations:  Continue to assess pending progress        Assessment   Treatment Diagnosis: Decreased activity endurance  Prognosis: Good  PT Education: Gait Training;Transfer Training;Energy Conservation;General Safety  Patient Education: Importance of ex, transfers and repositioning. REQUIRES PT FOLLOW UP: Yes  Activity Tolerance  Activity Tolerance: Patient Tolerated treatment well     Patient Diagnosis(es): The primary encounter diagnosis was Pneumonia due to COVID-19 virus. A diagnosis of Acute respiratory failure with hypoxia (HCC) was also pertinent to this visit. has a past medical history of Acute pulmonary embolism (HCC), Anxiety, Asthma, Constipation, chronic, CPAP (continuous positive airway pressure) dependence, Diabetes mellitus ([facility] [address] Embolism - blood clot, Esophageal reflux, Factor V Leiden ([facility] [address] Heart murmur, HTN (hypertension), Hyperlipidemia, Irritable bladder, MTHFR mutation, MVP (mitral valve prolapse), Palpitations, Pulmonary embolism ([facility] [address] Rhinitis, allergic, Sleep apnea, and Venous thromboembolism. has a past surgical history that includes Cholecystectomy; Mandible fracture surgery (); Tubal ligation; Upper gastrointestinal endoscopy (2010); pr colsc flx w/rmvl of tumor polyp lesion snare tq (3/27/2017); pr colsc flexible w/control bleeding any method (3/27/2017); Cataract removal with implant (Left, 2018); pr xcapsl ctrc rmvl insj io lens prosth w/o ecp (Left, 2018); Colonoscopy (2021); Upper gastrointestinal endoscopy (N/A, 2021); and Colonoscopy (N/A, 2021).     Restrictions  Restrictions/Precautions  Restrictions/Precautions: Isolation, Fall Risk, General Precautions  Subjective   General  Chart Reviewed: Yes  Response To Previous Treatment: Patient with no complaints from previous session. Family / Caregiver Present: No  Referring Practitioner: Musa March MD  Subjective  Subjective: Pt states she feels very good today and her breathign feels much better          Orientation  Orientation  Overall Orientation Status: Within Functional Limits  Cognition      Objective   Bed mobility  Comment: Pt in chair before and after treatment  Transfers  Sit to Stand: Supervision;Modified independent  Stand to sit: Supervision;Modified independent  Ambulation  Ambulation?: Yes  Ambulation 1  Surface: level tile  Device: No Device  Other Apparatus: O2  Assistance: Supervision;Modified Independent  Quality of Gait: Pts SpO2 remained above 92% throughout treatment with minor SOB noted. Gait Deviations: Slow Fariha  Distance: 120 ft  Comments: several directional changes d/t limited space d/t tubing. Balance  Posture: Good  Sitting - Static: Good;+  Sitting - Dynamic: Good;+  Standing - Static: Good;+  Standing - Dynamic: Good  Exercises  Hip Flexion: 15  Knee Long Arc Quad: 15  Ankle Pumps: 15  Comments: Seated exercises completed in chair, Standing exercises completed including calf raises, marches, abduction x10      G-Code     OutComes Score    AM-PAC Score       Goals  Short term goals  Time Frame for Short term goals: 10 days  Short term goal 1: Patient will ambulate 48' with supervision, without LOB  Short term goal 2: Patient will perform bed mobility and transfers with MI  Short term goal 3: Patient will tolerate 20-30 minutes of therex/act to improve endurance for ADLs  Patient Goals   Patient goals : Feel better    Plan    Plan  Times per week: 7  Times per day: Twice a day  Plan Comment: 1x/day on weekends  Safety Devices  Type of devices:  All fall risk precautions in place, Call light within reach, Left in chair, Nurse notified (No alarm upon entry)     Therapy Time   Individual Concurrent Group Co-treatment   Time In [address] [place] [address] 35 [facility]         Time Out 1317         Minutes 23 Ngoc Miners, PTA

## 2021-09-23 NOTE — PROGRESS NOTES
Occupational Therapy  Facility/Department: [facility] AT THE [facility] MED SURG  Daily Treatment Note  NAME: Sergio Buckley  : 1965  MRN: [id number]    Date of Service: 2021    Discharge Recommendations:  Continue to assess pending progress       Assessment      OT Education: Energy Conservation  Activity Tolerance  Activity Tolerance: Patient limited by fatigue;Patient Tolerated treatment well  Safety Devices  Safety Devices in place: Yes  Type of devices: Call light within reach; Left in chair         Patient Diagnosis(es): The primary encounter diagnosis was Pneumonia due to COVID-19 virus. A diagnosis of Acute respiratory failure with hypoxia (HCC) was also pertinent to this visit. has a past medical history of Acute pulmonary embolism (HCC), Anxiety, Asthma, Constipation, chronic, CPAP (continuous positive airway pressure) dependence, Diabetes mellitus ([facility] [address] Embolism - blood clot, Esophageal reflux, Factor V Leiden ([facility] [address] Heart murmur, HTN (hypertension), Hyperlipidemia, Irritable bladder, MTHFR mutation, MVP (mitral valve prolapse), Palpitations, Pulmonary embolism ([facility] [address] Rhinitis, allergic, Sleep apnea, and Venous thromboembolism. has a past surgical history that includes Cholecystectomy; Mandible fracture surgery (); Tubal ligation; Upper gastrointestinal endoscopy (2010); pr colsc flx w/rmvl of tumor polyp lesion snare tq (3/27/2017); pr colsc flexible w/control bleeding any method (3/27/2017); Cataract removal with implant (Left, 2018); pr xcapsl ctrc rmvl insj io lens prosth w/o ecp (Left, 2018); Colonoscopy (2021); Upper gastrointestinal endoscopy (N/A, 2021); and Colonoscopy (N/A, 2021).     Restrictions  Restrictions/Precautions  Restrictions/Precautions: Isolation, Fall Risk, General Precautions  Subjective   General  Chart Reviewed: Yes  Patient assessed for rehabilitation services?: Yes  Response to previous treatment: Patient with no complaints from previous session  Family / Caregiver Present: No  Referring Practitioner: Dr. Kate Chau  Diagnosis: acute respiratory failure wtih hypoxia  Subjective  Subjective: Pt had no complaints of pain this date. General Comment  Comments: Pt sitting up in bedside chair upon arrival. Pt agreed to participate in therapy session. Orientation     Objective             Balance  Sitting Balance: Independent  Standing Balance: Supervision  Standing Balance  Time: 5 min tolerance s RB during dynamic task/ther ex. SpO2 88-91 on high flow. Transfers  Sit to stand: Modified independent  Stand to sit: Modified independent                                            Type of ROM/Therapeutic Exercise  Comment: BUE ther ex 2# weight x 5 planes, 1x20 to improve strength. Patient c increased tolerance, O2 maintainined 90-94 on high flow. Plan   Plan  Times per week: 7x/week  Times per day: Daily  Current Treatment Recommendations: Strengthening, Patient/Caregiver Education & Training, Balance Training, Functional Mobility Training, Endurance Training, Safety Education & Training, Self-Care / ADL    Goals  Short term goals  Time Frame for Short term goals: 90 days  Short term goal 1: Pt will verbalize 4 ECWS strategies for implementation/use at home to improve safety and independence with ADL and functional tasks. Short term goal 2: Pt will tolerate standing for greater tahn 3 minutes without LOB or SOB while engage in functional tasks to improve safety and independence with ADL and functional mobility. Short term goal 3: Pt will complete TB ADL Otto with use of AE PRN to ensure safe return home. Short term goal 4: Pt will complete toileting routine at standard toilet Otto to improve safety and independence with ADL task.        Therapy Time   Individual Concurrent Group Co-treatment   Time In 1140         Time Out 1206         Minutes Gabi Gordon 112, OTR/L

## 2021-09-23 NOTE — PROGRESS NOTES
/[facility] [address] [place] [address] Progress Note   COVID 19/ Patient  Today's Date and Time: 9/23/2021, 2:52 PM    Impression :   · COVID 19 Suspect  · COVID 19 Confirmed Infection  · Covid tests:  · 9/15/21 Positive  · 9/8/21: Positive  · History of PE on Eliquis  · Essential hypertension  · Moderate persistent asthma without complication  · Patient has not received her Covid vaccine    Patient evaluated by Telemedicine. Requesting Institution: [facility]  Provider Institution: [address] [place] [address],[facility] [place], [address] Person at [facility] [place]: Ms Jewell Jeremy, APRN- IM    Recommendations:   · Monitor off antibiotics  · Remdesivir initiated 9/15/21-this may not be very effective as the patient was diagnosed over a week ago. Stopped  · Decadron initiated 9/15/21. Stop date 9-25-21  · Actemra given 9-18-21    Medical Decision Making/Summary/Discussion:9/23/2021     · Patient admitted with suspected COVID 19 infection  · Covid test confirmed positive. Infection Control Recommendations   · Universal Precautions  · Airborne isolation  · Droplet Isolation    Antimicrobial Stewardship Recommendations     · Discontinuation of therapy  Coordination of Outpatient Care:   · Estimated Length of IV antimicrobials:TBD  · Patient will need Midline Catheter Insertion: TBD  · Patient will need PICC line Insertion: No  · Patient will need: Home IV , [place],  SNF,  LTAC:TBD  · Patient will need outpatient wound care:No    Chief complaint/reason for consultation:   · Concern for COVID infection      History of Present Illness:   Toma Perea is a 54y.o.-year-old female who was initially admitted on 9/15/2021. Patient seen at the request of Dr. Kelli Ray:    This patient who tested positive for COVID-19 on 9/8/2021 presented through ER with complaints of worsening shortness of breath with associated pleuritic chest pain, cough and fatigue.   She initially began having symptoms on 9/7/2021. The patient was oxygenating at 88% on room air and imaging revealed bilateral groundglass opacities. She was initiated on Decadron and remdesivir and admitted to the floor. CTA chest 9/15/2021  Impression   Patchy bilateral ground-glass and airspace infiltrates most notably in the   lung periphery compatible with patient's history of COVID pneumonia.       No obvious evidence of pulmonary embolism. Patient admitted because of concerns with COVID 19.    CURRENT EVALUATION : 9/23/2021    Afebrile  VS stable    The patient continues on hi flow NC @ 25 L/min    Actemra administered 9/18/21  CRP is decreasing    Patient exhibiting respiratory distress. yes  Respiratory secretions: no    Patient receiving supplemental oxygen. 3-->5-->25 L/min High flow NC  02 sat: 90 %  RR 20-->18 --> 20-->18      % FIO2: 50-->61-->49  Flow rate: 25  PEEP:      QTc:       NEWS Score: 0-4 Low risk group; 5-6: Medium risk group; 7 or above: High risk group  Parameters 3 2 1 0 1 2 3   Age    < 65   ? 65   RR ? 8  9-11 12-20  21-24 ? 25   O2 Sats ?  91 92-93 94-95 ? 96      Suppl O2  Yes  No      SBP ? 90  101-110 111-219   ? 220   HR ? 40  41-50 51-90  111-130 ? 131   Consciousness    Alert   Drowsiness, lethargy, or confusion   Temperature ? 35.0 C (95.0 F)  35.1-36.0 C 95.1-96.9 F 36.1-38.0 C 97.0-100.4 F 38.1-39.0 C 100.5-102.3 F ? 39.1 C ? 102.4 F      NEWS Score:   9/16/21: 3 Low risk    Overall Daily Picture:      Unchanged    Presence of secondary bacterial Infection:    No   Additional antibiotics: No    Labs, X rays reviewed: 9/23/2021    BUN:13-->13-->14 --> 13    Cr:0.57-->0.56-->0.57 --> 0.61    WBC:6.2-->7.6-->8.7 --> 10.9  Hb:13.2-->13.6 --> 13.7  Plat: 155-->172-->236 --> 338    Absolute Neutrophils:4.85 --> 8.14  Absolute Lymphocytes:1.16 --> 1.67  Neutrophil/Lymphocyte Ratio: 4.9 high risk    CRP: 20-->38.8-->18.3-->11.2-->3.7  Ferritin:  LDH:   D-dimer: 1.10 --> 0.97    Pro Calcitonin:      Cultures:  Urine:  ·   Blood:  ·   Sputum :  ·   Wound:       CXR:         9/15/21        CAT:    9/15/21    Discussed with patient, RN, CC, IM. I have personally reviewed the past medical history, past surgical history, medications, social history, and family history, and I have updated the database accordingly.   Past Medical History:     Past Medical History:   Diagnosis Date    Acute pulmonary embolism ([facility] [address] 11/2/2015    Anxiety     Asthma     Constipation, chronic     CPAP (continuous positive airway pressure) dependence     Diabetes mellitus ([facility] [address]     pt states she was prediabetic at one point    Embolism - blood clot     right lung and right leg    Esophageal reflux     Factor V Leiden ([facility] [address]     Heart murmur     HTN (hypertension)     Hyperlipidemia     Irritable bladder     MTHFR mutation     MVP (mitral valve prolapse)     Palpitations     Pulmonary embolism (HCC) 10/15/2012    Rhinitis, allergic 10/15/2012    Sleep apnea     Venous thromboembolism 5/2/2016       Past Surgical  History:     Past Surgical History:   Procedure Laterality Date    CATARACT REMOVAL WITH IMPLANT Left 04/02/2018    CHOLECYSTECTOMY      around 2000    COLONOSCOPY  07/12/2021    COLONOSCOPY N/A 7/12/2021    COLONOSCOPY POLYPECTOMY CECUM COLD BIOPSY performed by Lyndsey Porter MD at [address]  3/27/2017    COLONOSCOPY CONTROL HEMORRHAGE performed by Adela Pradhan MD at [address] FLX W/RMVL OF TUMOR POLYP LESION [address] TQ  3/27/2017    COLONOSCOPY POLYPECTOMY SNARE/COLD BIOPSY performed by Adela Pradhan MD at [address] [facility] W/O ECP Left 4/2/2018    EYE CATARACT EMULSIFICATION IOL IMPLANT performed by Melo Andersen MD at [address] ENDOSCOPY  1/2010    with biopsy    UPPER GASTROINTESTINAL ENDOSCOPY N/A 7/12/2021    EGD BIOPSY OF STOMACH, GASTRIC POLYPECTOMY AND DUODENUM BIOPSY performed by Camilo Riley MD at [address] [facility].       Medications:      zinc sulfate  100 mg Oral Daily    ascorbic acid  1,000 mg Oral 4x Daily    budesonide-formoterol  2 puff Inhalation BID    apixaban  5 mg Oral BID    famotidine  40 mg Oral QPM    metoprolol tartrate  12.5 mg Oral BID    losartan  25 mg Oral Daily    pantoprazole  40 mg Oral Daily    spironolactone  25 mg Oral Daily    sodium chloride flush  10 mL IntraVENous 2 times per day    dexamethasone  6 mg Oral Daily    Vitamin D  2,000 Units Oral Daily       Social History:     Social History     Socioeconomic History    Marital status:      Spouse name: Not on file    Number of children: Not on file    Years of education: Not on file    Highest education level: Not on file   Occupational History    Not on file   Tobacco Use    Smoking status: Never Smoker    Smokeless tobacco: Never Used   Vaping Use    Vaping Use: Never used   Substance and Sexual Activity    Alcohol use: Yes     Alcohol/week: 0.0 standard drinks     Comment: 1 glass of wine/month    Drug use: No    Sexual activity: Yes     Partners: Male     Birth control/protection: Surgical   Other Topics Concern    Not on file   Social History Narrative    Not on file     Social Determinants of Health     Financial Resource Strain: Low Risk     Difficulty of Paying Living Expenses: Not hard at all   Food Insecurity: No Food Insecurity    Worried About Running Out of Food in the Last Year: Never true    [address] in the Last Year: Never true   Transportation Needs: No Transportation Needs    Lack of Transportation (Medical): No    Lack of Transportation (Non-Medical):  No   Physical Activity:     Days of Exercise per Week:     Minutes of Exercise per Session:    Stress:     Feeling of Stress :    Social Connections:     Frequency of Communication with Friends and Family:  Frequency of Social Gatherings with Friends and Family:     Attends [employer] Services:     Active Member of Clubs or Organizations:     Attends Club or Organization Meetings:     Marital Status:    Intimate Partner Violence:     Fear of Current or Ex-Partner:     Emotionally Abused:     Physically Abused:     Sexually Abused:        Family History:     Family History   Problem Relation Age of Onset    Cancer Mother         breast ca    Diabetes Mother     Depression Mother     Thyroid Disease Mother     High Blood Pressure Mother     Heart Disease Mother     Cancer Father         prostate ca    COPD Father     Heart Failure Father     Heart Disease Father     High Blood Pressure Father     Deep Vein Thrombosis Sister     Breast Cancer Sister     Deep Vein Thrombosis Sister         Allergies:   Levaquin [levofloxacin in d5w] and Pcn [penicillins]     Review of Systems:       Constitutional: Generalized malaise  Head: No headaches  Eyes: No double vision or blurry vision. No conjunctival inflammation. ENT: No sore throat or runny nose. . No hearing loss, tinnitus or vertigo. Cardiovascular: No chest pain or palpitations. Shortness of breath. CARBAJAL  Lung:  Shortness of breath & cough. No sputum production  Abdomen: No nausea, vomiting, diarrhea, or abdominal pain. Elise Graven No cramps. Genitourinary: No increased urinary frequency, or dysuria. No hematuria. No suprapubic or CVA pain  Musculoskeletal: No muscle aches or pains. No joint effusions, swelling or deformities  Hematologic: No bleeding or bruising. Neurologic: No headache, weakness, numbness, or tingling. Integument: No rash, no ulcers. Psychiatric: No depression. Endocrine: No polyuria, no polydipsia, no polyphagia.     Physical Examination :     Patient Vitals for the past 8 hrs:   BP Temp Temp src Pulse Resp SpO2   09/23/21 1415 -- -- -- -- -- 93 %   09/23/21 1340 113/65 97 °F (36.1 °C) Temporal 72 18 93 %   09/23/21 1300 -- -- -- -- -- 95 %   09/23/21 1200 -- -- -- -- -- 91 %   09/23/21 1115 -- -- -- -- -- 94 %   09/23/21 1000 -- -- -- -- -- 93 %   09/23/21 0945 -- -- -- -- -- 91 %   09/23/21 0730 110/65 96.8 °F (36 °C) Temporal 58 18 95 %     General Appearance: Awake, alert, and in no apparent distress  Head:  Normocephalic, no trauma  Eyes: Pupils equal, round, reactive to light; sclera anicteric; conjunctivae pink. No embolic phenomena. ENT: Oropharynx clear, without erythema, exudate, or thrush. No tenderness of sinuses. Mouth/throat: mucosa pink and moist. No lesions. Dentition in good repair. Neck:Supple, without lymphadenopathy. Thyroid normal, No bruits. Pulmonary/Chest: Clear to auscultation, without wheezes, rales, or rhonchi. No dullness to percussion. Cardiovascular: Regular rate and rhythm without murmurs, rubs, or gallops. Abdomen: Soft, non tender. Bowel sounds normal. No organomegaly  All four Extremities: No cyanosis, clubbing, edema, or effusions. Neurologic: No gross sensory or motor deficits. Skin: Warm and dry with good turgor. No signs of peripheral arterial or venous insufficiency. No ulcerations. No open wounds. Medical Decision Making -Laboratory:   I have independently reviewed/ordered the following labs:    CBC with Differential:   Recent Labs     09/22/21  0610 09/23/21  0550   WBC 10.9 11.0   HGB 13.7 13.9   HCT 41.4 41.3    336   LYMPHOPCT 15* 14*   MONOPCT 7 7     BMP:   Recent Labs     09/22/21  0610 09/23/21  0550    142   K 4.0 4.3    105   CO2 26 28   BUN 13 11   CREATININE 0.61 0.58     Hepatic Function Panel:   Recent Labs     09/23/21  0550   PROT 5.6*   LABALBU 3.2*   BILIDIR <0.08   IBILI CANNOT BE CALCULATED   BILITOT 0.37   ALKPHOS 54   ALT 33   AST 22     No results for input(s): RPR in the last 72 hours. No results for input(s): HIV in the last 72 hours. No results for input(s): BC in the last 72 hours.   Lab Results   Component Value Date    RBC 4.62 09/23/2021    RBC 4.67 11/02/2011    WBC 11.0 09/23/2021     Lab Results   Component Value Date    CREATININE 0.58 09/23/2021    GLUCOSE 131 09/23/2021    GLUCOSE 99 11/02/2011       Medical Decision Making-Imaging:     Narrative   EXAMINATION:   CTA OF THE CHEST 9/15/2021 6:47 pm       TECHNIQUE:   CTA of the chest was performed after the administration of intravenous   contrast.  Multiplanar reformatted images are provided for review.  MIP   images are provided for review. Dose modulation, iterative reconstruction,   and/or weight based adjustment of the mA/kV was utilized to reduce the   radiation dose to as low as reasonably achievable.       COMPARISON:   None.       HISTORY:   ORDERING SYSTEM PROVIDED HISTORY: hypoxia, tachycardia, covid   TECHNOLOGIST PROVIDED HISTORY:   hypoxia, tachycardia, covid   Decision Support Exception - unselect if not a suspected or confirmed   emergency medical condition->Emergency Medical Condition (MA)       FINDINGS:   Pulmonary Arteries: Pulmonary arteries are less than adequately opacified for   evaluation. No evidence of intraluminal filling defect to suggest pulmonary   embolism.  Main pulmonary artery is normal in caliber.       Mediastinum: There are a few small nonspecific lymph nodes seen in the middle   mediastinum. No suspicious lymphadenopathy.       Lungs/pleura: Patchy bilateral ground-glass and airspace infiltrates most   notably in the lung periphery. ..  No pulmonary masses are noted. No pleural   effusions are identified.       Cardiomediastinal Structures: Intimal calcifications associated with the   thoracic aorta. No evidence of thoracic aortic aneurysm or dissection. Cardiac chambers are normal       Upper Abdomen: Small hiatal hernia       Soft Tissues/Bones:  There are hypertrophic degenerative changes in the   thoracic spine.       No acute osseous abnormalities.           Impression   Patchy bilateral ground-glass and airspace infiltrates most notably in the   lung periphery compatible with patient's history of COVID pneumonia.       No obvious evidence of pulmonary embolism.             Narrative   EXAMINATION:   ONE XRAY VIEW OF THE CHEST       9/15/2021 5:21 pm       COMPARISON:   09/13/2021       HISTORY:   ORDERING SYSTEM PROVIDED HISTORY: cough, covid, worsening sob   TECHNOLOGIST PROVIDED HISTORY:   cough, covid, worsening sob       FINDINGS:   . The cardiac size is normal. No  pleural effusions are seen.  Patchy   bilateral interstitial infiltrates, improved on the right however progressive   on the left.  Pulmonary vascularity appears normal. There is mild ectasia of   the thoracic aorta.  . No acute bony abnormalities. The hilar structures are   normal.           Impression   Patchy bilateral interstitial infiltrates, improved on the right however   progressive on the left compatible with patient's history of COVID.                 Medical Decision [id number]:       Medical Decision Making-Other:     Note:  · Labs, medications, radiologic studies were reviewed with personal review of films  · Large amounts of data were reviewed  · Discussed with nursing Staff, Discharge planner  · Infection Control and Prevention measures reviewed  · All prior entries were reviewed  · Administer medications as ordered  · Prognosis: Guarded  · Discharge planning reviewed      Thank you for allowing us to participate in the care of this patient. Please call with questions.         Be Pickett MD    .            Pager: [phone] - Office: [phone]

## 2021-09-23 NOTE — PROGRESS NOTES
Pt reassessed and vitals obtained at this time. Pt is sitting up in the chair. Respirations are unlabored and even. No signs of distress noted. SPO2 running WDL. Heated petra flow turned down to 35%. Pt lung sounds are clear diminished this afternoon. BSC emptied at this time. Pt states she has not had any results yet from the glycolax that was administered this morning. Pt denies any pain. Bowel sounds are active and patient denies feeling constipated. Pt performed acapella per self. Pt reminded to lay on her sides or prone position if she gets back into bed. Pt verbalizes understanding. Pt denies any further needs at this time.

## 2021-09-23 NOTE — PROGRESS NOTES
Comprehensive Nutrition Assessment    Type and Reason for Visit:  Reassess    Nutrition Recommendations/Plan:   Continue current diet  Continue current ONS    Nutrition Assessment:  Improving mild malnutrition aeb PO % of meals and supplements. Pt reports doing better. Glucose 131, on dexamethasone. Malnutrition Assessment:  Malnutrition Status:  Mild malnutrition    Context:  Acute Illness     Findings of the 6 clinical characteristics of malnutrition:  Energy Intake:  7 - 50% or less of estimated energy requirements for 5 or more days  Weight Loss:   (3.8% weight loss x 2 weeks)     Body Fat Loss:  Unable to assess     Muscle Mass Loss:  Unable to assess    Fluid Accumulation:  No significant fluid accumulation     Strength:  Not Performed    Estimated Daily Nutrient Needs:  Energy (kcal):  1484-6687 (12-15/kg); Weight Used for Energy Requirements:  Current     Protein (g):  118-124g (2.0-2.1g/kg); Weight Used for Protein Requirements:  Ideal        Fluid (ml/day):  2304 ml; Method Used for Fluid Requirements:  ml/Kg      Nutrition Related Findings:  unable to assess due to isolation       Wounds:  None       Current Nutrition Therapies:    ADULT DIET;  Regular  Adult Oral Nutrition Supplement; Standard High Calorie/High Protein Oral Supplement    Anthropometric Measures:  · Height: 5' 6\" (167.6 cm)  · Current Body Weight: 288 lb (130.6 kg)   · Admission Body Weight: 281 lb (127.5 kg)    · Usual Body Weight: 293 lb (132.9 kg) (8/31/21)     · Ideal Body Weight: 130 lbs; % Ideal Body Weight 216.9 %   · BMI: 46.5  · BMI Categories: Obese Class 3 (BMI 40.0 or greater)       Nutrition Diagnosis:   · Mild malnutrition related to inadequate protein-energy intake as evidenced by intake 0-25%, weight loss      Nutrition Interventions:   Food and/or Nutrient Delivery:  Continue Current Diet, Continue Oral Nutrition Supplement  Nutrition Education/Counseling:  No recommendation at this time   Coordination of Nutrition Care:  Continue to monitor while inpatient    Goals:  PO > 75% of meals and supplements       Recent Labs     09/21/21  0619 09/21/21  0619 09/22/21  0610 09/23/21  0550   *  --  137 142   K 4.2  --  4.0 4.3     --  102 105   CO2 28  --  26 28   BUN 14  --  13 11   CREATININE 0.57  --  0.61 0.58   GLUCOSE 135*  --  118* 131*   ALT  --   --   --  33   ALKPHOS  --   --   --  54   GFR              < >                          < > = values in this interval not displayed. Lab Results   Component Value Date    LABALBU 3.2 09/23/2021      Nutrition Monitoring and Evaluation:   Behavioral-Environmental Outcomes:  None Identified   Food/Nutrient Intake Outcomes:  Food and Nutrient Intake, Supplement Intake  Physical Signs/Symptoms Outcomes:  Biochemical Data, Weight, Nausea or Vomiting     Discharge Planning:     Too soon to determine     Electronically signed by Alison Hanson RD, LD on 9/23/21 at 3:05 PM EDT    Contact: [phone]

## 2021-09-24 ENCOUNTER — CARE COORDINATION (OUTPATIENT)
Dept: OTHER | Facility: CLINIC | Age: 56
End: 2021-09-24

## 2021-09-24 LAB
ABSOLUTE EOS #: 0.15 K/UL (ref 0–0.44)
ABSOLUTE IMMATURE GRANULOCYTE: 0.17 K/UL (ref 0–0.3)
ABSOLUTE LYMPH #: 1.68 K/UL (ref 1.1–3.7)
ABSOLUTE MONO #: 0.8 K/UL (ref 0.1–1.2)
ANION GAP SERPL CALCULATED.3IONS-SCNC: 9 MMOL/L (ref 9–17)
BASOPHILS # BLD: 0 % (ref 0–2)
BASOPHILS ABSOLUTE: 0.04 K/UL (ref 0–0.2)
BUN BLDV-MCNC: 12 MG/DL (ref 6–20)
BUN/CREAT BLD: 21 (ref 9–20)
C-REACTIVE PROTEIN: <3 MG/L (ref 0–5)
CALCIUM SERPL-MCNC: 9 MG/DL (ref 8.6–10.4)
CHLORIDE BLD-SCNC: 103 MMOL/L (ref 98–107)
CO2: 27 MMOL/L (ref 20–31)
CREAT SERPL-MCNC: 0.57 MG/DL (ref 0.5–0.9)
D-DIMER QUANTITATIVE: 1.76 MG/L FEU (ref 0–0.59)
DIFFERENTIAL TYPE: ABNORMAL
EOSINOPHILS RELATIVE PERCENT: 1 % (ref 1–4)
GFR AFRICAN AMERICAN: >60 ML/MIN
GFR NON-AFRICAN AMERICAN: >60 ML/MIN
GFR SERPL CREATININE-BSD FRML MDRD: ABNORMAL ML/MIN/{1.73_M2}
GFR SERPL CREATININE-BSD FRML MDRD: ABNORMAL ML/MIN/{1.73_M2}
GLUCOSE BLD-MCNC: 134 MG/DL (ref 70–99)
HCT VFR BLD CALC: 42 % (ref 36.3–47.1)
HEMOGLOBIN: 13.6 G/DL (ref 11.9–15.1)
IMMATURE GRANULOCYTES: 2 %
LYMPHOCYTES # BLD: 14 % (ref 24–43)
MCH RBC QN AUTO: 29.3 PG (ref 25.2–33.5)
MCHC RBC AUTO-ENTMCNC: 32.4 G/DL (ref 28.4–34.8)
MCV RBC AUTO: 90.5 FL (ref 82.6–102.9)
MONOCYTES # BLD: 7 % (ref 3–12)
NRBC AUTOMATED: 0 PER 100 WBC
PDW BLD-RTO: 13.5 % (ref 11.8–14.4)
PLATELET # BLD: 325 K/UL (ref 138–453)
PLATELET ESTIMATE: ABNORMAL
PMV BLD AUTO: 10.3 FL (ref 8.1–13.5)
POTASSIUM SERPL-SCNC: 4.3 MMOL/L (ref 3.7–5.3)
RBC # BLD: 4.64 M/UL (ref 3.95–5.11)
RBC # BLD: ABNORMAL 10*6/UL
SEG NEUTROPHILS: 76 % (ref 36–65)
SEGMENTED NEUTROPHILS ABSOLUTE COUNT: 8.85 K/UL (ref 1.5–8.1)
SODIUM BLD-SCNC: 139 MMOL/L (ref 135–144)
WBC # BLD: 11.7 K/UL (ref 3.5–11.3)
WBC # BLD: ABNORMAL 10*3/UL

## 2021-09-24 PROCEDURE — 97116 GAIT TRAINING THERAPY: CPT

## 2021-09-24 PROCEDURE — 6370000000 HC RX 637 (ALT 250 FOR IP): Performed by: NURSE PRACTITIONER

## 2021-09-24 PROCEDURE — 99233 SBSQ HOSP IP/OBS HIGH 50: CPT | Performed by: INTERNAL MEDICINE

## 2021-09-24 PROCEDURE — 36415 COLL VENOUS BLD VENIPUNCTURE: CPT

## 2021-09-24 PROCEDURE — 94640 AIRWAY INHALATION TREATMENT: CPT

## 2021-09-24 PROCEDURE — 85025 COMPLETE CBC W/AUTO DIFF WBC: CPT

## 2021-09-24 PROCEDURE — 2580000003 HC RX 258: Performed by: FAMILY MEDICINE

## 2021-09-24 PROCEDURE — 2700000000 HC OXYGEN THERAPY PER DAY

## 2021-09-24 PROCEDURE — 94761 N-INVAS EAR/PLS OXIMETRY MLT: CPT

## 2021-09-24 PROCEDURE — 94669 MECHANICAL CHEST WALL OSCILL: CPT

## 2021-09-24 PROCEDURE — 97110 THERAPEUTIC EXERCISES: CPT

## 2021-09-24 PROCEDURE — 6360000002 HC RX W HCPCS: Performed by: INTERNAL MEDICINE

## 2021-09-24 PROCEDURE — 85379 FIBRIN DEGRADATION QUANT: CPT

## 2021-09-24 PROCEDURE — 1200000000 HC SEMI PRIVATE

## 2021-09-24 PROCEDURE — 2580000003 HC RX 258: Performed by: INTERNAL MEDICINE

## 2021-09-24 PROCEDURE — 6370000000 HC RX 637 (ALT 250 FOR IP): Performed by: INTERNAL MEDICINE

## 2021-09-24 PROCEDURE — APPSS30 APP SPLIT SHARED TIME 16-30 MINUTES: Performed by: NURSE PRACTITIONER

## 2021-09-24 PROCEDURE — 80048 BASIC METABOLIC PNL TOTAL CA: CPT

## 2021-09-24 PROCEDURE — 86140 C-REACTIVE PROTEIN: CPT

## 2021-09-24 RX ADMIN — POLYETHYLENE GLYCOL (3350) 17 G: 17 POWDER, FOR SOLUTION ORAL at 09:16

## 2021-09-24 RX ADMIN — Medication 100 MG: at 09:06

## 2021-09-24 RX ADMIN — OXYCODONE HYDROCHLORIDE AND ACETAMINOPHEN 1000 MG: 500 TABLET ORAL at 16:46

## 2021-09-24 RX ADMIN — SPIRONOLACTONE 25 MG: 25 TABLET, FILM COATED ORAL at 09:07

## 2021-09-24 RX ADMIN — LOSARTAN POTASSIUM 25 MG: 25 TABLET, FILM COATED ORAL at 09:07

## 2021-09-24 RX ADMIN — PANTOPRAZOLE SODIUM 40 MG: 40 TABLET, DELAYED RELEASE ORAL at 09:07

## 2021-09-24 RX ADMIN — Medication 2000 UNITS: at 09:07

## 2021-09-24 RX ADMIN — OXYCODONE HYDROCHLORIDE AND ACETAMINOPHEN 1000 MG: 500 TABLET ORAL at 12:59

## 2021-09-24 RX ADMIN — FAMOTIDINE 40 MG: 20 TABLET, FILM COATED ORAL at 16:46

## 2021-09-24 RX ADMIN — SODIUM CHLORIDE: 9 INJECTION, SOLUTION INTRAVENOUS at 11:43

## 2021-09-24 RX ADMIN — BUDESONIDE AND FORMOTEROL FUMARATE DIHYDRATE 2 PUFF: 160; 4.5 AEROSOL RESPIRATORY (INHALATION) at 21:50

## 2021-09-24 RX ADMIN — OXYCODONE HYDROCHLORIDE AND ACETAMINOPHEN 1000 MG: 500 TABLET ORAL at 09:06

## 2021-09-24 RX ADMIN — SODIUM CHLORIDE, PRESERVATIVE FREE 10 ML: 5 INJECTION INTRAVENOUS at 12:59

## 2021-09-24 RX ADMIN — APIXABAN 5 MG: 5 TABLET, FILM COATED ORAL at 20:59

## 2021-09-24 RX ADMIN — METOPROLOL TARTRATE 12.5 MG: 25 TABLET, FILM COATED ORAL at 09:07

## 2021-09-24 RX ADMIN — APIXABAN 5 MG: 5 TABLET, FILM COATED ORAL at 09:07

## 2021-09-24 RX ADMIN — BUDESONIDE AND FORMOTEROL FUMARATE DIHYDRATE 2 PUFF: 160; 4.5 AEROSOL RESPIRATORY (INHALATION) at 08:52

## 2021-09-24 RX ADMIN — SODIUM CHLORIDE, PRESERVATIVE FREE 10 ML: 5 INJECTION INTRAVENOUS at 21:00

## 2021-09-24 RX ADMIN — OXYCODONE HYDROCHLORIDE AND ACETAMINOPHEN 1000 MG: 500 TABLET ORAL at 20:59

## 2021-09-24 RX ADMIN — DEXAMETHASONE 6 MG: 4 TABLET ORAL at 09:07

## 2021-09-24 ASSESSMENT — PAIN SCALES - GENERAL
PAINLEVEL_OUTOF10: 0

## 2021-09-24 NOTE — PROGRESS NOTES
Patient reassessed and vitals obtained at this time. Respirations remain unlabored with clear/diminished lung sounds and fine crackles heard in RLL. SPO2 has been WDL on 4L via N/C. Pt notifies writer that glycolax packet was successful stating she had a BM earlier today. Pt denies any pain or feeling SOB. Pt reports mild CARBAJAL but states overall it's improving. Pt denies any further needs at this time.

## 2021-09-24 NOTE — PROGRESS NOTES
Occupational Therapy  Facility/Department: [facility] AT THE [facility] MED SURG  Daily Treatment Note  NAME: Derrick Lang  : 1965  MRN: [id number]    Date of Service: 2021    Discharge Recommendations:  Continue to assess pending progress       Assessment      OT Education: OT Role;Plan of Care;Home Exercise Program;Energy Conservation;Transfer Training;IADL Safety  Patient Education: G verbal return of d/c folder contents, o2 tubing managment, EC/WS techs  Barriers to Learning: none  Activity Tolerance  Activity Tolerance: Patient Tolerated treatment well  Activity Tolerance: Mild SOB  Safety Devices  Safety Devices in place: Yes  Type of devices: Call light within reach; Left in chair         Patient Diagnosis(es): The primary encounter diagnosis was Pneumonia due to COVID-19 virus. A diagnosis of Acute respiratory failure with hypoxia (HCC) was also pertinent to this visit. has a past medical history of Acute pulmonary embolism (HCC), Anxiety, Asthma, Constipation, chronic, CPAP (continuous positive airway pressure) dependence, Diabetes mellitus ([address] Embolism - blood clot, Esophageal reflux, Factor V Leiden ([facility] [address] Heart murmur, HTN (hypertension), Hyperlipidemia, Irritable bladder, MTHFR mutation, MVP (mitral valve prolapse), Palpitations, Pulmonary embolism ([address] Rhinitis, allergic, Sleep apnea, and Venous thromboembolism. has a past surgical history that includes Cholecystectomy; Mandible fracture surgery (); Tubal ligation; Upper gastrointestinal endoscopy (2010); pr colsc flx w/rmvl of tumor polyp lesion snare tq (3/27/2017); pr colsc flexible w/control bleeding any method (3/27/2017); Cataract removal with implant (Left, 2018); pr xcapsl ctrc rmvl insj io lens prosth w/o ecp (Left, 2018); Colonoscopy (2021); Upper gastrointestinal endoscopy (N/A, 2021); and Colonoscopy (N/A, 2021).     Restrictions  Restrictions/Precautions  Restrictions/Precautions: Isolation, General Precautions  Subjective   General  Chart Reviewed: Yes  Patient assessed for rehabilitation services?: Yes  Response to previous treatment: Patient with no complaints from previous session  Family / Caregiver Present: No  Referring Practitioner: Dr. Ivet Lee  Diagnosis: acute respiratory failure wtih hypoxia  Subjective  Subjective: Pt in good spirits, estimated date of d/c is Sunday. \"I cant wait to walk thru that door. \"  General Comment  Comments: Pt sitting up in bedside chair upon arrival. Pt agreed to participate in therapy session. Spo2 remained in low 90s throughout  Vital Signs  Patient Currently in Pain: Denies   Orientation     Objective             Balance  Sitting Balance: Independent  Standing Balance: Independent  Standing Balance  Time: ~ 20 min  Activity: therex, fxl mob  Comment: 0 LOB no AD  Functional Mobility  Functional - Mobility Device: No device  Activity: Other  Assist Level: Independent  Functional Mobility Comments: Pt edu on managment of 02 tubing thorughout     Transfers  Sit to stand: Independent  Stand to sit: Independent                                            Type of ROM/Therapeutic Exercise  Comment: BUE therex to increase strength/endurance for ease of fxl tasks. green digi flex x 20, yellow flex bar bends and twist x 20 1# free weight x 20 reps x all planes all while standing. Plan   Plan  Times per week: 7x/week  Times per day: Daily  Current Treatment Recommendations: Strengthening, Patient/Caregiver Education & Training, Balance Training, Functional Mobility Training, Endurance Training, Safety Education & Training, Self-Care / ADL  G-Code     OutComes Score                                                  AM-PAC Score             Goals  Short term goals  Time Frame for Short term goals: 90 days  Short term goal 1: Pt will verbalize 4 ECWS strategies for implementation/use at home to improve safety and independence with ADL and functional tasks.   Short term goal 2: Pt will tolerate standing for greater tahn 3 minutes without LOB or SOB while engage in functional tasks to improve safety and independence with ADL and functional mobility. Short term goal 3: Pt will complete TB ADL Otto with use of AE PRN to ensure safe return home. Short term goal 4: Pt will complete toileting routine at standard toilet Otto to improve safety and independence with ADL task.        Therapy Time   Individual Concurrent Group Co-treatment   Time In 1440         Time Out 1505         Minutes 25                 SHIRLEY Greer

## 2021-09-24 NOTE — PROGRESS NOTES
Physical Therapy  Facility/Department: On license of [facility] AT THE [facility] MED SURG  Daily Treatment Note  NAME: Sergio Buckley  : 1965  MRN: [id number]    Date of Service: 2021    Discharge Recommendations:  Continue to assess pending progress   PT Equipment Recommendations  Equipment Needed: No    Assessment   Body structures, Functions, Activity limitations: Decreased functional mobility ; Decreased balance;Decreased ADL status; Decreased ROM; Decreased endurance;Decreased strength;Decreased posture  Assessment: Pt. on nasal canula O2. Pt. able to ambulate [id number] with no AD, SUP/Mod I  Treatment Diagnosis: Decreased activity endurance  Prognosis: Good  Decision Making: Medium Complexity  Patient Education: Importance of ex, transfers and repositioning. REQUIRES PT FOLLOW UP: Yes  Activity Tolerance  Activity Tolerance: Patient Tolerated treatment well  Activity Tolerance: limited by SOB     Patient Diagnosis(es): The primary encounter diagnosis was Pneumonia due to COVID-19 virus. A diagnosis of Acute respiratory failure with hypoxia (HCC) was also pertinent to this visit. has a past medical history of Acute pulmonary embolism (HCC), Anxiety, Asthma, Constipation, chronic, CPAP (continuous positive airway pressure) dependence, Diabetes mellitus ([address] Embolism - blood clot, Esophageal reflux, Factor V Leiden ([address] Heart murmur, HTN (hypertension), Hyperlipidemia, Irritable bladder, MTHFR mutation, MVP (mitral valve prolapse), Palpitations, Pulmonary embolism ([address] Rhinitis, allergic, Sleep apnea, and Venous thromboembolism. has a past surgical history that includes Cholecystectomy; Mandible fracture surgery (); Tubal ligation; Upper gastrointestinal endoscopy (2010); pr colsc flx w/rmvl of tumor polyp lesion snare tq (3/27/2017); pr colsc flexible w/control bleeding any method (3/27/2017); Cataract removal with implant (Left, 2018); pr xcapsl ctrc rmvl insj io lens prosth w/o ecp (Left, 2018);  Colonoscopy (07/12/2021); Upper gastrointestinal endoscopy (N/A, 7/12/2021); and Colonoscopy (N/A, 7/12/2021). Restrictions  Restrictions/Precautions  Restrictions/Precautions: Isolation, Fall Risk, General Precautions  Subjective   General  Chart Reviewed: Yes  Response To Previous Treatment: Patient with no complaints from previous session.   Family / Caregiver Present: No  Referring Practitioner: Sheryle Heller, MD  Subjective  Subjective: Pt states she feels very good today and her breathign feels much better and on lower O2  Pain Screening  Patient Currently in Pain: Denies  Vital Signs  Patient Currently in Pain: Denies       Orientation  Orientation  Overall Orientation Status: Within Functional Limits  Cognition      Objective      Transfers  Sit to Stand: Supervision;Modified independent  Stand to sit: Supervision;Modified independent  Stand Pivot Transfers: Supervision;Modified independent  Ambulation  Ambulation?: Yes  Ambulation 1  Surface: level tile  Device: No Device  Other Apparatus: O2  Assistance: Supervision;Modified Independent  Gait Deviations: Slow Fariha  Distance: [id number] with no noted LOB  Stairs/Curb  Stairs?: No     Balance  Posture: Good  Sitting - Static: Good;+  Sitting - Dynamic: Good;+  Standing - Static: Good;+  Standing - Dynamic: Good  Exercises  Comments: Standing sink exercises B Le x20 with 2 seated rest breaks   AROM RLE (degrees)  RLE AROM: WFL  AROM LLE (degrees)  LLE AROM : WFL  Strength RLE  Comment: Grossly 4/5  Strength LLE  Comment: Grossly 4/5     G-Code     OutComes Score    AM-PAC Score    Goals  Short term goals  Time Frame for Short term goals: 10 days  Short term goal 1: Patient will ambulate 48' with supervision, without LOB  Short term goal 2: Patient will perform bed mobility and transfers with MI  Short term goal 3: Patient will tolerate 20-30 minutes of therex/act to improve endurance for ADLs  Patient Goals   Patient goals : Feel better    Plan    Plan  Times per week: 7  Times per day: Twice a day  Plan Comment: 1x/day on weekends  Safety Devices  Type of devices:  All fall risk precautions in place, Call light within reach, Left in chair, Nurse notified     Therapy Time   Individual Concurrent Group Co-treatment   Time In 0935         Time Out 1004         Minutes [address] [place], [place], [facility]

## 2021-09-24 NOTE — CARE COORDINATION
Pt noted to still be IP at hospital. Will follow     Dulce UPTONN, RN- [facility] Manager  [phone]

## 2021-09-24 NOTE — PROGRESS NOTES
Progress Note    SUBJECTIVE:    Patient seen for f/u of Pneumonia due to COVID-19 virus. She sitting up in the chair alert oriented no acute distress. Patient is currently down on nasal cannula and tolerating well. She states she feels better. She is tolerating her diet well. She is afebrile. She denies nausea vomiting or diarrhea    ROS:   Constitutional: negative  for fevers, and negative for chills.   Respiratory: positive for shortness of breath, positive for cough, and negative for wheezing  Cardiovascular: negative for chest pain, and negative for palpitations  Gastrointestinal: negative for abdominal pain, negative for nausea,negative for vomiting, negative for diarrhea, and negative for constipation     All other systems were reviewed with the patient and are negative unless otherwise stated in HPI      OBJECTIVE:      Vitals:   Vitals:    09/24/21 1500   BP:    Pulse:    Resp:    Temp:    SpO2: 94%     Weight: 289 lb (131.1 kg)   Height: 5' 6\" (167.6 cm)   -----------------------------------------------------------------  Exam:    CONSTITUTIONAL:  awake, alert, cooperative, no apparent distress, and appears stated age  EYES:  Lids and lashes normal, pupils equal, round and reactive to light, extra ocular muscles intact, sclera clear, conjunctiva normal  ENT:  normocepalic, without obvious abnormality, atraumatic  NECK:  supple, symmetrical, trachea midline, skin normal and no stridor  HEMATOLOGIC/LYMPHATICS:  no cervical lymphadenopathy and no supraclavicular lymphadenopathy  LUNGS:  no increased work of breathing, decreased air exchange and crackles right base and left base  CARDIOVASCULAR:  Normal apical impulse, regular rate and rhythm, normal S1 and S2, no S3 or S4, and no murmur noted  ABDOMEN:  No scars, normal bowel sounds, soft, non-distended, non-tender, no masses palpated, no hepatosplenomegally  MUSCULOSKELETAL:  there is no redness, warmth, or swelling of the joints  full range of motion noted  NEUROLOGIC: Moves all extremities equally, follows commands, no deficits  SKIN:  no bruising or bleeding, normal skin color, texture, turgor, no redness, warmth, or swelling, no rashes and no lesions  EXT:     no cyanosis, clubbing or edema present    -----------------------------------------------------------------    Diagnostic Data:    · All available data reviewed  Lab Results   Component Value Date    WBC 11.7 (H) 09/24/2021    HGB 13.6 09/24/2021    MCV 90.5 09/24/2021     09/24/2021      Lab Results   Component Value Date    GLUCOSE 134 (H) 09/24/2021    BUN 12 09/24/2021    CREATININE 0.57 09/24/2021     09/24/2021    K 4.3 09/24/2021    CALCIUM 9.0 09/24/2021     09/24/2021    CO2 27 09/24/2021       CT CHEST PULMONARY EMBOLISM W CONTRAST   Final Result   Patchy bilateral ground-glass and airspace infiltrates most notably in the   lung periphery compatible with patient's history of COVID pneumonia. No obvious evidence of pulmonary embolism. XR CHEST PORTABLE   Final Result   Patchy bilateral interstitial infiltrates, improved on the right however   progressive on the left compatible with patient's history of COVID.              ASSESSMENT / PLAN:  Pneumonia due to COVID-19 virus  · Continue current therapy  · IV remdesivir-was stopped due to length of time of symptoms and ineffectiveness  · Received 1 dose of Actemra on 9/18/2021  · Continue dexamethasone-last dose tomorrow  · Continue vitamin D C and zinc  · Appreciate infectious disease  · Acute respiratory failure due to hypoxia  · Supplemental oxygen to maintain SPO2 greater than 92%  · Acapella  · Prone  · Continuous SPO2 monitoring via telemetry  · Sleep apnea obstructive  · BiPAP on CPAP mode at at bedtime  · Essential hypertension  · Continue Lopressor, Aldactone, losartan  · Nutrition status: at risk for malnutrition: dietician consult initiated during hositalization  · DVT prophylaxis: Eliquis   · High risk medications: None  · Disposition:  Discharge plan is home      ZACARIAS Fitzgerald - CNP , ZACARIAS, NP-C

## 2021-09-24 NOTE — PROGRESS NOTES
/[facility] [address] [place] [address] Progress Note   COVID 19/ Patient  Today's Date and Time: 9/24/2021, 9:32 AM    Impression :   · COVID 19 Suspect  · COVID 19 Confirmed Infection  · Covid tests:  · 9/15/21 Positive  · 9/8/21: Positive  · History of PE on Eliquis  · Essential hypertension  · Moderate persistent asthma without complication  · Patient has not received her Covid vaccine    Patient evaluated by Telemedicine. Requesting Institution: [facility]  Provider Institution: [facility] [address], [address] [facility] Person at [facility] [place]: Ms Hanley Bradly, APRN- IM    Recommendations:   · Monitor off antibiotics  · Remdesivir initiated 9/15/21-this may not be very effective as the patient was diagnosed over a week ago. Stopped  · Decadron initiated 9/15/21. Stop date 9-25-21  · Actemra given 9-18-21    Medical Decision Making/Summary/Discussion:9/24/2021     · Patient admitted with suspected COVID 19 infection  · Covid test confirmed positive. Infection Control Recommendations   · Universal Precautions  · Airborne isolation  · Droplet Isolation    Antimicrobial Stewardship Recommendations     · Discontinuation of therapy  Coordination of Outpatient Care:   · Estimated Length of IV antimicrobials:TBD  · Patient will need Midline Catheter Insertion: TBD  · Patient will need PICC line Insertion: No  · Patient will need: Home IV , [place],  SNF,  LTAC:TBD  · Patient will need outpatient wound care:No    Chief complaint/reason for consultation:   · Concern for COVID infection      History of Present Illness:   Corby Johnson is a 54y.o.-year-old female who was initially admitted on 9/15/2021. Patient seen at the request of Dr. Link Salgado:    This patient who tested positive for COVID-19 on 9/8/2021 presented through ER with complaints of worsening shortness of breath with associated pleuritic chest pain, cough and fatigue.   She initially began having Spencer Inch ·   Wound:       CXR:         9/15/21        CAT:    9/15/21    Discussed with patient, RN, CC, IM. I have personally reviewed the past medical history, past surgical history, medications, social history, and family history, and I have updated the database accordingly.   Past Medical History:     Past Medical History:   Diagnosis Date    Acute pulmonary embolism ([facility] 11/2/2015    Anxiety     Asthma     Constipation, chronic     CPAP (continuous positive airway pressure) dependence     Diabetes mellitus ([facility]     pt states she was prediabetic at one point    Embolism - blood clot     right lung and right leg    Esophageal reflux     Factor V Leiden ([facility]     Heart murmur     HTN (hypertension)     Hyperlipidemia     Irritable bladder     MTHFR mutation     MVP (mitral valve prolapse)     Palpitations     Pulmonary embolism (HCC) 10/15/2012    Rhinitis, allergic 10/15/2012    Sleep apnea     Venous thromboembolism 5/2/2016       Past Surgical  History:     Past Surgical History:   Procedure Laterality Date    CATARACT REMOVAL WITH IMPLANT Left 04/02/2018    CHOLECYSTECTOMY      around 2000    COLONOSCOPY  07/12/2021    COLONOSCOPY N/A 7/12/2021    COLONOSCOPY POLYPECTOMY CECUM COLD BIOPSY performed by David Doty MD at [address]  3/27/2017    COLONOSCOPY CONTROL HEMORRHAGE performed by Last Lopez MD at [address] FLX W/RMVL OF TUMOR POLYP LESION [address] [place]  3/27/2017    COLONOSCOPY POLYPECTOMY SNARE/COLD BIOPSY performed by Last Lopez MD at [address] W/O ECP Left 4/2/2018    EYE CATARACT EMULSIFICATION IOL IMPLANT performed by Alondra Bone MD at [address] ENDOSCOPY  1/2010    with biopsy    UPPER GASTROINTESTINAL ENDOSCOPY N/A 7/12/2021    EGD BIOPSY OF STOMACH, GASTRIC POLYPECTOMY Family:     Attends Shinto Services:     Active Member of Clubs or Organizations:     Attends Club or Organization Meetings:     Marital Status:    Intimate Partner Violence:     Fear of Current or Ex-Partner:     Emotionally Abused:     Physically Abused:     Sexually Abused:        Family History:     Family History   Problem Relation Age of Onset    Cancer Mother         breast ca    Diabetes Mother     Depression Mother     Thyroid Disease Mother     High Blood Pressure Mother     Heart Disease Mother     Cancer Father         prostate ca    COPD Father     Heart Failure Father     Heart Disease Father     High Blood Pressure Father     Deep Vein Thrombosis Sister     Breast Cancer Sister     Deep Vein Thrombosis Sister         Allergies:   Levaquin [levofloxacin in d5w] and Pcn [penicillins]     Review of Systems:       Constitutional: Generalized malaise  Head: No headaches  Eyes: No double vision or blurry vision. No conjunctival inflammation. ENT: No sore throat or runny nose. . No hearing loss, tinnitus or vertigo. Cardiovascular: No chest pain or palpitations. Shortness of breath. CARBAJAL  Lung:  Shortness of breath & cough. No sputum production  Abdomen: No nausea, vomiting, diarrhea, or abdominal pain. Brisa Mandy No cramps. Genitourinary: No increased urinary frequency, or dysuria. No hematuria. No suprapubic or CVA pain  Musculoskeletal: No muscle aches or pains. No joint effusions, swelling or deformities  Hematologic: No bleeding or bruising. Neurologic: No headache, weakness, numbness, or tingling. Integument: No rash, no ulcers. Psychiatric: No depression. Endocrine: No polyuria, no polydipsia, no polyphagia.     Physical Examination :     Patient Vitals for the past 8 hrs:   BP Temp Temp src Pulse Resp SpO2 Weight   09/24/21 0900 -- -- -- -- -- 92 % --   09/24/21 0853 -- -- -- -- 20 94 % --   09/24/21 0700 122/68 98.8 °F (37.1 °C) Temporal 54 16 93 % --   09/24/21 0257 -- -- -- -- -- -- 289 lb (131.1 kg)   09/24/21 0247 123/66 98.2 °F (36.8 °C) Temporal 64 16 95 % --     General Appearance: Awake, alert, and in no apparent distress  Head:  Normocephalic, no trauma  Eyes: Pupils equal, round, reactive to light; sclera anicteric; conjunctivae pink. No embolic phenomena. ENT: Oropharynx clear, without erythema, exudate, or thrush. No tenderness of sinuses. Mouth/throat: mucosa pink and moist. No lesions. Dentition in good repair. Neck:Supple, without lymphadenopathy. Thyroid normal, No bruits. Pulmonary/Chest: Clear to auscultation, without wheezes, rales, or rhonchi. No dullness to percussion. Cardiovascular: Regular rate and rhythm without murmurs, rubs, or gallops. Abdomen: Soft, non tender. Bowel sounds normal. No organomegaly  All four Extremities: No cyanosis, clubbing, edema, or effusions. Neurologic: No gross sensory or motor deficits. Skin: Warm and dry with good turgor. No signs of peripheral arterial or venous insufficiency. No ulcerations. No open wounds. Medical Decision Making -Laboratory:   I have independently reviewed/ordered the following labs:    CBC with Differential:   Recent Labs     09/23/21  0550 09/24/21  0600   WBC 11.0 11.7*   HGB 13.9 13.6   HCT 41.3 42.0    325   LYMPHOPCT 14* 14*   MONOPCT 7 7     BMP:   Recent Labs     09/23/21  0550 09/24/21  0600    139   K 4.3 4.3    103   CO2 28 27   BUN 11 12   CREATININE 0.58 0.57     Hepatic Function Panel:   Recent Labs     09/23/21  0550   PROT 5.6*   LABALBU 3.2*   BILIDIR <0.08   IBILI CANNOT BE CALCULATED   BILITOT 0.37   ALKPHOS 54   ALT 33   AST 22     No results for input(s): RPR in the last 72 hours. No results for input(s): HIV in the last 72 hours. No results for input(s): BC in the last 72 hours.   Lab Results   Component Value Date    RBC 4.64 09/24/2021    RBC 4.67 11/02/2011    WBC 11.7 09/24/2021     Lab Results   Component Value Date    CREATININE 0.57 09/24/2021    GLUCOSE 134 09/24/2021    GLUCOSE 99 11/02/2011       Medical Decision Making-Imaging:     Narrative   EXAMINATION:   CTA OF THE CHEST 9/15/2021 6:47 pm       TECHNIQUE:   CTA of the chest was performed after the administration of intravenous   contrast.  Multiplanar reformatted images are provided for review.  MIP   images are provided for review. Dose modulation, iterative reconstruction,   and/or weight based adjustment of the mA/kV was utilized to reduce the   radiation dose to as low as reasonably achievable.       COMPARISON:   None.       HISTORY:   ORDERING SYSTEM PROVIDED HISTORY: hypoxia, tachycardia, covid   TECHNOLOGIST PROVIDED HISTORY:   hypoxia, tachycardia, covid   Decision Support Exception - unselect if not a suspected or confirmed   emergency medical condition->Emergency Medical Condition (MA)       FINDINGS:   Pulmonary Arteries: Pulmonary arteries are less than adequately opacified for   evaluation. No evidence of intraluminal filling defect to suggest pulmonary   embolism.  Main pulmonary artery is normal in caliber.       Mediastinum: There are a few small nonspecific lymph nodes seen in the middle   mediastinum. No suspicious lymphadenopathy.       Lungs/pleura: Patchy bilateral ground-glass and airspace infiltrates most   notably in the lung periphery. ..  No pulmonary masses are noted. No pleural   effusions are identified.       Cardiomediastinal Structures: Intimal calcifications associated with the   thoracic aorta. No evidence of thoracic aortic aneurysm or dissection. Cardiac chambers are normal       Upper Abdomen: Small hiatal hernia       Soft Tissues/Bones:  There are hypertrophic degenerative changes in the   thoracic spine.       No acute osseous abnormalities.           Impression   Patchy bilateral ground-glass and airspace infiltrates most notably in the   lung periphery compatible with patient's history of COVID pneumonia.       No obvious evidence of pulmonary embolism.         Narrative   EXAMINATION:   ONE XRAY VIEW OF THE CHEST       9/15/2021 5:21 pm       COMPARISON:   09/13/2021       HISTORY:   ORDERING SYSTEM PROVIDED HISTORY: cough, covid, worsening sob   TECHNOLOGIST PROVIDED HISTORY:   cough, covid, worsening sob       FINDINGS:   . The cardiac size is normal. No  pleural effusions are seen.  Patchy   bilateral interstitial infiltrates, improved on the right however progressive   on the left.  Pulmonary vascularity appears normal. There is mild ectasia of   the thoracic aorta.  . No acute bony abnormalities. The hilar structures are   normal.           Impression   Patchy bilateral interstitial infiltrates, improved on the right however   progressive on the left compatible with patient's history of COVID.                 Medical Decision [id number]-[id number]:       Medical Decision Making-Other:     Note:  · Labs, medications, radiologic studies were reviewed with personal review of films  · Large amounts of data were reviewed  · Discussed with nursing Staff, Discharge planner  · Infection Control and Prevention measures reviewed  · All prior entries were reviewed  · Administer medications as ordered  · Prognosis: Guarded  · Discharge planning reviewed      Thank you for allowing us to participate in the care of this patient. Please call with questions. Milli ADAMES-CNP    ATTESTATION:    I have discussed the case, including pertinent history and exam findings with the APRN. I have evaluated the  History, physical findings and pictures of the patient and the key elements of the encounter have been performed by me. I have reviewed the laboratory data, other diagnostic studies and discussed them with the APRN. I have updated the medical record where necessary. I agree with the assessment, plan and orders as documented by the APRN.     Rommel Nunez MD.                Pager: [phone] - Office: [phone]

## 2021-09-24 NOTE — PROGRESS NOTES
Writer into room to assess patient and obtain vitals. Pt is sitting up in the chair. N/C at 4L in place and SPO2 running WDL. Lung sounds are clear/diminished with unlabored respiration noted. Pt denies feeling SOB or noticing any CARBAJAL. BSC put away and patient educated she can now ambulated self tot he bathroom to void. Pt verbalizes understanding. Pt states she never had results with the glycolax  Administered yesterday. Pt denies feeling constipated and bowel sounds are active in all quadrants. Pt requests glycolax packet to be given again today. Writer will administer with morning medications.

## 2021-09-24 NOTE — PROGRESS NOTES
Physical Therapy  Facility/Department: [facility] AT THE [facility] MED SURG  Daily Treatment Note  NAME: Nir Spence  : 1965  MRN: [id number]    Date of Service: 2021    Discharge Recommendations:  Continue to assess pending progress        Assessment   Treatment Diagnosis: Decreased activity endurance  Prognosis: Good  Decision Making: Medium Complexity  PT Education: Gait Training;Transfer Training;Energy Conservation;General Safety  Patient Education: Educated on above with good understanding  REQUIRES PT FOLLOW UP: Yes  Activity Tolerance  Activity Tolerance: Patient Tolerated treatment well     Patient Diagnosis(es): The primary encounter diagnosis was Pneumonia due to COVID-19 virus. A diagnosis of Acute respiratory failure with hypoxia (HCC) was also pertinent to this visit. has a past medical history of Acute pulmonary embolism (HCC), Anxiety, Asthma, Constipation, chronic, CPAP (continuous positive airway pressure) dependence, Diabetes mellitus ([facility] [address] Embolism - blood clot, Esophageal reflux, Factor V Leiden ([facility] [address] Heart murmur, HTN (hypertension), Hyperlipidemia, Irritable bladder, MTHFR mutation, MVP (mitral valve prolapse), Palpitations, Pulmonary embolism ([facility] [address] Rhinitis, allergic, Sleep apnea, and Venous thromboembolism. has a past surgical history that includes Cholecystectomy; Mandible fracture surgery (); Tubal ligation; Upper gastrointestinal endoscopy (2010); pr colsc flx w/rmvl of tumor polyp lesion snare tq (3/27/2017); pr colsc flexible w/control bleeding any method (3/27/2017); Cataract removal with implant (Left, 2018); pr xcapsl ctrc rmvl insj io lens prosth w/o ecp (Left, 2018); Colonoscopy (2021); Upper gastrointestinal endoscopy (N/A, 2021); and Colonoscopy (N/A, 2021).     Restrictions  Restrictions/Precautions  Restrictions/Precautions: Isolation, General Precautions  Subjective   General  Chart Reviewed: Yes  Response To Previous Treatment: Patient with no complaints from previous session. Family / Caregiver Present: No  Referring Practitioner: Malu Baldwin MD  Subjective  Subjective: Pt doing well, on O2 via nc          Orientation  Orientation  Overall Orientation Status: Within Normal Limits  Cognition      Objective   Bed mobility  Comment: up in chair  Transfers  Sit to Stand: Independent  Stand to sit: Independent  Stand Pivot Transfers: Independent  Ambulation  Ambulation?: Yes  WB Status: FWB  Ambulation 1  Surface: level tile  Device: No Device  Other Apparatus: O2  Assistance: Supervision;Modified Independent  Quality of Gait: Pts SpO2 remained above 89% throughout treatment with minor SOB noted. Gait Deviations: Decreased step length  Distance: [id number] with no noted LOB  Comments: .     Balance  Posture: Good  Sitting - Static: Good;+  Sitting - Dynamic: Good;+  Standing - Static: Good;+  Standing - Dynamic: Good  Exercises  Comments: Standing sink exercises B Le x20 with 2 seated rest breaks. SPO2= 85% after ex. Goals  Short term goals  Time Frame for Short term goals: 10 days  Short term goal 1: Patient will ambulate 48' with supervision, without LOB  Short term goal 2: Patient will perform bed mobility and transfers with MI  Short term goal 3: Patient will tolerate 20-30 minutes of therex/act to improve endurance for ADLs  Patient Goals   Patient goals : Feel better    Plan    Plan  Times per week: 7  Times per day: Twice a day  Plan Comment: 1x/day on weekends  Safety Devices  Type of devices:  All fall risk precautions in place, Call light within reach, Left in chair, Nurse notified (no alarm upon entry)     Therapy Time   Individual Concurrent Group Co-treatment   Time In 1328         Time Out 1351         Minutes 23                 Abimael Hopkins, PTA - - -

## 2021-09-25 LAB
ABSOLUTE EOS #: 0.04 K/UL (ref 0–0.44)
ABSOLUTE IMMATURE GRANULOCYTE: 0.13 K/UL (ref 0–0.3)
ABSOLUTE LYMPH #: 1.75 K/UL (ref 1.1–3.7)
ABSOLUTE MONO #: 0.91 K/UL (ref 0.1–1.2)
ANION GAP SERPL CALCULATED.3IONS-SCNC: 10 MMOL/L (ref 9–17)
BASOPHILS # BLD: 0 % (ref 0–2)
BASOPHILS ABSOLUTE: <0.03 K/UL (ref 0–0.2)
BUN BLDV-MCNC: 14 MG/DL (ref 6–20)
BUN/CREAT BLD: 25 (ref 9–20)
C-REACTIVE PROTEIN: <3 MG/L (ref 0–5)
CALCIUM SERPL-MCNC: 9 MG/DL (ref 8.6–10.4)
CHLORIDE BLD-SCNC: 100 MMOL/L (ref 98–107)
CO2: 28 MMOL/L (ref 20–31)
CREAT SERPL-MCNC: 0.56 MG/DL (ref 0.5–0.9)
DIFFERENTIAL TYPE: ABNORMAL
EOSINOPHILS RELATIVE PERCENT: 0 % (ref 1–4)
GFR AFRICAN AMERICAN: >60 ML/MIN
GFR NON-AFRICAN AMERICAN: >60 ML/MIN
GFR SERPL CREATININE-BSD FRML MDRD: ABNORMAL ML/MIN/{1.73_M2}
GFR SERPL CREATININE-BSD FRML MDRD: ABNORMAL ML/MIN/{1.73_M2}
GLUCOSE BLD-MCNC: 122 MG/DL (ref 70–99)
HCT VFR BLD CALC: 42.7 % (ref 36.3–47.1)
HEMOGLOBIN: 14.4 G/DL (ref 11.9–15.1)
IMMATURE GRANULOCYTES: 1 %
LYMPHOCYTES # BLD: 13 % (ref 24–43)
MCH RBC QN AUTO: 29.8 PG (ref 25.2–33.5)
MCHC RBC AUTO-ENTMCNC: 33.7 G/DL (ref 28.4–34.8)
MCV RBC AUTO: 88.4 FL (ref 82.6–102.9)
MONOCYTES # BLD: 7 % (ref 3–12)
NRBC AUTOMATED: 0 PER 100 WBC
PDW BLD-RTO: 13.7 % (ref 11.8–14.4)
PLATELET # BLD: 321 K/UL (ref 138–453)
PLATELET ESTIMATE: ABNORMAL
PMV BLD AUTO: 9.9 FL (ref 8.1–13.5)
POTASSIUM SERPL-SCNC: 4.2 MMOL/L (ref 3.7–5.3)
RBC # BLD: 4.83 M/UL (ref 3.95–5.11)
RBC # BLD: ABNORMAL 10*6/UL
SEG NEUTROPHILS: 79 % (ref 36–65)
SEGMENTED NEUTROPHILS ABSOLUTE COUNT: 10.76 K/UL (ref 1.5–8.1)
SODIUM BLD-SCNC: 138 MMOL/L (ref 135–144)
WBC # BLD: 13.6 K/UL (ref 3.5–11.3)
WBC # BLD: ABNORMAL 10*3/UL

## 2021-09-25 PROCEDURE — 94640 AIRWAY INHALATION TREATMENT: CPT

## 2021-09-25 PROCEDURE — 6370000000 HC RX 637 (ALT 250 FOR IP): Performed by: NURSE PRACTITIONER

## 2021-09-25 PROCEDURE — 99233 SBSQ HOSP IP/OBS HIGH 50: CPT | Performed by: INTERNAL MEDICINE

## 2021-09-25 PROCEDURE — 2580000003 HC RX 258: Performed by: INTERNAL MEDICINE

## 2021-09-25 PROCEDURE — 94669 MECHANICAL CHEST WALL OSCILL: CPT

## 2021-09-25 PROCEDURE — 86140 C-REACTIVE PROTEIN: CPT

## 2021-09-25 PROCEDURE — 85025 COMPLETE CBC W/AUTO DIFF WBC: CPT

## 2021-09-25 PROCEDURE — 97116 GAIT TRAINING THERAPY: CPT

## 2021-09-25 PROCEDURE — 97110 THERAPEUTIC EXERCISES: CPT

## 2021-09-25 PROCEDURE — 36415 COLL VENOUS BLD VENIPUNCTURE: CPT

## 2021-09-25 PROCEDURE — 94761 N-INVAS EAR/PLS OXIMETRY MLT: CPT

## 2021-09-25 PROCEDURE — 2700000000 HC OXYGEN THERAPY PER DAY

## 2021-09-25 PROCEDURE — 1200000000 HC SEMI PRIVATE

## 2021-09-25 PROCEDURE — 97535 SELF CARE MNGMENT TRAINING: CPT

## 2021-09-25 PROCEDURE — 6370000000 HC RX 637 (ALT 250 FOR IP): Performed by: INTERNAL MEDICINE

## 2021-09-25 PROCEDURE — 80048 BASIC METABOLIC PNL TOTAL CA: CPT

## 2021-09-25 RX ADMIN — PANTOPRAZOLE SODIUM 40 MG: 40 TABLET, DELAYED RELEASE ORAL at 08:36

## 2021-09-25 RX ADMIN — FAMOTIDINE 40 MG: 20 TABLET, FILM COATED ORAL at 16:58

## 2021-09-25 RX ADMIN — BUDESONIDE AND FORMOTEROL FUMARATE DIHYDRATE 2 PUFF: 160; 4.5 AEROSOL RESPIRATORY (INHALATION) at 11:45

## 2021-09-25 RX ADMIN — Medication 100 MG: at 08:36

## 2021-09-25 RX ADMIN — METOPROLOL TARTRATE 12.5 MG: 25 TABLET, FILM COATED ORAL at 08:36

## 2021-09-25 RX ADMIN — OXYCODONE HYDROCHLORIDE AND ACETAMINOPHEN 1000 MG: 500 TABLET ORAL at 13:05

## 2021-09-25 RX ADMIN — APIXABAN 5 MG: 5 TABLET, FILM COATED ORAL at 20:42

## 2021-09-25 RX ADMIN — SODIUM CHLORIDE, PRESERVATIVE FREE 10 ML: 5 INJECTION INTRAVENOUS at 20:42

## 2021-09-25 RX ADMIN — SODIUM CHLORIDE, PRESERVATIVE FREE 10 ML: 5 INJECTION INTRAVENOUS at 08:37

## 2021-09-25 RX ADMIN — OXYCODONE HYDROCHLORIDE AND ACETAMINOPHEN 1000 MG: 500 TABLET ORAL at 16:58

## 2021-09-25 RX ADMIN — BUDESONIDE AND FORMOTEROL FUMARATE DIHYDRATE 2 PUFF: 160; 4.5 AEROSOL RESPIRATORY (INHALATION) at 20:22

## 2021-09-25 RX ADMIN — OXYCODONE HYDROCHLORIDE AND ACETAMINOPHEN 1000 MG: 500 TABLET ORAL at 20:42

## 2021-09-25 RX ADMIN — Medication 2000 UNITS: at 08:37

## 2021-09-25 RX ADMIN — APIXABAN 5 MG: 5 TABLET, FILM COATED ORAL at 08:36

## 2021-09-25 RX ADMIN — SPIRONOLACTONE 25 MG: 25 TABLET, FILM COATED ORAL at 08:36

## 2021-09-25 RX ADMIN — OXYCODONE HYDROCHLORIDE AND ACETAMINOPHEN 1000 MG: 500 TABLET ORAL at 08:36

## 2021-09-25 RX ADMIN — POLYETHYLENE GLYCOL (3350) 17 G: 17 POWDER, FOR SOLUTION ORAL at 08:37

## 2021-09-25 RX ADMIN — LOSARTAN POTASSIUM 25 MG: 25 TABLET, FILM COATED ORAL at 08:36

## 2021-09-25 ASSESSMENT — PAIN SCALES - GENERAL
PAINLEVEL_OUTOF10: 0

## 2021-09-25 NOTE — PROGRESS NOTES
09/25/2021    CO2 28 09/25/2021       PROBLEM LIST:  Principal Problem:    Pneumonia due to COVID-19 virus  Active Problems:    Mild malnutrition (HCC)    Bronchial asthma    Sleep apnea, obstructive    Essential hypertension    Long term current use of anticoagulant therapy    Hypernatremia  Resolved Problems:    * No resolved hospital problems. *      ASSESSMENT / PLAN:  Pneumonia due to COVID-19 virus  · Continue current therapy of dexamethazone,high flow oxygen- wean gradually as tolerated    · Hypernatremia- improved,Na 142  · Nutrition status:  Well developed, well nourished with no malnutrition  · DVT prophylaxis: Eliquis   · High risk medications: eliquis   · Disposition:  Discharge plan is home    Milli Pate MD , M.D.  9/25/2021  3:42 PM

## 2021-09-25 NOTE — PROGRESS NOTES
Occupational Therapy  Facility/Department: [facility] AT THE [facility] MED SURG  Daily Treatment Note  NAME: Miguel Ángel Murry  : 1965  MRN: [id number]    Date of Service: 2021    Discharge Recommendations:  Continue to assess pending progress       Assessment      Activity Tolerance  Activity Tolerance: Patient Tolerated treatment well  Activity Tolerance: Mild SOB with fxal mobility sans device this session  Safety Devices  Type of devices: Call light within reach; Left in chair         Patient Diagnosis(es): The primary encounter diagnosis was Pneumonia due to COVID-19 virus. A diagnosis of Acute respiratory failure with hypoxia (HCC) was also pertinent to this visit. has a past medical history of Acute pulmonary embolism (HCC), Anxiety, Asthma, Constipation, chronic, CPAP (continuous positive airway pressure) dependence, Diabetes mellitus ([facility] [address] Embolism - blood clot, Esophageal reflux, Factor V Leiden ([facility] [address] Heart murmur, HTN (hypertension), Hyperlipidemia, Irritable bladder, MTHFR mutation, MVP (mitral valve prolapse), Palpitations, Pulmonary embolism ([facility] [address] Rhinitis, allergic, Sleep apnea, and Venous thromboembolism. has a past surgical history that includes Cholecystectomy; Mandible fracture surgery (); Tubal ligation; Upper gastrointestinal endoscopy (2010); pr colsc flx w/rmvl of tumor polyp lesion snare tq (3/27/2017); pr colsc flexible w/control bleeding any method (3/27/2017); Cataract removal with implant (Left, 2018); pr xcapsl ctrc rmvl insj io lens prosth w/o ecp (Left, 2018); Colonoscopy (2021); Upper gastrointestinal endoscopy (N/A, 2021); and Colonoscopy (N/A, 2021).     Restrictions  Restrictions/Precautions  Restrictions/Precautions: Isolation, General Precautions  Subjective   General  Chart Reviewed: Yes  Patient assessed for rehabilitation services?: Yes  Response to previous treatment: Patient with no complaints from previous session  Family / Caregiver Present: No  Referring Practitioner: Dr. Cooney Sender  Diagnosis: acute respiratory failure wtih hypoxia  Subjective  Subjective: When therapist arrived, robin reported, \"I was hoping you were the doctor. I am so ready to get out of here\"  General Comment  Comments: Patient agreeable to ADLs this session  Vital Signs  Patient Currently in Pain: Denies   Orientation  Orientation  Overall Orientation Status: Within Normal Limits  Objective    ADL  Grooming: Modified independent   UE Bathing: Setup  LE Bathing: Setup  UE Dressing: Minimal assistance (assist hospital gown & monitor)  Toileting: Stand by assistance  Additional Comments: Patient complted toileting and UB ADLs this session. Patient tolerated standing x 5 mins for oral care then completed UB bathing/dressing        Balance  Sitting Balance: Independent  Standing Balance: Supervision  Standing Balance  Time: 5 mins for oral care  Activity: oral care  Comment: O lob noted  Functional Mobility  Functional - Mobility Device: No device  Activity: To/From therapy gym  Assist Level: Independent  Functional Mobility Comments: Pt concetta'juan MADERA understanding of O2 tubing this session     Transfers  Sit to stand: Independent  Stand to sit: Independent                                                                 Plan   Plan  Times per week: 7x/week  Times per day: Daily  Current Treatment Recommendations: Strengthening, Patient/Caregiver Education & Training, Balance Training, Functional Mobility Training, Endurance Training, Safety Education & Training, Self-Care / ADL  G-Code     OutComes Score                                                  AM-PAC Score             Goals  Short term goals  Time Frame for Short term goals: 90 days  Short term goal 1: Pt will verbalize 4 ECWS strategies for implementation/use at home to improve safety and independence with ADL and functional tasks.   Short term goal 2: Pt will tolerate standing for greater tahn 3 minutes without LOB or SOB while

## 2021-09-25 NOTE — PROGRESS NOTES
Pt resting in bed with eyes closed, no distress noted continues on 3LPM. SPO2 94% respirations even and unlabored. Pt noted SPO2 to drop to 86% when up to restroom, stated she thinks she moved to fast when going. Will continue to monitor.

## 2021-09-25 NOTE — PROGRESS NOTES
/[facility] [address] [place] [address] Progress Note   COVID 19/ Patient  Today's Date and Time: 9/25/2021, 3:26 PM    Impression :   · COVID 19 Suspect  · COVID 19 Confirmed Infection  · Covid tests:  · 9/15/21 Positive  · 9/8/21: Positive  · History of PE on Eliquis  · Essential hypertension  · Moderate persistent asthma without complication  · Patient has not received her Covid vaccine    Patient evaluated by Telemedicine. Requesting Institution: [facility]  Provider Institution: [address] [place] [address],[facility] [address] [place], [address] [facility] Person at [facility]: Ms Desouza Adrianne, APRN- IM    Recommendations:   · Monitor off antibiotics  · Remdesivir initiated 9/15/21-this may not be very effective as the patient was diagnosed over a week ago. Stopped  · Decadron initiated 9/15/21. Stop date 9-25-21  · Actemra given 9-18-21    Medical Decision Making/Summary/Discussion:9/25/2021     · Patient admitted with suspected COVID 19 infection  · Covid test confirmed positive. Infection Control Recommendations   · Universal Precautions  · Airborne isolation  · Droplet Isolation    Antimicrobial Stewardship Recommendations     · Discontinuation of therapy  Coordination of Outpatient Care:   · Estimated Length of IV antimicrobials:TBD  · Patient will need Midline Catheter Insertion: TBD  · Patient will need PICC line Insertion: No  · Patient will need: Home IV , [place],  SNF,  LTAC:TBD  · Patient will need outpatient wound care:No    Chief complaint/reason for consultation:   · Concern for COVID infection      History of Present Illness:   Chari Gaucher is a 54y.o.-year-old female who was initially admitted on 9/15/2021. Patient seen at the request of Dr. Kylee Landry:    This patient who tested positive for COVID-19 on 9/8/2021 presented through ER with complaints of worsening shortness of breath with associated pleuritic chest pain, cough and fatigue.   She initially began having symptoms on 9/7/2021. The patient was oxygenating at 88% on room air and imaging revealed bilateral groundglass opacities. She was initiated on Decadron and remdesivir and admitted to the floor. CTA chest 9/15/2021  Impression   Patchy bilateral ground-glass and airspace infiltrates most notably in the   lung periphery compatible with patient's history of COVID pneumonia.       No obvious evidence of pulmonary embolism. Patient admitted because of concerns with COVID 19.    CURRENT EVALUATION : 9/25/2021    Afebrile  VS stable    The patient's respiratory status is better today and she is tolerating 4-->3 L NC. Feeling better. Comfortable on current oxygenation. Actemra administered 9/18/21  CRP is <3.0    Patient exhibiting respiratory distress. yes  Respiratory secretions: no    Patient receiving supplemental oxygen. High flow NC--> 3 L NC  02 sat: 90-->92%  RR  20-->18-->16-->18      % FIO2: 50-->61-->49-->35       QTc:       NEWS Score: 0-4 Low risk group; 5-6: Medium risk group; 7 or above: High risk group  Parameters 3 2 1 0 1 2 3   Age    < 65   ? 65   RR ? 8  9-11 12-20  21-24 ? 25   O2 Sats ?  91 92-93 94-95 ? 96      Suppl O2  Yes  No      SBP ? 90  101-110 111-219   ? 220   HR ? 40  41-50 51-90  111-130 ? 131   Consciousness    Alert   Drowsiness, lethargy, or confusion   Temperature ? 35.0 C (95.0 F)  35.1-36.0 C 95.1-96.9 F 36.1-38.0 C 97.0-100.4 F 38.1-39.0 C 100.5-102.3 F ? 39.1 C ? 102.4 F      NEWS Score:   9/16/21: 3 Low risk    Overall Daily Picture:      Improving    Presence of secondary bacterial Infection:    No   Additional antibiotics: No    Labs, X rays reviewed: 9/25/2021    BUN: 12-->14  Cr:0.56    WBC:11.7-->13.6  Hb:13.6--!4.4  Plat:321    Absolute Neutrophils:4.85 --> 8.14  Absolute Lymphocytes:1.16 --> 1.67  Neutrophil/Lymphocyte Ratio: 4.9 high risk    CRP: 20-->38.8-->18.3-->11.2-->3.7--><3.0  Ferritin:  LDH:   D-dimer: 1.10 --> 0.97    Pro Calcitonin:      Cultures:  Urine:  ·   Blood:  ·   Sputum :  ·   Wound:       CXR:         9/15/21        CAT:    9/15/21    Discussed with patient, RN, CC, IM. I have personally reviewed the past medical history, past surgical history, medications, social history, and family history, and I have updated the database accordingly.   Past Medical History:     Past Medical History:   Diagnosis Date    Acute pulmonary embolism ([facility] [address] 11/2/2015    Anxiety     Asthma     Constipation, chronic     CPAP (continuous positive airway pressure) dependence     Diabetes mellitus ([facility] [address]     pt states she was prediabetic at one point    Embolism - blood clot     right lung and right leg    Esophageal reflux     Factor V Leiden ([facility] [address]     Heart murmur     HTN (hypertension)     Hyperlipidemia     Irritable bladder     MTHFR mutation     MVP (mitral valve prolapse)     Palpitations     Pulmonary embolism (HCC) 10/15/2012    Rhinitis, allergic 10/15/2012    Sleep apnea     Venous thromboembolism 5/2/2016       Past Surgical  History:     Past Surgical History:   Procedure Laterality Date    CATARACT REMOVAL WITH IMPLANT Left 04/02/2018    CHOLECYSTECTOMY      around 2000    COLONOSCOPY  07/12/2021    COLONOSCOPY N/A 7/12/2021    COLONOSCOPY POLYPECTOMY CECUM COLD BIOPSY performed by Juan Medina MD at [address]  3/27/2017    COLONOSCOPY CONTROL HEMORRHAGE performed by Kirti Newton MD at [address] FLX W/RMVL OF TUMOR POLYP LESION [address] TQ  3/27/2017    COLONOSCOPY POLYPECTOMY SNARE/COLD BIOPSY performed by Kirti Newton MD at [address] [facility] W/O ECP Left 4/2/2018    EYE CATARACT EMULSIFICATION IOL IMPLANT performed by Hamzah Redding MD at [address] [facility] ENDOSCOPY  1/2010    with biopsy    UPPER GASTROINTESTINAL ENDOSCOPY Gatherings with Friends and Family:     Attends Scientologist Services:     Active Member of Clubs or Organizations:     Attends Club or Organization Meetings:     Marital Status:    Intimate Partner Violence:     Fear of Current or Ex-Partner:     Emotionally Abused:     Physically Abused:     Sexually Abused:        Family History:     Family History   Problem Relation Age of Onset    Cancer Mother         breast ca    Diabetes Mother     Depression Mother     Thyroid Disease Mother     High Blood Pressure Mother     Heart Disease Mother     Cancer Father         prostate ca    COPD Father     Heart Failure Father     Heart Disease Father     High Blood Pressure Father     Deep Vein Thrombosis Sister     Breast Cancer Sister     Deep Vein Thrombosis Sister         Allergies:   Levaquin [levofloxacin in d5w] and Pcn [penicillins]     Review of Systems:       Constitutional: Generalized malaise  Head: No headaches  Eyes: No double vision or blurry vision. No conjunctival inflammation. ENT: No sore throat or runny nose. . No hearing loss, tinnitus or vertigo. Cardiovascular: No chest pain or palpitations. Shortness of breath. CARBAJAL  Lung:  Shortness of breath & cough. No sputum production  Abdomen: No nausea, vomiting, diarrhea, or abdominal pain. [address] No cramps. Genitourinary: No increased urinary frequency, or dysuria. No hematuria. No suprapubic or CVA pain  Musculoskeletal: No muscle aches or pains. No joint effusions, swelling or deformities  Hematologic: No bleeding or bruising. Neurologic: No headache, weakness, numbness, or tingling. Integument: No rash, no ulcers. Psychiatric: No depression. Endocrine: No polyuria, no polydipsia, no polyphagia.     Physical Examination :     Patient Vitals for the past 8 hrs:   BP Temp Temp src Pulse Resp SpO2   09/25/21 1309 104/71 97.8 °F (36.6 °C) Temporal 69 18 92 %     General Appearance: Awake, alert, and in no apparent distress  Head: Normocephalic, no trauma  Eyes: Pupils equal, round, reactive to light; sclera anicteric; conjunctivae pink. No embolic phenomena. ENT: Oropharynx clear, without erythema, exudate, or thrush. No tenderness of sinuses. Mouth/throat: mucosa pink and moist. No lesions. Dentition in good repair. Neck:Supple, without lymphadenopathy. Thyroid normal, No bruits. Pulmonary/Chest: Clear to auscultation, without wheezes, rales, or rhonchi. No dullness to percussion. Cardiovascular: Regular rate and rhythm without murmurs, rubs, or gallops. Abdomen: Soft, non tender. Bowel sounds normal. No organomegaly  All four Extremities: No cyanosis, clubbing, edema, or effusions. Neurologic: No gross sensory or motor deficits. Skin: Warm and dry with good turgor. No signs of peripheral arterial or venous insufficiency. No ulcerations. No open wounds. Medical Decision Making -Laboratory:   I have independently reviewed/ordered the following labs:    CBC with Differential:   Recent Labs     09/24/21  0600 09/25/21  0646   WBC 11.7* 13.6*   HGB 13.6 14.4   HCT 42.0 42.7    321   LYMPHOPCT 14* 13*   MONOPCT 7 7     BMP:   Recent Labs     09/24/21  0600 09/25/21  0646    138   K 4.3 4.2    100   CO2 27 28   BUN 12 14   CREATININE 0.57 0.56     Hepatic Function Panel:   Recent Labs     09/23/21  0550   PROT 5.6*   LABALBU 3.2*   BILIDIR <0.08   IBILI CANNOT BE CALCULATED   BILITOT 0.37   ALKPHOS 54   ALT 33   AST 22     No results for input(s): RPR in the last 72 hours. No results for input(s): HIV in the last 72 hours. No results for input(s): BC in the last 72 hours.   Lab Results   Component Value Date    RBC 4.83 09/25/2021    RBC 4.67 11/02/2011    WBC 13.6 09/25/2021     Lab Results   Component Value Date    CREATININE 0.56 09/25/2021    GLUCOSE 122 09/25/2021    GLUCOSE 99 11/02/2011       Medical Decision Making-Imaging:     Narrative   EXAMINATION:   CTA OF THE CHEST 9/15/2021 6:47 pm       TECHNIQUE: CTA of the chest was performed after the administration of intravenous   contrast.  Multiplanar reformatted images are provided for review.  MIP   images are provided for review. Dose modulation, iterative reconstruction,   and/or weight based adjustment of the mA/kV was utilized to reduce the   radiation dose to as low as reasonably achievable.       COMPARISON:   None.       HISTORY:   ORDERING SYSTEM PROVIDED HISTORY: hypoxia, tachycardia, covid   TECHNOLOGIST PROVIDED HISTORY:   hypoxia, tachycardia, covid   Decision Support Exception - unselect if not a suspected or confirmed   emergency medical condition->Emergency Medical Condition (MA)       FINDINGS:   Pulmonary Arteries: Pulmonary arteries are less than adequately opacified for   evaluation. No evidence of intraluminal filling defect to suggest pulmonary   embolism.  Main pulmonary artery is normal in caliber.       Mediastinum: There are a few small nonspecific lymph nodes seen in the middle   mediastinum. No suspicious lymphadenopathy.       Lungs/pleura: Patchy bilateral ground-glass and airspace infiltrates most   notably in the lung periphery. ..  No pulmonary masses are noted. No pleural   effusions are identified.       Cardiomediastinal Structures: Intimal calcifications associated with the   thoracic aorta. No evidence of thoracic aortic aneurysm or dissection. Cardiac chambers are normal       Upper Abdomen: Small hiatal hernia       Soft Tissues/Bones:  There are hypertrophic degenerative changes in the   thoracic spine.       No acute osseous abnormalities.           Impression   Patchy bilateral ground-glass and airspace infiltrates most notably in the   lung periphery compatible with patient's history of COVID pneumonia.       No obvious evidence of pulmonary embolism.             Narrative   EXAMINATION:   ONE XRAY VIEW OF THE CHEST       9/15/2021 5:21 pm       COMPARISON:   09/13/2021       HISTORY:   ORDERING SYSTEM PROVIDED HISTORY: cough, covid, worsening sob   TECHNOLOGIST PROVIDED HISTORY:   cough, covid, worsening sob       FINDINGS:   . The cardiac size is normal. No  pleural effusions are seen.  Patchy   bilateral interstitial infiltrates, improved on the right however progressive   on the left.  Pulmonary vascularity appears normal. There is mild ectasia of   the thoracic aorta.  . No acute bony abnormalities. The hilar structures are   normal.           Impression   Patchy bilateral interstitial infiltrates, improved on the right however   progressive on the left compatible with patient's history of COVID.                 Medical Decision [id number]:       Medical Decision Making-Other:     Note:  · Labs, medications, radiologic studies were reviewed with personal review of films  · Large amounts of data were reviewed  · Discussed with nursing Staff, Discharge planner  · Infection Control and Prevention measures reviewed  · All prior entries were reviewed  · Administer medications as ordered  · Prognosis: Guarded  · Discharge planning reviewed      Thank you for allowing us to participate in the care of this patient. Please call with questions.       Modesto Bowden MD.                Pager: [phone] - Office: [phone]

## 2021-09-25 NOTE — PROGRESS NOTES
Physical Therapy  Facility/Department: [facility] AT THE [facility] MED SURG  Daily Treatment Note  NAME: Evelyn Sandoval  : 1965  MRN: [id number]    Date of Service: 2021    Discharge Recommendations:  Continue to assess pending progress        Assessment   Treatment Diagnosis: Decreased activity endurance  Prognosis: Good  Activity Tolerance  Activity Tolerance: Patient Tolerated treatment well     Patient Diagnosis(es): The primary encounter diagnosis was Pneumonia due to COVID-19 virus. A diagnosis of Acute respiratory failure with hypoxia (HCC) was also pertinent to this visit. has a past medical history of Acute pulmonary embolism (HCC), Anxiety, Asthma, Constipation, chronic, CPAP (continuous positive airway pressure) dependence, Diabetes mellitus ([facility] [address] Embolism - blood clot, Esophageal reflux, Factor V Leiden ([facility] [address] Heart murmur, HTN (hypertension), Hyperlipidemia, Irritable bladder, MTHFR mutation, MVP (mitral valve prolapse), Palpitations, Pulmonary embolism ([facility] [address] Rhinitis, allergic, Sleep apnea, and Venous thromboembolism. has a past surgical history that includes Cholecystectomy; Mandible fracture surgery (); Tubal ligation; Upper gastrointestinal endoscopy (2010); pr colsc flx w/rmvl of tumor polyp lesion snare tq (3/27/2017); pr colsc flexible w/control bleeding any method (3/27/2017); Cataract removal with implant (Left, 2018); pr xcapsl ctrc rmvl insj io lens prosth w/o ecp (Left, 2018); Colonoscopy (2021); Upper gastrointestinal endoscopy (N/A, 2021); and Colonoscopy (N/A, 2021). Restrictions  Restrictions/Precautions  Restrictions/Precautions: Isolation, General Precautions  Subjective   General  Chart Reviewed: Yes  Response To Previous Treatment: Patient with no complaints from previous session. Family / Caregiver Present: No  Referring Practitioner: Robbin Mustafa MD  Subjective  Subjective: Pt reports no new concerns or pain.  Pt reports she is ready to go home.          Orientation  Orientation  Overall Orientation Status: Within Normal Limits  Cognition      Objective      Transfers  Sit to Stand: Independent  Stand to sit: Independent  Ambulation  Ambulation?: Yes  WB Status: FWB  Ambulation 1  Surface: level tile  Device: No Device  Other Apparatus: O2  Assistance: Supervision;Modified Independent  Quality of Gait: Pts SpO2 86-89% throughout treatment with minor SOB noted with SPO2 increasing to 90%-91% after 1-2 minute rest.  Gait Deviations: Decreased step length  Distance: [id number] with no noted LOB  Stairs/Curb  Stairs?: No     Balance  Posture: Good  Sitting - Static: Good;+  Sitting - Dynamic: Good;+  Standing - Static: Good;+  Standing - Dynamic: Good  Exercises  Hip Flexion: x15  Hip Abduction: x15  Comments: Standing sink exercises B Le x20 with 2 seated rest breaks. SPO2= 85-86% after ex. Goals  Short term goals  Time Frame for Short term goals: 10 days  Short term goal 1: Patient will ambulate 48' with supervision, without LOB  Short term goal 2: Patient will perform bed mobility and transfers with MI  Short term goal 3: Patient will tolerate 20-30 minutes of therex/act to improve endurance for ADLs  Patient Goals   Patient goals : Feel better    Plan    Plan  Times per week: 7  Times per day: Twice a day  Plan Comment: 1x/day on weekends  Safety Devices  Type of devices:  All fall risk precautions in place, Call light within reach, Left in chair, Nurse notified     Therapy Time   Individual Concurrent Group Co-treatment   Time In 0913         Time Out 0943         Minutes [address]

## 2021-09-25 NOTE — PROGRESS NOTES
Pt noted to be up in chair at beginning of shift, no distress noted, on 3L o2 nasal cannula, SPO2 94%, respirations even, unlabored, lungs noted clear, no distress noted, will continue to monitor.

## 2021-09-25 NOTE — FLOWSHEET NOTE
Dr Siri Wilkins in to see patient. RT in room. Ambulated patient. Pulse ox dropped to 84% on 3 liters of oxygen.  Continue to monitor

## 2021-09-26 VITALS
TEMPERATURE: 96.5 F | HEIGHT: 66 IN | DIASTOLIC BLOOD PRESSURE: 46 MMHG | SYSTOLIC BLOOD PRESSURE: 108 MMHG | WEIGHT: 292 LBS | OXYGEN SATURATION: 95 % | RESPIRATION RATE: 18 BRPM | BODY MASS INDEX: 46.93 KG/M2 | HEART RATE: 67 BPM

## 2021-09-26 PROBLEM — R09.02 HYPOXIA: Status: ACTIVE | Noted: 2021-09-26

## 2021-09-26 LAB
ABSOLUTE EOS #: 0.13 K/UL (ref 0–0.44)
ABSOLUTE IMMATURE GRANULOCYTE: 0.08 K/UL (ref 0–0.3)
ABSOLUTE LYMPH #: 2.59 K/UL (ref 1.1–3.7)
ABSOLUTE MONO #: 0.76 K/UL (ref 0.1–1.2)
ALBUMIN SERPL-MCNC: 3.3 G/DL (ref 3.5–5.2)
ALBUMIN/GLOBULIN RATIO: 1.2 (ref 1–2.5)
ALP BLD-CCNC: 58 U/L (ref 35–104)
ALT SERPL-CCNC: 32 U/L (ref 5–33)
ANION GAP SERPL CALCULATED.3IONS-SCNC: 12 MMOL/L (ref 9–17)
AST SERPL-CCNC: 25 U/L
BASOPHILS # BLD: 0 % (ref 0–2)
BASOPHILS ABSOLUTE: 0.03 K/UL (ref 0–0.2)
BILIRUB SERPL-MCNC: 0.63 MG/DL (ref 0.3–1.2)
BILIRUBIN DIRECT: <0.08 MG/DL
BILIRUBIN, INDIRECT: ABNORMAL MG/DL (ref 0–1)
BUN BLDV-MCNC: 18 MG/DL (ref 6–20)
BUN/CREAT BLD: 29 (ref 9–20)
C-REACTIVE PROTEIN: <3 MG/L (ref 0–5)
CALCIUM SERPL-MCNC: 8.8 MG/DL (ref 8.6–10.4)
CHLORIDE BLD-SCNC: 100 MMOL/L (ref 98–107)
CO2: 28 MMOL/L (ref 20–31)
CREAT SERPL-MCNC: 0.63 MG/DL (ref 0.5–0.9)
DIFFERENTIAL TYPE: ABNORMAL
EOSINOPHILS RELATIVE PERCENT: 2 % (ref 1–4)
GFR AFRICAN AMERICAN: >60 ML/MIN
GFR NON-AFRICAN AMERICAN: >60 ML/MIN
GFR SERPL CREATININE-BSD FRML MDRD: ABNORMAL ML/MIN/{1.73_M2}
GFR SERPL CREATININE-BSD FRML MDRD: ABNORMAL ML/MIN/{1.73_M2}
GLOBULIN: ABNORMAL G/DL (ref 1.5–3.8)
GLUCOSE BLD-MCNC: 100 MG/DL (ref 70–99)
HCT VFR BLD CALC: 43.1 % (ref 36.3–47.1)
HEMOGLOBIN: 14.2 G/DL (ref 11.9–15.1)
IMMATURE GRANULOCYTES: 1 %
INR BLD: 1.1
LYMPHOCYTES # BLD: 31 % (ref 24–43)
MCH RBC QN AUTO: 29.3 PG (ref 25.2–33.5)
MCHC RBC AUTO-ENTMCNC: 32.9 G/DL (ref 28.4–34.8)
MCV RBC AUTO: 88.9 FL (ref 82.6–102.9)
MONOCYTES # BLD: 9 % (ref 3–12)
NRBC AUTOMATED: 0 PER 100 WBC
PARTIAL THROMBOPLASTIN TIME: 32 SEC (ref 23.9–33.8)
PDW BLD-RTO: 14 % (ref 11.8–14.4)
PLATELET # BLD: 273 K/UL (ref 138–453)
PLATELET ESTIMATE: ABNORMAL
PMV BLD AUTO: 10 FL (ref 8.1–13.5)
POTASSIUM SERPL-SCNC: 4.3 MMOL/L (ref 3.7–5.3)
PROTHROMBIN TIME: 14.4 SEC (ref 11.5–14.2)
RBC # BLD: 4.85 M/UL (ref 3.95–5.11)
RBC # BLD: ABNORMAL 10*6/UL
SEG NEUTROPHILS: 57 % (ref 36–65)
SEGMENTED NEUTROPHILS ABSOLUTE COUNT: 4.77 K/UL (ref 1.5–8.1)
SODIUM BLD-SCNC: 140 MMOL/L (ref 135–144)
TOTAL PROTEIN: 6 G/DL (ref 6.4–8.3)
WBC # BLD: 8.4 K/UL (ref 3.5–11.3)
WBC # BLD: ABNORMAL 10*3/UL

## 2021-09-26 PROCEDURE — 2700000000 HC OXYGEN THERAPY PER DAY

## 2021-09-26 PROCEDURE — 97110 THERAPEUTIC EXERCISES: CPT

## 2021-09-26 PROCEDURE — 94761 N-INVAS EAR/PLS OXIMETRY MLT: CPT

## 2021-09-26 PROCEDURE — 86140 C-REACTIVE PROTEIN: CPT

## 2021-09-26 PROCEDURE — 2580000003 HC RX 258: Performed by: INTERNAL MEDICINE

## 2021-09-26 PROCEDURE — 94618 PULMONARY STRESS TESTING: CPT

## 2021-09-26 PROCEDURE — 97535 SELF CARE MNGMENT TRAINING: CPT

## 2021-09-26 PROCEDURE — 6370000000 HC RX 637 (ALT 250 FOR IP): Performed by: INTERNAL MEDICINE

## 2021-09-26 PROCEDURE — 36415 COLL VENOUS BLD VENIPUNCTURE: CPT

## 2021-09-26 PROCEDURE — 85025 COMPLETE CBC W/AUTO DIFF WBC: CPT

## 2021-09-26 PROCEDURE — 80076 HEPATIC FUNCTION PANEL: CPT

## 2021-09-26 PROCEDURE — 97116 GAIT TRAINING THERAPY: CPT

## 2021-09-26 PROCEDURE — 85730 THROMBOPLASTIN TIME PARTIAL: CPT

## 2021-09-26 PROCEDURE — 85610 PROTHROMBIN TIME: CPT

## 2021-09-26 PROCEDURE — 6370000000 HC RX 637 (ALT 250 FOR IP): Performed by: NURSE PRACTITIONER

## 2021-09-26 PROCEDURE — 80048 BASIC METABOLIC PNL TOTAL CA: CPT

## 2021-09-26 RX ORDER — ZINC SULFATE 50(220)MG
100 CAPSULE ORAL DAILY
Qty: 30 CAPSULE | Refills: 3 | COMMUNITY
Start: 2021-09-27 | End: 2022-10-11

## 2021-09-26 RX ORDER — PREDNISONE 20 MG/1
20 TABLET ORAL 2 TIMES DAILY
Qty: 10 TABLET | Refills: 0 | Status: SHIPPED | OUTPATIENT
Start: 2021-09-26 | End: 2021-10-01

## 2021-09-26 RX ORDER — CHOLECALCIFEROL (VITAMIN D3) 50 MCG
2000 TABLET ORAL DAILY
Qty: 60 TABLET | Refills: 0 | Status: SHIPPED | OUTPATIENT
Start: 2021-09-27 | End: 2021-10-11

## 2021-09-26 RX ADMIN — OXYCODONE HYDROCHLORIDE AND ACETAMINOPHEN 1000 MG: 500 TABLET ORAL at 14:03

## 2021-09-26 RX ADMIN — SPIRONOLACTONE 25 MG: 25 TABLET, FILM COATED ORAL at 09:38

## 2021-09-26 RX ADMIN — METOPROLOL TARTRATE 12.5 MG: 25 TABLET, FILM COATED ORAL at 09:37

## 2021-09-26 RX ADMIN — BUDESONIDE AND FORMOTEROL FUMARATE DIHYDRATE 2 PUFF: 160; 4.5 AEROSOL RESPIRATORY (INHALATION) at 10:20

## 2021-09-26 RX ADMIN — APIXABAN 5 MG: 5 TABLET, FILM COATED ORAL at 09:38

## 2021-09-26 RX ADMIN — SODIUM CHLORIDE, PRESERVATIVE FREE 10 ML: 5 INJECTION INTRAVENOUS at 09:38

## 2021-09-26 RX ADMIN — OXYCODONE HYDROCHLORIDE AND ACETAMINOPHEN 1000 MG: 500 TABLET ORAL at 09:37

## 2021-09-26 RX ADMIN — Medication 100 MG: at 09:37

## 2021-09-26 RX ADMIN — LOSARTAN POTASSIUM 25 MG: 25 TABLET, FILM COATED ORAL at 09:38

## 2021-09-26 RX ADMIN — Medication 2000 UNITS: at 09:37

## 2021-09-26 RX ADMIN — PANTOPRAZOLE SODIUM 40 MG: 40 TABLET, DELAYED RELEASE ORAL at 09:37

## 2021-09-26 ASSESSMENT — PAIN SCALES - GENERAL: PAINLEVEL_OUTOF10: 0

## 2021-09-26 NOTE — PROGRESS NOTES
Patient discharged at this time. Transported in wheelchair to private car. Writer assisted patient in switching to home oxygen. Discharged with documented belongings.

## 2021-09-26 NOTE — DISCHARGE SUMMARY
Physician Discharge Summary  Lianet King MD       Patient ID:  Can Paulson  [id number]  1965    Admission date: 9/15/2021    Discharge date: 9/26/2021     Admitting Physician: Angie Jaramillo MD     Primary Care Physician: Angie Jaramillo MD     Primary Discharge Diagnoses:   Patient Active Problem List    Diagnosis Date Noted    Mild malnutrition ([facility] [address] 09/17/2021    Hypoxia 09/26/2021    Hypernatremia 09/21/2021    Pneumonia due to COVID-19 virus 09/15/2021    History of colon polyps 08/11/2020    Long term current use of anticoagulant therapy 04/20/2016    Acute pulmonary embolism ([address] 11/02/2015    Essential hypertension 08/26/2015    DVT, lower extremity, distal, chronic ([address] 04/20/2015    Sleep apnea, obstructive 03/02/2015    Hyperlipidemia     Thrombophilia ([address] 07/31/2013    Obesity 05/06/2013    Bronchial asthma 10/15/2012       Additional Diagnoses:       Diagnosis Date    Acute pulmonary embolism ([address] 11/2/2015    Anxiety     Asthma     Constipation, chronic     CPAP (continuous positive airway pressure) dependence     Diabetes mellitus ([address]     pt states she was prediabetic at one point    Embolism - blood clot     right lung and right leg    Esophageal reflux     Factor V Leiden ([address]     Heart murmur     HTN (hypertension)     Hyperlipidemia     Irritable bladder     MTHFR mutation     MVP (mitral valve prolapse)     Palpitations     Pulmonary embolism ([address] 10/15/2012    Rhinitis, allergic 10/15/2012    Sleep apnea     Venous thromboembolism 5/2/2016         Review of Systems:  Constitutional: negative for fevers or chills  Eyes: negative for visual disturbance   ENT: negative for sore throat or nasal congestion  Respiratory: shortness of breath and cough better  Cardiovascular: negative for chest pain ,palpitations,pnd,syncope  Gastrointestinal: negative for abd pain, nausea, vomiting, diarrhea , constipation,hemetemesis,robbin,blood in stool  Genitourinary: negative for dysuria, urgency ,frequency,hematuria  Integument/breast: negative for skin rash or lesions  Neurological: negative for unilateral weakness, numbness or tingling. Skeletal Muscular: no joint pain,jont swelling,back pain              Physical exam:      -----------------------------------------------------------------  Exam:  GEN:   A & O x3, no apparent distress  EYES: No gross abnormalities. NECK: normal, supple, no lymphadenopathy,  no carotid bruits  PULM: clear to auscultation bilaterally- no wheezes, rales or rhonchi, normal air movement, no respiratory distress  COR: regular rate & rhythm and no gallops  ABD:  soft, non-tender, non-distended, normal bowel sounds, no masses or organomegaly  EXT:   no cyanosis, clubbing or edema present    NEURO: negative  SKIN:  no rashes or significant lesions  -----------------------------------------------------------------          Hospital Course: The patient was admitted for the above. She was treated for covid infection as per protocol  With steroids,high flow oxygen,remdezevirand input from ID and improved slowly over the course of her hospitalization. She had prolonged hospital stay and at the time of discharge she required 2 lit of oxygen. Consultants:    · ID    Procedures:    · none    Complications:   · none    Significant Diagnostic Studies:   XR CHEST PORTABLE    Result Date: 9/15/2021  EXAMINATION: ONE XRAY VIEW OF THE CHEST 9/15/2021 5:21 pm COMPARISON: 09/13/2021 HISTORY: ORDERING SYSTEM PROVIDED HISTORY: cough, covid, worsening sob TECHNOLOGIST PROVIDED HISTORY: cough, covid, worsening sob FINDINGS: . The cardiac size is normal. No  pleural effusions are seen. Patchy bilateral interstitial infiltrates, improved on the right however progressive on the left. Pulmonary vascularity appears normal. There is mild ectasia of the thoracic aorta. . No acute bony abnormalities.  The hilar structures are normal.     Patchy bilateral interstitial infiltrates, improved on the right however progressive on the left compatible with patient's history of COVID. XR CHEST PORTABLE    Result Date: 9/13/2021  EXAMINATION: ONE XRAY VIEW OF THE CHEST 9/13/2021 12:44 pm COMPARISON: 02/20/2020 HISTORY: ORDERING SYSTEM PROVIDED HISTORY: covid positive, SOB TECHNOLOGIST PROVIDED HISTORY: covid positive, SOB FINDINGS: There is suboptimal inspiration. The cardiac size is normal.  Patchy bilateral interstitial infiltrates. No pleural effusions are seen. Pulmonary vascularity appears normal. .  . No acute bony abnormalities. The hilar structures are normal.     Patchy bilateral interstitial infiltrates compatible with patient's history of COVID pneumonia. CT CHEST PULMONARY EMBOLISM W CONTRAST    Result Date: 9/15/2021  EXAMINATION: CTA OF THE CHEST 9/15/2021 6:47 pm TECHNIQUE: CTA of the chest was performed after the administration of intravenous contrast.  Multiplanar reformatted images are provided for review. MIP images are provided for review. Dose modulation, iterative reconstruction, and/or weight based adjustment of the mA/kV was utilized to reduce the radiation dose to as low as reasonably achievable. COMPARISON: None. HISTORY: ORDERING SYSTEM PROVIDED HISTORY: hypoxia, tachycardia, covid TECHNOLOGIST PROVIDED HISTORY: hypoxia, tachycardia, covid Decision Support Exception - unselect if not a suspected or confirmed emergency medical condition->Emergency Medical Condition (MA) FINDINGS: Pulmonary Arteries: Pulmonary arteries are less than adequately opacified for evaluation. No evidence of intraluminal filling defect to suggest pulmonary embolism. Main pulmonary artery is normal in caliber. Mediastinum: There are a few small nonspecific lymph nodes seen in the middle mediastinum. No suspicious lymphadenopathy. Lungs/pleura: Patchy bilateral ground-glass and airspace infiltrates most notably in the lung periphery. ..   No pulmonary masses are noted. No pleural effusions are identified. Cardiomediastinal Structures: Intimal calcifications associated with the thoracic aorta. No evidence of thoracic aortic aneurysm or dissection. Cardiac chambers are normal Upper Abdomen: Small hiatal hernia Soft Tissues/Bones: There are hypertrophic degenerative changes in the thoracic spine. No acute osseous abnormalities. Patchy bilateral ground-glass and airspace infiltrates most notably in the lung periphery compatible with patient's history of COVID pneumonia. No obvious evidence of pulmonary embolism.      Recent Results (from the past 96 hour(s))   Basic Metabolic Panel w/ Reflex to MG    Collection Time: 09/23/21  5:50 AM   Result Value Ref Range    Glucose 131 (H) 70 - 99 mg/dL    BUN 11 6 - 20 mg/dL    CREATININE 0.58 0.50 - 0.90 mg/dL    Bun/Cre Ratio 19 9 - 20    Calcium 8.9 8.6 - 10.4 mg/dL    Sodium 142 135 - 144 mmol/L    Potassium 4.3 3.7 - 5.3 mmol/L    Chloride 105 98 - 107 mmol/L    CO2 28 20 - 31 mmol/L    Anion Gap 9 9 - 17 mmol/L    GFR Non-African American >60 >60 mL/min    GFR African American >60 >60 mL/min    GFR Comment          GFR Staging         CBC auto differential    Collection Time: 09/23/21  5:50 AM   Result Value Ref Range    WBC 11.0 3.5 - 11.3 k/uL    RBC 4.62 3.95 - 5.11 m/uL    Hemoglobin 13.9 11.9 - 15.1 g/dL    Hematocrit 41.3 36.3 - 47.1 %    MCV 89.4 82.6 - 102.9 fL    MCH 30.1 25.2 - 33.5 pg    MCHC 33.7 28.4 - 34.8 g/dL    RDW 13.5 11.8 - 14.4 %    Platelets 176 170 - 931 k/uL    MPV 10.0 8.1 - 13.5 fL    NRBC Automated 0.2 (H) 0.0 per 100 WBC    Differential Type NOT REPORTED     Seg Neutrophils 75 (H) 36 - 65 %    Lymphocytes 14 (L) 24 - 43 %    Monocytes 7 3 - 12 %    Eosinophils % 2 1 - 4 %    Basophils 0 0 - 2 %    Immature Granulocytes 2 (H) 0 %    Segs Absolute 8.27 (H) 1.50 - 8.10 k/uL    Absolute Lymph # 1.54 1.10 - 3.70 k/uL    Absolute Mono # 0.79 0.10 - 1.20 k/uL    Absolute Eos # 0.23 0.00 - 0.44 k/uL Basophils Absolute <0.03 0.00 - 0.20 k/uL    Absolute Immature Granulocyte 0.16 0.00 - 0.30 k/uL    WBC Morphology NOT REPORTED     RBC Morphology NOT REPORTED     Platelet Estimate NOT REPORTED    Protime-INR    Collection Time: 09/23/21  5:50 AM   Result Value Ref Range    Protime 13.8 11.5 - 14.2 sec    INR 1.1    APTT    Collection Time: 09/23/21  5:50 AM   Result Value Ref Range    PTT 29.4 23.9 - 33.8 sec   C-Reactive Protein    Collection Time: 09/23/21  5:50 AM   Result Value Ref Range    CRP <3.0 0.0 - 5.0 mg/L   Hepatic function panel    Collection Time: 09/23/21  5:50 AM   Result Value Ref Range    Albumin 3.2 (L) 3.5 - 5.2 g/dL    Alkaline Phosphatase 54 35 - 104 U/L    ALT 33 5 - 33 U/L    AST 22 <32 U/L    Total Bilirubin 0.37 0.3 - 1.2 mg/dL    Bilirubin, Direct <0.08 <0.31 mg/dL    Bilirubin, Indirect CANNOT BE CALCULATED 0.00 - 1.00 mg/dL    Total Protein 5.6 (L) 6.4 - 8.3 g/dL    Globulin NOT REPORTED 1.5 - 3.8 g/dL    Albumin/Globulin Ratio 1.3 1.0 - 2.5   Basic Metabolic Panel w/ Reflex to MG    Collection Time: 09/24/21  6:00 AM   Result Value Ref Range    Glucose 134 (H) 70 - 99 mg/dL    BUN 12 6 - 20 mg/dL    CREATININE 0.57 0.50 - 0.90 mg/dL    Bun/Cre Ratio 21 (H) 9 - 20    Calcium 9.0 8.6 - 10.4 mg/dL    Sodium 139 135 - 144 mmol/L    Potassium 4.3 3.7 - 5.3 mmol/L    Chloride 103 98 - 107 mmol/L    CO2 27 20 - 31 mmol/L    Anion Gap 9 9 - 17 mmol/L    GFR Non-African American >60 >60 mL/min    GFR African American >60 >60 mL/min    GFR Comment          GFR Staging         CBC auto differential    Collection Time: 09/24/21  6:00 AM   Result Value Ref Range    WBC 11.7 (H) 3.5 - 11.3 k/uL    RBC 4.64 3.95 - 5.11 m/uL    Hemoglobin 13.6 11.9 - 15.1 g/dL    Hematocrit 42.0 36.3 - 47.1 %    MCV 90.5 82.6 - 102.9 fL    MCH 29.3 25.2 - 33.5 pg    MCHC 32.4 28.4 - 34.8 g/dL    RDW 13.5 11.8 - 14.4 %    Platelets 197 623 - 358 k/uL    MPV 10.3 8.1 - 13.5 fL    NRBC Automated 0.0 0.0 per 100 WBC Differential Type NOT REPORTED     Seg Neutrophils 76 (H) 36 - 65 %    Lymphocytes 14 (L) 24 - 43 %    Monocytes 7 3 - 12 %    Eosinophils % 1 1 - 4 %    Basophils 0 0 - 2 %    Immature Granulocytes 2 (H) 0 %    Segs Absolute 8.85 (H) 1.50 - 8.10 k/uL    Absolute Lymph # 1.68 1.10 - 3.70 k/uL    Absolute Mono # 0.80 0.10 - 1.20 k/uL    Absolute Eos # 0.15 0.00 - 0.44 k/uL    Basophils Absolute 0.04 0.00 - 0.20 k/uL    Absolute Immature Granulocyte 0.17 0.00 - 0.30 k/uL    WBC Morphology NOT REPORTED     RBC Morphology NOT REPORTED     Platelet Estimate NOT REPORTED    C-Reactive Protein    Collection Time: 09/24/21  6:00 AM   Result Value Ref Range    CRP <3.0 0.0 - 5.0 mg/L   D-Dimer, Quantitative    Collection Time: 09/24/21  6:00 AM   Result Value Ref Range    D-Dimer, Quant 1.76 (H) 0.00 - 0.59 mg/L FEU   Basic Metabolic Panel w/ Reflex to MG    Collection Time: 09/25/21  6:46 AM   Result Value Ref Range    Glucose 122 (H) 70 - 99 mg/dL    BUN 14 6 - 20 mg/dL    CREATININE 0.56 0.50 - 0.90 mg/dL    Bun/Cre Ratio 25 (H) 9 - 20    Calcium 9.0 8.6 - 10.4 mg/dL    Sodium 138 135 - 144 mmol/L    Potassium 4.2 3.7 - 5.3 mmol/L    Chloride 100 98 - 107 mmol/L    CO2 28 20 - 31 mmol/L    Anion Gap 10 9 - 17 mmol/L    GFR Non-African American >60 >60 mL/min    GFR African American >60 >60 mL/min    GFR Comment          GFR Staging         CBC auto differential    Collection Time: 09/25/21  6:46 AM   Result Value Ref Range    WBC 13.6 (H) 3.5 - 11.3 k/uL    RBC 4.83 3.95 - 5.11 m/uL    Hemoglobin 14.4 11.9 - 15.1 g/dL    Hematocrit 42.7 36.3 - 47.1 %    MCV 88.4 82.6 - 102.9 fL    MCH 29.8 25.2 - 33.5 pg    MCHC 33.7 28.4 - 34.8 g/dL    RDW 13.7 11.8 - 14.4 %    Platelets 017 872 - 881 k/uL    MPV 9.9 8.1 - 13.5 fL    NRBC Automated 0.0 0.0 per 100 WBC    Differential Type NOT REPORTED     Seg Neutrophils 79 (H) 36 - 65 %    Lymphocytes 13 (L) 24 - 43 %    Monocytes 7 3 - 12 %    Eosinophils % 0 (L) 1 - 4 %    Basophils 0 0 - 2 %    Immature Granulocytes 1 (H) 0 %    Segs Absolute 10.76 (H) 1.50 - 8.10 k/uL    Absolute Lymph # 1.75 1.10 - 3.70 k/uL    Absolute Mono # 0.91 0.10 - 1.20 k/uL    Absolute Eos # 0.04 0.00 - 0.44 k/uL    Basophils Absolute <0.03 0.00 - 0.20 k/uL    Absolute Immature Granulocyte 0.13 0.00 - 0.30 k/uL    WBC Morphology NOT REPORTED     RBC Morphology NOT REPORTED     Platelet Estimate NOT REPORTED    C-Reactive Protein    Collection Time: 09/25/21  6:46 AM   Result Value Ref Range    CRP <3.0 0.0 - 5.0 mg/L   Basic Metabolic Panel w/ Reflex to MG    Collection Time: 09/26/21  7:04 AM   Result Value Ref Range    Glucose 100 (H) 70 - 99 mg/dL    BUN 18 6 - 20 mg/dL    CREATININE 0.63 0.50 - 0.90 mg/dL    Bun/Cre Ratio 29 (H) 9 - 20    Calcium 8.8 8.6 - 10.4 mg/dL    Sodium 140 135 - 144 mmol/L    Potassium 4.3 3.7 - 5.3 mmol/L    Chloride 100 98 - 107 mmol/L    CO2 28 20 - 31 mmol/L    Anion Gap 12 9 - 17 mmol/L    GFR Non-African American >60 >60 mL/min    GFR African American >60 >60 mL/min    GFR Comment          GFR Staging         CBC auto differential    Collection Time: 09/26/21  7:04 AM   Result Value Ref Range    WBC 8.4 3.5 - 11.3 k/uL    RBC 4.85 3.95 - 5.11 m/uL    Hemoglobin 14.2 11.9 - 15.1 g/dL    Hematocrit 43.1 36.3 - 47.1 %    MCV 88.9 82.6 - 102.9 fL    MCH 29.3 25.2 - 33.5 pg    MCHC 32.9 28.4 - 34.8 g/dL    RDW 14.0 11.8 - 14.4 %    Platelets 366 811 - 096 k/uL    MPV 10.0 8.1 - 13.5 fL    NRBC Automated 0.0 0.0 per 100 WBC    Differential Type NOT REPORTED     WBC Morphology NOT REPORTED     RBC Morphology NOT REPORTED     Platelet Estimate NOT REPORTED     Seg Neutrophils 57 36 - 65 %    Lymphocytes 31 24 - 43 %    Monocytes 9 3 - 12 %    Eosinophils % 2 1 - 4 %    Basophils 0 0 - 2 %    Immature Granulocytes 1 (H) 0 %    Segs Absolute 4.77 1.50 - 8.10 k/uL    Absolute Lymph # 2.59 1.10 - 3.70 k/uL    Absolute Mono # 0.76 0.10 - 1.20 k/uL    Absolute Eos # 0.13 0.00 - 0.44 k/uL Basophils Absolute 0.03 0.00 - 0.20 k/uL    Absolute Immature Granulocyte 0.08 0.00 - 0.30 k/uL   APTT    Collection Time: 09/26/21  7:04 AM   Result Value Ref Range    PTT 32.0 23.9 - 33.8 sec   Protime-INR    Collection Time: 09/26/21  7:04 AM   Result Value Ref Range    Protime 14.4 (H) 11.5 - 14.2 sec    INR 1.1      ·     Discharge Condition:   · stable    Disposition:   · home    Discharge Medications:     Yelitza Echeverria [employer] Medication Instructions SYU:[id number]    Printed on:09/26/21 1121   Medication Information                      albuterol (PROVENTIL) (2.5 MG/3ML) 0.083% nebulizer solution  Take 3 mLs by nebulization every 6 hours as needed for Wheezing             albuterol sulfate HFA (PROAIR HFA) 108 (90 Base) MCG/ACT inhaler  Inhale 2 puffs into the lungs 4 times daily as needed for Wheezing or Shortness of Breath             ALPRAZolam (XANAX) 0.25 MG tablet  Take 1 tablet by mouth daily as needed for Anxiety for up to 90 days. budesonide-formoterol (SYMBICORT) 160-4.5 MCG/ACT AERO  Inhale 2 puffs into the lungs 2 times daily             cetirizine (ZYRTEC) 10 MG tablet  Take 10 mg by mouth daily.                ELIQUIS 5 MG TABS tablet  TAKE 1 TABLET BY MOUTH TWICE DAILY             famotidine (PEPCID) 40 MG tablet  Take 1 tablet by mouth every evening             metoprolol tartrate (LOPRESSOR) 25 MG tablet  TAKE 1/2 TABLET (12.5 MG) BY MOUTH 2 TIMES EACH DAY             olmesartan (BENICAR) 20 MG tablet  Take 1 tablet by mouth daily             ondansetron (ZOFRAN ODT) 4 MG disintegrating tablet  Take 1 tablet by mouth every 8 hours as needed for Nausea or Vomiting             pantoprazole (PROTONIX) 40 MG tablet  TAKE 1 TABLET BY MOUTH ONCE DAILY             polyethylene glycol (MIRALAX) 17 g PACK packet  Take 17 g by mouth daily             predniSONE (DELTASONE) 20 MG tablet  Take 1 tablet by mouth 2 times daily for 5 days             PREVIDENT 5000 BOOSTER PLUS 1.1 % PSTE spironolactone (ALDACTONE) 25 MG tablet  Take 1 tablet by mouth daily             Vitamin D (CHOLECALCIFEROL) 50 MCG (2000 UT) TABS tablet  Take 1 tablet by mouth daily             zinc sulfate (ZINCATE) 220 (50 Zn) MG capsule  Take 2 capsules by mouth daily                 · Resume all home medications unless otherwise directed  · Add prednisone,zinc,vit d,oxygen  · Stop taking     Patient Instructions:   · Activity: activity as tolerated  · Diet: regular diet  · Wound Care: none needed  · Other: Patient was evaluated today for the diagnosis of covid 19 pneumonia with hypoxia. I entered a DME order for home oxygen because the diagnosis and testing requires the patient to have supplemental oxygen. Condition will improve or be benefited by oxygen use. The patient is  able to perform good mobility in a home setting and therefore does require the use of a portable oxygen system. The need for this equipment was discussed with the patient and she understands and is in agreement. ·   · Follow up with Dr Davian Horta MD in 1 wk as directed      Time Spent on discharge services is 35 minutes in the examination, evaluation, counseling and review of medications and discharge plan.     Signed:  Davian Horta MD, M.D.  9/26/2021  11:21 AM

## 2021-09-26 NOTE — PROGRESS NOTES
Progress Note  Lianet King MD    OBJECTIVE:    Patient seen for f/u of Pneumonia due to COVID-19 virus. She feels  better,  Oxygen requirements improving    ROS:   Constitutional: negative  for fevers, and negative for chills. Appetite improving  Respiratory:  shortness of breath better,  Cough better, and negative for wheezing  Cardiovascular: negative for chest pain, and negative for palpitations  Gastrointestinal: negative for abdominal pain, negative for nausea,negative for vomiting, negative for diarrhea, and negative for constipation     All other systems were reviewed with the patient and are negative unless otherwise stated in HPI    OBJECTIVE:  Vitals:   Temp: 96.5 °F (35.8 °C)  BP: (!) 108/46  Resp: 18  Pulse: 67  SpO2: 92 %    24HR INTAKE/OUTPUT:      Intake/Output Summary (Last 24 hours) at 9/26/2021 1113  Last data filed at 9/26/2021 4461  Gross per 24 hour   Intake 10 ml   Output --   Net 10 ml     -----------------------------------------------------------------  Exam:  GEN:    Awake, alert and oriented x3. EYES:  EOMI, pupils equal   NECK: Supple. No lymphadenopathy. No carotid bruit  CVS:    regular rate and rhythm, no audible murmur  PULM:  hasa diminished air entry, no acute respiratory distress  ABD:    Bowels sounds normal.  Abdomen is soft. No distention. no tenderness to palpation. EXT:   no edema bilaterally . No calf tenderness. NEURO: Moves all extremities. Motor and sensory are grossly intact  SKIN:  No rashes.   No skin lesions.    -----------------------------------------------------------------  Diagnostic Data:    · All available data reviewed  Lab Results   Component Value Date    WBC 8.4 09/26/2021    HGB 14.2 09/26/2021    MCV 88.9 09/26/2021     09/26/2021      Lab Results   Component Value Date    GLUCOSE 100 (H) 09/26/2021    BUN 18 09/26/2021    CREATININE 0.63 09/26/2021     09/26/2021    K 4.3 09/26/2021    CALCIUM 8.8 09/26/2021

## 2021-09-26 NOTE — PROGRESS NOTES
Physical Therapy  Facility/Department: [facility] AT THE [facility] SURG  Daily Treatment Note  NAME: Adam Grant  : 1965  MRN: [id number]    Date of Service: 2021    Discharge Recommendations:  Continue to assess pending progress        Assessment   Body structures, Functions, Activity limitations: Decreased functional mobility ; Decreased balance;Decreased ADL status; Decreased ROM; Decreased endurance;Decreased strength;Decreased posture  Assessment: Pt. on nasal canula O2. Pt. able to ambulate [id number] with no AD, SUP/Mod I  Treatment Diagnosis: Decreased activity endurance  Prognosis: Good  Decision Making: Medium Complexity  PT Education: Gait Training;Transfer Training;Energy Conservation;General Safety  Patient Education: Educated on above with good understanding  Activity Tolerance  Activity Tolerance: Patient Tolerated treatment well  Activity Tolerance: limited by SOB     Patient Diagnosis(es): The primary encounter diagnosis was Pneumonia due to COVID-19 virus. A diagnosis of Acute respiratory failure with hypoxia (HCC) was also pertinent to this visit. has a past medical history of Acute pulmonary embolism (HCC), Anxiety, Asthma, Constipation, chronic, CPAP (continuous positive airway pressure) dependence, Diabetes mellitus ([address] Embolism - blood clot, Esophageal reflux, Factor V Leiden ([address] Heart murmur, HTN (hypertension), Hyperlipidemia, Irritable bladder, MTHFR mutation, MVP (mitral valve prolapse), Palpitations, Pulmonary embolism ([address] Rhinitis, allergic, Sleep apnea, and Venous thromboembolism. has a past surgical history that includes Cholecystectomy; Mandible fracture surgery (); Tubal ligation; Upper gastrointestinal endoscopy (2010); pr colsc flx w/rmvl of tumor polyp lesion snare tq (3/27/2017); pr colsc flexible w/control bleeding any method (3/27/2017); Cataract removal with implant (Left, 2018); pr xcapsl ctrc rmvl insj io lens prosth w/o ecp (Left, 2018);  Colonoscopy (07/12/2021); Upper gastrointestinal endoscopy (N/A, 7/12/2021); and Colonoscopy (N/A, 7/12/2021). Restrictions  Restrictions/Precautions  Restrictions/Precautions: Isolation, General Precautions  Subjective   General  Chart Reviewed: Yes  Response To Previous Treatment: Patient with no complaints from previous session. Family / Caregiver Present: No  Referring Practitioner: Amberly Castano MD  Subjective  Subjective: Pt reports no new concerns or pain. Pt reports she is ready to go home. Pain Screening  Patient Currently in Pain: No  Vital Signs  Patient Currently in Pain: No       Orientation  Orientation  Overall Orientation Status: Within Normal Limits  Cognition      Objective      Transfers  Sit to Stand: Independent  Stand to sit: Independent  Stand Pivot Transfers: Independent  Ambulation  Ambulation?: Yes  WB Status: FWB  Ambulation 1  Surface: level tile  Device: No Device  Other Apparatus: O2  Assistance: Supervision;Modified Independent  Quality of Gait: Pts SpO2 89% throughout treatment with minor SOB noted with SPO2 increasing to 90%-91% after 1-2 minute rest.  Gait Deviations: Decreased step length  Distance: [id number] with no noted LOB     Balance  Posture: Good  Sitting - Static: Good;+  Sitting - Dynamic: Good;+  Standing - Static: Good;+  Standing - Dynamic: Good  Exercises  Comments: Seated exercises B LE x20 Standing isnk exercises B LE x20 with 1 seated rest breaks.    AROM RLE (degrees)  RLE AROM: WFL  AROM LLE (degrees)  LLE AROM : WFL  Strength RLE  Comment: Grossly 4/5  Strength LLE  Comment: Grossly 4/5    G-Code     OutComes Score    AM-PAC Score    Goals  Short term goals  Time Frame for Short term goals: 10 days  Short term goal 1: Patient will ambulate 48' with supervision, without LOB  Short term goal 2: Patient will perform bed mobility and transfers with MI  Short term goal 3: Patient will tolerate 20-30 minutes of therex/act to improve endurance for ADLs  Patient Goals Patient goals : Feel better    Plan    Plan  Times per week: 7  Times per day: Twice a day  Plan Comment: 1x/day on weekends  Safety Devices  Type of devices:  All fall risk precautions in place, Call light within reach, Left in chair, Nurse notified     Therapy Time   Individual Concurrent Group Co-treatment   Time In 0745         Time Out 0815         Minutes [place], [place]

## 2021-09-26 NOTE — PROGRESS NOTES
[facility]   Cardiopulmonary Services  [address], [place], [place] [address]           A home oxygen evaluation has been completed. Patient was placed on room air for 10 minutes. SpO2 was 90-92 % on room air at rest. Patient was walked for 6 minutes. SpO2 was 84 % on room air during walking. Oxygen was applied at 2 lpm via nasal cannula to maintain a SpO2 between 90-92% while walking.  Actual SpO2 was 92-94 % with oxygen at 2 lpm.

## 2021-09-26 NOTE — PROGRESS NOTES
Writer reviewed discharge instructions with patient at this time. IV removed, patient states understanding. Collected own belongings and got self dressed.

## 2021-09-26 NOTE — PROGRESS NOTES
Pt resting throughout night in bed with eyes closed, no distress noted, respirations even and unlabored, pt states she did get up and walk around room during night, denies SOB when ambulating around room, SPO2 94% on 2 LPM will continue to monitor.

## 2021-09-26 NOTE — PROGRESS NOTES
Pt resting with eyes closed, respirations even and unlabored, no distress noted, SPO2 94% on 2 lpm, will continue to monitor.

## 2021-09-27 ENCOUNTER — CARE COORDINATION (OUTPATIENT)
Dept: OTHER | Facility: CLINIC | Age: 56
End: 2021-09-27

## 2021-09-27 NOTE — CARE COORDINATION
Patient contacted regarding COVID-19 risk and diagnosis. Discussed COVID-19 related testing which was available at this time. Test results were positive. Patient informed of results, if available? Yes. Ambulatory Care Manager contacted the patient by telephone to perform post discharge assessment. Call within 2 business days of discharge: Yes. Verified name and  with patient as identifiers. Provided introduction to self, and explanation of the CTN/ACM role, and reason for call due to risk factors for infection and/or exposure to COVID-19. Symptoms reviewed with patient who verbalized the following symptoms: fatigue, shortness of breath, no new symptoms and no worsening symptoms. Pt has her own pulse oximeter and she is also on oxygen continuous at 2 litres. Sats at 93%. Pt said she has no worsening of her breathing and the oxygen helps a lot. She is moving around the house and is completing the exercised provided to her from the therapists at the hospital. Pt is eating and drinking, she has her sister dropping off her groceries to her, she has transportation to her follow up this Friday, Pt is eating light foods but is drinking ensure for extra protein. Due to no new or worsening symptoms encounter was not routed to provider for escalation. Discussed follow-up appointments. If no appointment was previously scheduled, appointment scheduling offered: n/a.   [address] follow up appointment(s):   Future Appointments   Date Time Provider Jael Reid   10/1/2021  2:15 PM Yana Schafer [facility]   10/11/2021  1:15 PM [facility]ap[facility] Mims [facility]   2021  4:45 PM Marvin Morrison DO [facility]   2021  8:30 AM Lianet Hudson MD [facility]   2021  9:30 AM Marvin Morrison DO [facility]   2022  8:00 AM ZACARIAS Kahn CNM [facility]   2022 12:00 PM [facility]-to-face services provided:  Obtained and reviewed discharge summary and/or continuity of care documents     Advance Care Planning:   Does patient have an Advance Directive:  reviewed and current. Educated patient about risk for severe COVID-19 due to risk factors according to CDC guidelines. ACM reviewed discharge instructions, medical action plan and red flag symptoms with the patient who verbalized understanding. Discussed COVID vaccination status: No. Education provided on COVID-19 vaccination as appropriate. Discussed exposure protocols and quarantine with CDC Guidelines. Patient was given an opportunity to verbalize any questions and concerns and agrees to contact ACM or health care provider for questions related to their healthcare. Reviewed and educated patient on any new and changed medications related to discharge diagnosis     Was patient discharged with a pulse oximeter? No, pt has her own at home  Discussed and confirmed pulse oximeter discharge instructions and when to notify provider or seek emergency care. ACM provided contact information. Plan for follow-up call in 3-5 days based on severity of symptoms and risk factors.     Dori ZAPATA, RN- [facility]  Associate Care Manager  [phone]

## 2021-09-27 NOTE — TELEPHONE ENCOUNTER
Patient needs a weeks worth of Eliquis sent in to [facility] while she awaits her mail in order.

## 2021-09-29 ENCOUNTER — CARE COORDINATION (OUTPATIENT)
Dept: OTHER | Facility: CLINIC | Age: 56
End: 2021-09-29

## 2021-09-29 NOTE — CARE COORDINATION
Patient contacted regarding COVID-19 diagnosis. Discussed COVID-19 related testing which was available at this time. Test results were positive. Patient informed of results, if available? Yes    Ambulatory Care Manager contacted the patient by telephone to perform follow-up assessment. Verified name and  with patient as identifiers. Patient has following risk factors of: hypertension. Symptoms reviewed with patient who verbalized the following symptoms: fatigue, shortness of breath and no worsening symptoms. Pt is continuing to wear oxygen at 2 litres, she felt up to taking a shower today and getting dressed for the first time. She had an episode yesterday where she got clammy skin but said it passed and has not happened since then. Her breathing is somewhat better today she said. No fever, she is able to drink plenty of fluids and is able to eat. Has good support with family to get her whatever she needs. Due to no new or worsening symptoms encounter was not routed to provider for escalation. Educated patient about risk for severe COVID-19 due to risk factors according to CDC guidelines. ACM reviewed discharge instructions, medical action plan and red flag symptoms with the patient who verbalized understanding. Discussed COVID vaccination status: No. Education provided on COVID-19 vaccination as appropriate. Discussed exposure protocols and quarantine with CDC Guidelines. Patient was given an opportunity to verbalize any questions and concerns and agrees to contact ACM or health care provider for questions related to their healthcare. Was patient discharged with a pulse oximeter? No Pt has her own at home  Discussed and confirmed pulse oximeter discharge instructions and when to notify provider or seek emergency care. ACM provided contact information. Plan for follow-up call in 5-7 days based on severity of symptoms and risk factors.       Jennifer UPTONN, RN- [facility] Manager  [phone]

## 2021-10-01 ENCOUNTER — OFFICE VISIT (OUTPATIENT)
Dept: PRIMARY CARE CLINIC | Age: 56
End: 2021-10-01
Payer: COMMERCIAL

## 2021-10-01 VITALS — BODY MASS INDEX: 44.32 KG/M2 | WEIGHT: 274.6 LBS | HEART RATE: 76 BPM

## 2021-10-01 DIAGNOSIS — U07.1 COVID-19 VIRUS INFECTION: Primary | ICD-10-CM

## 2021-10-01 DIAGNOSIS — I10 ESSENTIAL HYPERTENSION: ICD-10-CM

## 2021-10-01 DIAGNOSIS — J45.40 MODERATE PERSISTENT ASTHMA WITHOUT COMPLICATION: ICD-10-CM

## 2021-10-01 PROCEDURE — 99214 OFFICE O/P EST MOD 30 MIN: CPT | Performed by: FAMILY MEDICINE

## 2021-10-01 PROCEDURE — 1111F DSCHRG MED/CURRENT MED MERGE: CPT | Performed by: FAMILY MEDICINE

## 2021-10-01 RX ORDER — MULTIVIT-MIN/IRON/FOLIC ACID/K 18-600-40
CAPSULE ORAL
COMMUNITY
End: 2022-10-11

## 2021-10-01 NOTE — LETTER
[facility]  [address] [place] [address]  Phone: [phone]  Fax: [phone]    Rishabh Ford MD        October 1, 2021     Patient: Autumn Palencia   YOB: 1965   Date of Visit: 10/1/2021       To Whom It May Concern: It is my medical opinion that Edward Keene should remain out of work until 10/11/2021. If you have any questions or concerns, please don't hesitate to call.     Sincerely,        Rishabh Ford MD

## 2021-10-01 NOTE — PROGRESS NOTES
Patient is here today for follow-up on hospitalization for Pneumonia due to COVID-19 virus. Symptoms have -Patient states over all better. Current complaints- Patient states still weak. Patient states she still gets clammy. Patient states with oxygen it's been 93-94. Patient states getting up to do exercises with oxygen it goes to 84. Medication change-Patient states Prednisone, Vitamin D3, Vitamin C, Zinc  Follow-up with specialists-Patient states no. Special needs-Patient states oxygen. Patient states she wants know how to strengthen breathing. Patient states should she be waking herself up sererval times at night trying to sit up an exercise her lungs. Patient states when she wakes up from sleeping in the morning it's tightened up. Patient states she was approved to be off work until 10/2/2021. Patient states she wants to know when she can return to work. RECHECK B/P 145/84      Patient states she would like a note for her  since he is returning to work on Monday. Post-Discharge Transitional Care Management Services or Hospital Follow Up      Srini Crabajal   YOB: 1965    Date of Office Visit:  10/1/2021  Date of Hospital Admission: 9/15/21  Date of Hospital Discharge: 9/26/21  Readmission Risk Score(high >=14%.  Medium >=10%):Readmission Risk Score: 17      Care management risk score Rising risk (score 2-5) and Complex Care (Scores >=6): 9     Non face to face  following discharge, date last encounter closed (first attempt may have been earlier): 9/27/2021 11:01 AM 9/27/2021 11:01 AM    Call initiated 2 business days of discharge: Yes     Patient Active Problem List   Diagnosis    Bronchial asthma    Obesity    Thrombophilia ([address]    Hyperlipidemia    Sleep apnea, obstructive    DVT, lower extremity, distal, chronic ([address]    Essential hypertension    Acute pulmonary embolism ([address]    Long term current use of anticoagulant therapy    History of colon polyps    Pneumonia due to COVID-19 virus    Mild malnutrition (HCC)    Hypernatremia    Hypoxia       Allergies   Allergen Reactions    Levaquin [Levofloxacin In D5w]      FLUSHED AND WHEEZING    Pcn [Penicillins]        Medications listed as ordered at the time of discharge from [facility]   Yelitza Echeverria Medication Instructions KMY:[id number]    Printed on:10/01/21 9884   Medication Information                      albuterol (PROVENTIL) (2.5 MG/3ML) 0.083% nebulizer solution  Take 3 mLs by nebulization every 6 hours as needed for Wheezing             albuterol sulfate HFA (PROAIR HFA) 108 (90 Base) MCG/ACT inhaler  Inhale 2 puffs into the lungs 4 times daily as needed for Wheezing or Shortness of Breath             ALPRAZolam (XANAX) 0.25 MG tablet  Take 1 tablet by mouth daily as needed for Anxiety for up to 90 days. apixaban (ELIQUIS) 5 MG TABS tablet  Take 1 tablet by mouth 2 times daily             budesonide-formoterol (SYMBICORT) 160-4.5 MCG/ACT AERO  Inhale 2 puffs into the lungs 2 times daily             cetirizine (ZYRTEC) 10 MG tablet  Take 10 mg by mouth daily.                Cholecalciferol (VITAMIN D) 50 MCG (2000 UT) CAPS capsule  Take by mouth             famotidine (PEPCID) 40 MG tablet  Take 1 tablet by mouth every evening             metoprolol tartrate (LOPRESSOR) 25 MG tablet  TAKE 1/2 TABLET (12.5 MG) BY MOUTH 2 TIMES EACH DAY             olmesartan (BENICAR) 20 MG tablet  Take 1 tablet by mouth daily             ondansetron (ZOFRAN ODT) 4 MG disintegrating tablet  Take 1 tablet by mouth every 8 hours as needed for Nausea or Vomiting             pantoprazole (PROTONIX) 40 MG tablet  TAKE 1 TABLET BY MOUTH ONCE DAILY             polyethylene glycol (MIRALAX) 17 g PACK packet  Take 17 g by mouth daily             predniSONE (DELTASONE) 20 MG tablet  Take 1 tablet by mouth 2 times daily for 5 days             PREVIDENT 5000 BOOSTER PLUS 1.1 % PSTE               spironolactone (ALDACTONE) 25 MG tablet  Take 1 tablet by mouth daily             Vitamin D (CHOLECALCIFEROL) 50 MCG (2000 UT) TABS tablet  Take 1 tablet by mouth daily             zinc sulfate (ZINCATE) 220 (50 Zn) MG capsule  Take 2 capsules by mouth daily                   Medications marked \"taking\" at this time  Outpatient Medications Marked as Taking for the 10/1/21 encounter (Office Visit) with Zachary Mortensen MD   Medication Sig Dispense Refill    Cholecalciferol (VITAMIN D) 50 MCG (2000 UT) CAPS capsule Take by mouth      apixaban (ELIQUIS) 5 MG TABS tablet Take 1 tablet by mouth 2 times daily 14 tablet 0    Vitamin D (CHOLECALCIFEROL) 50 MCG (2000 UT) TABS tablet Take 1 tablet by mouth daily 60 tablet 0    predniSONE (DELTASONE) 20 MG tablet Take 1 tablet by mouth 2 times daily for 5 days 10 tablet 0    zinc sulfate (ZINCATE) 220 (50 Zn) MG capsule Take 2 capsules by mouth daily 30 capsule 3    budesonide-formoterol (SYMBICORT) 160-4.5 MCG/ACT AERO Inhale 2 puffs into the lungs 2 times daily 6 each 0    ALPRAZolam (XANAX) 0.25 MG tablet Take 1 tablet by mouth daily as needed for Anxiety for up to 90 days.  90 tablet 0    metoprolol tartrate (LOPRESSOR) 25 MG tablet TAKE 1/2 TABLET (12.5 MG) BY MOUTH 2 TIMES EACH DAY 60 tablet 3    spironolactone (ALDACTONE) 25 MG tablet Take 1 tablet by mouth daily 90 tablet 0    pantoprazole (PROTONIX) 40 MG tablet TAKE 1 TABLET BY MOUTH ONCE DAILY 90 tablet 0    albuterol sulfate HFA (PROAIR HFA) 108 (90 Base) MCG/ACT inhaler Inhale 2 puffs into the lungs 4 times daily as needed for Wheezing or Shortness of Breath 1 Inhaler 0    polyethylene glycol (MIRALAX) 17 g PACK packet Take 17 g by mouth daily      famotidine (PEPCID) 40 MG tablet Take 1 tablet by mouth every evening 30 tablet 3    olmesartan (BENICAR) 20 MG tablet Take 1 tablet by mouth daily 90 tablet 3    PREVIDENT 5000 BOOSTER PLUS 1.1 % PSTE   0        Medications patient taking as of now reconciled against medications ordered at time of hospital discharge: Yes    Chief Complaint   Patient presents with    Follow-Up from Hospital       HPI    Inpatient course: Discharge summary reviewed- see chart. Interval history/Current status: se has been using 2 lit oxygen at rest and 3 lit on activity    Review of Systems    Respiratory- has dry cough, has shortness of breath on activity  Vitals:    10/01/21 1429   Pulse: 76   Weight: 274 lb 9.6 oz (124.6 kg)     Body mass index is 44.32 kg/m². Wt Readings from Last 3 Encounters:   10/01/21 274 lb 9.6 oz (124.6 kg)   09/26/21 292 lb (132.5 kg)   08/31/21 293 lb (132.9 kg)     BP Readings from Last 3 Encounters:   09/26/21 (!) 108/46   09/13/21 132/85   08/31/21 128/80       Physical Exam  .  Exam:  GEN:   A & O x3, no apparent distress  EYES: No gross abnormalities. ENT:ENT exam normal, no neck nodes or sinus tenderness  NECK: normal, supple, no lymphadenopathy,  no carotid bruits  PULM: clear to auscultation bilaterally- no wheezes, rales or rhonchi, normal air movement, no respiratory distress  COR: regular rate & rhythm, no murmurs and no gallops  ABD:  soft, non-tender, non-distended, normal bowel sounds, no masses or organomegaly  : deferred  EXT: Extremities: + 2 pedal pulses, no edema or calf tenderness, and warm to touch. Normal nails without lesions  Skeletomuscular:  NEURO: Motor and sensory grossly intact  SKIN:  No skin lesions or rashes          Assessment/Plan:  1. COVID-19 virus infection  Still sympatomatic, continue oxygen,exercise as tolerated    2. Essential hypertension  Higher,to monitor closely    3.  Moderate persistent asthma without complication  Continue with inhalors,oxygen      Off work 2 wks  Medical Decision Making: high complexity

## 2021-10-01 NOTE — PATIENT INSTRUCTIONS
SURVEY:    You may be receiving a survey from [employer] regarding your visit today. You may get this in the mail, through your MyChart, or in your email. Please complete the survey to enable us to provide the highest quality of care to you and your family. If you cannot score us a very good (5 Stars) on any question, please call the office to discuss how we could of made your experience exceptional.    Thank you!     BRITTANI Huggins, DIOMEDES Morelos, [address] [place] [address] Merlin Navas    Phone: [phone]  Fax: [phone]    Office Hours:   Jaylin Duckworth, F: 8-5 Wednesday: 9-11

## 2021-10-05 ENCOUNTER — CARE COORDINATION (OUTPATIENT)
Dept: OTHER | Facility: CLINIC | Age: 56
End: 2021-10-05

## 2021-10-07 NOTE — PROGRESS NOTES
GI CLINIC FOLLOW UP    INTERVAL HISTORY:   No referring provider defined for this encounter. Chief Complaint   Patient presents with    Gastroesophageal Reflux     Patient is f/u on GERD, EGD/colonoscopy. Patient states she has COVID and during that, she was in the hospital and not eating the same. She was not having these issues. Once home and back on her normal diet, symptoms started back up           HISTORY OF PRESENT ILLNESS: Felecia Simental is a 54 y.o. female , referred for evaluation of*GERD, polyps, diverticulosis, hemorrhoids ,retching   Patient is here for follow-up  The only symptom she has is retching which has no timing and no relationship to food it could come on empty stomach could be doing eating, she denied drainage from the nose to the throat to indicate sinusitis but she could not rule that out either  Her endoscopy  CT of the chest  MRI of the brain all negative  Still having the issue with she and her daughter insisting on the retching issue being very disturbing  And out of the blue  Sometimes just after she takes her inhaler        MRI brain :  Impression   Minimal chronic microangiopathic ischemic disease.       Otherwise unremarkable contrast enhanced brain MRI.  No acute infarction,   intracranial hemorrhage or mass lesion.                   Findings:     Retropharyngeal area was grossly normal appearing     Esophagus: normal     Stomach: Innumerable number of polyps in the fundus and the body of the stomach consistent with fundic gland polyp with simple couple of those were ranging in size from 5mm to 20 mm      Gastritis in the antrum biopsies were taken      Duodenum:     Descending: normal    Bulb: abnormal: The mucosa of the duodenal bulb was edematous with nodular appearance could be related to primary brain hyperplasia biopsies were  Taken      The scope was removed and the patient tolerated the procedure well.      Recommendations/Plan:   1. F/U Biopsies  2.  F/U In Office in 3-4 weeks  3. Discussed with the family     Electronically signed by Sarah Downing MD  on 7/12/2021 at 11:27 AM   The prep was fair.       Findings:  Terminal ileum: normal     Cecum/Ascending colon: abnormal: 1 small sessile polyp 7 mm in the cecum removed with cold biopsy      Transverse colon: normal     Descending/Sigmoid colon: abnormal: Redundant colon  Diverticulosis      Rectum/Anus: examined in normal and retroflexed positions and was abnormal: Large external hemorrhoids     Withdrawal Time was (minutes): 12     The colon was decompressed and the scope was removed. The patient tolerated the procedure well.      Recommendations/Plan:   1. Lifestyle and dietary modifications as discussed  2. F/U Biopsies  3. F/U In OfficeYes  4. Discussed with the family  5. Repeat colonoscopy [id number]     Electronically signed by Sarah Downing MD  on 7/12/2021 at 11:50 AM   -- Diagnosis --     1.  Stomach, biopsy:   Mild chronic inflammation. 2.  Stomach, polyp, biopsy:  Fundic gland polyp     3.  Duodenum, biopsy:  Heterotopic gastric mucosa with mild chronic   inflammation. 4.  Cecum, polyp, biopsy:  Tubular adenoma.       SHANNAN Velásquez   **Electronically Signed Out**         rdd/7/14/2021       Past Medical,Family, and Social History reviewed and does contribute to the patient presentingcondition. Patient's PMH/PSH,SH,PSYCH Hx, MEDs, ALLERGIES, and ROS were all reviewed and updated in the appropriate sections.     PAST MEDICAL HISTORY:  Past Medical History:   Diagnosis Date    Acute pulmonary embolism ([address] 11/2/2015    Anxiety     Asthma     Constipation, chronic     COVID-19 09/2021    CPAP (continuous positive airway pressure) dependence     Diabetes mellitus ([address]     pt states she was prediabetic at one point    Embolism - blood clot     right lung and right leg    Esophageal reflux     Factor V Leiden ([address]     Heart murmur     HTN (hypertension)     Hyperlipidemia     Irritable bladder     MTHFR mutation     MVP (mitral valve prolapse)     Palpitations     Pulmonary embolism (HCC) 10/15/2012    Rhinitis, allergic 10/15/2012    Sleep apnea     Venous thromboembolism 5/2/2016       Past Surgical History:   Procedure Laterality Date    CATARACT REMOVAL WITH IMPLANT Left 04/02/2018    CHOLECYSTECTOMY      around 2000    COLONOSCOPY  07/12/2021    COLONOSCOPY N/A 7/12/2021    COLONOSCOPY POLYPECTOMY CECUM COLD BIOPSY performed by Nohemi Peabody, MD at [address]  3/27/2017    COLONOSCOPY CONTROL HEMORRHAGE performed by Tyler Pavon MD at [address] FLX W/RMVL OF TUMOR POLYP LESION [address] [place]  3/27/2017    COLONOSCOPY POLYPECTOMY SNARE/COLD BIOPSY performed by Tyler Pavon MD at [facility] W/O ECP Left 4/2/2018    EYE CATARACT EMULSIFICATION IOL IMPLANT performed by Melany Doty MD at [address] ENDOSCOPY  1/2010    with biopsy    UPPER GASTROINTESTINAL ENDOSCOPY N/A 7/12/2021    EGD BIOPSY OF STOMACH, GASTRIC POLYPECTOMY AND DUODENUM BIOPSY performed by Nohemi Peabody, MD at [id number]:    Current Outpatient Medications:     Cholecalciferol (VITAMIN D) 50 MCG (2000 UT) CAPS capsule, Take by mouth, Disp: , Rfl:     apixaban (ELIQUIS) 5 MG TABS tablet, Take 1 tablet by mouth 2 times daily, Disp: 14 tablet, Rfl: 0    zinc sulfate (ZINCATE) 220 (50 Zn) MG capsule, Take 2 capsules by mouth daily, Disp: 30 capsule, Rfl: 3    albuterol (PROVENTIL) (2.5 MG/3ML) 0.083% nebulizer solution, Take 3 mLs by nebulization every 6 hours as needed for Wheezing, Disp: 60 each, Rfl: 3    budesonide-formoterol (SYMBICORT) 160-4.5 MCG/ACT AERO, Inhale 2 puffs into the lungs 2 times daily, Disp: 6 each, Rfl: 0    ALPRAZolam (XANAX) 0.25 MG tablet, Take 1 tablet by mouth daily as needed for Anxiety for up to 90 days. , Disp: 90 tablet, Rfl: 0    metoprolol tartrate (LOPRESSOR) 25 MG tablet, TAKE 1/2 TABLET (12.5 MG) BY MOUTH 2 TIMES EACH DAY, Disp: 60 tablet, Rfl: 3    spironolactone (ALDACTONE) 25 MG tablet, Take 1 tablet by mouth daily, Disp: 90 tablet, Rfl: 0    pantoprazole (PROTONIX) 40 MG tablet, TAKE 1 TABLET BY MOUTH ONCE DAILY, Disp: 90 tablet, Rfl: 0    albuterol sulfate HFA (PROAIR HFA) 108 (90 Base) MCG/ACT inhaler, Inhale 2 puffs into the lungs 4 times daily as needed for Wheezing or Shortness of Breath, Disp: 1 Inhaler, Rfl: 0    polyethylene glycol (MIRALAX) 17 g PACK packet, Take 17 g by mouth daily, Disp: , Rfl:     famotidine (PEPCID) 40 MG tablet, Take 1 tablet by mouth every evening, Disp: 30 tablet, Rfl: 3    olmesartan (BENICAR) 20 MG tablet, Take 1 tablet by mouth daily, Disp: 90 tablet, Rfl: 3    PREVIDENT 5000 BOOSTER PLUS 1.1 % PSTE, , Disp: , Rfl: 0    ALLERGIES:   Allergies   Allergen Reactions    Levaquin [Levofloxacin In D5w]      FLUSHED AND WHEEZING    Pcn [Penicillins]        FAMILY HISTORY:       Problem Relation Age of Onset    Cancer Mother         breast ca    Diabetes Mother     Depression Mother     Thyroid Disease Mother     High Blood Pressure Mother     Heart Disease Mother     Cancer Father         prostate ca    COPD Father     Heart Failure Father     Heart Disease Father     High Blood Pressure Father     Deep Vein Thrombosis Sister     Breast Cancer Sister     Deep Vein Thrombosis Sister          SOCIAL HISTORY:   Social History     Socioeconomic History    Marital status:      Spouse name: Not on file    Number of children: Not on file    Years of education: Not on file    Highest education level: Not on file   Occupational History    Not on file   Tobacco Use    Smoking status: Never Smoker    Smokeless tobacco: Never Used   Vaping Use    Vaping Use: Never used   Substance and Sexual Activity    Alcohol use: Yes     Alcohol/week: 0.0 standard drinks     Comment: 1 glass of wine/month    Drug use: No    Sexual activity: Yes     Partners: Male     Birth control/protection: Surgical   Other Topics Concern    Not on file   Social History Narrative    Not on file     Social Determinants of Health     Financial Resource Strain: Low Risk     Difficulty of Paying Living Expenses: Not hard at all   Food Insecurity: No Food Insecurity    Worried About Running Out of Food in the Last Year: Never true    [address] in the Last Year: Never true   Transportation Needs: No Transportation Needs    Lack of Transportation (Medical): No    Lack of Transportation (Non-Medical): No   Physical Activity:     Days of Exercise per Week:     Minutes of Exercise per Session:    Stress:     Feeling of Stress :    Social Connections:     Frequency of Communication with Friends and Family:     Frequency of Social Gatherings with Friends and Family:     Attends Anabaptism Services:     Active Member of Clubs or Organizations:     Attends Club or Organization Meetings:     Marital Status:    Intimate Partner Violence:     Fear of Current or Ex-Partner:     Emotionally Abused:     Physically Abused:     Sexually Abused:        REVIEW OF SYSTEMS: A 12-point review of systemswas obtained and pertinent positives and negatives were enumerated above in the history of present illness. All other reviewed systems / symptoms were negative. Review of Systems   Constitutional: Positive for unexpected weight change (decrease-COVID). Negative for appetite change and fatigue. HENT: Negative for trouble swallowing. Respiratory: Positive for cough and shortness of breath. Negative for choking and wheezing. Cardiovascular: Positive for chest pain (heaviness). Negative for palpitations and leg swelling. Gastrointestinal: Positive for constipation (Miralax).  Negative for abdominal distention, abdominal pain, anal bleeding, blood in stool, diarrhea, nausea, rectal pain and vomiting. Genitourinary: Negative for difficulty urinating. Allergic/Immunologic: Negative for environmental allergies and food allergies. Neurological: Negative for dizziness, weakness, light-headedness, numbness and headaches. Hematological: Does not bruise/bleed easily. Psychiatric/Behavioral: Negative for sleep disturbance. The patient is not nervous/anxious. LABORATORY DATA: Reviewed  Lab Results   Component Value Date    WBC 8.4 09/26/2021    HGB 14.2 09/26/2021    HCT 43.1 09/26/2021    MCV 88.9 09/26/2021     09/26/2021     09/26/2021    K 4.3 09/26/2021     09/26/2021    CO2 28 09/26/2021    BUN 18 09/26/2021    CREATININE 0.63 09/26/2021    LABPROT 7.1 11/09/2012    LABALBU 3.3 (L) 09/26/2021    BILITOT 0.63 09/26/2021    ALKPHOS 58 09/26/2021    AST 25 09/26/2021    ALT 32 09/26/2021    INR 1.1 09/26/2021         Lab Results   Component Value Date    RBC 4.85 09/26/2021    HGB 14.2 09/26/2021    MCV 88.9 09/26/2021    MCH 29.3 09/26/2021    MCHC 32.9 09/26/2021    RDW 14.0 09/26/2021    MPV 10.0 09/26/2021    BASOPCT 0 09/26/2021    LYMPHSABS 2.59 09/26/2021    MONOSABS 0.76 09/26/2021    NEUTROABS 4.77 09/26/2021    EOSABS 0.13 09/26/2021    BASOSABS 0.03 09/26/2021         DIAGNOSTIC TESTING:     XR CHEST PORTABLE    Result Date: 9/15/2021  EXAMINATION: ONE XRAY VIEW OF THE CHEST 9/15/2021 5:21 pm COMPARISON: 09/13/2021 HISTORY: ORDERING SYSTEM PROVIDED HISTORY: cough, covid, worsening sob TECHNOLOGIST PROVIDED HISTORY: cough, covid, worsening sob FINDINGS: . The cardiac size is normal. No  pleural effusions are seen. Patchy bilateral interstitial infiltrates, improved on the right however progressive on the left. Pulmonary vascularity appears normal. There is mild ectasia of the thoracic aorta. . No acute bony abnormalities.  The hilar structures are normal.     Patchy bilateral interstitial infiltrates, improved on the right however progressive on the left compatible with patient's history of COVID. XR CHEST PORTABLE    Result Date: 9/13/2021  EXAMINATION: ONE XRAY VIEW OF THE CHEST 9/13/2021 12:44 pm COMPARISON: 02/20/2020 HISTORY: ORDERING SYSTEM PROVIDED HISTORY: covid positive, SOB TECHNOLOGIST PROVIDED HISTORY: covid positive, SOB FINDINGS: There is suboptimal inspiration. The cardiac size is normal.  Patchy bilateral interstitial infiltrates. No pleural effusions are seen. Pulmonary vascularity appears normal. .  . No acute bony abnormalities. The hilar structures are normal.     Patchy bilateral interstitial infiltrates compatible with patient's history of COVID pneumonia. CT CHEST PULMONARY EMBOLISM W CONTRAST    Result Date: 9/15/2021  EXAMINATION: CTA OF THE CHEST 9/15/2021 6:47 pm TECHNIQUE: CTA of the chest was performed after the administration of intravenous contrast.  Multiplanar reformatted images are provided for review. MIP images are provided for review. Dose modulation, iterative reconstruction, and/or weight based adjustment of the mA/kV was utilized to reduce the radiation dose to as low as reasonably achievable. COMPARISON: None. HISTORY: ORDERING SYSTEM PROVIDED HISTORY: hypoxia, tachycardia, covid TECHNOLOGIST PROVIDED HISTORY: hypoxia, tachycardia, covid Decision Support Exception - unselect if not a suspected or confirmed emergency medical condition->Emergency Medical Condition (MA) FINDINGS: Pulmonary Arteries: Pulmonary arteries are less than adequately opacified for evaluation. No evidence of intraluminal filling defect to suggest pulmonary embolism. Main pulmonary artery is normal in caliber. Mediastinum: There are a few small nonspecific lymph nodes seen in the middle mediastinum. No suspicious lymphadenopathy. Lungs/pleura: Patchy bilateral ground-glass and airspace infiltrates most notably in the lung periphery. .. No pulmonary masses are noted.  No pleural effusions are identified. Cardiomediastinal Structures: Intimal calcifications associated with the thoracic aorta. No evidence of thoracic aortic aneurysm or dissection. Cardiac chambers are normal Upper Abdomen: Small hiatal hernia Soft Tissues/Bones: There are hypertrophic degenerative changes in the thoracic spine. No acute osseous abnormalities. Patchy bilateral ground-glass and airspace infiltrates most notably in the lung periphery compatible with patient's history of COVID pneumonia. No obvious evidence of pulmonary embolism. PHYSICAL EXAMINATION: Vital signs reviewed per the nursing documentation. /84   Pulse 87   Temp 97 °F (36.1 °C)   Wt 277 lb (125.6 kg)   BMI 44.71 kg/m²   Body mass index is 44.71 kg/m². Physical Exam  Vitals and nursing note reviewed. Constitutional:       General: She is not in acute distress. Appearance: She is well-developed. She is not diaphoretic. HENT:      Head: Normocephalic. Mouth/Throat:      Pharynx: No oropharyngeal exudate. Eyes:      General: No scleral icterus. Pupils: Pupils are equal, round, and reactive to light. Neck:      Thyroid: No thyromegaly. Vascular: No JVD. Trachea: No tracheal deviation. Cardiovascular:      Rate and Rhythm: Normal rate and regular rhythm. Heart sounds: Normal heart sounds. No murmur heard. Pulmonary:      Effort: Pulmonary effort is normal. No respiratory distress. Breath sounds: Normal breath sounds. No wheezing. Abdominal:      General: Bowel sounds are normal. There is no distension. Palpations: Abdomen is soft. Tenderness: There is no abdominal tenderness. There is no guarding or rebound. Comments: No ascites   Musculoskeletal:         General: Normal range of motion. Cervical back: Normal range of motion and neck supple. Skin:     General: Skin is warm. Coloration: Skin is not pale. Findings: No erythema or rash.       Comments: She is not diaphoretic   Neurological:      Mental Status: She is alert and oriented to person, place, and time. Deep Tendon Reflexes: Reflexes are normal and symmetric. Psychiatric:         Behavior: Behavior normal.         Thought Content: Thought content normal.         Judgment: Judgment normal.           IMPRESSION: Ms. Germania Alfonso is a 54 y.o. female with      Diagnosis Orders   1. Gastroesophageal reflux disease, unspecified whether esophagitis present     2. Adenomatous polyps     3. Retching     4. Diverticulosis       Will continue with the acid reflux medication  But because of the isolated symptom of retching which we cannot explain 100%  I am sending her for allergy testing  And also to rule out sinusitis and will take it from there    Diet/life style/natural hx /complication of the dx were all explained in details   Past medical, past surgical, social history, psychiatric history, medications or allergies, all reviewed and  updated    Thank you for allowing me to participate in the care of Ms. Germania Alfonso. For any further questions please do not hesitate to contact me. I have reviewed and agree with the ROS entered by the MA/RN. Note is dictated utilizing voice recognition software. Unfortunately this leads to occasional typographical errors. Please contact our office if you have any questions.       Danna Wong MD  [facility]  O: #[phone]

## 2021-10-08 ENCOUNTER — TELEPHONE (OUTPATIENT)
Dept: PRIMARY CARE CLINIC | Age: 56
End: 2021-10-08

## 2021-10-11 ENCOUNTER — OFFICE VISIT (OUTPATIENT)
Dept: GASTROENTEROLOGY | Age: 56
End: 2021-10-11
Payer: COMMERCIAL

## 2021-10-11 ENCOUNTER — HOSPITAL ENCOUNTER (OUTPATIENT)
Age: 56
Discharge: HOME | End: 2021-10-11
Payer: COMMERCIAL

## 2021-10-11 ENCOUNTER — OFFICE VISIT (OUTPATIENT)
Dept: PRIMARY CARE CLINIC | Age: 56
End: 2021-10-11
Payer: COMMERCIAL

## 2021-10-11 ENCOUNTER — TELEPHONE (OUTPATIENT)
Dept: OBGYN CLINIC | Age: 56
End: 2021-10-11

## 2021-10-11 VITALS
DIASTOLIC BLOOD PRESSURE: 86 MMHG | RESPIRATION RATE: 18 BRPM | OXYGEN SATURATION: 97 % | HEART RATE: 72 BPM | SYSTOLIC BLOOD PRESSURE: 132 MMHG | WEIGHT: 280.2 LBS | BODY MASS INDEX: 45.23 KG/M2

## 2021-10-11 VITALS
DIASTOLIC BLOOD PRESSURE: 84 MMHG | TEMPERATURE: 97 F | BODY MASS INDEX: 44.71 KG/M2 | SYSTOLIC BLOOD PRESSURE: 130 MMHG | WEIGHT: 277 LBS | HEART RATE: 87 BPM

## 2021-10-11 DIAGNOSIS — R06.02 SHORTNESS OF BREATH: ICD-10-CM

## 2021-10-11 DIAGNOSIS — T78.1XXA GASTROINTESTINAL FOOD SENSITIVITY: ICD-10-CM

## 2021-10-11 DIAGNOSIS — K57.90 DIVERTICULOSIS: ICD-10-CM

## 2021-10-11 DIAGNOSIS — K21.9 GASTROESOPHAGEAL REFLUX DISEASE, UNSPECIFIED WHETHER ESOPHAGITIS PRESENT: Primary | ICD-10-CM

## 2021-10-11 DIAGNOSIS — R11.10 RETCHING: ICD-10-CM

## 2021-10-11 DIAGNOSIS — D36.9 ADENOMATOUS POLYPS: ICD-10-CM

## 2021-10-11 DIAGNOSIS — U07.1 COVID-19 VIRUS INFECTION: ICD-10-CM

## 2021-10-11 DIAGNOSIS — U07.1 COVID-19 VIRUS INFECTION: Primary | ICD-10-CM

## 2021-10-11 DIAGNOSIS — I10 ESSENTIAL HYPERTENSION: ICD-10-CM

## 2021-10-11 DIAGNOSIS — J45.40 MODERATE PERSISTENT ASTHMA WITHOUT COMPLICATION: ICD-10-CM

## 2021-10-11 LAB — D-DIMER QUANTITATIVE: 0.51 MG/L FEU (ref 0–0.59)

## 2021-10-11 PROCEDURE — 36415 COLL VENOUS BLD VENIPUNCTURE: CPT

## 2021-10-11 PROCEDURE — 99214 OFFICE O/P EST MOD 30 MIN: CPT | Performed by: FAMILY MEDICINE

## 2021-10-11 PROCEDURE — 85379 FIBRIN DEGRADATION QUANT: CPT

## 2021-10-11 PROCEDURE — 99214 OFFICE O/P EST MOD 30 MIN: CPT | Performed by: INTERNAL MEDICINE

## 2021-10-11 ASSESSMENT — ENCOUNTER SYMPTOMS
NAUSEA: 0
SHORTNESS OF BREATH: 1
CHOKING: 0
VOMITING: 0
ABDOMINAL PAIN: 0
ANAL BLEEDING: 0
WHEEZING: 0
COUGH: 1
RECTAL PAIN: 0
CONSTIPATION: 1
BLOOD IN STOOL: 0
TROUBLE SWALLOWING: 0
DIARRHEA: 0
ABDOMINAL DISTENTION: 0

## 2021-10-11 NOTE — TELEPHONE ENCOUNTER
Patient called stating that she had a positive COVID test on 9/8. She ended up spending 10 days in the hospital due to COVID pneumonia. She was sent home on oxygen and wanted to update the office as she is scheduled for a hyst on 11/29. She will be seeing Dr. Jessica Humphrey on 10/29 and hoping to be off of oxygen at that visit. I spoke to [facility] at [facility] to confirm the next step. A letter of medical clearance will be sent to Dr. Jessica Humphrey for the Oct 29th visit and if she clears, everything will be sent to anesthesia for clearance. I did speak to [facility] at Dr. Vika Lopez office and she is aware that patient will need medical clearance. Patient is aware of the plan and will call if anything changes.

## 2021-10-11 NOTE — PROGRESS NOTES
Patient is here for a 10 day follow up from having covid pneumonia. She is still having chest tightness, and due to this she has been using her albuterol inhaler often. She said she is feeling a little stronger than she was before. She said her oxygen level is getting better. She said that she is still on oxygen, and is at 2 liters. She said when she was sitting her oxygen was at 97%, she said that when she took off her oxygen it dropped to 93%. She began to walk around for a few minutes and it dropped to 86%, but when she sat down and took some deep breaths it went back up to 93%. She is also concerned about clotting due to being on eliquis, so she was hoping she could get tested for clotting. She would also like to talk about going off of the Eliquis and switching to something else.          CURRENT ALLERGIES: Levaquin [levofloxacin in d5w] and Pcn [penicillins]    PAST MEDICAL HISTORY:   Past Medical History:   Diagnosis Date    Acute pulmonary embolism ([facility] 11/2/2015    Anxiety     Asthma     Constipation, chronic     CPAP (continuous positive airway pressure) dependence     Diabetes mellitus ([address]     pt states she was prediabetic at one point    Embolism - blood clot     right lung and right leg    Esophageal reflux     Factor V Leiden ([facility]     Heart murmur     HTN (hypertension)     Hyperlipidemia     Irritable bladder     MTHFR mutation     MVP (mitral valve prolapse)     Palpitations     Pulmonary embolism (HCC) 10/15/2012    Rhinitis, allergic 10/15/2012    Sleep apnea     Venous thromboembolism 5/2/2016       SURGICAL HISTORY:   Past Surgical History:   Procedure Laterality Date    CATARACT REMOVAL WITH IMPLANT Left 04/02/2018    CHOLECYSTECTOMY      around 2000    COLONOSCOPY  07/12/2021    COLONOSCOPY N/A 7/12/2021    COLONOSCOPY POLYPECTOMY CECUM COLD BIOPSY performed by Trisha Camacho MD at [address]/CONTROL BLEEDING ANY METHOD  3/27/2017    COLONOSCOPY CONTROL HEMORRHAGE performed by Stephanie Salas MD at [address] FL[address] W/RMVL OF TUMOR POLYP LESION SNARE TQ  3/27/2017    COLONOSCOPY POLYPECTOMY SNARE/COLD BIOPSY performed by Stephanie Salas MD at [facility] RMVL INSJ IO LENS PROSTH W/O ECP Left 4/2/2018    EYE CATARACT EMULSIFICATION IOL IMPLANT performed by Katina Sears MD at [address] [facility] ENDOSCOPY  1/2010    with biopsy    UPPER GASTROINTESTINAL ENDOSCOPY N/A 7/12/2021    EGD BIOPSY OF STOMACH, GASTRIC POLYPECTOMY AND DUODENUM BIOPSY performed by Sonia Cohen MD at [address] [facility] [address]:   Family History   Problem Relation Age of Onset    Cancer Mother         breast ca   Mcneill Diabetes Mother     Depression Mother     Thyroid Disease Mother     High Blood Pressure Mother     Heart Disease Mother     Cancer Father         prostate ca    COPD Father     Heart Failure Father     Heart Disease Father     High Blood Pressure Father     Deep Vein Thrombosis Sister     Breast Cancer Sister     Deep Vein Thrombosis Sister        SOCIAL HISTORY:   Social History     Tobacco Use    Smoking status: Never Smoker    Smokeless tobacco: Never Used   Vaping Use    Vaping Use: Never used   Substance Use Topics    Alcohol use: Yes     Alcohol/week: 0.0 standard drinks     Comment: 1 glass of wine/month    Drug use: No     Prior to Admission medications    Medication Sig Start Date End Date Taking?  Authorizing Provider   Cholecalciferol (VITAMIN D) 50 MCG (2000 UT) CAPS capsule Take by mouth    Historical Provider, MD   apixaban (ELIQUIS) 5 MG TABS tablet Take 1 tablet by mouth 2 times daily 9/27/21   Supriya Judge MD   Vitamin D (CHOLECALCIFEROL) 50 MCG (2000 UT) TABS tablet Take 1 tablet by mouth daily 9/27/21   Supriya Judge MD   zinc sulfate (ZINCATE) 220 (50 Zn) MG capsule Take 2 capsules by mouth daily 9/27/21   Margie Ward MD   albuterol (PROVENTIL) (2.5 MG/3ML) 0.083% nebulizer solution Take 3 mLs by nebulization every 6 hours as needed for Wheezing  Patient not taking: Reported on 10/1/2021 9/13/21   Katheryn Tellez MD   ondansetron (ZOFRAN ODT) 4 MG disintegrating tablet Take 1 tablet by mouth every 8 hours as needed for Nausea or Vomiting  Patient not taking: Reported on 10/1/2021 9/13/21   Katheryn Tellez MD   budesonide-formoterol [facility]) 160-4.5 MCG/ACT AERO Inhale 2 puffs into the lungs 2 times daily 9/2/21 12/1/21  Margie Ward MD   ALPRAZolam Cordella Halleyler) 0.25 MG tablet Take 1 tablet by mouth daily as needed for Anxiety for up to 90 days. 9/2/21 12/1/21  Margie Ward MD   metoprolol tartrate (LOPRESSOR) 25 MG tablet TAKE 1/2 TABLET (12.5 MG) BY MOUTH 2 TIMES EACH DAY 8/2/21   Margie Ward MD   spironolactone (ALDACTONE) 25 MG tablet Take 1 tablet by mouth daily 7/26/21   Margie Ward MD   pantoprazole (PROTONIX) 40 MG tablet TAKE 1 TABLET BY MOUTH ONCE DAILY 7/14/21   Margie Ward MD   albuterol sulfate HFA (PROAIR HFA) 108 (90 Base) MCG/ACT inhaler Inhale 2 puffs into the lungs 4 times daily as needed for Wheezing or Shortness of Breath 6/17/21   Margie Ward MD   polyethylene glycol (MIRALAX) 17 g PACK packet Take 17 g by mouth daily 5/1/21   Historical Provider, MD   famotidine (PEPCID) 40 MG tablet Take 1 tablet by mouth every evening 6/15/21   Agapito Lopez MD   olmesartan (BENICAR) 20 MG tablet Take 1 tablet by mouth daily 1/11/21   Margie Ward MD   PREVIDENT 5000 BOOSTER PLUS 1.1 % PSTE  3/12/15   Historical Provider, MD   cetirizine (ZYRTEC) 10 MG tablet Take 10 mg by mouth daily.     Patient not taking: Reported on 10/1/2021    Historical Provider, MD       Review of Systems:  Constitutional: negative for fevers or chills, has some fatigue and tiredness  Eyes: negative for visual disturbance   ENT: negative for sore throat or nasal congestion  Respiratory: POSItive for shortness of breath ON EXERTION AND PULSE OXY DROPS TO 86 % ON ACTIVITY  Cardiovascular: negative for chest pain ,palpitations,pnd,syncope  Gastrointestinal: negative for abd pain, nausea, vomiting, diarrhea , constipation,hemetemesis,robbin,blood in stool  Genitourinary: negative for dysuria, urgency ,frequency,hematuria  Integument/breast: negative for skin rash or lesions  Neurological: negative for unilateral weakness, numbness or tingling. Skeletal Muscular: no joint pain,jont swelling,back pain    Subjective:  Vitals:    10/11/21 0953   BP: 132/86   Pulse: 72   Resp: 18   SpO2: 97%         Exam:  GEN:   A & O x3, no apparent distress  EYES: No gross abnormalities. ENT:ENT exam normal, no neck nodes or sinus tenderness  NECK: normal, supple, no lymphadenopathy,  no carotid bruits  PULM: decreased breath sounds noted- bilat,no wheezing  COR: regular rate & rhythm, no murmurs and no gallops  ABD:  soft, non-tender, non-distended, normal bowel sounds, no masses or organomegaly  : deferred  EXT: Extremities: + 2 pedal pulses, no edema or calf tenderness, and warm to touch. Normal nails without lesions  Skeletomuscular:  NEURO: Motor and sensory grossly intact  SKIN:  No skin lesions or rashes      Assessment:  1. COVID-19 virus infection    2. Essential hypertension    3. Moderate persistent asthma without complication    4. Shortness of breath      Labs reviewed: d-dimer from 9/24/2021    Plan:    ICD-10-CM    1. COVID-19 virus infection - if increasing,recheck ct chest with contrast due to shortness of breath on exertion and chest heavyness. Continue eliquis U07.1 D-Dimer, Quantitative   2. Essential hypertension -better I10    3. Moderate persistent asthma without complication - continue inhalors and oxygen J45.40    4.  Shortness of breath - continue oxygen, aerosols R06.02 D-Dimer, Quantitative       Orders Placed This Encounter   Procedures    D-Dimer, Quantitative Standing Status:   Future     Standing Expiration Date:   10/11/2022     No follow-ups on file. No orders of the defined types were placed in this encounter.     Medication directions and side effects discussed      Electronically signed by Peter Parker MD on 10/11/2021 at 10:16 AM         Peter Parker MD, MD

## 2021-10-11 NOTE — PATIENT INSTRUCTIONS
SURVEY:    You may be receiving a survey from [employer] regarding your visit today. You may get this in the mail, through your MyChart, or in your email. Please complete the survey to enable us to provide the highest quality of care to you and your family. If you cannot score us a very good (5 Stars) on any question, please call the office to discuss how we could of made your experience exceptional.    Thank you!     Dr. Hernandez [place]  BRITTANI Ocasio RN Adelle Salvage, [address] [place] [address], [address]    Phone: [phone]  Fax: [phone]    Office Hours:   Jaylin Leach, F: 8-5 Wednesday: 9-11

## 2021-10-12 ENCOUNTER — CARE COORDINATION (OUTPATIENT)
Dept: OTHER | Facility: CLINIC | Age: 56
End: 2021-10-12

## 2021-10-12 NOTE — CARE COORDINATION
Date/Time:  10/12/2021 1:00 PM     Attempted to reach patient by telephone. Call within 2 business days of discharge: Yes Left HIPPA compliant message requesting a return call. Will attempt to reach patient again.     Sylvester ZAPATA, RN- [facility]  Associate Care Manager  [phone]

## 2021-10-18 ENCOUNTER — TELEPHONE (OUTPATIENT)
Dept: PRIMARY CARE CLINIC | Age: 56
End: 2021-10-18

## 2021-10-18 NOTE — TELEPHONE ENCOUNTER
Patient called and stated she had an episode over the weekend where she woke up in the middle of the night to use the restroom. States when she sat up she had a pain in her chest and felt like her heart was racing. She took a Xanax and checked her BP and it was 139/112 and pulse was 77. States then when she layed back down she has a headache at the base of her skull and her BP was down to 113/83 but pulse was 143. States other than that episode she feels okay but does still have a slight headache.

## 2021-10-19 ENCOUNTER — CARE COORDINATION (OUTPATIENT)
Dept: OTHER | Facility: CLINIC | Age: 56
End: 2021-10-19

## 2021-10-19 NOTE — TELEPHONE ENCOUNTER
Re-Faxed [facility] paperwork to [facility]) at [phone] with return to work date of 10/12/2021. Patient scheduled for follow up with PCP 10/26/2021.

## 2021-10-19 NOTE — CARE COORDINATION
Patient contacted regarding COVID-19 diagnosis and pulse oximeter ordered at discharge. Discussed COVID-19 related testing which was available at this time. Test results were positive. Patient informed of results, if available? Yes    Ambulatory Care Manager contacted the patient by telephone to perform follow-up assessment. Verified name and  with patient as identifiers. Patient has following risk factors of: pneumonia. Symptoms reviewed with patient who verbalized the following symptoms: shortness of breath. Due to no new or worsening symptoms encounter was not routed to provider for escalation. Educated patient about risk for severe COVID-19 due to risk factors according to CDC guidelines. ACM reviewed discharge instructions, medical action plan and red flag symptoms with the patient who verbalized understanding. Discussed COVID vaccination status: No Education provided on COVID-19 vaccination as appropriate. Discussed exposure protocols and quarantine with CDC Guidelines. Patient was given an opportunity to verbalize any questions and concerns and agrees to contact ACM or health care provider for questions related to their healthcare. Was patient discharged with a pulse oximeter? Yes Discussed and confirmed pulse oximeter discharge instructions and when to notify provider or seek emergency care. ACM provided contact information. No further follow-up call identified based on severity of symptoms and risk factors. Pt is doing better, she returned to work last Tuesday. She is doing 1/2 days at the facility and 1/2 days at home. She continues to use the oxygen as needed. She has been able to decrease it to 1 litre while sitting at her desk working, with activity she has to put it back up to 3 as her oxygen sat drops in the 80's still with exertion. Sats are 95-96% at rest with 2 litres on. Pt said she is still just fatigued at times and does get short of breath with stairs.   Pt has no further care mtg needs at this time. Her hysterectomy will be postponed until she is off her oxygen at least 60 days. Pt has my contact information if needed for future.  ACM will sign off at this time     Raymond Kellogg BSN, [address] [place] [place] [address]  [phone]

## 2021-10-19 NOTE — TELEPHONE ENCOUNTER
Received a note from anesthesia that patient needs to be off of oxygen for 60 days before she can have her surgery. I attempted to call her to notify her of this and had to leave a message. Instructed her to call after her visit with Dr. Kulwinder Waters and we would rearrange surgery after she is off of oxygen.

## 2021-10-22 ENCOUNTER — TELEPHONE (OUTPATIENT)
Dept: GASTROENTEROLOGY | Age: 56
End: 2021-10-22

## 2021-10-26 ENCOUNTER — OFFICE VISIT (OUTPATIENT)
Dept: PRIMARY CARE CLINIC | Age: 56
End: 2021-10-26
Payer: COMMERCIAL

## 2021-10-26 VITALS
BODY MASS INDEX: 46.1 KG/M2 | DIASTOLIC BLOOD PRESSURE: 88 MMHG | WEIGHT: 285.6 LBS | HEART RATE: 80 BPM | SYSTOLIC BLOOD PRESSURE: 138 MMHG | OXYGEN SATURATION: 99 %

## 2021-10-26 DIAGNOSIS — J45.40 MODERATE PERSISTENT ASTHMA WITHOUT COMPLICATION: ICD-10-CM

## 2021-10-26 DIAGNOSIS — U07.1 COVID-19 VIRUS INFECTION: Primary | ICD-10-CM

## 2021-10-26 PROCEDURE — 99213 OFFICE O/P EST LOW 20 MIN: CPT | Performed by: FAMILY MEDICINE

## 2021-10-26 ASSESSMENT — PATIENT HEALTH QUESTIONNAIRE - PHQ9
SUM OF ALL RESPONSES TO PHQ9 QUESTIONS 1 & 2: 0
2. FEELING DOWN, DEPRESSED OR HOPELESS: 0
SUM OF ALL RESPONSES TO PHQ QUESTIONS 1-9: 0
1. LITTLE INTEREST OR PLEASURE IN DOING THINGS: 0

## 2021-10-26 NOTE — PATIENT INSTRUCTIONS
SURVEY:    You may be receiving a survey from [employer] regarding your visit today. You may get this in the mail, through your MyChart, or in your email. Please complete the survey to enable us to provide the highest quality of care to you and your family. If you cannot score us a very good (5 Stars) on any question, please call the office to discuss how we could of made your experience exceptional.    Thank you!     Dr. Jessica Montano, DIOMEDES Arroyo, [address] [place] [address] [place]    Phone: [phone]  Fax: [phone]    Office Hours:   Jaylin Bacon, F: 8-5 Wednesday: 9-11

## 2021-10-26 NOTE — PROGRESS NOTES
Esophageal reflux     Factor V Leiden ([facility]     Heart murmur     HTN (hypertension)     Hyperlipidemia     Irritable bladder     MTHFR mutation     MVP (mitral valve prolapse)     Palpitations     Pulmonary embolism (HCC) 10/15/2012    Rhinitis, allergic 10/15/2012    Sleep apnea     Venous thromboembolism 5/2/2016       Past Surgical History:    Past Surgical History:   Procedure Laterality Date    CATARACT REMOVAL WITH IMPLANT Left 04/02/2018    CHOLECYSTECTOMY      around 2000    COLONOSCOPY  07/12/2021    COLONOSCOPY N/A 7/12/2021    COLONOSCOPY POLYPECTOMY CECUM COLD BIOPSY performed by Sarah Downing MD at [address] [facility] BLEEDING ANY METHOD  3/27/2017    COLONOSCOPY CONTROL HEMORRHAGE performed by Lelia Stoddard MD at [address] FL[address] W/RMVL OF TUMOR POLYP LESION SNARE TQ  3/27/2017    COLONOSCOPY POLYPECTOMY SNARE/COLD BIOPSY performed by Lelia Stoddard MD at [address] [facility] W/O ECP Left 4/2/2018    EYE CATARACT EMULSIFICATION IOL IMPLANT performed by Mannie Berg MD at [address] ENDOSCOPY  1/2010    with biopsy    UPPER GASTROINTESTINAL ENDOSCOPY N/A 7/12/2021    EGD BIOPSY OF STOMACH, GASTRIC POLYPECTOMY AND DUODENUM BIOPSY performed by Sarah Downing MD at [address] [facility] History:   Social History     Tobacco Use    Smoking status: Never Smoker    Smokeless tobacco: Never Used   Substance Use Topics    Alcohol use:  Yes     Alcohol/week: 0.0 standard drinks     Comment: 1 glass of wine/month       Family History:   Family History   Problem Relation Age of Onset    Cancer Mother         breast ca    Diabetes Mother     Depression Mother     Thyroid Disease Mother     High Blood Pressure Mother     Heart Disease Mother     Cancer Father         prostate ca    COPD Father     Heart Failure Father    [facility] Heart Disease Father     High Blood Pressure Father     Deep Vein Thrombosis Sister     Breast Cancer Sister     Deep Vein Thrombosis Sister          Review of Systems:  Constitutional: negative for fever or chills  Eyes: negative for visual disturbance   ENT: negative for sore throat or nasal congestion  Respiratory: neg for cough The patient was called for notification of a POSITIVE test result for COVID-19. Positive  shortness of breath on exertion,has cough  Cardiovascular: negative for chest pain or palpitations  Gastrointestinal: negative for abd pain, nausea, vomiting, diarrhea or constipation  Genitourinary: negative for dysuria, urgency or frequency  Integument/breast: negative for skin rash or lesions  Neurological: negative for unilateral weakness, numbness or tingling. Skeletal Muscular: no joint pain or swelling      Objective:  Physical Exam:  GEN:   A & O x3, no apparent distress  EYES: No gross abnormalities. ENT:ENT exam normal, no neck nodes or sinus tenderness  NECK: normal, supple, no lymphadenopathy,  no carotid bruits  PULM: clear to auscultation bilaterally- no wheezes, rales or rhonchi, normal air movement, no respiratory distress  COR: regular rate & rhythm, no murmurs and no gallops  ABD:  soft, non-tender, non-distended, normal bowel sounds, no masses or organomegaly  : deferred  EXT: no deformities  NEURO: Motor and sensory grossly intact  SKIN:  No skin lesions or rashes        Labs:  Lab Results   Component Value Date    GLUCOSE 100 (H) 09/26/2021    BUN 18 09/26/2021    CREATININE 0.63 09/26/2021     09/26/2021    K 4.3 09/26/2021    CALCIUM 8.8 09/26/2021     09/26/2021    CO2 28 09/26/2021    LABGLOM >60 09/26/2021       Assessment:  1. COVID-19 virus infection    2. Moderate persistent asthma without complication          Plan:    ICD-10-CM    1. COVID-19 virus infection - continues to have hypoxia on activity. Has vasomotor instability U07.1    2.  Moderate persistent asthma without complication  O94.74        · Encouraged low fat and low sodium diet. · Goal for blood pressure control is 120/80  · Recommended regular exercise as tolerated, 3-5 times per day    No orders of the defined types were placed in this encounter. Current Outpatient Medications   Medication Sig Dispense Refill    Cholecalciferol (VITAMIN D) 50 MCG (2000 UT) CAPS capsule Take by mouth      apixaban (ELIQUIS) 5 MG TABS tablet Take 1 tablet by mouth 2 times daily 14 tablet 0    zinc sulfate (ZINCATE) 220 (50 Zn) MG capsule Take 2 capsules by mouth daily 30 capsule 3    budesonide-formoterol (SYMBICORT) 160-4.5 MCG/ACT AERO Inhale 2 puffs into the lungs 2 times daily 6 each 0    ALPRAZolam (XANAX) 0.25 MG tablet Take 1 tablet by mouth daily as needed for Anxiety for up to 90 days. 90 tablet 0    metoprolol tartrate (LOPRESSOR) 25 MG tablet TAKE 1/2 TABLET (12.5 MG) BY MOUTH 2 TIMES EACH DAY 60 tablet 3    spironolactone (ALDACTONE) 25 MG tablet Take 1 tablet by mouth daily 90 tablet 0    pantoprazole (PROTONIX) 40 MG tablet TAKE 1 TABLET BY MOUTH ONCE DAILY 90 tablet 0    albuterol sulfate HFA (PROAIR HFA) 108 (90 Base) MCG/ACT inhaler Inhale 2 puffs into the lungs 4 times daily as needed for Wheezing or Shortness of Breath 1 Inhaler 0    polyethylene glycol (MIRALAX) 17 g PACK packet Take 17 g by mouth daily      famotidine (PEPCID) 40 MG tablet Take 1 tablet by mouth every evening 30 tablet 3    olmesartan (BENICAR) 20 MG tablet Take 1 tablet by mouth daily 90 tablet 3    PREVIDENT 5000 BOOSTER PLUS 1.1 % PSTE   0    albuterol (PROVENTIL) (2.5 MG/3ML) 0.083% nebulizer solution Take 3 mLs by nebulization every 6 hours as needed for Wheezing (Patient not taking: Reported on 10/26/2021) 60 each 3     No current facility-administered medications for this visit. Return in about 1 month (around 11/26/2021) for hypertension.       Electronically signed by Manny Cobos MD on 10/26/2021 at 9:44 AM

## 2021-11-09 RX ORDER — SPIRONOLACTONE 25 MG/1
TABLET ORAL
Qty: 90 TABLET | Refills: 0 | Status: SHIPPED | OUTPATIENT
Start: 2021-11-09 | End: 2022-03-03 | Stop reason: SDUPTHER

## 2021-12-02 ENCOUNTER — OFFICE VISIT (OUTPATIENT)
Dept: PRIMARY CARE CLINIC | Age: 56
End: 2021-12-02
Payer: COMMERCIAL

## 2021-12-02 VITALS
WEIGHT: 286.6 LBS | HEART RATE: 72 BPM | BODY MASS INDEX: 46.26 KG/M2 | DIASTOLIC BLOOD PRESSURE: 81 MMHG | SYSTOLIC BLOOD PRESSURE: 121 MMHG

## 2021-12-02 DIAGNOSIS — R73.9 HYPERGLYCEMIA: ICD-10-CM

## 2021-12-02 DIAGNOSIS — J45.40 MODERATE PERSISTENT ASTHMA WITHOUT COMPLICATION: ICD-10-CM

## 2021-12-02 DIAGNOSIS — I10 ESSENTIAL HYPERTENSION: Primary | ICD-10-CM

## 2021-12-02 PROCEDURE — 99213 OFFICE O/P EST LOW 20 MIN: CPT | Performed by: FAMILY MEDICINE

## 2021-12-02 ASSESSMENT — PATIENT HEALTH QUESTIONNAIRE - PHQ9
1. LITTLE INTEREST OR PLEASURE IN DOING THINGS: 0
SUM OF ALL RESPONSES TO PHQ QUESTIONS 1-9: 0
2. FEELING DOWN, DEPRESSED OR HOPELESS: 0
SUM OF ALL RESPONSES TO PHQ9 QUESTIONS 1 & 2: 0
SUM OF ALL RESPONSES TO PHQ QUESTIONS 1-9: 0
SUM OF ALL RESPONSES TO PHQ QUESTIONS 1-9: 0

## 2021-12-02 NOTE — PROGRESS NOTES
Hypertension: Patient here for follow-up of elevated blood pressure. She some exercising and is not adherent to low salt diet. Blood pressure is not well controlled at home. Cardiac symptoms fatigue. Patient denies chest pain and palpitations. Cardiovascular risk factors: hypertension and obesity (BMI >= 30 kg/m2). Use of agents associated with hypertension: none. Asthma: Patient states she is doing fine. Patient states there was a time where she was using the rescue inhaler quit often and says she has not used it in 2 weeks. Gastroesophageal Reflux: Patient states it is doing fine. Patient states she was going to try to take the Protonix less often. Patient states she only takes it every third day and says she takes Pepcid every night. Patient states Dr needs to discontinue the oxygen so they can come and pick it up at her house. Patient states since a week ago Monday it has been 24 7 since wearing it. Patient states when doing activities and testing it was 90 and says when she sat down down picked back up.       Allergies:  Levaquin [levofloxacin in d5w] and Pcn [penicillins]    Past Medical History:    Past Medical History:   Diagnosis Date    Acute pulmonary embolism ([address] 11/2/2015    Anxiety     Asthma     Constipation, chronic     COVID-19 09/2021    CPAP (continuous positive airway pressure) dependence     Diabetes mellitus ([address]     pt states she was prediabetic at one point    Embolism - blood clot     right lung and right leg    Esophageal reflux     Factor V Leiden ([address]     Heart murmur     HTN (hypertension)     Hyperlipidemia     Irritable bladder     MTHFR mutation     MVP (mitral valve prolapse)     Palpitations     Pulmonary embolism ([address] 10/15/2012    Rhinitis, allergic 10/15/2012    Sleep apnea     Venous thromboembolism 5/2/2016       Past Surgical History:    Past Surgical History:   Procedure Laterality Date    CATARACT REMOVAL WITH IMPLANT Left 04/02/2018    CHOLECYSTECTOMY      around 2000    COLONOSCOPY  07/12/2021    COLONOSCOPY N/A 7/12/2021    COLONOSCOPY POLYPECTOMY CECUM COLD BIOPSY performed by Ella Aguilar MD at [address] [facility] BLEEDING ANY METHOD  3/27/2017    COLONOSCOPY CONTROL HEMORRHAGE performed by Hola Aldridge MD at [address] FLX W/RMVL OF TUMOR POLYP LESION SNARE TQ  3/27/2017    COLONOSCOPY POLYPECTOMY SNARE/COLD BIOPSY performed by Hola Aldridge MD at [address] [facility] W/O ECP Left 4/2/2018    EYE CATARACT EMULSIFICATION IOL IMPLANT performed by Maddy Gaming MD at [address] ENDOSCOPY  1/2010    with biopsy    UPPER GASTROINTESTINAL ENDOSCOPY N/A 7/12/2021    EGD BIOPSY OF STOMACH, GASTRIC POLYPECTOMY AND DUODENUM BIOPSY performed by Ella Aguilar MD at [address] [place] [address] History:   Social History     Tobacco Use    Smoking status: Never Smoker    Smokeless tobacco: Never Used   Substance Use Topics    Alcohol use:  Yes     Alcohol/week: 0.0 standard drinks     Comment: 1 glass of wine/month       Family History:   Family History   Problem Relation Age of Onset   Rohini Rell Cancer Mother         breast ca    Diabetes Mother     Depression Mother     Thyroid Disease Mother     High Blood Pressure Mother     Heart Disease Mother     Cancer Father         prostate ca    COPD Father     Heart Failure Father     Heart Disease Father     High Blood Pressure Father     Deep Vein Thrombosis Sister     Breast Cancer Sister     Deep Vein Thrombosis Sister          Review of Systems:  Constitutional: negative for fever or chills  Eyes: negative for visual disturbance   ENT: negative for sore throat or nasal congestion  Respiratory: neg for cough or shortness of breath  Cardiovascular: negative for chest pain or palpitations  Gastrointestinal: negative for abd pain, nausea, vomiting, diarrhea or constipation  Genitourinary: negative for dysuria, urgency or frequency  Integument/breast: negative for skin rash or lesions  Neurological: negative for unilateral weakness, numbness or tingling. Skeletal Muscular: no joint pain or swelling      Objective:  Physical Exam:  GEN:   A & O x3, no apparent distress  EYES: No gross abnormalities. ENT:ENT exam normal, no neck nodes or sinus tenderness  NECK: normal, supple, no lymphadenopathy,  no carotid bruits  PULM: clear to auscultation bilaterally- no wheezes, rales or rhonchi, normal air movement, no respiratory distress  COR: regular rate & rhythm, no murmurs and no gallops  ABD:  soft, non-tender, non-distended, normal bowel sounds, no masses or organomegaly  : deferred  EXT: normal strength, tone, and muscle mass  NEURO: Motor and sensory grossly intact  SKIN:  No skin lesions or rashes        Labs:  Lab Results   Component Value Date    GLUCOSE 100 (H) 09/26/2021    BUN 18 09/26/2021    CREATININE 0.63 09/26/2021     09/26/2021    K 4.3 09/26/2021    CALCIUM 8.8 09/26/2021     09/26/2021    CO2 28 09/26/2021    LABGLOM >60 09/26/2021       Assessment:  1. Essential hypertension    2. Moderate persistent asthma without complication    3. Hyperglycemia          Plan:  · Encouraged low fat, low sodium and 1800 diet. · Goal for blood pressure control is 120/80  · Recommended regular exercise as tolerated, 3-5 times per day  · Labs: fasting lipids, ALT, AST and BMP in 6 months  D/c oxygen  Start metformin for hyperglycemia  No orders of the defined types were placed in this encounter.       Current Outpatient Medications   Medication Sig Dispense Refill    metFORMIN (GLUCOPHAGE) 500 MG tablet Take 1 tablet by mouth 2 times daily (with meals) 180 tablet 1    spironolactone (ALDACTONE) 25 MG tablet TAKE 1 TABLET BY MOUTH ONE TIME A DAY 90 tablet 0    Cholecalciferol (VITAMIN D) 50 MCG (2000 UT) CAPS capsule Take by mouth      apixaban (ELIQUIS) 5 MG TABS tablet Take 1 tablet by mouth 2 times daily 14 tablet 0    zinc sulfate (ZINCATE) 220 (50 Zn) MG capsule Take 2 capsules by mouth daily 30 capsule 3    metoprolol tartrate (LOPRESSOR) 25 MG tablet TAKE 1/2 TABLET (12.5 MG) BY MOUTH 2 TIMES EACH DAY 60 tablet 3    pantoprazole (PROTONIX) 40 MG tablet TAKE 1 TABLET BY MOUTH ONCE DAILY 90 tablet 0    albuterol sulfate HFA (PROAIR HFA) 108 (90 Base) MCG/ACT inhaler Inhale 2 puffs into the lungs 4 times daily as needed for Wheezing or Shortness of Breath 1 Inhaler 0    polyethylene glycol (MIRALAX) 17 g PACK packet Take 17 g by mouth daily      famotidine (PEPCID) 40 MG tablet Take 1 tablet by mouth every evening 30 tablet 3    olmesartan (BENICAR) 20 MG tablet Take 1 tablet by mouth daily 90 tablet 3    PREVIDENT 5000 BOOSTER PLUS 1.1 % PSTE   0    budesonide-formoterol (SYMBICORT) 160-4.5 MCG/ACT AERO Inhale 2 puffs into the lungs 2 times daily 6 each 0     No current facility-administered medications for this visit. Return in about 3 months (around 3/2/2022) for hypertension.       Electronically signed by Darwin Vaca MD on 12/2/2021 at 3:54 PM

## 2021-12-30 RX ORDER — PANTOPRAZOLE SODIUM 40 MG/1
TABLET, DELAYED RELEASE ORAL
Qty: 90 TABLET | Refills: 0 | Status: SHIPPED | OUTPATIENT
Start: 2021-12-30

## 2022-02-23 RX ORDER — BUDESONIDE AND FORMOTEROL FUMARATE DIHYDRATE 160; 4.5 UG/1; UG/1
2 AEROSOL RESPIRATORY (INHALATION) 2 TIMES DAILY
Qty: 6 EACH | Refills: 0 | Status: SHIPPED | OUTPATIENT
Start: 2022-02-23 | End: 2022-07-26 | Stop reason: SDUPTHER

## 2022-03-03 RX ORDER — SPIRONOLACTONE 25 MG/1
25 TABLET ORAL DAILY
Qty: 90 TABLET | Refills: 0 | Status: SHIPPED | OUTPATIENT
Start: 2022-03-03 | End: 2022-07-15 | Stop reason: SDUPTHER

## 2022-03-11 ENCOUNTER — OFFICE VISIT (OUTPATIENT)
Dept: PRIMARY CARE CLINIC | Age: 57
End: 2022-03-11
Payer: COMMERCIAL

## 2022-03-11 VITALS — DIASTOLIC BLOOD PRESSURE: 76 MMHG | HEART RATE: 78 BPM | RESPIRATION RATE: 18 BRPM | SYSTOLIC BLOOD PRESSURE: 115 MMHG

## 2022-03-11 DIAGNOSIS — F41.1 GAD (GENERALIZED ANXIETY DISORDER): Primary | ICD-10-CM

## 2022-03-11 DIAGNOSIS — I10 ESSENTIAL HYPERTENSION: ICD-10-CM

## 2022-03-11 DIAGNOSIS — K21.00 GASTROESOPHAGEAL REFLUX DISEASE WITH ESOPHAGITIS WITHOUT HEMORRHAGE: ICD-10-CM

## 2022-03-11 DIAGNOSIS — J45.40 MODERATE PERSISTENT ASTHMA WITHOUT COMPLICATION: ICD-10-CM

## 2022-03-11 PROCEDURE — 99213 OFFICE O/P EST LOW 20 MIN: CPT | Performed by: FAMILY MEDICINE

## 2022-03-11 RX ORDER — ALPRAZOLAM 0.25 MG/1
0.25 TABLET ORAL 3 TIMES DAILY PRN
Qty: 30 TABLET | Refills: 0 | Status: SHIPPED | OUTPATIENT
Start: 2022-03-11 | End: 2022-04-10

## 2022-03-11 NOTE — PATIENT INSTRUCTIONS
SURVEY:    You may be receiving a survey from [employer] regarding your visit today. You may get this in the mail, through your MyChart, or in your email. Please complete the survey to enable us to provide the highest quality of care to you and your family. If you cannot score us a very good (5 Stars) on any question, please call the office to discuss how we could of made your experience exceptional.    Thank you!     Dr. Shasta Burgos, BRITTANI Langley, DIOMEDES Pavon, [address] [place] [address] Merlin Navas    Phone: [phone]  Fax: [phone]    Office Hours:   Dionicia Boxer, [place], F: 8-5 Wednesday: 9-11

## 2022-03-11 NOTE — PROGRESS NOTES
Hypertension: Patient here for follow-up of elevated blood pressure. She is some what exercising and is not adherent to low salt diet. Blood pressure is well controlled at home. Cardiac symptoms none. Patient denies chest pain, fatigue and palpitations. Cardiovascular risk factors: hypertension and obesity (BMI >= 30 kg/m2). Use of agents associated with hypertension: none. Asthma: Patient states overall she is doing better. She uses her inhaler PRN. GERD: Patient states that she is getting acid reflux and heartburn almost daily. She said it is well controlled with her medication. She said she does always seem to burp no matter what.        Allergies:  Levaquin [levofloxacin in d5w] and Pcn [penicillins]    Past Medical History:    Past Medical History:   Diagnosis Date    Acute pulmonary [facility]ism ([facility] 11/2/2015    Anxiety     Asthma     Constipation, chronic     COVID-19 09/2021    CPAP (continuous positive airway pressure) dependence     Diabetes mellitus ([facility]     pt states she was prediabetic at one point    Embolism - blood clot     right lung and right leg    Esophageal reflux     Factor V Leiden ([facility]     Heart murmur     HTN (hypertension)     Hyperlipidemia     Irritable bladder     MTHFR mutation     MVP (mitral valve prolapse)     Palpitations     Pulmonary embolism (HCC) 10/15/2012    Rhinitis, allergic 10/15/2012    Sleep apnea     Venous thromboembolism 5/2/2016       Past Surgical History:    Past Surgical History:   Procedure Laterality Date    CATARACT REMOVAL WITH IMPLANT Left 04/02/2018    CHOLECYSTECTOMY      around 2000    COLONOSCOPY  07/12/2021    COLONOSCOPY N/A 7/12/2021    COLONOSCOPY POLYPECTOMY CECUM COLD BIOPSY performed by Krista Nelson MD at [address]  3/27/2017    COLONOSCOPY CONTROL HEMORRHAGE performed by Joey Joshua MD at [address] [facility] COLSC FLX W/RMVL OF TUMOR POLYP LESION SNARE TQ  3/27/2017    COLONOSCOPY POLYPECTOMY SNARE/COLD BIOPSY performed by Sloane Lyles MD at [address] W/O ECP Left 4/2/2018    EYE CATARACT EMULSIFICATION IOL IMPLANT performed by Herberth Yip MD at [address] [facility] ENDOSCOPY  1/2010    with biopsy    UPPER GASTROINTESTINAL ENDOSCOPY N/A 7/12/2021    EGD BIOPSY OF STOMACH, GASTRIC POLYPECTOMY AND DUODENUM BIOPSY performed by Dottie Salinas MD at [address] [facility] History:   Social History     Tobacco Use    Smoking status: Never Smoker    Smokeless tobacco: Never Used   Substance Use Topics    Alcohol use: Yes     Alcohol/week: 0.0 standard drinks     Comment: 1 glass of wine/month       Family History:   Family History   Problem Relation Age of Onset   [facility] Cancer Mother         breast ca    Diabetes Mother     Depression Mother     Thyroid Disease Mother     High Blood Pressure Mother     Heart Disease Mother     Cancer Father         prostate ca    COPD Father     Heart Failure Father     Heart Disease Father     High Blood Pressure Father     Deep Vein Thrombosis Sister     Breast Cancer Sister     Deep Vein Thrombosis Sister          Review of Systems:  Constitutional: negative for fever or chills  Eyes: negative for visual disturbance   ENT: negative for sore throat or nasal congestion  Respiratory: neg for cough or shortness of breath  Cardiovascular: negative for chest pain or palpitations  Gastrointestinal: negative for abd pain, nausea, vomiting, diarrhea or constipation,has gerd  Genitourinary: negative for dysuria, urgency or frequency  Integument/breast: negative for skin rash or lesions  Neurological: negative for unilateral weakness, numbness or tingling.   Skeletal Muscular: no joint pain or swelling  Psych- has anxiety issues and takes prn xanax    Objective:  Physical Exam:  GEN:   A & O x3, no apparent distress  EYES: No gross abnormalities. ENT:ENT exam normal, no neck nodes or sinus tenderness  NECK: normal, supple, no lymphadenopathy,  no carotid bruits  PULM: clear to auscultation bilaterally- no wheezes, rales or rhonchi, normal air movement, no respiratory distress  COR: regular rate & rhythm, no murmurs and no gallops  ABD:  soft, non-tender  : deferred  EXT: normal strength, tone, and muscle mass  NEURO: Motor and sensory grossly intact  SKIN:  No skin lesions or rashes        Labs:  Lab Results   Component Value Date    GLUCOSE 100 (H) 09/26/2021    BUN 18 09/26/2021    CREATININE 0.63 09/26/2021     09/26/2021    K 4.3 09/26/2021    CALCIUM 8.8 09/26/2021     09/26/2021    CO2 28 09/26/2021    LABGLOM >60 09/26/2021       Assessment:  1. Essential hypertension    2. ASHTYN (generalized anxiety disorder)    3. Gastroesophageal reflux disease with esophagitis without hemorrhage    4. Moderate persistent asthma without complication    ·       Plan:  · Encouraged low fat and low sodium diet. · Goal for blood pressure control is 130/80  · Recommended regular exercise as tolerated, 3-5 times per day  · Labs: fasting lipids, ALT, AST and BMP in 3 months    No orders of the defined types were placed in this encounter.       Current Outpatient Medications   Medication Sig Dispense Refill    spironolactone (ALDACTONE) 25 MG tablet Take 1 tablet by mouth daily 90 tablet 0    apixaban (ELIQUIS) 5 MG TABS tablet Take 1 tablet by mouth 2 times daily 180 tablet 0    budesonide-formoterol (SYMBICORT) 160-4.5 MCG/ACT AERO Inhale 2 puffs into the lungs 2 times daily 6 each 0    pantoprazole (PROTONIX) 40 MG tablet TAKE 1 TABLET BY MOUTH ONE TIME A DAY 90 tablet 0    metFORMIN (GLUCOPHAGE) 500 MG tablet Take 1 tablet by mouth 2 times daily (with meals) 180 tablet 1    Cholecalciferol (VITAMIN D) 50 MCG (2000 UT) CAPS capsule Take by mouth      zinc sulfate (ZINCATE) 220 (50 Zn) MG capsule Take 2 capsules by mouth daily 30 capsule 3    metoprolol tartrate (LOPRESSOR) 25 MG tablet TAKE 1/2 TABLET (12.5 MG) BY MOUTH 2 TIMES EACH DAY 60 tablet 3    albuterol sulfate HFA (PROAIR HFA) 108 (90 Base) MCG/ACT inhaler Inhale 2 puffs into the lungs 4 times daily as needed for Wheezing or Shortness of Breath 1 Inhaler 0    polyethylene glycol (MIRALAX) 17 g PACK packet Take 17 g by mouth daily      famotidine (PEPCID) 40 MG tablet Take 1 tablet by mouth every evening 30 tablet 3    olmesartan (BENICAR) 20 MG tablet Take 1 tablet by mouth daily 90 tablet 3    PREVIDENT 5000 BOOSTER PLUS 1.1 % PSTE   0     No current facility-administered medications for this visit. No follow-ups on file.       Electronically signed by Sachi Fleming MD on 3/11/2022 at 10:28 AM

## 2022-05-05 ENCOUNTER — OFFICE VISIT (OUTPATIENT)
Dept: OBGYN | Age: 57
End: 2022-05-05
Payer: COMMERCIAL

## 2022-05-05 VITALS
HEIGHT: 66 IN | SYSTOLIC BLOOD PRESSURE: 132 MMHG | WEIGHT: 288 LBS | BODY MASS INDEX: 46.28 KG/M2 | DIASTOLIC BLOOD PRESSURE: 88 MMHG

## 2022-05-05 DIAGNOSIS — Z01.419 ENCOUNTER FOR ANNUAL ROUTINE GYNECOLOGICAL EXAMINATION: Primary | ICD-10-CM

## 2022-05-05 DIAGNOSIS — Z12.31 SCREENING MAMMOGRAM, ENCOUNTER FOR: ICD-10-CM

## 2022-05-05 PROCEDURE — 99396 PREV VISIT EST AGE 40-64: CPT | Performed by: ADVANCED PRACTICE MIDWIFE

## 2022-05-05 NOTE — PROGRESS NOTES
YEARLY PHYSICAL    Date of service: 2022    Neeta Rizo  Is a 64 y.o.   female    PT's PCP is: Daryl Kamara MD     : 1965                                             Subjective:       Patient's last menstrual period was 2022 (approximate). Are your menses regular: no    OB History    Para Term  AB Living   2 2       2   SAB IAB Ectopic Molar Multiple Live Births             2      # Outcome Date GA Lbr Daron/2nd Weight Sex Delivery Anes PTL Lv   2 Para 97 40w0d  7 lb 6 oz (3.345 kg) F Vag-Spont   EARLE      Complications: Diabetes in pregnancy   1 Para 93 40w0d  8 lb (3.629 kg) F Vag-Spont   EARLE        Social History     Tobacco Use   Smoking Status Never Smoker   Smokeless Tobacco Never Used        Social History     Substance and Sexual Activity   Alcohol Use Yes    Alcohol/week: 0.0 standard drinks    Comment: 1 glass of wine/month       Family History   Problem Relation Age of Onset    Cancer Mother         breast ca    Diabetes Mother     Depression Mother     Thyroid Disease Mother     High Blood Pressure Mother     Heart Disease Mother     Cancer Father         prostate ca    COPD Father     Heart Failure Father     Heart Disease Father     High Blood Pressure Father     Deep Vein Thrombosis Sister     Breast Cancer Sister     Deep Vein Thrombosis Sister        Any family history of breast or ovarian cancer:  Yes    Any family history of blood clots: Yes    Have you had a positive covid test: Yes    Have you had the covid immunization: No      Allergies: Levaquin [levofloxacin in d5w] and Pcn [penicillins]      Current Outpatient Medications:     spironolactone (ALDACTONE) 25 MG tablet, Take 1 tablet by mouth daily, Disp: 90 tablet, Rfl: 0    apixaban (ELIQUIS) 5 MG TABS tablet, Take 1 tablet by mouth 2 times daily, Disp: 180 tablet, Rfl: 0    budesonide-formoterol (SYMBICORT) 160-4.5 MCG/ACT AERO, Inhale 2 puffs into the lungs 2 times daily, Disp: 6 each, Rfl: 0    pantoprazole (PROTONIX) 40 MG tablet, TAKE 1 TABLET BY MOUTH ONE TIME A DAY, Disp: 90 tablet, Rfl: 0    Cholecalciferol (VITAMIN D) 50 MCG (2000 UT) CAPS capsule, Take by mouth, Disp: , Rfl:     zinc sulfate (ZINCATE) 220 (50 Zn) MG capsule, Take 2 capsules by mouth daily, Disp: 30 capsule, Rfl: 3    metoprolol tartrate (LOPRESSOR) 25 MG tablet, TAKE 1/2 TABLET (12.5 MG) BY MOUTH 2 TIMES EACH DAY, Disp: 60 tablet, Rfl: 3    albuterol sulfate HFA (PROAIR HFA) 108 (90 Base) MCG/ACT inhaler, Inhale 2 puffs into the lungs 4 times daily as needed for Wheezing or Shortness of Breath, Disp: 1 Inhaler, Rfl: 0    polyethylene glycol (MIRALAX) 17 g PACK packet, Take 17 g by mouth daily, Disp: , Rfl:     famotidine (PEPCID) 40 MG tablet, Take 1 tablet by mouth every evening, Disp: 30 tablet, Rfl: 3    olmesartan (BENICAR) 20 MG tablet, Take 1 tablet by mouth daily, Disp: 90 tablet, Rfl: 3    PREVIDENT 5000 BOOSTER PLUS 1.1 % PSTE, , Disp: , Rfl: 0    metFORMIN (GLUCOPHAGE) 500 MG tablet, Take 1 tablet by mouth 2 times daily (with meals) (Patient not taking: Reported on 5/5/2022), Disp: 180 tablet, Rfl: 1    Social History     Substance and Sexual Activity   Sexual Activity Yes    Partners: Male    Birth control/protection: Surgical       Any bleeding or pain with intercourse: No    Last Yearly:  04/2021    Last pap: 04/2021    Last HPV: 2015    Last Mammogram: 11/2020    Last Dexascan never    Last colorectal screen- type:colonoscopy*  date  2021    Do you do self breast exams: No    Past Medical History:   Diagnosis Date    [facility] [address] [address] 11/2/2015    Anxiety     Asthma     Constipation, chronic     COVID-19 09/2021    CPAP (continuous positive airway pressure) dependence     Diabetes mellitus ([facility]     pt states she was prediabetic at one point    Embolism - blood clot     right lung and right leg    Esophageal reflux     Factor V Leiden ([address]     Heart murmur     HTN (hypertension)     Hyperlipidemia     Irritable bladder     MTHFR mutation     MVP (mitral valve prolapse)     Palpitations     Pulmonary embolism (HCC) 10/15/2012    Rhinitis, allergic 10/15/2012    Sleep apnea     Venous thromboembolism 5/2/2016       Past Surgical History:   Procedure Laterality Date    CATARACT REMOVAL WITH IMPLANT Left 04/02/2018    CHOLECYSTECTOMY      around 2000    COLONOSCOPY  07/12/2021    COLONOSCOPY N/A 7/12/2021    COLONOSCOPY POLYPECTOMY CECUM COLD BIOPSY performed by Greg Villalobos MD at [address] [place] [address] Av BLEEDING ANY METHOD  3/27/2017    COLONOSCOPY CONTROL HEMORRHAGE performed by Renae Carrero MD at [address] [place] [address] W/RMVL OF TUMOR POLYP LESION SNARE TQ  3/27/2017    COLONOSCOPY POLYPECTOMY SNARE/COLD BIOPSY performed by Renae Carrero MD at [facility] INSJ IO LENS PROSTH W/O ECP Left 4/2/2018    EYE CATARACT EMULSIFICATION IOL IMPLANT performed by Sunny Barragan MD at [facility]  1/2010    with biopsy    UPPER GASTROINTESTINAL ENDOSCOPY N/A 7/12/2021    EGD BIOPSY OF STOMACH, GASTRIC POLYPECTOMY AND DUODENUM BIOPSY performed by Greg Villalobos MD at [address] [facility].       Family History   Problem Relation Age of Onset    Cancer Mother         breast ca   [facility] Diabetes Mother     Depression Mother     Thyroid Disease Mother     High Blood Pressure Mother     Heart Disease Mother     Cancer Father         prostate ca    COPD Father     Heart Failure Father     Heart Disease Father     High Blood Pressure Father     Deep Vein Thrombosis Sister     Breast Cancer Sister     Deep Vein Thrombosis Sister        Chief Complaint   Patient presents with    Gynecologic Exam     Yearly exam. Last pap 04/28/2021 negative, last HPV 2015 not detected. Patient was scheduled for hysterectomy 11/2021 however patient had COVID and was in hospital for 12 days so surgery was canceled. PE:  Vital Signs  Blood pressure 132/88, height 5' 6\" (1.676 m), weight 288 lb (130.6 kg), last menstrual period 04/21/2022, not currently breastfeeding. Estimated body mass index is 46.48 kg/m² as calculated from the following:    Height as of this encounter: 5' 6\" (1.676 m). Weight as of this encounter: 288 lb (130.6 kg). Labs:    No results found for this visit on 05/05/22. No data recorded    NURSE: Faisal PETER    HPI: here for annual gyn exam; c/o vaginal dryness, is still considering hyst - continues to have periods although some space out    Review of Systems   Constitutional: Negative. HENT: Negative for congestion. Respiratory: Positive for chest tightness and shortness of breath. Cardiovascular: Negative for chest pain. Gastrointestinal: Negative for abdominal pain. Genitourinary: Negative for dysuria. Vaginal dryness   Musculoskeletal: Negative for back pain. Skin: Negative for rash. Neurological: Negative for headaches. Psychiatric/Behavioral: The patient is nervous/anxious. Objective  Lymphatic:   no lymphadenopathy  Heent:   negative   Cor: regular rate and rhythm, no murmurs              Pul:clear to auscultation bilaterally- no wheezes, rales or rhonchi, normal air movement, no respiratory distress      GI: Abdomen soft, non-tender. BS normal. No masses,  No organomegaly           Extremities: normal strength, tone, and muscle mass   Breasts: Breast:normal appearance, no masses or tenderness   Pelvic Exam: GENITAL/URINARY:  External Genitalia:  General appearance; normal, Hair distribution; normal, Lesions absent  Urethral Meatus:  Size normal, Location normal, Lesions absent, Prolapse absent  Urethra:   Fullness absent, Masses absent  Bladder:  Fullness absent, Masses absent, Tenderness absent, Cystocele absent  Vagina:  General appearance normal, Discharge absent, Lesions absent, Pelvic support normal, atrophic changes  Cervix:  General appearance normal, Lesions absent, Discharge absent, Tenderness absent, Enlargement absent, Nodularity absent  Uterus:  Size normal, Tenderness absent  Adenexa: Masses absent, Tenderness absent  Anus/Perineum:  Lesions absent and Masses absent                              Assessment and Plan          Diagnosis Orders   1. Encounter for annual routine gynecological examination     2. Screening mammogram, encounter for  BRENNA SATURNINO DIGITAL SCREEN BILATERAL       will call after daughter's wedding to RS hysterectomy; in interval reviewed danger signs specifically heavy or frequent bleeding      I am having Tess Hughes maintain her PreviDent 5000 Booster Plus, olmesartan, polyethylene glycol, famotidine, albuterol sulfate HFA, metoprolol tartrate, zinc sulfate, vitamin D, metFORMIN, pantoprazole, apixaban, budesonide-formoterol, and spironolactone. Return in about 1 year (around 5/5/2023) for yearly. She was also counseled on her preventative health maintenance recommendations and follow-up. There are no Patient Instructions on file for this visit.     Segundo Hassan 80 5:03 PM

## 2022-05-08 ASSESSMENT — ENCOUNTER SYMPTOMS
ABDOMINAL PAIN: 0
SHORTNESS OF BREATH: 1
CHEST TIGHTNESS: 1
BACK PAIN: 0

## 2022-06-14 ENCOUNTER — OFFICE VISIT (OUTPATIENT)
Dept: PRIMARY CARE CLINIC | Age: 57
End: 2022-06-14
Payer: COMMERCIAL

## 2022-06-14 VITALS
HEIGHT: 66 IN | RESPIRATION RATE: 18 BRPM | BODY MASS INDEX: 46.12 KG/M2 | DIASTOLIC BLOOD PRESSURE: 82 MMHG | SYSTOLIC BLOOD PRESSURE: 118 MMHG | WEIGHT: 287 LBS

## 2022-06-14 DIAGNOSIS — Z12.31 ENCOUNTER FOR SCREENING MAMMOGRAM FOR BREAST CANCER: ICD-10-CM

## 2022-06-14 DIAGNOSIS — F41.1 GAD (GENERALIZED ANXIETY DISORDER): ICD-10-CM

## 2022-06-14 DIAGNOSIS — I10 ESSENTIAL HYPERTENSION: Primary | ICD-10-CM

## 2022-06-14 DIAGNOSIS — K21.00 GASTROESOPHAGEAL REFLUX DISEASE WITH ESOPHAGITIS WITHOUT HEMORRHAGE: ICD-10-CM

## 2022-06-14 PROCEDURE — 99214 OFFICE O/P EST MOD 30 MIN: CPT | Performed by: FAMILY MEDICINE

## 2022-06-14 SDOH — ECONOMIC STABILITY: FOOD INSECURITY: WITHIN THE PAST 12 MONTHS, THE FOOD YOU BOUGHT JUST DIDN'T LAST AND YOU DIDN'T HAVE MONEY TO GET MORE.: NEVER TRUE

## 2022-06-14 SDOH — ECONOMIC STABILITY: TRANSPORTATION INSECURITY
IN THE PAST 12 MONTHS, HAS THE LACK OF TRANSPORTATION KEPT YOU FROM MEDICAL APPOINTMENTS OR FROM GETTING MEDICATIONS?: NO

## 2022-06-14 SDOH — ECONOMIC STABILITY: FOOD INSECURITY: WITHIN THE PAST 12 MONTHS, YOU WORRIED THAT YOUR FOOD WOULD RUN OUT BEFORE YOU GOT MONEY TO BUY MORE.: NEVER TRUE

## 2022-06-14 ASSESSMENT — PATIENT HEALTH QUESTIONNAIRE - PHQ9
SUM OF ALL RESPONSES TO PHQ QUESTIONS 1-9: 0
SUM OF ALL RESPONSES TO PHQ QUESTIONS 1-9: 0
SUM OF ALL RESPONSES TO PHQ9 QUESTIONS 1 & 2: 0
2. FEELING DOWN, DEPRESSED OR HOPELESS: 0
SUM OF ALL RESPONSES TO PHQ QUESTIONS 1-9: 0
SUM OF ALL RESPONSES TO PHQ QUESTIONS 1-9: 0
1. LITTLE INTEREST OR PLEASURE IN DOING THINGS: 0

## 2022-06-14 ASSESSMENT — SOCIAL DETERMINANTS OF HEALTH (SDOH): HOW HARD IS IT FOR YOU TO PAY FOR THE VERY BASICS LIKE FOOD, HOUSING, MEDICAL CARE, AND HEATING?: NOT HARD AT ALL

## 2022-06-14 NOTE — PROGRESS NOTES
Hypertension: Patient here for follow-up of elevated blood pressure. She is not exercising and is adherent to low salt diet. Blood pressure is not well monitored at home. Cardiac symptoms none. Patient denies chest pain, chest pressure/discomfort and palpitations. Cardiovascular risk factors: dyslipidemia, hypertension and obesity (BMI >= 30 kg/m2). Use of agents associated with hypertension: none. Hyperlipidemia: Patient presents with hyperlipidemia. GERD: Patient states no concerns at this time.       Allergies:  Levaquin [levofloxacin in d5w] and Pcn [penicillins]    Past Medical History:    Past Medical History:   Diagnosis Date    Acute pulmonary embolism ([facility] 11/2/2015    Anxiety     Asthma     Constipation, chronic     COVID-19 09/2021    CPAP (continuous positive airway pressure) dependence     Diabetes mellitus ([facility]     pt states she was prediabetic at one point    Embolism - blood clot     right lung and right leg    Esophageal reflux     Factor V Leiden ([facility]     Heart murmur     HTN (hypertension)     Hyperlipidemia     Irritable bladder     MTHFR mutation     MVP (mitral valve prolapse)     Palpitations     Pulmonary embolism (HCC) 10/15/2012    Rhinitis, allergic 10/15/2012    Sleep apnea     Venous thromboembolism 5/2/2016       Past Surgical History:    Past Surgical History:   Procedure Laterality Date    CATARACT EXTRACTION W/  INTRAOCULAR LENS IMPLANT Left 04/02/2018    CHOLECYSTECTOMY      around 2000    COLONOSCOPY  07/12/2021    COLONOSCOPY N/A 7/12/2021    COLONOSCOPY POLYPECTOMY CECUM COLD BIOPSY performed by Raul Ahmadi MD at [address] [facility] METHOD  3/27/2017    COLONOSCOPY CONTROL HEMORRHAGE performed by Jeffrey Heimlich, MD at [address] FLX W/RMVL OF TUMOR POLYP LESION [address] [facility] TQ  3/27/2017    COLONOSCOPY POLYPECTOMY SNARE/COLD BIOPSY performed by Juan Hernandez Carlito Gray MD at [facility] RMVL INSJ IO LENS PROSTH W/O ECP Left 4/2/2018    EYE CATARACT EMULSIFICATION IOL IMPLANT performed by Frankie Jones MD at [address] [place] [address] ENDOSCOPY  1/2010    with biopsy    UPPER GASTROINTESTINAL ENDOSCOPY N/A 7/12/2021    EGD BIOPSY OF STOMACH, GASTRIC POLYPECTOMY AND DUODENUM BIOPSY performed by Raul Ahmadi MD at [address] [place] [address] History:   Social History     Tobacco Use    Smoking status: Never Smoker    Smokeless tobacco: Never Used   Substance Use Topics    Alcohol use: Yes     Alcohol/week: 0.0 standard drinks     Comment: 1 glass of wine/month       Family History:   Family History   Problem Relation Age of Onset   [facility] Cancer Mother         breast ca    Diabetes Mother     Depression Mother     Thyroid Disease Mother     High Blood Pressure Mother     Heart Disease Mother     Cancer Father         prostate ca    COPD Father     Heart Failure Father     Heart Disease Father     High Blood Pressure Father     Deep Vein Thrombosis Sister     Breast Cancer Sister     Deep Vein Thrombosis Sister          Review of Systems:  Constitutional: negative for fevers or chills  Eyes: negative for visual disturbance   ENT: negative for sore throat ,has nasal congestion  Respiratory: no cough or shortness of breath  Cardiovascular: negative for chest pain or palpitations  Gastrointestinal: negative for abd pain, nausea, vomiting, diarrhea or constipation  Genitourinary: negative for dysuria, urgency or frequency  Integument/breast: negative for skin rash or lesions  Neurological: negative for unilateral weakness, numbness or tingling. Skeletal Muscular: no joint paibn     Medication List          Accurate as of June 14, 2022  8:55 AM. If you have any questions, ask your nurse or doctor.             CONTINUE taking these medications    albuterol sulfate  (90 Base) MCG/ACT inhaler  Commonly known as: ProAir HFA  Inhale 2 puffs into the lungs 4 times daily as needed for Wheezing or Shortness of Breath     apixaban 5 MG Tabs tablet  Commonly known as: Eliquis  Take 1 tablet by mouth 2 times daily     budesonide-formoterol 160-4.5 MCG/ACT Aero  Commonly known as: Symbicort  Inhale 2 puffs into the lungs 2 times daily     famotidine 40 MG tablet  Commonly known as: PEPCID  Take 1 tablet by mouth every evening     metFORMIN 500 MG tablet  Commonly known as: GLUCOPHAGE  Take 1 tablet by mouth 2 times daily (with meals)     metoprolol tartrate 25 MG tablet  Commonly known as: LOPRESSOR  TAKE 1/2 TABLET (12.5 MG) BY MOUTH 2 TIMES EACH DAY     olmesartan 20 MG tablet  Commonly known as: BENICAR  Take 1 tablet by mouth daily     pantoprazole 40 MG tablet  Commonly known as: PROTONIX  TAKE 1 TABLET BY MOUTH ONE TIME A DAY     polyethylene glycol 17 g Pack packet  Commonly known as: MIRALAX     PreviDent 5000 Booster Plus 1.1 % Pste  Generic drug: Sodium Fluoride     spironolactone 25 MG tablet  Commonly known as: ALDACTONE  Take 1 tablet by mouth daily     vitamin D 50 MCG (2000 UT) Caps capsule     zinc sulfate 220 (50 Zn) MG capsule  Commonly known as: ZINCATE  Take 2 capsules by mouth daily            Objective:  Physical Exam:  VitalsBP 118/82 (Site: Left Upper Arm, Position: Sitting, Cuff Size: Large Adult)   Resp 18   Ht 5' 6\" (1.676 m)   Wt 287 lb (130.2 kg)   BMI 46.32 kg/m²   GEN:   A & O x3, no apparent distress  EYES: No gross abnormalities.   ENT:right and left TM normal without fluid or infection, neck without nodes and throat normal without erythema or exudate  NECK: normal, supple, no lymphadenopathy,  no carotid bruits  PULM: clear to auscultation bilaterally- no wheezes, rales or rhonchi, normal air movement, no respiratory distress  COR: regular rate & rhythm, no murmurs and no gallops  ABD:  soft, non-tender, non-distended, normal bowel sounds, no masses or organomegaly  : deferred  EXT: normal strength, tone, and muscle mass  NEURO: Motor and sensory grossly intact  SKIN:  No skin lesions or rashes      Labs:  Lab Results   Component Value Date    BUN 18 09/26/2021    CREATININE 0.63 09/26/2021     09/26/2021    K 4.3 09/26/2021    CALCIUM 8.8 09/26/2021     09/26/2021    CO2 28 09/26/2021    LABGLOM >60 09/26/2021    CHOL 169 06/14/2021    LDLCHOLESTEROL 108 06/14/2021    HDL 39 (L) 06/14/2021    TRIG 112 06/14/2021    ALT 32 09/26/2021    AST 25 09/26/2021       Assessment:  1. Essential hypertension    2. ASHTYN (generalized anxiety disorder)    3. Gastroesophageal reflux disease with esophagitis without hemorrhage    4. Encounter for screening mammogram for breast cancer      Patient Active Problem List   Diagnosis Code    Bronchial asthma J45.909    Obesity E66.9    Thrombophilia ([facility] D68.59    Hyperlipidemia E78.5    Sleep apnea, obstructive G47.33    DVT, lower extremity, distal, chronic ([facility] I82.5Z9    Essential hypertension I10    Acute pulmonary embolism ([facility] I26.99    Long term current use of anticoagulant therapy Z79.01    History of colon polyps Z86.010    Pneumonia due to COVID-19 virus U07.1, J12.82    Mild malnutrition (HCC) E44.1    Hypernatremia E87.0    Hypoxia R09.02    Retching R11.10    Adenomatous polyps D36.9    Gastroesophageal reflux disease K21.9    Diverticulosis K57.90       Plan:  1. Essential hypertension -well controlled with lopressor,aldectone   2. ASHTYN (generalized anxiety disorder) - well controlled   3. Gastroesophageal reflux disease with esophagitis without hemorrhage -well controlled with protonix   4. Encounter for screening mammogram for breast cancer        · Continue current medications  · Add otc allegra  · Encouraged low sodium, low cholesterol, weight loss diet.     · Goal for blood pressure controll is 130/80  · Recommended regular exercise as tolerated, 3-5 times per day  · Labs: fasting lipids, ALT, AST and BMP in 6 months  · Follow up in 3 months  · prevnar 13-wants at next f/u    Orders Placed This Encounter   Procedures    BRENNA SATURNINO DIGITAL SCREEN BILATERAL     Standing Status:   Future     Standing Expiration Date:   8/14/2023       Current Outpatient Medications   Medication Sig Dispense Refill    spironolactone (ALDACTONE) 25 MG tablet Take 1 tablet by mouth daily 90 tablet 0    apixaban (ELIQUIS) 5 MG TABS tablet Take 1 tablet by mouth 2 times daily 180 tablet 0    pantoprazole (PROTONIX) 40 MG tablet TAKE 1 TABLET BY MOUTH ONE TIME A DAY 90 tablet 0    metoprolol tartrate (LOPRESSOR) 25 MG tablet TAKE 1/2 TABLET (12.5 MG) BY MOUTH 2 TIMES EACH DAY 60 tablet 3    albuterol sulfate HFA (PROAIR HFA) 108 (90 Base) MCG/ACT inhaler Inhale 2 puffs into the lungs 4 times daily as needed for Wheezing or Shortness of Breath 1 Inhaler 0    polyethylene glycol (MIRALAX) 17 g PACK packet Take 17 g by mouth daily      famotidine (PEPCID) 40 MG tablet Take 1 tablet by mouth every evening 30 tablet 3    PREVIDENT 5000 BOOSTER PLUS 1.1 % PSTE   0    budesonide-formoterol (SYMBICORT) 160-4.5 MCG/ACT AERO Inhale 2 puffs into the lungs 2 times daily 6 each 0    metFORMIN (GLUCOPHAGE) 500 MG tablet Take 1 tablet by mouth 2 times daily (with meals) (Patient not taking: Reported on 5/5/2022) 180 tablet 1    Cholecalciferol (VITAMIN D) 50 MCG (2000 UT) CAPS capsule Take by mouth (Patient not taking: Reported on 6/14/2022)      zinc sulfate (ZINCATE) 220 (50 Zn) MG capsule Take 2 capsules by mouth daily (Patient not taking: Reported on 6/14/2022) 30 capsule 3    olmesartan (BENICAR) 20 MG tablet Take 1 tablet by mouth daily (Patient not taking: Reported on 6/14/2022) 90 tablet 3     No current facility-administered medications for this visit. No follow-ups on file.       Electronically signed by Randee Andersen MD on 6/14/2022 at 8:55 AM

## 2022-06-17 ENCOUNTER — HOSPITAL ENCOUNTER (OUTPATIENT)
Dept: WOMENS IMAGING | Age: 57
Discharge: HOME | End: 2022-06-19
Payer: COMMERCIAL

## 2022-06-17 DIAGNOSIS — Z12.31 ENCOUNTER FOR SCREENING MAMMOGRAM FOR BREAST CANCER: ICD-10-CM

## 2022-06-17 PROCEDURE — 77063 BREAST TOMOSYNTHESIS BI: CPT

## 2022-06-22 LAB
CHOLESTEROL/HDL RATIO: 4.9
CHOLESTEROL: 167 MG/DL
GLUCOSE BLD-MCNC: 138 MG/DL (ref 70–99)
HDLC SERPL-MCNC: 34 MG/DL
LDL CHOLESTEROL: 109 MG/DL (ref 0–130)
PATIENT FASTING?: YES
TRIGL SERPL-MCNC: 120 MG/DL

## 2022-07-01 RX ORDER — FAMOTIDINE 40 MG/1
40 TABLET, FILM COATED ORAL EVERY EVENING
Qty: 90 TABLET | Refills: 1 | Status: SHIPPED | OUTPATIENT
Start: 2022-07-01

## 2022-07-15 RX ORDER — SPIRONOLACTONE 25 MG/1
25 TABLET ORAL DAILY
Qty: 90 TABLET | Refills: 0 | Status: SHIPPED | OUTPATIENT
Start: 2022-07-15

## 2022-07-26 ENCOUNTER — TELEPHONE (OUTPATIENT)
Dept: PRIMARY CARE CLINIC | Age: 57
End: 2022-07-26

## 2022-07-26 DIAGNOSIS — J45.40 MODERATE PERSISTENT ASTHMA WITHOUT COMPLICATION: ICD-10-CM

## 2022-07-26 RX ORDER — BUDESONIDE AND FORMOTEROL FUMARATE DIHYDRATE 160; 4.5 UG/1; UG/1
2 AEROSOL RESPIRATORY (INHALATION) 2 TIMES DAILY
Qty: 6 EACH | Refills: 0 | Status: SHIPPED | OUTPATIENT
Start: 2022-07-26 | End: 2022-10-24

## 2022-07-26 RX ORDER — ALBUTEROL SULFATE 90 UG/1
2 AEROSOL, METERED RESPIRATORY (INHALATION) 4 TIMES DAILY PRN
Qty: 1 EACH | Refills: 0 | Status: SHIPPED | OUTPATIENT
Start: 2022-07-26

## 2022-07-26 NOTE — TELEPHONE ENCOUNTER
----- Message from Calderon Estrada sent at 7/26/2022  1:49 PM EDT -----  Subject: Refill Request    QUESTIONS  Name of Medication? albuterol sulfate HFA (PROAIR HFA) 108 (90 Base)   MCG/ACT inhaler  Patient-reported dosage and instructions? 2 PUFFS PO QID PRN  How many days do you have left? 0  Preferred Pharmacy? [id number]  Pharmacy phone number (if available)? [phone]  ---------------------------------------------------------------------------  --------------  CALL BACK INFO  What is the best way for the office to contact you? OK to leave message on   voicemail  Preferred Call Back Phone Number? [phone]  ---------------------------------------------------------------------------  --------------  SCRIPT ANSWERS  Relationship to Patient?  Self

## 2022-08-01 ENCOUNTER — OFFICE VISIT (OUTPATIENT)
Dept: PRIMARY CARE CLINIC | Age: 57
End: 2022-08-01
Payer: COMMERCIAL

## 2022-08-01 ENCOUNTER — HOSPITAL ENCOUNTER (OUTPATIENT)
Age: 57
Setting detail: SPECIMEN
Discharge: HOME | End: 2022-08-01
Payer: COMMERCIAL

## 2022-08-01 VITALS
SYSTOLIC BLOOD PRESSURE: 145 MMHG | DIASTOLIC BLOOD PRESSURE: 94 MMHG | WEIGHT: 286.8 LBS | RESPIRATION RATE: 18 BRPM | BODY MASS INDEX: 46.29 KG/M2 | HEART RATE: 77 BPM | OXYGEN SATURATION: 96 %

## 2022-08-01 DIAGNOSIS — J21.9 ACUTE BRONCHIOLITIS DUE TO UNSPECIFIED ORGANISM: ICD-10-CM

## 2022-08-01 DIAGNOSIS — J21.9 ACUTE BRONCHIOLITIS DUE TO UNSPECIFIED ORGANISM: Primary | ICD-10-CM

## 2022-08-01 LAB
SARS-COV-2, RAPID: NOT DETECTED
SPECIMEN DESCRIPTION: NORMAL

## 2022-08-01 PROCEDURE — 87635 SARS-COV-2 COVID-19 AMP PRB: CPT

## 2022-08-01 PROCEDURE — 99213 OFFICE O/P EST LOW 20 MIN: CPT | Performed by: FAMILY MEDICINE

## 2022-08-01 RX ORDER — AZITHROMYCIN 250 MG/1
250 TABLET, FILM COATED ORAL SEE ADMIN INSTRUCTIONS
Qty: 6 TABLET | Refills: 0 | Status: SHIPPED | OUTPATIENT
Start: 2022-08-01 | End: 2022-08-06

## 2022-08-01 NOTE — PROGRESS NOTES
Chest Congestion   for  about a  week. Nature of Onset and Mechanism -  gradual, waxing and waning  Characteristics/Radiation/Quality - Patient here for chest congestion with some chest tightness. She states she is not short of breath but is having some burning in her back between her shoulder blades. She states she has been taking her rescue inhaler more frequently. Severity (0-10) - 6  Aggravating Factors - nothing in particular  Relieving Factors/Treatment tried - Emergen-C, rescue inhaler    CURRENT ALLERGIES: Levaquin [levofloxacin in d5w] and Pcn [penicillins]    SOCIAL HISTORY:   Social History     Tobacco Use    Smoking status: Never    Smokeless tobacco: Never   Vaping Use    Vaping Use: Never used   Substance Use Topics    Alcohol use: Yes     Alcohol/week: 0.0 standard drinks     Comment: 1 glass of wine/month    Drug use: No       She has a current medication list which includes the following prescription(s): azithromycin, albuterol sulfate hfa, budesonide-formoterol, metoprolol tartrate, spironolactone, famotidine, apixaban, pantoprazole, polyethylene glycol, prevident 5000 booster plus, metformin, vitamin d, zinc sulfate, and olmesartan. Review of Systems:  Constitutional: neg for fever, chills, neg for headache  Eyes: negative for visual disturbance   ENT: neg  for sore throat , neg nasal congestion, neg ear pain  Respiratory: positive for cough, positive for shortness of breath positive for sputum -clear  Cardiovascular: negative for chest pain,pnd or palpitations  Gastrointestinal: negative for abd pain, nausea, vomiting, diarrhea or constipation  Integument/breast: negative for skin rash or lesions  Neurological: negative for unilateral weakness, numbness or tingling.   No joint pain,bodyache     Objective:  BP (!) 145/94 (Site: Left Upper Arm, Position: Sitting, Cuff Size: Large Adult)   Pulse 77   Resp 18   Wt 286 lb 12.8 oz (130.1 kg)   SpO2 96%   BMI 46.29 kg/m²    GEN:  She is alert and

## 2022-08-08 RX ORDER — PREDNISONE 20 MG/1
20 TABLET ORAL 2 TIMES DAILY
Qty: 10 TABLET | Refills: 0 | Status: SHIPPED | OUTPATIENT
Start: 2022-08-08 | End: 2022-08-13

## 2022-09-23 RX ORDER — OLMESARTAN MEDOXOMIL 20 MG/1
20 TABLET ORAL DAILY
Qty: 90 TABLET | Refills: 3 | Status: SHIPPED | OUTPATIENT
Start: 2022-09-23

## 2022-09-26 NOTE — TELEPHONE ENCOUNTER
Patient reports she will need Eliquis before the mail in pharmacy is able to ship. Patient requests to have 7 days worth sent to [facility].

## 2022-10-11 ENCOUNTER — OFFICE VISIT (OUTPATIENT)
Dept: PRIMARY CARE CLINIC | Age: 57
End: 2022-10-11
Payer: COMMERCIAL

## 2022-10-11 VITALS — HEART RATE: 83 BPM | RESPIRATION RATE: 18 BRPM | WEIGHT: 287 LBS | BODY MASS INDEX: 46.32 KG/M2

## 2022-10-11 DIAGNOSIS — I10 ESSENTIAL HYPERTENSION: Primary | ICD-10-CM

## 2022-10-11 DIAGNOSIS — H66.91 ACUTE RIGHT OTITIS MEDIA: ICD-10-CM

## 2022-10-11 DIAGNOSIS — K21.00 GASTROESOPHAGEAL REFLUX DISEASE WITH ESOPHAGITIS WITHOUT HEMORRHAGE: ICD-10-CM

## 2022-10-11 DIAGNOSIS — J06.9 ACUTE UPPER RESPIRATORY INFECTION: ICD-10-CM

## 2022-10-11 PROCEDURE — 99214 OFFICE O/P EST MOD 30 MIN: CPT | Performed by: FAMILY MEDICINE

## 2022-10-11 RX ORDER — AZITHROMYCIN 250 MG/1
250 TABLET, FILM COATED ORAL SEE ADMIN INSTRUCTIONS
Qty: 6 TABLET | Refills: 0 | Status: SHIPPED | OUTPATIENT
Start: 2022-10-11 | End: 2022-10-16

## 2022-10-11 NOTE — PATIENT INSTRUCTIONS
SURVEY:    You may be receiving a survey from "[employer] regarding your visit today. You may get this in the mail, through your MyChart, or in your email. Please complete the survey to enable us to provide the highest quality of care to you and your family. If you cannot score us a very good (5 Stars) on any question, please call the office to discuss how we could of made your experience exceptional.    Thank you!     Dr. Albaro Jackson, SALMA Dowell, [address] [place] [address], [address] [place] [address]    Phone: [phone]  Fax: [phone]    Office Hours:   Flaco Neff, [facility], F: 8-5

## 2022-10-11 NOTE — PROGRESS NOTES
Hypertension: Patient here for follow-up of elevated blood pressure. She is not exercising and is adherent to low salt diet. Blood pressure is not well controlled at home. Patient does not take her blood pressure at home. Cardiac symptoms fatigue. Patient denies chest pain and palpitations. Cardiovascular risk factors: hypertension and obesity (BMI >= 30 kg/m2). Use of agents associated with hypertension: none. GERD: Patient states some days are better than others. She states she sometimes sleeps in the chair rather than lay down from acid reflux. Anxiety: Patient states overall well controlled at this time. Patient also is having sinus issues since last Sunday. She denies fever and body aches at this time. Patients BP was 149/97, will recheck.        Allergies:  Levaquin [levofloxacin in d5w] and Pcn [penicillins]    Past Medical History:    Past Medical History:   Diagnosis Date    Acute pulmonary embolism ([facility] 11/2/2015    Anxiety     Asthma     Constipation, chronic     COVID-19 09/2021    CPAP (continuous positive airway pressure) dependence     Diabetes mellitus ([address]     pt states she was prediabetic at one point    Embolism - blood clot     right lung and right leg    Esophageal reflux     Factor V Leiden (HCC)     Heart murmur     HTN (hypertension)     Hyperlipidemia     Irritable bladder     MTHFR mutation     MVP (mitral valve prolapse)     Palpitations     Pulmonary embolism ([address] 10/15/2012    Rhinitis, allergic 10/15/2012    Sleep apnea     Venous thromboembolism 5/2/2016       Past Surgical History:    Past Surgical History:   Procedure Laterality Date    CATARACT REMOVAL WITH IMPLANT Left 04/02/2018    CHOLECYSTECTOMY      around 2000    COLONOSCOPY  07/12/2021    COLONOSCOPY N/A 7/12/2021    COLONOSCOPY POLYPECTOMY CECUM COLD BIOPSY performed by Idris Deshpande MD at [address] [place] [address] 3/27/2017    COLONOSCOPY CONTROL HEMORRHAGE performed by Nader Evans MD at [address] FLX W/RMVL OF TUMOR POLYP LESION SNARE TQ  3/27/2017    COLONOSCOPY POLYPECTOMY SNARE/COLD BIOPSY performed by Nader Evans MD at [address] [place] [address] W/O ECP Left 4/2/2018    EYE CATARACT EMULSIFICATION IOL IMPLANT performed by Donta Floyd MD at [facility]  1/2010    with biopsy    UPPER GASTROINTESTINAL ENDOSCOPY N/A 7/12/2021    EGD BIOPSY OF STOMACH, GASTRIC POLYPECTOMY AND DUODENUM BIOPSY performed by Idris Deshpande MD at [address] [place] [address] History:   Social History     Tobacco Use    Smoking status: Never    Smokeless tobacco: Never   Substance Use Topics    Alcohol use: Yes     Alcohol/week: 0.0 standard drinks     Comment: 1 glass of wine/month       Family History:   Family History   Problem Relation Age of Onset    Cancer Mother         breast ca    Diabetes Mother     Depression Mother     Thyroid Disease Mother     High Blood Pressure Mother     Heart Disease Mother     Cancer Father         prostate ca    COPD Father     Heart Failure Father     Heart Disease Father     High Blood Pressure Father     Deep Vein Thrombosis Sister     Breast Cancer Sister     Deep Vein Thrombosis Sister          Review of Systems:  Constitutional: negative for fevers or chills  Eyes: negative for visual disturbance   ENT: positive for sore throat , nasal congestion  Respiratory: has cough ,no shortness of breath  Cardiovascular: negative for chest pain or palpitations  Gastrointestinal: negative for abd pain, nausea, vomiting, diarrhea or constipation  Genitourinary: negative for dysuria, urgency or frequency  Integument/breast: negative for skin rash or lesions  Neurological: negative for unilateral weakness, numbness or tingling.   Skeletal Muscular: no joint pain     Medication List            Accurate as of October 11, 2022 9:04 AM. If you have any questions, ask your nurse or doctor. START taking these medications      azithromycin 250 MG tablet  Commonly known as: ZITHROMAX  Take 1 tablet by mouth See Admin Instructions for 5 days 500mg on day 1 followed by 250mg on days 2 - 5  Started by: Rashad Escobar MD            CONTINUE taking these medications      albuterol sulfate  (90 Base) MCG/ACT inhaler  Commonly known as: ProAir HFA  Inhale 2 puffs into the lungs 4 times daily as needed for Wheezing or Shortness of Breath     apixaban 5 MG Tabs tablet  Commonly known as: Eliquis  Take 1 tablet by mouth 2 times daily for 7 days     budesonide-formoterol 160-4.5 MCG/ACT Aero  Commonly known as: Symbicort  Inhale 2 puffs into the lungs in the morning and 2 puffs before bedtime. famotidine 40 MG tablet  Commonly known as: PEPCID  Take 1 tablet by mouth every evening     metoprolol tartrate 25 MG tablet  Commonly known as: LOPRESSOR  Take 0.5 tablets by mouth in the morning and 0.5 tablets before bedtime. olmesartan 20 MG tablet  Commonly known as: BENICAR  Take 1 tablet by mouth daily     pantoprazole 40 MG tablet  Commonly known as: PROTONIX  TAKE 1 TABLET BY MOUTH ONE TIME A DAY     polyethylene glycol 17 g Pack packet  Commonly known as: MIRALAX     PreviDent 5000 Booster Plus 1.1 % Pste  Generic drug: Sodium Fluoride     spironolactone 25 MG tablet  Commonly known as: ALDACTONE  Take 1 tablet by mouth in the morning. Where to Get Your Medications        These medications were sent to [address] [place] [address] [id number] - [place], [address] [place] [address]  Beny Caden , [place] [address]      Phone: [phone]   azithromycin 250 MG tablet         Objective:  Physical Exam:  VitalsPulse 83   Resp 18   Wt 287 lb (130.2 kg)   BMI 46.32 kg/m²   GEN:   A & O x3, no apparent distress  EYES: No gross abnormalities.   ENT:neck without nodes, pharynx erythematous without exudate, and nasal mucosa congested  NECK: normal, supple, no lymphadenopathy,  no carotid bruits  PULM: clear to auscultation bilaterally- no wheezes, rales or rhonchi, normal air movement, no respiratory distress  COR: regular rate & rhythm, no murmurs, and no gallops  ABD:  soft, non-tender, non-distended, normal bowel sounds, no masses or organomegaly  : deferred  EXT: normal strength, tone, and muscle mass  NEURO: Motor and sensory grossly intact  SKIN:  No skin lesions or rashes      Labs:  Lab Results   Component Value Date    BUN 18 09/26/2021    CREATININE 0.63 09/26/2021     09/26/2021    K 4.3 09/26/2021    CALCIUM 8.8 09/26/2021     09/26/2021    CO2 28 09/26/2021    LABGLOM >60 09/26/2021    CHOL 167 06/22/2022    LDLCHOLESTEROL 109 06/22/2022    HDL 34 (L) 06/22/2022    TRIG 120 06/22/2022    ALT 32 09/26/2021    AST 25 09/26/2021       Assessment:  1. Essential hypertension    2. Acute upper respiratory infection    3. Gastroesophageal reflux disease with esophagitis without hemorrhage    4. Acute right otitis media      Patient Active Problem List   Diagnosis Code    Bronchial asthma J45.909    Obesity E66.9    Thrombophilia ([facility]) D68.59    Hyperlipidemia E78.5    Sleep apnea, obstructive G47.33    DVT, lower extremity, distal, chronic ([facility]) I82.5Z9    Essential hypertension I10    Acute pulmonary embolism ([facility]) I26.99    Long term current use of anticoagulant therapy Z79.01    History of colon polyps Z86.010    Pneumonia due to COVID-19 virus U07.1, J12.82    Mild malnutrition ([facility]) E44.1    Hypernatremia E87.0    Hypoxia R09.02    Retching R11.10    Adenomatous polyps D36.9    Gastroesophageal reflux disease K21.9    Diverticulosis K57.90       Plan:  1. Essential hypertension    2. Acute upper respiratory infection    3. Gastroesophageal reflux disease with esophagitis without hemorrhage    4.  Acute right otitis media        Continue current medications  zpack    Encouraged low sodium, low cholesterol, weight loss diet. Goal for blood pressure controll is 130/80  Recommended regular exercise as tolerated, 3-5 times per day  Follow up in 3 months      No orders of the defined types were placed in this encounter. Current Outpatient Medications   Medication Sig Dispense Refill    azithromycin (ZITHROMAX) 250 MG tablet Take 1 tablet by mouth See Admin Instructions for 5 days 500mg on day 1 followed by 250mg on days 2 - 5 6 tablet 0    apixaban (ELIQUIS) 5 MG TABS tablet Take 1 tablet by mouth 2 times daily for 7 days 14 tablet 0    olmesartan (BENICAR) 20 MG tablet Take 1 tablet by mouth daily 90 tablet 3    albuterol sulfate HFA (PROAIR HFA) 108 (90 Base) MCG/ACT inhaler Inhale 2 puffs into the lungs 4 times daily as needed for Wheezing or Shortness of Breath 1 each 0    budesonide-formoterol (SYMBICORT) 160-4.5 MCG/ACT AERO Inhale 2 puffs into the lungs in the morning and 2 puffs before bedtime. 6 each 0    metoprolol tartrate (LOPRESSOR) 25 MG tablet Take 0.5 tablets by mouth in the morning and 0.5 tablets before bedtime. 90 tablet 0    spironolactone (ALDACTONE) 25 MG tablet Take 1 tablet by mouth in the morning. 90 tablet 0    famotidine (PEPCID) 40 MG tablet Take 1 tablet by mouth every evening 90 tablet 1    pantoprazole (PROTONIX) 40 MG tablet TAKE 1 TABLET BY MOUTH ONE TIME A DAY 90 tablet 0    polyethylene glycol (MIRALAX) 17 g PACK packet Take 17 g by mouth daily      PREVIDENT 5000 BOOSTER PLUS 1.1 % PSTE   0     No current facility-administered medications for this visit. No follow-ups on file.       Electronically signed by Meredith Clifton MD on 10/11/2022 at 9:04 AM

## 2022-10-18 ENCOUNTER — HOSPITAL ENCOUNTER (OUTPATIENT)
Age: 57
Discharge: HOME | End: 2022-10-18
Payer: COMMERCIAL

## 2022-10-18 ENCOUNTER — NURSE ONLY (OUTPATIENT)
Dept: PRIMARY CARE CLINIC | Age: 57
End: 2022-10-18
Payer: COMMERCIAL

## 2022-10-18 VITALS — HEART RATE: 80 BPM | HEIGHT: 66 IN | WEIGHT: 290 LBS | OXYGEN SATURATION: 95 % | BODY MASS INDEX: 46.61 KG/M2

## 2022-10-18 DIAGNOSIS — I10 ESSENTIAL HYPERTENSION: ICD-10-CM

## 2022-10-18 DIAGNOSIS — R73.09 ELEVATED GLUCOSE: Primary | ICD-10-CM

## 2022-10-18 LAB
ABSOLUTE EOS #: 0.15 K/UL (ref 0–0.44)
ABSOLUTE IMMATURE GRANULOCYTE: 0.03 K/UL (ref 0–0.3)
ABSOLUTE LYMPH #: 3.02 K/UL (ref 1.1–3.7)
ABSOLUTE MONO #: 0.7 K/UL (ref 0.1–1.2)
ALBUMIN SERPL-MCNC: 4 G/DL (ref 3.5–5.2)
ALBUMIN/GLOBULIN RATIO: 1.3 (ref 1–2.5)
ALP BLD-CCNC: 82 U/L (ref 35–104)
ALT SERPL-CCNC: 19 U/L (ref 5–33)
ANION GAP SERPL CALCULATED.3IONS-SCNC: 10 MMOL/L (ref 9–17)
AST SERPL-CCNC: 19 U/L
BASOPHILS # BLD: 0 % (ref 0–2)
BASOPHILS ABSOLUTE: 0.04 K/UL (ref 0–0.2)
BILIRUB SERPL-MCNC: 0.5 MG/DL (ref 0.3–1.2)
BUN BLDV-MCNC: 11 MG/DL (ref 6–20)
BUN/CREAT BLD: 15 (ref 9–20)
CALCIUM SERPL-MCNC: 9.5 MG/DL (ref 8.6–10.4)
CHLORIDE BLD-SCNC: 102 MMOL/L (ref 98–107)
CO2: 27 MMOL/L (ref 20–31)
CREAT SERPL-MCNC: 0.75 MG/DL (ref 0.5–0.9)
EOSINOPHILS RELATIVE PERCENT: 1 % (ref 1–4)
GFR SERPL CREATININE-BSD FRML MDRD: >60 ML/MIN/1.73M2
GLUCOSE BLD-MCNC: 131 MG/DL (ref 70–99)
HBA1C MFR BLD: 6 %
HCT VFR BLD CALC: 44.5 % (ref 36.3–47.1)
HEMOGLOBIN: 15.5 G/DL (ref 11.9–15.1)
IMMATURE GRANULOCYTES: 0 %
LYMPHOCYTES # BLD: 29 % (ref 24–43)
MCH RBC QN AUTO: 31 PG (ref 25.2–33.5)
MCHC RBC AUTO-ENTMCNC: 34.8 G/DL (ref 28.4–34.8)
MCV RBC AUTO: 89 FL (ref 82.6–102.9)
MONOCYTES # BLD: 7 % (ref 3–12)
NRBC AUTOMATED: 0 PER 100 WBC
PDW BLD-RTO: 13.5 % (ref 11.8–14.4)
PLATELET # BLD: 268 K/UL (ref 138–453)
PMV BLD AUTO: 10 FL (ref 8.1–13.5)
POTASSIUM SERPL-SCNC: 4.3 MMOL/L (ref 3.7–5.3)
RBC # BLD: 5 M/UL (ref 3.95–5.11)
SEG NEUTROPHILS: 63 % (ref 36–65)
SEGMENTED NEUTROPHILS ABSOLUTE COUNT: 6.41 K/UL (ref 1.5–8.1)
SODIUM BLD-SCNC: 139 MMOL/L (ref 135–144)
TOTAL PROTEIN: 7.2 G/DL (ref 6.4–8.3)
TSH SERPL DL<=0.05 MIU/L-ACNC: 2.77 UIU/ML (ref 0.3–5)
WBC # BLD: 10.4 K/UL (ref 3.5–11.3)

## 2022-10-18 PROCEDURE — 85025 COMPLETE CBC W/AUTO DIFF WBC: CPT

## 2022-10-18 PROCEDURE — 84443 ASSAY THYROID STIM HORMONE: CPT

## 2022-10-18 PROCEDURE — 83036 HEMOGLOBIN GLYCOSYLATED A1C: CPT | Performed by: FAMILY MEDICINE

## 2022-10-18 PROCEDURE — 80053 COMPREHEN METABOLIC PANEL: CPT

## 2022-10-18 PROCEDURE — 36415 COLL VENOUS BLD VENIPUNCTURE: CPT

## 2022-11-17 RX ORDER — SPIRONOLACTONE 25 MG/1
25 TABLET ORAL DAILY
Qty: 90 TABLET | Refills: 0 | Status: SHIPPED | OUTPATIENT
Start: 2022-11-17

## 2022-11-21 ENCOUNTER — OFFICE VISIT (OUTPATIENT)
Dept: PULMONOLOGY | Age: 57
End: 2022-11-21
Payer: COMMERCIAL

## 2022-11-21 VITALS
SYSTOLIC BLOOD PRESSURE: 160 MMHG | TEMPERATURE: 97.4 F | OXYGEN SATURATION: 98 % | WEIGHT: 289.9 LBS | HEIGHT: 66 IN | HEART RATE: 74 BPM | BODY MASS INDEX: 46.59 KG/M2 | DIASTOLIC BLOOD PRESSURE: 85 MMHG | RESPIRATION RATE: 16 BRPM

## 2022-11-21 DIAGNOSIS — J45.40 MODERATE PERSISTENT ASTHMA WITHOUT COMPLICATION: ICD-10-CM

## 2022-11-21 DIAGNOSIS — E66.01 MORBID OBESITY DUE TO EXCESS CALORIES (HCC): ICD-10-CM

## 2022-11-21 DIAGNOSIS — G47.33 OSA (OBSTRUCTIVE SLEEP APNEA): ICD-10-CM

## 2022-11-21 DIAGNOSIS — Z86.16 HISTORY OF COVID-19: ICD-10-CM

## 2022-11-21 DIAGNOSIS — I82.90 VENOUS THROMBOEMBOLISM: Primary | ICD-10-CM

## 2022-11-21 DIAGNOSIS — I10 ESSENTIAL HYPERTENSION: ICD-10-CM

## 2022-11-21 DIAGNOSIS — Z79.01 LONG TERM CURRENT USE OF ANTICOAGULANT THERAPY: ICD-10-CM

## 2022-11-21 DIAGNOSIS — R09.82 POST-NASAL DRIP: ICD-10-CM

## 2022-11-21 DIAGNOSIS — D68.59 THROMBOPHILIA (HCC): ICD-10-CM

## 2022-11-21 PROCEDURE — 3074F SYST BP LT 130 MM HG: CPT | Performed by: INTERNAL MEDICINE

## 2022-11-21 PROCEDURE — 99214 OFFICE O/P EST MOD 30 MIN: CPT | Performed by: INTERNAL MEDICINE

## 2022-11-21 PROCEDURE — 3078F DIAST BP <80 MM HG: CPT | Performed by: INTERNAL MEDICINE

## 2022-11-21 NOTE — PATIENT INSTRUCTIONS
SURVEY:    You may be receiving a survey from [employer] regarding your visit today. Please complete the survey to enable us to provide the highest quality of care to you and your family. If you cannot score us a very good on any question, please call the office to discuss how we could have made your experience a very good one. Thank you. Please recheck your blood pressure when you go home and make sure you take your prescribed medication if applicable . Please follow up with your PCP or ER if needed.

## 2022-11-21 NOTE — PROGRESS NOTES
PULMONARY OP  PROGRESS NOTE      Patient:  Rowena Jenkins  YOB: 1965    MRN: [id number]     Acct:        Pt seen and Chart reviewed. Ms. Rowena Jenkins is here in followup for     Diagnosis Orders   1. Venous thromboembolism        2. SUSY (obstructive sleep apnea)        3. Moderate persistent asthma without complication        4. Essential hypertension        5. Thrombophilia ([address]        6. Morbid obesity due to excess calories ([address]        7. Post-nasal drip        8. Long term current use of anticoagulant therapy        9. History of COVID-19             Patient has been doing well since last visit. Pt has not been hospitalized or been to er since last visit. Has been using meds as recommended. She forgot to take her blood pressure medication this morning and her blood pressure was high at this visit  Patient has not been using her positive airway pressure therapy  Has daytime sleepiness and fatigue and tiredness  On anticoagulation for VTE with Eliquis  Denied any shortness of breath or wheezing. No cough or any sputum production. No hemoptysis. No epistaxis or sore throat. No daytime fatigue or tiredness or sleepiness. No orthopnea or PND. No hematemesis, melena or hematochezia. No vaginal bleeding. No motor vehicle accidents. No symptoms of narcolepsy. No nighttime symptoms of bronchial asthma. She has been using Symbicort  Hardly needs any albuterol  Continues to make efforts to lose weight  Patient having symptoms of acid reflux intermittently.       Subjective:   Review of Systems -   General ROS: Completed and except as mentioned above were negative   Psychological ROS:  Completed and except as mentioned above were negative  Ophthalmic ROS:  Completed and except as mentioned above were negative  ENT ROS:  Completed and except as mentioned above were negative  Allergy and Immunology ROS:  Completed and except as mentioned above were negative  Hematological and Lymphatic ROS:  Completed and except as mentioned above were negative  Endocrine ROS: Completed and except as mentioned above were negative  Breast ROS:  Completed and except as mentioned above were negative  Respiratory ROS:  Completed and except as mentioned above were negative  Cardiovascular ROS:  Completed and except as mentioned above were negative  Gastrointestinal ROS: Completed and except as mentioned above were negative  Genito-Urinary ROS:  Completed and except as mentioned above were negative  Musculoskeletal ROS:  Completed and except as mentioned above were negative  Neurological ROS:  Completed and except as mentioned above were negative  Dermatological ROS:  Completed and except as mentioned above were negative    NO choking, gasping, but has excessive daytime sleepiness, disrupted sleep  No mva    Allergies: Allergies   Allergen Reactions    Levaquin [Levofloxacin In D5w]      FLUSHED AND WHEEZING    Pcn [Penicillins]        Medications:    Current Outpatient Medications:     spironolactone (ALDACTONE) 25 MG tablet, Take 1 tablet by mouth daily, Disp: 90 tablet, Rfl: 0    apixaban (ELIQUIS) 5 MG TABS tablet, Take 1 tablet by mouth 2 times daily for 7 days, Disp: 14 tablet, Rfl: 0    olmesartan (BENICAR) 20 MG tablet, Take 1 tablet by mouth daily, Disp: 90 tablet, Rfl: 3    albuterol sulfate HFA (PROAIR HFA) 108 (90 Base) MCG/ACT inhaler, Inhale 2 puffs into the lungs 4 times daily as needed for Wheezing or Shortness of Breath, Disp: 1 each, Rfl: 0    budesonide-formoterol (SYMBICORT) 160-4.5 MCG/ACT AERO, Inhale 2 puffs into the lungs in the morning and 2 puffs before bedtime. , Disp: 6 each, Rfl: 0    metoprolol tartrate (LOPRESSOR) 25 MG tablet, Take 0.5 tablets by mouth in the morning and 0.5 tablets before bedtime. , Disp: 90 tablet, Rfl: 0    famotidine (PEPCID) 40 MG tablet, Take 1 tablet by mouth every evening, Disp: 90 tablet, Rfl: 1 pantoprazole (PROTONIX) 40 MG tablet, TAKE 1 TABLET BY MOUTH ONE TIME A DAY, Disp: 90 tablet, Rfl: 0    polyethylene glycol (MIRALAX) 17 g PACK packet, Take 17 g by mouth daily, Disp: , Rfl:     PREVIDENT 5000 BOOSTER PLUS 1.1 % PSTE, , Disp: , Rfl: 0      Objective:    Physical Exam:  Vitals:   BP (!) 160/85   Pulse 74   Temp 97.4 °F (36.3 °C)   Resp 16   Ht 5' 6\" (1.676 m)   Wt 289 lb 14.4 oz (131.5 kg)   SpO2 98%   BMI 46.79 kg/m²   Last 3 weights: Wt Readings from Last 3 Encounters:   11/21/22 289 lb 14.4 oz (131.5 kg)   10/18/22 290 lb (131.5 kg)   10/11/22 287 lb (130.2 kg)     Body mass index is 46.79 kg/m². Physical Examination:   General appearance - alert, well appearing, and in no distress, Morbidly obese  Mental status - alert, oriented to person, place, and time  Nose - normal and patent, no erythema, discharge or polyps  Mouth - mucous membranes moist, pharynx normal without lesions  Neck - supple, no significant adenopathy  Chest - clear to auscultation, no wheezes, rales or rhonchi, symmetric air entry  Heart - normal rate, regular rhythm, normal S1, S2, no murmurs, rubs, clicks or gallops  Abdomen - soft, nontender, nondistended, no masses or organomegaly  Extremities - no pedal edema noted  Skin - normal coloration and turgor, no rashes, no suspicious skin lesions noted     Labs:    none        Assessment:   Diagnosis Orders   1. Venous thromboembolism        2. SUSY (obstructive sleep apnea)        3. Moderate persistent asthma without complication        4. Essential hypertension        5. Thrombophilia ([address]        6. Morbid obesity due to excess calories ([address]        7. Post-nasal drip        8. Long term current use of anticoagulant therapy        9. History of COVID-19             PLAN:    CPAP AT LEAST FOR 4 HRS, QHS AT 9 CM H2O. Recommend using it every night. Explained importance of compliance with treatment of sleep apnea and its benefits  WT LOSS.   Patient is working on losing weight. Encouraged her to do so  [address]  USE HUMIDIFIER AS NEEDED. QUESTIONS ANSWERED to her satisfaction  EXPLAINED IMPORTANCE OF COMPLIANCE WITH THERAPY, especially given that she has had history of thromboembolic disease of the lung  9. VACCINATIONS  - HAD FLU VACCINE  Patient did not want the COVID-19 vaccination  10. REC ANTICOAGULATION FOR LIFE BY HEME. Patient does not want to change to the newer Oral anticoagulants  11. F/U IN 12 MONTHS. 12.  Refill provided-nasal pillows and other supplies  13. Recommend changing the supper time to 4 hours before bedtime to help decrease acid reflux symptoms. Thank you for having us involved in the care of your patient. Please call us if you have any questions or concerns.         Ivette Beebe MD, MD             11/21/2022, 10:36 AM

## 2022-12-10 ENCOUNTER — APPOINTMENT (OUTPATIENT)
Dept: GENERAL RADIOLOGY | Age: 57
End: 2022-12-10
Payer: COMMERCIAL

## 2022-12-10 ENCOUNTER — HOSPITAL ENCOUNTER (EMERGENCY)
Age: 57
Discharge: HOME | End: 2022-12-10
Attending: EMERGENCY MEDICINE
Payer: COMMERCIAL

## 2022-12-10 VITALS — TEMPERATURE: 97.3 F | HEART RATE: 95 BPM | OXYGEN SATURATION: 95 % | RESPIRATION RATE: 18 BRPM

## 2022-12-10 DIAGNOSIS — U07.1 COVID-19: Primary | ICD-10-CM

## 2022-12-10 PROCEDURE — 99283 EMERGENCY DEPT VISIT LOW MDM: CPT

## 2022-12-10 PROCEDURE — 71045 X-RAY EXAM CHEST 1 VIEW: CPT

## 2022-12-10 RX ORDER — ONDANSETRON 4 MG/1
4 TABLET, FILM COATED ORAL DAILY PRN
Qty: 20 TABLET | Refills: 0 | Status: SHIPPED | OUTPATIENT
Start: 2022-12-10

## 2022-12-10 ASSESSMENT — PAIN - FUNCTIONAL ASSESSMENT: PAIN_FUNCTIONAL_ASSESSMENT: NONE - DENIES PAIN

## 2022-12-10 NOTE — DISCHARGE INSTRUCTIONS
Rest at home. Make sure you stay well-hydrated. May use over-the-counter product such as Zyrtec Mucinex Robitussin or similar as needed and directed for symptom relief. Zofran if needed for nausea or vomiting.   Please seek medical attention immediately if you develop any significant worsening symptoms chest pain shortness of breath or any other acute concerns

## 2022-12-10 NOTE — ED PROVIDER NOTES
[facility] ED  EMERGENCY DEPARTMENT ENCOUNTER      Pt Name: Chris Doe  MRN: [id number]  [place] 1965  Date of evaluation: 12/10/2022  Provider: Araceli Edwards MD    CHIEF COMPLAINT       Chief Complaint   Patient presents with    Positive For Covid-19     Symptoms started Tuesday, was hospitalized for covid last year. Positive last night         HISTORY OF PRESENT ILLNESS   (Location/Symptom, Timing/Onset, Context/Setting, Quality, Duration, Modifying Factors, Severity)  Note limiting factors. Chris Doe is a 62 y.o. female who presents to the emergency department      60-year-old female presented emergency department for evaluation of some nasal congestion cough and nausea. Patient tested positive for COVID-19 yesterday. Symptoms have been present since this past Tuesday. No chest pain. No significant shortness of breath. Patient did have a 23 last year which required hospitalization and supplemental O2 mental O2 has been discontinued and patient recovered well. Patient feels ill and achy. Nursing Notes were reviewed. REVIEW OF SYSTEMS    (2-9 systems for level 4, 10 or more for level 5)     Review of Systems    Except as noted above the remainder of the review of systems was reviewed and negative.        PAST MEDICAL HISTORY     Past Medical History:   Diagnosis Date    Acute pulmonary embolism ([address] 11/2/2015    Anxiety     Asthma     Constipation, chronic     COVID-19 09/2021    CPAP (continuous positive airway pressure) dependence     Diabetes mellitus ([address]     pt states she was prediabetic at one point    Embolism - blood clot     right lung and right leg    Esophageal reflux     Factor V Leiden (HCC)     Heart murmur     HTN (hypertension)     Hyperlipidemia     Irritable bladder     MTHFR mutation     MVP (mitral valve prolapse)     Palpitations     Pulmonary embolism ([address] 10/15/2012    Rhinitis, allergic 10/15/2012    Sleep apnea     Venous thromboembolism 5/2/2016 SURGICAL HISTORY       Past Surgical History:   Procedure Laterality Date    CATARACT REMOVAL WITH IMPLANT Left 04/02/2018    CHOLECYSTECTOMY      around 2000    COLONOSCOPY  07/12/2021    COLONOSCOPY N/A 7/12/2021    COLONOSCOPY POLYPECTOMY CECUM COLD BIOPSY performed by Mason Bledsoe MD at [address]  3/27/2017    COLONOSCOPY CONTROL HEMORRHAGE performed by Margarito Carballo MD at [address] FLX W/RMVL OF TUMOR POLYP LESION [address] T[place]  3/27/2017    COLONOSCOPY POLYPECTOMY SNARE/COLD BIOPSY performed by Margarito Carballo MD at [address] [place] [address] W/O ECP Left 4/2/2018    EYE CATARACT EMULSIFICATION IOL IMPLANT performed by Monica Moseley MD at [address] [address]  1/2010    with biopsy    UPPER GASTROINTESTINAL ENDOSCOPY N/A 7/12/2021    EGD BIOPSY OF STOMACH, GASTRIC POLYPECTOMY AND DUODENUM BIOPSY performed by Mason Bledsoe MD at [facility] Medication List as of 12/10/2022 10:32 AM        CONTINUE these medications which have NOT CHANGED    Details   spironolactone (ALDACTONE) 25 MG tablet Take 1 tablet by mouth daily, Disp-90 tablet, R-0Normal      apixaban (ELIQUIS) 5 MG TABS tablet Take 1 tablet by mouth 2 times daily for 7 days, Disp-14 tablet, R-0Normal      olmesartan (BENICAR) 20 MG tablet Take 1 tablet by mouth daily, Disp-90 tablet, R-3Normal      albuterol sulfate HFA (PROAIR HFA) 108 (90 Base) MCG/ACT inhaler Inhale 2 puffs into the lungs 4 times daily as needed for Wheezing or Shortness of Breath, Disp-1 each, R-0Normal      budesonide-formoterol (SYMBICORT) 160-4.5 MCG/ACT AERO Inhale 2 puffs into the lungs in the morning and 2 puffs before bedtime. , Disp-6 each, R-0Normal      metoprolol tartrate (LOPRESSOR) 25 MG tablet Take 0.5 tablets by mouth in the morning and 0.5 tablets before bedtime. , Disp-90 tablet, R-0Normal      famotidine (PEPCID) 40 MG tablet Take 1 tablet by mouth every evening, Disp-90 tablet, R-1Normal      pantoprazole (PROTONIX) 40 MG tablet TAKE 1 TABLET BY MOUTH ONE TIME A DAY, Disp-90 tablet, R-0Normal      polyethylene glycol (MIRALAX) 17 g PACK packet Take 17 g by mouth dailyHistorical Med      PREVIDENT 5000 BOOSTER PLUS 1.1 % PSTE R-0Historical Med             ALLERGIES     Levaquin [levofloxacin in d5w] and Pcn [penicillins]    FAMILY HISTORY       Family History   Problem Relation Age of Onset    Cancer Mother         breast ca    Diabetes Mother     Depression Mother     Thyroid Disease Mother     High Blood Pressure Mother     Heart Disease Mother     Cancer Father         prostate ca    COPD Father     Heart Failure Father     Heart Disease Father     High Blood Pressure Father     Deep Vein Thrombosis Sister     Breast Cancer Sister     Deep Vein Thrombosis Sister           SOCIAL HISTORY       Social History     Socioeconomic History    Marital status:      Spouse name: None    Number of children: None    Years of education: None    Highest education level: None   Tobacco Use    Smoking status: Never    Smokeless tobacco: Never   Vaping Use    Vaping Use: Never used   Substance and Sexual Activity    Alcohol use: Yes     Alcohol/week: 0.0 standard drinks     Comment: 1 glass of wine/month    Drug use: No    Sexual activity: Yes     Partners: Male     Birth control/protection: Surgical     Social Determinants of Health     Financial Resource Strain: Low Risk     Difficulty of Paying Living Expenses: Not hard at all   Food Insecurity: No Food Insecurity    Worried About [address] in the Last Year: Never true    [facility] in the Last Year: Never true   Transportation Needs: No Transportation Needs    Lack of Transportation (Medical): No    Lack of Transportation (Non-Medical):  No       SCREENINGS        [place] Coma Scale  Eye Opening: Spontaneous  Best Verbal Response: Oriented  Best Motor Response: Obeys commands  [place] Coma Scale Score: 15               PHYSICAL EXAM    (up to 7 for level 4, 8 or more for level 5)     ED Triage Vitals [12/10/22 0759]   BP Temp Temp Source Heart Rate Resp SpO2 Height Weight   -- 97.3 °F (36.3 °C) Tympanic 95 18 95 % -- --       Physical Exam  Vitals reviewed. Constitutional:       General: She is not in acute distress. Appearance: She is not toxic-appearing. Comments: Ill but nontoxic-appearing. HENT:      Head: Normocephalic and atraumatic. Comments: Limited HEENT exam due to known COVID-19 infection  Cardiovascular:      Rate and Rhythm: Normal rate and regular rhythm. Pulmonary:      Effort: Pulmonary effort is normal. No respiratory distress. Breath sounds: Normal breath sounds. Abdominal:      General: There is no distension. Palpations: Abdomen is soft. Tenderness: There is no abdominal tenderness. Skin:     General: Skin is warm and dry. Findings: No rash. Neurological:      General: No focal deficit present. Mental Status: She is alert and oriented to person, place, and time. DIAGNOSTIC RESULTS     EKG: All EKG's are interpreted by the Emergency Department Physician who either signs or Co-signs this chart in the absence of a cardiologist.        RADIOLOGY:   Non-plain film images such as CT, Ultrasound and MRI are read by the radiologist. Plain radiographic images are visualized and preliminarily interpreted by the emergency physician with the below findings:        Interpretation per the Radiologist below, if available at the time of this note:    XR CHEST PORTABLE   Final Result   No acute cardiopulmonary disease. ED BEDSIDE ULTRASOUND:   Performed by ED Physician - none    LABS:  Labs Reviewed - No data to display    All other labs were within normal range or not returned as of this dictation.     EMERGENCY DEPARTMENT COURSE and DIFFERENTIAL DIAGNOSIS/MDM:   Vitals:    Vitals:    12/10/22 0759   Pulse: 95   Resp: 18   Temp: 97.3 °F (36.3 °C)   TempSrc: Tympanic   SpO2: 95%           MDM  Number of Diagnoses or Management Options  COVID-19  Diagnosis management comments: 59-year-old female positive for COVID-19 on home testing. No but nontoxic-appearing. Oxygen saturation in the mid 90s and patient is resting comfortably. Lungs are clear and chest x-ray is unremarkable. Patient is stable for discharge home. Home care instructions ED return and follow-up discussed. Stable for discharge home        [address] time was  minutes, excluding separately reportable procedures. There was a high probability of clinically significant/life threatening deterioration in the patient's condition which required my urgent intervention. CONSULTS:  None    PROCEDURES:  Unless otherwise noted below, none     Procedures        FINAL IMPRESSION      1. COVID-19          DISPOSITION/PLAN   DISPOSITION Decision To Discharge 12/10/2022 10:30:27 AM      PATIENT REFERRED TO:  No follow-up provider specified. DISCHARGE MEDICATIONS:  Discharge Medication List as of 12/10/2022 10:32 AM        START taking these medications    Details   ondansetron (ZOFRAN) 4 MG tablet Take 1 tablet by mouth daily as needed for Nausea or Vomiting, Disp-20 tablet, R-0Normal           Controlled Substances Monitoring:     RX Monitoring 3/11/2022   Attestation -   Periodic Controlled Substance Monitoring No signs of potential drug abuse or diversion identified.        (Please note that portions of this note were completed with a voice recognition program.  Efforts were made to edit the dictations but occasionally words are mis-transcribed.)    Kina Rao MD (electronically signed)  Attending Emergency Physician             Kina Rao MD  12/10/22 6537

## 2022-12-12 ENCOUNTER — TELEPHONE (OUTPATIENT)
Dept: PRIMARY CARE CLINIC | Age: 57
End: 2022-12-12

## 2022-12-12 NOTE — TELEPHONE ENCOUNTER
Patient called in this morning stating that she tested positive for Covid on Friday. Her symptoms began on Tuesday. She went to the ED on Saturday morning for a chest x-ray. Overall, she states she is doing much better this time than she was last. Her O2 is staying between 93-95%. She was supposed to get her flu vaccine completed for the mandate this week. She is unable to get it while she is sick, so she needs a letter stating that she can wait until she feels better to get the vaccine. She needs the letter emailed to [email or web address]. Is it ok to write her a letter?

## 2023-01-13 ENCOUNTER — OFFICE VISIT (OUTPATIENT)
Dept: PRIMARY CARE CLINIC | Age: 58
End: 2023-01-13
Payer: COMMERCIAL

## 2023-01-13 VITALS
WEIGHT: 289 LBS | HEIGHT: 66 IN | BODY MASS INDEX: 46.45 KG/M2 | OXYGEN SATURATION: 97 % | SYSTOLIC BLOOD PRESSURE: 124 MMHG | HEART RATE: 76 BPM | RESPIRATION RATE: 18 BRPM | DIASTOLIC BLOOD PRESSURE: 80 MMHG

## 2023-01-13 DIAGNOSIS — J45.40 MODERATE PERSISTENT ASTHMA WITHOUT COMPLICATION: ICD-10-CM

## 2023-01-13 DIAGNOSIS — K21.00 GASTROESOPHAGEAL REFLUX DISEASE WITH ESOPHAGITIS WITHOUT HEMORRHAGE: ICD-10-CM

## 2023-01-13 DIAGNOSIS — I10 ESSENTIAL HYPERTENSION: Primary | ICD-10-CM

## 2023-01-13 DIAGNOSIS — F41.1 GAD (GENERALIZED ANXIETY DISORDER): ICD-10-CM

## 2023-01-13 PROCEDURE — 3074F SYST BP LT 130 MM HG: CPT | Performed by: FAMILY MEDICINE

## 2023-01-13 PROCEDURE — 99214 OFFICE O/P EST MOD 30 MIN: CPT | Performed by: FAMILY MEDICINE

## 2023-01-13 PROCEDURE — 3079F DIAST BP 80-89 MM HG: CPT | Performed by: FAMILY MEDICINE

## 2023-01-13 ASSESSMENT — PATIENT HEALTH QUESTIONNAIRE - PHQ9
2. FEELING DOWN, DEPRESSED OR HOPELESS: 0
SUM OF ALL RESPONSES TO PHQ QUESTIONS 1-9: 0
SUM OF ALL RESPONSES TO PHQ9 QUESTIONS 1 & 2: 0
1. LITTLE INTEREST OR PLEASURE IN DOING THINGS: 0

## 2023-01-13 NOTE — PATIENT INSTRUCTIONS
SURVEY:    You may be receiving a survey from [employer] regarding your visit today. You may get this in the mail, through your MyChart, or in your email. Please complete the survey to enable us to provide the highest quality of care to you and your family. If you cannot score us a very good (5 Stars) on any question, please call the office to discuss how we could of made your experience exceptional.    Thank you!     BRITTANI Alexander RN Shelagh Canny, [address] [place] [address]    Phone: [phone]  Fax: [phone]    Office Hours:   Naomi Heck, [address] [facility], F: 8-5

## 2023-01-13 NOTE — PROGRESS NOTES
Hypertension: Patient here for follow-up of elevated blood pressure. She is not exercising and is adherent to low salt diet. Blood pressure is not well controlled at home. Cardiac symptoms none. Patient denies chest pain, dyspnea, fatigue, and palpitations. Cardiovascular risk factors: HTN, sedentary lifestyle,. Use of agents associated with hypertension: none. Hyperlipidemia: Patient presents with hyperlipidemia. GERD: Patient reports symptoms are unchanged. Patient reports she's doing well this visit, no issues or concerns.     Allergies:  Levaquin [levofloxacin in d5w] and Pcn [penicillins]    Past Medical History:    Past Medical History:   Diagnosis Date    Acute pulmonary embolism ([facility] [address] 11/2/2015    Anxiety     Asthma     Constipation, chronic     COVID-19 09/2021    CPAP (continuous positive airway pressure) dependence     Diabetes mellitus ([facility] [address]     pt states she was prediabetic at one point    Embolism - blood clot     right lung and right leg    Esophageal reflux     Factor V Leiden (HCC)     Heart murmur     HTN (hypertension)     Hyperlipidemia     Irritable bladder     MTHFR mutation     MVP (mitral valve prolapse)     Palpitations     Pulmonary embolism ([facility] [address] 10/15/2012    Rhinitis, allergic 10/15/2012    Sleep apnea     Venous thromboembolism 5/2/2016       Past Surgical History:    Past Surgical History:   Procedure Laterality Date    CATARACT REMOVAL WITH IMPLANT Left 04/02/2018    CHOLECYSTECTOMY      around 2000    COLONOSCOPY  07/12/2021    COLONOSCOPY N/A 7/12/2021    COLONOSCOPY POLYPECTOMY CECUM COLD BIOPSY performed by Louie Emanuel MD at [address]  3/27/2017    COLONOSCOPY CONTROL HEMORRHAGE performed by Gisele Cruz MD at [address] FLX W/RMVL OF TUMOR POLYP LESION [address] [place]  3/27/2017    COLONOSCOPY POLYPECTOMY SNARE/COLD BIOPSY performed by Gisele Cruz MD at [facility]    AK XCAPSL CTRC RMVL INSJ IO LENS PROSTH W/O ECP Left 4/2/2018    EYE CATARACT EMULSIFICATION IOL IMPLANT performed by Dunia Kasper MD at [address] [facility]  1/2010    with biopsy    UPPER GASTROINTESTINAL ENDOSCOPY N/A 7/12/2021    EGD BIOPSY OF STOMACH, GASTRIC POLYPECTOMY AND DUODENUM BIOPSY performed by John Patel MD at [address] [place] [address] History:   Social History     Tobacco Use    Smoking status: Never    Smokeless tobacco: Never   Substance Use Topics    Alcohol use: Yes     Alcohol/week: 0.0 standard drinks     Comment: 1 glass of wine/month       Family History:   Family History   Problem Relation Age of Onset    Cancer Mother         breast ca    Diabetes Mother     Depression Mother     Thyroid Disease Mother     High Blood Pressure Mother     Heart Disease Mother     Cancer Father         prostate ca    COPD Father     Heart Failure Father     Heart Disease Father     High Blood Pressure Father     Deep Vein Thrombosis Sister     Breast Cancer Sister     Deep Vein Thrombosis Sister          Review of Systems:  Constitutional: negative for fevers or chills  Eyes: negative for visual disturbance   ENT: negative for sore throat or nasal congestion  Respiratory: no cough or shortness of breath  Cardiovascular: negative for chest pain or palpitations  Gastrointestinal: negative for abd pain, nausea, vomiting, diarrhea or constipation  Genitourinary: negative for dysuria, urgency or frequency  Integument/breast: negative for skin rash or lesions  Neurological: negative for unilateral weakness, numbness or tingling. Skeletal Muscular: no joint pain     Medication List            Accurate as of January 13, 2023  9:30 AM. If you have any questions, ask your nurse or doctor.                 CONTINUE taking these medications      albuterol sulfate  (90 Base) MCG/ACT inhaler  Commonly known as: ProAir HFA  Inhale 2 puffs into the lungs 4 times daily as needed for Wheezing or Shortness of Breath     apixaban 5 MG Tabs tablet  Commonly known as: Eliquis  Take 1 tablet by mouth 2 times daily for 7 days     budesonide-formoterol 160-4.5 MCG/ACT Aero  Commonly known as: Symbicort  Inhale 2 puffs into the lungs in the morning and 2 puffs before bedtime. famotidine 40 MG tablet  Commonly known as: PEPCID  Take 1 tablet by mouth every evening     metoprolol tartrate 25 MG tablet  Commonly known as: LOPRESSOR  Take 0.5 tablets by mouth in the morning and 0.5 tablets before bedtime. olmesartan 20 MG tablet  Commonly known as: BENICAR  Take 1 tablet by mouth daily     ondansetron 4 MG tablet  Commonly known as: ZOFRAN  Take 1 tablet by mouth daily as needed for Nausea or Vomiting     pantoprazole 40 MG tablet  Commonly known as: PROTONIX  TAKE 1 TABLET BY MOUTH ONE TIME A DAY     polyethylene glycol 17 g Pack packet  Commonly known as: MIRALAX     PreviDent 5000 Booster Plus 1.1 % Pste  Generic drug: Sodium Fluoride     spironolactone 25 MG tablet  Commonly known as: ALDACTONE  Take 1 tablet by mouth daily              Objective:  Physical Exam:  VitalsBP 124/80 (Site: Left Upper Arm, Position: Sitting, Cuff Size: Large Adult)   Pulse 76   Resp 18   Ht 5' 6\" (1.676 m)   Wt 289 lb (131.1 kg)   LMP 12/13/2022 (Approximate)   SpO2 97%   BMI 46.65 kg/m²   GEN:   A & O x3, no apparent distress, obese  EYES: No gross abnormalities.   ENT:ENT exam normal, no neck nodes or sinus tenderness  NECK: normal, supple, no lymphadenopathy,  no carotid bruits  PULM: clear to auscultation bilaterally- no wheezes, rales or rhonchi, normal air movement, no respiratory distress  COR: regular rate & rhythm, no murmurs, and no gallops  ABD:  soft, non-tender  : deferred  EXT: normal strength, tone, and muscle mass  NEURO: Motor and sensory grossly intact  SKIN:  No skin lesions or rashes      Labs:  Lab Results   Component Value Date    BUN 11 10/18/2022 CREATININE 0.75 10/18/2022     10/18/2022    K 4.3 10/18/2022    CALCIUM 9.5 10/18/2022     10/18/2022    CO2 27 10/18/2022    LABGLOM >60 10/18/2022    CHOL 167 06/22/2022    LDLCHOLESTEROL 109 06/22/2022    HDL 34 (L) 06/22/2022    TRIG 120 06/22/2022    ALT 19 10/18/2022    AST 19 10/18/2022       Assessment:  1. Essential hypertension    2. Gastroesophageal reflux disease with esophagitis without hemorrhage    3. Moderate persistent asthma without complication    4. ASHTYN (generalized anxiety disorder)      Patient Active Problem List   Diagnosis Code    Bronchial asthma J45.909    Obesity E66.9    Thrombophilia (HCC) D68.59    Hyperlipidemia E78.5    Sleep apnea, obstructive G47.33    DVT, lower extremity, distal, chronic (HCC) I82.5Z9    Essential hypertension I10    Acute pulmonary embolism (HCC) I26.99    Long term current use of anticoagulant therapy Z79.01    History of colon polyps Z86.010    Pneumonia due to COVID-19 virus U07.1, J12.82    Mild malnutrition (HCC) E44.1    Hypernatremia E87.0    Hypoxia R09.02    Retching R11.10    Adenomatous polyps D36.9    Gastroesophageal reflux disease K21.9    Diverticulosis K57.90     Plan:  1. Essential hypertension -wel controlled with metoptolol,benicar, aldectone   2. Gastroesophageal reflux disease with esophagitis without hemorrhage -well controlled with protonix and pepsid   3. Moderate persistent asthma without complication -well controlled with symbicort   4. ASHTYN (generalized anxiety disorder) -stable       Continue current medications  Discussed diet  ,exercise and weight reduction  Encouraged low sodium, low cholesterol, weight loss diet. Goal for blood pressure controll is 130/80  Recommended regular exercise as tolerated, 3-5 times per day  Follow up in 3 months      No orders of the defined types were placed in this encounter.       Current Outpatient Medications   Medication Sig Dispense Refill    spironolactone (ALDACTONE) 25 MG tablet Take 1 tablet by mouth daily 90 tablet 0    apixaban (ELIQUIS) 5 MG TABS tablet Take 1 tablet by mouth 2 times daily for 7 days 14 tablet 0    olmesartan (BENICAR) 20 MG tablet Take 1 tablet by mouth daily 90 tablet 3    albuterol sulfate HFA (PROAIR HFA) 108 (90 Base) MCG/ACT inhaler Inhale 2 puffs into the lungs 4 times daily as needed for Wheezing or Shortness of Breath 1 each 0    budesonide-formoterol (SYMBICORT) 160-4.5 MCG/ACT AERO Inhale 2 puffs into the lungs in the morning and 2 puffs before bedtime. 6 each 0    metoprolol tartrate (LOPRESSOR) 25 MG tablet Take 0.5 tablets by mouth in the morning and 0.5 tablets before bedtime. 90 tablet 0    famotidine (PEPCID) 40 MG tablet Take 1 tablet by mouth every evening 90 tablet 1    pantoprazole (PROTONIX) 40 MG tablet TAKE 1 TABLET BY MOUTH ONE TIME A DAY 90 tablet 0    polyethylene glycol (MIRALAX) 17 g PACK packet Take 17 g by mouth daily      PREVIDENT 5000 BOOSTER PLUS 1.1 % PSTE   0    ondansetron (ZOFRAN) 4 MG tablet Take 1 tablet by mouth daily as needed for Nausea or Vomiting (Patient not taking: Reported on 1/13/2023) 20 tablet 0     No current facility-administered medications for this visit. No follow-ups on file.       Electronically signed by Nela Miller MD on 1/13/2023 at 9:30 AM

## 2023-02-16 DIAGNOSIS — F41.1 GAD (GENERALIZED ANXIETY DISORDER): ICD-10-CM

## 2023-02-16 RX ORDER — SPIRONOLACTONE 25 MG/1
TABLET ORAL
Qty: 90 TABLET | Refills: 0 | Status: SHIPPED | OUTPATIENT
Start: 2023-02-16

## 2023-02-16 RX ORDER — ALPRAZOLAM 0.25 MG/1
TABLET ORAL
Qty: 60 TABLET | Refills: 1 | Status: SHIPPED | OUTPATIENT
Start: 2023-02-16 | End: 2023-03-18

## 2023-02-16 RX ORDER — APIXABAN 5 MG/1
TABLET, FILM COATED ORAL
Qty: 180 TABLET | Refills: 0 | Status: SHIPPED | OUTPATIENT
Start: 2023-02-16

## 2023-05-09 ENCOUNTER — OFFICE VISIT (OUTPATIENT)
Dept: OBGYN | Age: 58
End: 2023-05-09

## 2023-05-09 VITALS — DIASTOLIC BLOOD PRESSURE: 84 MMHG | SYSTOLIC BLOOD PRESSURE: 132 MMHG

## 2023-05-09 DIAGNOSIS — Z13.820 SCREENING FOR OSTEOPOROSIS: ICD-10-CM

## 2023-05-09 DIAGNOSIS — Z12.31 SCREENING MAMMOGRAM, ENCOUNTER FOR: ICD-10-CM

## 2023-05-09 DIAGNOSIS — Z01.419 ENCOUNTER FOR ANNUAL ROUTINE GYNECOLOGICAL EXAMINATION: Primary | ICD-10-CM

## 2023-05-09 NOTE — PROGRESS NOTES
Assessment and Plan          Diagnosis Orders   1. Encounter for annual routine gynecological examination        2. Screening mammogram, encounter for  BRENNA SATURNINO DIGITAL SCREEN BILATERAL      3. Screening for osteoporosis  DEXA BONE DENSITY 2 SITES                I have discontinued Tess Rodrigueza's ondansetron. I am also having her maintain her PreviDent 5000 Booster Plus, polyethylene glycol, pantoprazole, famotidine, albuterol sulfate HFA, budesonide-formoterol, olmesartan, spironolactone, metoprolol tartrate, and Eliquis. Return in about 1 year (around 5/9/2024) for yearly. She was also counseled on her preventative health maintenance recommendations and follow-up. There are no Patient Instructions on file for this visit.     ZACARIAS Poon CNM,5/13/2023 11:22 AM

## 2023-05-13 ASSESSMENT — ENCOUNTER SYMPTOMS
BACK PAIN: 0
SHORTNESS OF BREATH: 0
ABDOMINAL PAIN: 0

## 2023-06-23 RX ORDER — SPIRONOLACTONE 25 MG/1
TABLET ORAL
Qty: 90 TABLET | Refills: 0 | Status: SHIPPED | OUTPATIENT
Start: 2023-06-23

## 2023-07-03 RX ORDER — BUDESONIDE AND FORMOTEROL FUMARATE DIHYDRATE 160; 4.5 UG/1; UG/1
2 AEROSOL RESPIRATORY (INHALATION) 2 TIMES DAILY
Qty: 6 EACH | Refills: 0 | Status: SHIPPED | OUTPATIENT
Start: 2023-07-03 | End: 2023-10-01

## 2023-07-18 SDOH — ECONOMIC STABILITY: INCOME INSECURITY: HOW HARD IS IT FOR YOU TO PAY FOR THE VERY BASICS LIKE FOOD, HOUSING, MEDICAL CARE, AND HEATING?: NOT HARD AT ALL

## 2023-07-18 SDOH — ECONOMIC STABILITY: FOOD INSECURITY: WITHIN THE PAST 12 MONTHS, YOU WORRIED THAT YOUR FOOD WOULD RUN OUT BEFORE YOU GOT MONEY TO BUY MORE.: NEVER TRUE

## 2023-07-18 SDOH — ECONOMIC STABILITY: FOOD INSECURITY: WITHIN THE PAST 12 MONTHS, THE FOOD YOU BOUGHT JUST DIDN'T LAST AND YOU DIDN'T HAVE MONEY TO GET MORE.: NEVER TRUE

## 2023-07-18 SDOH — ECONOMIC STABILITY: HOUSING INSECURITY
IN THE LAST 12 MONTHS, WAS THERE A TIME WHEN YOU DID NOT HAVE A STEADY PLACE TO SLEEP OR SLEPT IN A SHELTER (INCLUDING NOW)?: NO

## 2023-07-21 ENCOUNTER — OFFICE VISIT (OUTPATIENT)
Dept: PRIMARY CARE CLINIC | Age: 58
End: 2023-07-21
Payer: COMMERCIAL

## 2023-07-21 VITALS
SYSTOLIC BLOOD PRESSURE: 132 MMHG | WEIGHT: 293 LBS | OXYGEN SATURATION: 97 % | BODY MASS INDEX: 47.09 KG/M2 | HEIGHT: 66 IN | DIASTOLIC BLOOD PRESSURE: 80 MMHG | HEART RATE: 87 BPM

## 2023-07-21 DIAGNOSIS — R11.0 NAUSEA: ICD-10-CM

## 2023-07-21 DIAGNOSIS — D68.59 THROMBOPHILIA (HCC): ICD-10-CM

## 2023-07-21 DIAGNOSIS — K21.9 GASTROESOPHAGEAL REFLUX DISEASE, UNSPECIFIED WHETHER ESOPHAGITIS PRESENT: Primary | ICD-10-CM

## 2023-07-21 DIAGNOSIS — T75.3XXA MOTION SICKNESS, INITIAL ENCOUNTER: ICD-10-CM

## 2023-07-21 DIAGNOSIS — I10 ESSENTIAL HYPERTENSION: ICD-10-CM

## 2023-07-21 DIAGNOSIS — R61 EXCESSIVE SWEATING: ICD-10-CM

## 2023-07-21 DIAGNOSIS — I26.99 OTHER ACUTE PULMONARY EMBOLISM, UNSPECIFIED WHETHER ACUTE COR PULMONALE PRESENT (HCC): ICD-10-CM

## 2023-07-21 PROBLEM — E44.1 MILD MALNUTRITION (HCC): Status: RESOLVED | Noted: 2021-09-17 | Resolved: 2023-07-21

## 2023-07-21 PROCEDURE — 3075F SYST BP GE 130 - 139MM HG: CPT | Performed by: STUDENT IN AN ORGANIZED HEALTH CARE EDUCATION/TRAINING PROGRAM

## 2023-07-21 PROCEDURE — 99214 OFFICE O/P EST MOD 30 MIN: CPT | Performed by: STUDENT IN AN ORGANIZED HEALTH CARE EDUCATION/TRAINING PROGRAM

## 2023-07-21 PROCEDURE — 3079F DIAST BP 80-89 MM HG: CPT | Performed by: STUDENT IN AN ORGANIZED HEALTH CARE EDUCATION/TRAINING PROGRAM

## 2023-07-21 RX ORDER — SCOLOPAMINE TRANSDERMAL SYSTEM 1 MG/1
1 PATCH, EXTENDED RELEASE TRANSDERMAL
Qty: 10 PATCH | Refills: 0 | Status: SHIPPED | OUTPATIENT
Start: 2023-07-21 | End: 2023-08-04

## 2023-07-21 RX ORDER — PANTOPRAZOLE SODIUM 40 MG/1
40 TABLET, DELAYED RELEASE ORAL DAILY
Qty: 90 TABLET | Refills: 3 | Status: SHIPPED | OUTPATIENT
Start: 2023-07-21 | End: 2023-07-21

## 2023-07-21 RX ORDER — OXYBUTYNIN CHLORIDE 5 MG/1
5 TABLET, EXTENDED RELEASE ORAL DAILY
Qty: 30 TABLET | Refills: 3 | Status: SHIPPED | OUTPATIENT
Start: 2023-07-21

## 2023-07-21 RX ORDER — PANTOPRAZOLE SODIUM 40 MG/1
40 TABLET, DELAYED RELEASE ORAL DAILY
Qty: 90 TABLET | Refills: 3 | Status: SHIPPED | OUTPATIENT
Start: 2023-07-21

## 2023-07-21 NOTE — PROGRESS NOTES
has been experiencing heartburn for many year(s), chronic over time. She is on a PPI, well tolerated. Hypertension/ PE/Thrombophilia: She is exercising and is adherent to low salt diet. Blood pressure is well controlled at home. Cardiac symptoms none. Patient denies chest pain. Cardiovascular risk factors: hypertension. Use of agents associated with hypertension: none. She is on aldactone, benicar, lopressor, Eliquis and well tolerated. No bleeding noted. The patient is going on a cruise soon and has concerns for possible nausea.     Concern for excessive sweating for years, on/off, worsening over the past month  Nature of Onset and Mechanism -  gradual onset, worse after COVID  Location - head, neck, groin area  Duration - ongoing all day  Characteristics/Radiation/Quality - worsening  Severity (0-10) - severe  Aggravating Factors - movement, heat  Relieving Factors/Treatment tried - topical antiperspirant  Overall change in status since onset - stable    Health Maintenance -   Alcohol/Substance use History - None/Minimal    Tobacco Use      Smoking status: Never      Smokeless tobacco: Never    Family History   Problem Relation Age of Onset    Cancer Mother         breast ca    Diabetes Mother     Depression Mother     Thyroid Disease Mother     High Blood Pressure Mother     Heart Disease Mother     Cancer Father         prostate ca    COPD Father     Heart Failure Father     Heart Disease Father     High Blood Pressure Father     Deep Vein Thrombosis Sister     Breast Cancer Sister     Deep Vein Thrombosis Sister        [facility] Scores 1/13/2023 6/14/2022 12/2/2021 10/26/2021 7/26/2021 2/23/2021 2/4/2020   PHQ2 Score 0 0 0 0 0 2 0   PHQ9 Score 0 0 0 0 0 2 0     Interpretation of Total Score Depression Severity: 1-4 = Minimal depression, 5-9 = Mild depression, 10-14 = Moderate depression, 15-19 = Moderately severe depression, 20-27 = Severe depression    Review of Systems  Constitutional: Negative for activity

## 2023-08-24 ENCOUNTER — OFFICE VISIT (OUTPATIENT)
Dept: PRIMARY CARE CLINIC | Age: 58
End: 2023-08-24

## 2023-08-24 VITALS
OXYGEN SATURATION: 97 % | HEIGHT: 66 IN | WEIGHT: 291 LBS | DIASTOLIC BLOOD PRESSURE: 80 MMHG | BODY MASS INDEX: 46.77 KG/M2 | SYSTOLIC BLOOD PRESSURE: 140 MMHG | HEART RATE: 84 BPM | TEMPERATURE: 95.9 F

## 2023-08-24 DIAGNOSIS — B96.89 BACTERIAL URI: Primary | ICD-10-CM

## 2023-08-24 DIAGNOSIS — J06.9 BACTERIAL URI: Primary | ICD-10-CM

## 2023-08-24 DIAGNOSIS — R05.8 OTHER COUGH: ICD-10-CM

## 2023-08-24 LAB
Lab: 1
PERFORMING INSTRUMENT: NORMAL
QC PASS/FAIL: 1
SARS-COV-2, POC: NORMAL

## 2023-08-24 RX ORDER — ALBUTEROL SULFATE 90 UG/1
2 AEROSOL, METERED RESPIRATORY (INHALATION) EVERY 4 HOURS PRN
Qty: 18 G | Refills: 2 | Status: SHIPPED | OUTPATIENT
Start: 2023-08-24

## 2023-08-24 RX ORDER — DOXYCYCLINE HYCLATE 100 MG
100 TABLET ORAL 2 TIMES DAILY
Qty: 20 TABLET | Refills: 0 | Status: SHIPPED | OUTPATIENT
Start: 2023-08-24 | End: 2023-09-03

## 2023-08-24 RX ORDER — GUAIFENESIN 600 MG/1
600 TABLET, EXTENDED RELEASE ORAL 2 TIMES DAILY PRN
Qty: 30 TABLET | Refills: 0 | Status: SHIPPED | OUTPATIENT
Start: 2023-08-24 | End: 2023-09-08

## 2023-08-24 ASSESSMENT — ENCOUNTER SYMPTOMS
RHINORRHEA: 1
WHEEZING: 0
COUGH: 1
SHORTNESS OF BREATH: 0

## 2023-08-24 NOTE — PATIENT INSTRUCTIONS
SURVEY:    You may be receiving a survey from [employer] regarding your visit today. Please complete the survey to enable us to provide the highest quality of care to you and your family. If you cannot score us a very good on any question, please call the office to discuss how we could of made your experience a very good one. Thank you.

## 2023-08-28 ENCOUNTER — HOSPITAL ENCOUNTER (OUTPATIENT)
Dept: WOMENS IMAGING | Age: 58
Discharge: HOME | End: 2023-08-30
Attending: ADVANCED PRACTICE MIDWIFE
Payer: COMMERCIAL

## 2023-08-28 VITALS — BODY MASS INDEX: 45.64 KG/M2 | WEIGHT: 284 LBS | HEIGHT: 66 IN

## 2023-08-28 DIAGNOSIS — Z12.31 SCREENING MAMMOGRAM, ENCOUNTER FOR: ICD-10-CM

## 2023-08-28 DIAGNOSIS — Z13.820 SCREENING FOR OSTEOPOROSIS: ICD-10-CM

## 2023-08-28 PROCEDURE — 77063 BREAST TOMOSYNTHESIS BI: CPT

## 2023-08-28 PROCEDURE — 77080 DXA BONE DENSITY AXIAL: CPT

## 2023-09-29 RX ORDER — FAMOTIDINE 40 MG/1
40 TABLET, FILM COATED ORAL EVERY EVENING
Qty: 90 TABLET | Refills: 1 | Status: SHIPPED | OUTPATIENT
Start: 2023-09-29

## 2023-10-23 RX ORDER — SPIRONOLACTONE 25 MG/1
TABLET ORAL
Qty: 90 TABLET | Refills: 0 | Status: SHIPPED | OUTPATIENT
Start: 2023-10-23

## 2023-10-24 RX ORDER — SODIUM FLUORIDE 6 MG/ML
PASTE, DENTIFRICE DENTAL
Refills: 0 | OUTPATIENT
Start: 2023-10-24

## 2023-10-24 NOTE — TELEPHONE ENCOUNTER
Dr Patton dentist, pt use the toothpaste twice a day am and pm, she has been on it for about 9-10 yrs

## 2023-10-30 ENCOUNTER — HOSPITAL ENCOUNTER (OUTPATIENT)
Age: 58
Discharge: HOME | End: 2023-11-01
Attending: STUDENT IN AN ORGANIZED HEALTH CARE EDUCATION/TRAINING PROGRAM
Payer: COMMERCIAL

## 2023-10-30 ENCOUNTER — HOSPITAL ENCOUNTER (OUTPATIENT)
Age: 58
Discharge: HOME | End: 2023-10-30
Attending: STUDENT IN AN ORGANIZED HEALTH CARE EDUCATION/TRAINING PROGRAM
Payer: COMMERCIAL

## 2023-10-30 ENCOUNTER — HOSPITAL ENCOUNTER (OUTPATIENT)
Dept: GENERAL RADIOLOGY | Age: 58
Discharge: HOME | End: 2023-11-01
Attending: STUDENT IN AN ORGANIZED HEALTH CARE EDUCATION/TRAINING PROGRAM
Payer: COMMERCIAL

## 2023-10-30 ENCOUNTER — HOSPITAL ENCOUNTER (OUTPATIENT)
Dept: VASCULAR LAB | Age: 58
Discharge: HOME | End: 2023-11-01
Attending: STUDENT IN AN ORGANIZED HEALTH CARE EDUCATION/TRAINING PROGRAM
Payer: COMMERCIAL

## 2023-10-30 ENCOUNTER — OFFICE VISIT (OUTPATIENT)
Dept: PRIMARY CARE CLINIC | Age: 58
End: 2023-10-30
Payer: COMMERCIAL

## 2023-10-30 VITALS
OXYGEN SATURATION: 96 % | HEIGHT: 66 IN | WEIGHT: 291 LBS | DIASTOLIC BLOOD PRESSURE: 82 MMHG | HEART RATE: 90 BPM | SYSTOLIC BLOOD PRESSURE: 128 MMHG | BODY MASS INDEX: 46.77 KG/M2

## 2023-10-30 DIAGNOSIS — M79.662 BILATERAL CALF PAIN: ICD-10-CM

## 2023-10-30 DIAGNOSIS — M79.661 BILATERAL CALF PAIN: ICD-10-CM

## 2023-10-30 DIAGNOSIS — I10 ESSENTIAL HYPERTENSION: Primary | ICD-10-CM

## 2023-10-30 DIAGNOSIS — K59.00 CONSTIPATION, UNSPECIFIED CONSTIPATION TYPE: ICD-10-CM

## 2023-10-30 DIAGNOSIS — I10 ESSENTIAL HYPERTENSION: ICD-10-CM

## 2023-10-30 DIAGNOSIS — R73.9 HYPERGLYCEMIA: ICD-10-CM

## 2023-10-30 DIAGNOSIS — G47.33 OSA (OBSTRUCTIVE SLEEP APNEA): ICD-10-CM

## 2023-10-30 DIAGNOSIS — R06.02 SHORTNESS OF BREATH: ICD-10-CM

## 2023-10-30 LAB
ALBUMIN SERPL-MCNC: 4.4 G/DL (ref 3.5–5.2)
ALBUMIN/GLOB SERPL: 1.3 {RATIO} (ref 1–2.5)
ALP SERPL-CCNC: 82 U/L (ref 35–104)
ALT SERPL-CCNC: 26 U/L (ref 5–33)
ANION GAP SERPL CALCULATED.3IONS-SCNC: 11 MMOL/L (ref 9–17)
AST SERPL-CCNC: 28 U/L
BACTERIA URNS QL MICRO: ABNORMAL
BASOPHILS # BLD: 0.05 K/UL (ref 0–0.2)
BASOPHILS NFR BLD: 1 % (ref 0–2)
BILIRUB SERPL-MCNC: 0.8 MG/DL (ref 0.3–1.2)
BILIRUB UR QL STRIP: NEGATIVE
BUN SERPL-MCNC: 11 MG/DL (ref 6–20)
BUN/CREAT SERPL: 14 (ref 9–20)
CALCIUM SERPL-MCNC: 9.5 MG/DL (ref 8.6–10.4)
CHLORIDE SERPL-SCNC: 100 MMOL/L (ref 98–107)
CLARITY UR: CLEAR
CO2 SERPL-SCNC: 29 MMOL/L (ref 20–31)
COLOR UR: YELLOW
CREAT SERPL-MCNC: 0.8 MG/DL (ref 0.5–0.9)
D DIMER PPP FEU-MCNC: 0.65 UG/ML FEU (ref 0–0.59)
ECHO BSA: 2.48 M2
EOSINOPHIL # BLD: 0.14 K/UL (ref 0–0.44)
EOSINOPHILS RELATIVE PERCENT: 1 % (ref 1–4)
EPI CELLS #/AREA URNS HPF: ABNORMAL /HPF (ref 0–25)
ERYTHROCYTE [DISTWIDTH] IN BLOOD BY AUTOMATED COUNT: 13.2 % (ref 11.8–14.4)
EST. AVERAGE GLUCOSE BLD GHB EST-MCNC: 143 MG/DL
GFR SERPL CREATININE-BSD FRML MDRD: >60 ML/MIN/1.73M2
GLUCOSE SERPL-MCNC: 110 MG/DL (ref 70–99)
GLUCOSE UR STRIP-MCNC: NEGATIVE MG/DL
HBA1C MFR BLD: 6.6 % (ref 4–6)
HCT VFR BLD AUTO: 44.9 % (ref 36.3–47.1)
HGB BLD-MCNC: 15.8 G/DL (ref 11.9–15.1)
HGB UR QL STRIP.AUTO: ABNORMAL
IMM GRANULOCYTES # BLD AUTO: <0.03 K/UL (ref 0–0.3)
IMM GRANULOCYTES NFR BLD: 0 %
KETONES UR STRIP-MCNC: NEGATIVE MG/DL
LEUKOCYTE ESTERASE UR QL STRIP: ABNORMAL
LYMPHOCYTES NFR BLD: 3.47 K/UL (ref 1.1–3.7)
LYMPHOCYTES RELATIVE PERCENT: 34 % (ref 24–43)
MCH RBC QN AUTO: 31 PG (ref 25.2–33.5)
MCHC RBC AUTO-ENTMCNC: 35.2 G/DL (ref 28.4–34.8)
MCV RBC AUTO: 88 FL (ref 82.6–102.9)
MONOCYTES NFR BLD: 0.69 K/UL (ref 0.1–1.2)
MONOCYTES NFR BLD: 7 % (ref 3–12)
NEUTROPHILS NFR BLD: 57 % (ref 36–65)
NEUTS SEG NFR BLD: 5.92 K/UL (ref 1.5–8.1)
NITRITE UR QL STRIP: POSITIVE
NRBC BLD-RTO: 0 PER 100 WBC
PH UR STRIP: 6 [PH] (ref 5–9)
PLATELET # BLD AUTO: 231 K/UL (ref 138–453)
PMV BLD AUTO: 10 FL (ref 8.1–13.5)
POTASSIUM SERPL-SCNC: 4.1 MMOL/L (ref 3.7–5.3)
PROT SERPL-MCNC: 7.7 G/DL (ref 6.4–8.3)
PROT UR STRIP-MCNC: NEGATIVE MG/DL
RBC # BLD AUTO: 5.1 M/UL (ref 3.95–5.11)
RBC #/AREA URNS HPF: ABNORMAL /HPF (ref 0–2)
SODIUM SERPL-SCNC: 140 MMOL/L (ref 135–144)
SP GR UR STRIP: 1.02 (ref 1.01–1.02)
UROBILINOGEN UR STRIP-ACNC: NORMAL EU/DL (ref 0–1)
WBC #/AREA URNS HPF: ABNORMAL /HPF (ref 0–5)
WBC OTHER # BLD: 10.3 K/UL (ref 3.5–11.3)

## 2023-10-30 PROCEDURE — 74018 RADEX ABDOMEN 1 VIEW: CPT

## 2023-10-30 PROCEDURE — 82306 VITAMIN D 25 HYDROXY: CPT

## 2023-10-30 PROCEDURE — 82607 VITAMIN B-12: CPT

## 2023-10-30 PROCEDURE — 87086 URINE CULTURE/COLONY COUNT: CPT

## 2023-10-30 PROCEDURE — 3079F DIAST BP 80-89 MM HG: CPT | Performed by: STUDENT IN AN ORGANIZED HEALTH CARE EDUCATION/TRAINING PROGRAM

## 2023-10-30 PROCEDURE — 3074F SYST BP LT 130 MM HG: CPT | Performed by: STUDENT IN AN ORGANIZED HEALTH CARE EDUCATION/TRAINING PROGRAM

## 2023-10-30 PROCEDURE — 36415 COLL VENOUS BLD VENIPUNCTURE: CPT

## 2023-10-30 PROCEDURE — 87088 URINE BACTERIA CULTURE: CPT

## 2023-10-30 PROCEDURE — 85025 COMPLETE CBC W/AUTO DIFF WBC: CPT

## 2023-10-30 PROCEDURE — 87186 SC STD MICRODIL/AGAR DIL: CPT

## 2023-10-30 PROCEDURE — 80053 COMPREHEN METABOLIC PANEL: CPT

## 2023-10-30 PROCEDURE — 93970 EXTREMITY STUDY: CPT | Performed by: SURGERY

## 2023-10-30 PROCEDURE — 81001 URINALYSIS AUTO W/SCOPE: CPT

## 2023-10-30 PROCEDURE — 93970 EXTREMITY STUDY: CPT

## 2023-10-30 PROCEDURE — 99214 OFFICE O/P EST MOD 30 MIN: CPT | Performed by: STUDENT IN AN ORGANIZED HEALTH CARE EDUCATION/TRAINING PROGRAM

## 2023-10-30 PROCEDURE — 83036 HEMOGLOBIN GLYCOSYLATED A1C: CPT

## 2023-10-30 PROCEDURE — 82746 ASSAY OF FOLIC ACID SERUM: CPT

## 2023-10-30 PROCEDURE — 85379 FIBRIN DEGRADATION QUANT: CPT

## 2023-10-30 NOTE — PROGRESS NOTES
[address], [place] [address] [place], [place], [address] [employer] [address] Sherine is a 62 y.o. female with  has a past medical history of Acute pulmonary embolism ([address]), Anxiety, Asthma, Constipation, chronic, COVID-19, CPAP (continuous positive airway pressure) dependence, Diabetes mellitus ([address]), Embolism - blood clot, Esophageal reflux, Factor V Leiden ([address]), Heart murmur, HTN (hypertension), Hyperlipidemia, Irritable bladder, MTHFR mutation, MVP (mitral valve prolapse), Palpitations, Pulmonary embolism ([address]), Rhinitis, allergic, Sleep apnea, and Venous thromboembolism. Presented to the office today for:  Chief Complaint   Patient presents with    Hypertension       Assessment/Plan   1. Essential hypertension  -     CBC with Auto Differential; Future  -     Comprehensive Metabolic Panel; Future  -     Echo (TTE) complete (PRN contrast/bubble/strain/3D); Future  -     Urinalysis with Reflex to Culture; Future  2. Constipation, unspecified constipation type  -     Vitamin D 25 Hydroxy; Future  -     Vitamin B12 & Folate; Future  -     XR ABDOMEN (KUB) (SINGLE AP VIEW); Future  -     linaCLOtide (LINZESS) 72 MCG CAPS capsule; Take 1 capsule by mouth every morning (before breakfast), Disp-90 capsule, R-0Normal  -     Urinalysis with Reflex to Culture; Future  3. Bilateral calf pain  -     Vascular duplex lower extremity venous bilateral; Future  -     D-Dimer, Quantitative; Future  4. Shortness of breath  -     Full PFT Study With Bronchodilator; Future  -     Echo (TTE) complete (PRN contrast/bubble/strain/3D); Future  5. SUSY (obstructive sleep apnea)  -     Sleep Study with PAP Titration; Future  -     Baseline Diagnostic Sleep Study; Future  -     Home Sleep Study; Future  6. Hyperglycemia  -     Hemoglobin A1C; Future    Return in about 1 month (around 11/30/2023) for F/U Meds.     VL DUP STAT ordered today  Encourage ongoing use of Eliquis BID  Encourage SUSY/CPAP machine  Obtain labs

## 2023-10-31 LAB
25(OH)D3 SERPL-MCNC: 22.6 NG/ML
FOLATE SERPL-MCNC: 11.2 NG/ML
VIT B12 SERPL-MCNC: 558 PG/ML (ref 232–1245)

## 2023-11-01 LAB
MICROORGANISM SPEC CULT: ABNORMAL
SPECIMEN DESCRIPTION: ABNORMAL

## 2023-11-01 RX ORDER — CEPHALEXIN 500 MG/1
500 CAPSULE ORAL 4 TIMES DAILY
Qty: 28 CAPSULE | Refills: 0 | Status: SHIPPED | OUTPATIENT
Start: 2023-11-01 | End: 2023-11-08

## 2023-11-02 DIAGNOSIS — E55.9 VITAMIN D DEFICIENCY: Primary | ICD-10-CM

## 2023-11-02 RX ORDER — ERGOCALCIFEROL 1.25 MG/1
50000 CAPSULE ORAL WEEKLY
Qty: 12 CAPSULE | Refills: 1 | Status: SHIPPED | OUTPATIENT
Start: 2023-11-02

## 2023-11-02 NOTE — TELEPHONE ENCOUNTER
----- Message from Riam Pillai MD sent at 11/1/2023  3:13 PM EDT -----  Please notify the patient that their other labs were also reviewed. 1) We had discussed the positive UA for UTI - plan to recheck this in 2 weeks time  2) vitamin D was low, I would recommend 50,000IU weekly for 12 weeks - pend in agreeable  3) A1c was 6.6 - elevated compared to prior - continue w/ lifestyle changes for now    Thank you.   Electronically signed by Rima Pillai MD on 11/1/2023 at 3:13 PM

## 2023-11-13 ENCOUNTER — HOSPITAL ENCOUNTER (OUTPATIENT)
Dept: SLEEP CENTER | Age: 58
Discharge: HOME | End: 2023-11-13
Attending: STUDENT IN AN ORGANIZED HEALTH CARE EDUCATION/TRAINING PROGRAM
Payer: COMMERCIAL

## 2023-11-13 DIAGNOSIS — G47.33 OSA (OBSTRUCTIVE SLEEP APNEA): ICD-10-CM

## 2023-11-13 PROCEDURE — 95806 SLEEP STUDY UNATT&RESP EFFT: CPT

## 2023-11-13 ASSESSMENT — SLEEP AND FATIGUE QUESTIONNAIRES
SNORING VOLUME: VERY LOUD
HOW LIKELY ARE YOU TO NOD OFF OR FALL ASLEEP WHILE LYING DOWN TO REST IN THE AFTERNOON WHEN CIRCUMSTANCES PERMIT: 3
HOW LIKELY ARE YOU TO NOD OFF OR FALL ASLEEP IN A CAR, WHILE STOPPED FOR A FEW MINUTES IN TRAFFIC: 0
NORMAL AMOUNT OF SLEEP PER NIGHT: 7
WHAT TIME DO YOU USUALLY WAKE UP: 20700
NUMBER OF TIMES YOU WAKE PER NIGHT: 2
ARE YOU TIRED DURING WAKE TIME: ALMOST DAILY
ARE YOU TIRED AFTER SLEEPING: ALMOST DAILY
HOW LIKELY ARE YOU TO NOD OFF OR FALL ASLEEP WHEN YOU ARE A PASSENGER IN A CAR FOR AN HOUR WITHOUT A BREAK: 2
ESS TOTAL SCORE: 14
HOW LIKELY ARE YOU TO NOD OFF OR FALL ASLEEP WHILE SITTING INACTIVE IN A PUBLIC PLACE: 1
DO YOU SNORE: YES
HOW LIKELY ARE YOU TO NOD OFF OR FALL ASLEEP WHILE SITTING AND READING: 3
HOW LIKELY ARE YOU TO NOD OFF OR FALL ASLEEP WHILE WATCHING TV: 3
HAS ANYONE NOTICED THAT YOU QUIT BREATHING DURING SLEEP: ALMOST DAILY
HOW LIKELY ARE YOU TO NOD OFF OR FALL ASLEEP WHILE SITTING QUIETLY AFTER LUNCH WITHOUT ALCOHOL: 1
USUAL AMOUNT OF TIME TO FALL ASLEEP (MIN): 10
DOES YOUR SNORING BOTHER OTHERS: YES
WHAT TIME DO YOU USUALLY GO TO BED: 82800
HOW OFTEN DO YOU SNORE: ALMOST DAILY
HOW MANY NAPS DO YOU TAKE PER WEEK: 2
HOW LIKELY ARE YOU TO NOD OFF OR FALL ASLEEP WHILE SITTING AND TALKING TO SOMEONE: 1

## 2023-11-14 ENCOUNTER — HOSPITAL ENCOUNTER (OUTPATIENT)
Dept: PULMONOLOGY | Age: 58
Discharge: HOME | End: 2023-11-14
Payer: COMMERCIAL

## 2023-11-14 DIAGNOSIS — R06.02 SHORTNESS OF BREATH: ICD-10-CM

## 2023-11-14 PROCEDURE — 94726 PLETHYSMOGRAPHY LUNG VOLUMES: CPT

## 2023-11-14 PROCEDURE — 94060 EVALUATION OF WHEEZING: CPT

## 2023-11-14 PROCEDURE — 6370000000 HC RX 637 (ALT 250 FOR IP): Performed by: INTERNAL MEDICINE

## 2023-11-14 PROCEDURE — 94729 DIFFUSING CAPACITY: CPT

## 2023-11-14 PROCEDURE — 94664 DEMO&/EVAL PT USE INHALER: CPT

## 2023-11-14 RX ORDER — ALBUTEROL SULFATE 90 UG/1
4 AEROSOL, METERED RESPIRATORY (INHALATION) ONCE
Status: COMPLETED | OUTPATIENT
Start: 2023-11-14 | End: 2023-11-14

## 2023-11-14 RX ADMIN — ALBUTEROL SULFATE 4 PUFF: 90 AEROSOL, METERED RESPIRATORY (INHALATION) at 11:25

## 2023-11-15 ENCOUNTER — TELEPHONE (OUTPATIENT)
Dept: PRIMARY CARE CLINIC | Age: 58
End: 2023-11-15

## 2023-11-15 LAB — STATUS: NORMAL

## 2023-11-15 NOTE — TELEPHONE ENCOUNTER
Patient is requesting a new order for a cpap machine as her current machine is older. She has an appt scheduled for her titration appt next week. DME order pended. Needs sent to [address] [place] [address] once signed.

## 2023-11-21 ENCOUNTER — HOSPITAL ENCOUNTER (OUTPATIENT)
Dept: SLEEP CENTER | Age: 58
Discharge: HOME | End: 2023-11-21
Payer: COMMERCIAL

## 2023-11-21 VITALS — HEIGHT: 66 IN | WEIGHT: 293 LBS | BODY MASS INDEX: 47.09 KG/M2

## 2023-11-21 DIAGNOSIS — G47.33 OSA (OBSTRUCTIVE SLEEP APNEA): ICD-10-CM

## 2023-11-21 PROCEDURE — 95811 POLYSOM 6/>YRS CPAP 4/> PARM: CPT

## 2023-11-22 NOTE — PROGRESS NOTES
Patient arrived for titration study. Pt fitted with Dreamwear nasal pillows size small.  [facility].

## 2023-11-28 LAB — STATUS: NORMAL

## 2023-11-29 ENCOUNTER — HOSPITAL ENCOUNTER (OUTPATIENT)
Age: 58
Discharge: HOME | End: 2023-12-01
Attending: STUDENT IN AN ORGANIZED HEALTH CARE EDUCATION/TRAINING PROGRAM
Payer: COMMERCIAL

## 2023-11-29 VITALS
WEIGHT: 293 LBS | DIASTOLIC BLOOD PRESSURE: 82 MMHG | HEIGHT: 66 IN | SYSTOLIC BLOOD PRESSURE: 128 MMHG | BODY MASS INDEX: 47.09 KG/M2

## 2023-11-29 DIAGNOSIS — I10 ESSENTIAL HYPERTENSION: ICD-10-CM

## 2023-11-29 DIAGNOSIS — R06.02 SHORTNESS OF BREATH: ICD-10-CM

## 2023-11-29 LAB
ECHO AO ROOT DIAM: 3.3 CM
ECHO AO ROOT INDEX: 1.4 CM/M2
ECHO AV ACCELERATION TIME: 51.57 MS
ECHO AV CUSP MM: 1.9 CM
ECHO AV MEAN GRADIENT: 5 MMHG
ECHO AV MEAN VELOCITY: 1 M/S
ECHO AV PEAK VELOCITY: 1.5 M/S
ECHO AV REGURGITANT PHT: 542.5 MILLISECOND
ECHO AV VTI: 26.4 CM
ECHO BSA: 2.49 M2
ECHO LA DIAMETER INDEX: 1.91 CM/M2
ECHO LA DIAMETER: 4.5 CM
ECHO LA MAJOR AXIS: 4.5 CM
ECHO LA TO AORTIC ROOT RATIO: 1.36
ECHO LA VOL BP: 55 ML (ref 22–52)
ECHO LA VOL MOD A2C: 69 ML (ref 22–52)
ECHO LA VOL MOD A4C: 36 ML (ref 22–52)
ECHO LA VOL/BSA BIPLANE: 23 ML/M2 (ref 16–34)
ECHO LA VOLUME AREA LENGTH: 57 ML
ECHO LA VOLUME INDEX AREA LENGTH: 24 ML/M2 (ref 16–34)
ECHO LA VOLUME INDEX MOD A2C: 29 ML/M2 (ref 16–34)
ECHO LA VOLUME INDEX MOD A4C: 15 ML/M2 (ref 16–34)
ECHO LV E' LATERAL VELOCITY: 6 CM/S
ECHO LV EDV A2C: 70 ML
ECHO LV EDV A4C: 85 ML
ECHO LV EDV BP: 77 ML (ref 56–104)
ECHO LV EDV INDEX A4C: 36 ML/M2
ECHO LV EDV INDEX BP: 33 ML/M2
ECHO LV EDV NDEX A2C: 30 ML/M2
ECHO LV EJECTION FRACTION BIPLANE: 56 % (ref 55–100)
ECHO LV ESV A2C: 28 ML
ECHO LV ESV A4C: 35 ML
ECHO LV ESV BP: 33 ML (ref 19–49)
ECHO LV ESV INDEX A2C: 12 ML/M2
ECHO LV ESV INDEX A4C: 15 ML/M2
ECHO LV ESV INDEX BP: 14 ML/M2
ECHO LV FRACTIONAL SHORTENING: 21 % (ref 28–44)
ECHO LV INTERNAL DIMENSION DIASTOLE INDEX: 1.61 CM/M2
ECHO LV INTERNAL DIMENSION DIASTOLIC: 3.8 CM (ref 3.9–5.3)
ECHO LV INTERNAL DIMENSION SYSTOLIC INDEX: 1.27 CM/M2
ECHO LV INTERNAL DIMENSION SYSTOLIC: 3 CM
ECHO LV IVSD: 1.6 CM (ref 0.6–0.9)
ECHO LV IVSS: 1.7 CM
ECHO LV MASS 2D: 216.6 G (ref 67–162)
ECHO LV MASS INDEX 2D: 91.8 G/M2 (ref 43–95)
ECHO LV POSTERIOR WALL DIASTOLIC: 1.4 CM (ref 0.6–0.9)
ECHO LV POSTERIOR WALL SYSTOLIC: 1.6 CM
ECHO LV RELATIVE WALL THICKNESS RATIO: 0.74
ECHO LVOT AV VTI INDEX: 0.76
ECHO LVOT MEAN GRADIENT: 2 MMHG
ECHO LVOT VTI: 20.1 CM
ECHO MV A VELOCITY: 1.01 M/S
ECHO MV E DECELERATION TIME (DT): 237.6 MS
ECHO MV E VELOCITY: 0.82 M/S
ECHO MV E/A RATIO: 0.81
ECHO MV E/E' LATERAL: 13.67
ECHO PV MAX VELOCITY: 1 M/S

## 2023-11-29 PROCEDURE — 93306 TTE W/DOPPLER COMPLETE: CPT | Performed by: FAMILY MEDICINE

## 2023-11-29 PROCEDURE — 93306 TTE W/DOPPLER COMPLETE: CPT

## 2023-11-30 ENCOUNTER — OFFICE VISIT (OUTPATIENT)
Dept: PRIMARY CARE CLINIC | Age: 58
End: 2023-11-30
Payer: COMMERCIAL

## 2023-11-30 VITALS
HEART RATE: 64 BPM | HEIGHT: 66 IN | DIASTOLIC BLOOD PRESSURE: 80 MMHG | SYSTOLIC BLOOD PRESSURE: 122 MMHG | RESPIRATION RATE: 18 BRPM | BODY MASS INDEX: 47.09 KG/M2 | WEIGHT: 293 LBS

## 2023-11-30 DIAGNOSIS — N39.0 RECURRENT UTI: ICD-10-CM

## 2023-11-30 DIAGNOSIS — N39.0 URINARY TRACT INFECTION WITHOUT HEMATURIA, SITE UNSPECIFIED: ICD-10-CM

## 2023-11-30 DIAGNOSIS — R93.1 ABNORMAL ECHOCARDIOGRAM: Primary | ICD-10-CM

## 2023-11-30 PROCEDURE — 3079F DIAST BP 80-89 MM HG: CPT | Performed by: STUDENT IN AN ORGANIZED HEALTH CARE EDUCATION/TRAINING PROGRAM

## 2023-11-30 PROCEDURE — 99214 OFFICE O/P EST MOD 30 MIN: CPT | Performed by: STUDENT IN AN ORGANIZED HEALTH CARE EDUCATION/TRAINING PROGRAM

## 2023-11-30 PROCEDURE — 3074F SYST BP LT 130 MM HG: CPT | Performed by: STUDENT IN AN ORGANIZED HEALTH CARE EDUCATION/TRAINING PROGRAM

## 2023-11-30 NOTE — PROGRESS NOTES
[address], [place] [address] , [place], [address] [employer] [address] Sherine is a 62 y.o. female with  has a past medical history of Acute pulmonary embolism ([address]), Anxiety, Asthma, Constipation, chronic, COVID-19, CPAP (continuous positive airway pressure) dependence, Diabetes mellitus ([address]), Embolism - blood clot, Esophageal reflux, Factor V Leiden ([address]), Heart murmur, HTN (hypertension), Hyperlipidemia, Irritable bladder, MTHFR mutation, MVP (mitral valve prolapse), Palpitations, Pulmonary embolism ([address]), Rhinitis, allergic, Sleep apnea, and Venous thromboembolism. Presented to the office today for:  Chief Complaint   Patient presents with    Hypertension       Assessment/Plan   1. Abnormal echocardiogram  -     Emmy Chu MD, Cardiology, [place]  -     Longterm Continuous Cardiac Event Monitor; Future  -     CT ABDOMEN PELVIS W IV CONTRAST Additional Contrast? Oral; Future  2. Recurrent UTI  -     Culture, Urine; Future  3. Urinary tract infection without hematuria, site unspecified  -     Culture, Urine; Future  Return in about 3 months (around 2/29/2024) for F/U Meds. Cardiology consultation as discussed  F/U with Pulm per prior plans  Plan to start CPAP machine soon. Abdominal mass - CT Abdomen/Pelvis     All patient questions answered. Pt voiced understanding. There are no discontinued medications. Patient received counseling on the following healthy behaviors: nutrition, exercise and medication adherence. I encouraged and discussed lifestyle modifications including diet and exercise and the patient was agreeable to making positive/beneficial changes to both to help improve their overall health. Discussed use, benefit, and side effects of prescribed medications. Barriers to medication compliance addressed. Patient given educational materials: see patient instructions. HM - HM items completed today as per orders.  Outstanding HM items though not

## 2023-12-04 ENCOUNTER — OFFICE VISIT (OUTPATIENT)
Dept: PULMONOLOGY | Age: 58
End: 2023-12-04
Payer: COMMERCIAL

## 2023-12-04 VITALS
SYSTOLIC BLOOD PRESSURE: 142 MMHG | HEIGHT: 66 IN | RESPIRATION RATE: 16 BRPM | DIASTOLIC BLOOD PRESSURE: 86 MMHG | BODY MASS INDEX: 47.09 KG/M2 | WEIGHT: 293 LBS | HEART RATE: 72 BPM

## 2023-12-04 DIAGNOSIS — I50.32 CHRONIC DIASTOLIC (CONGESTIVE) HEART FAILURE (HCC): ICD-10-CM

## 2023-12-04 DIAGNOSIS — I10 ESSENTIAL HYPERTENSION: ICD-10-CM

## 2023-12-04 DIAGNOSIS — R09.82 POST-NASAL DRIP: ICD-10-CM

## 2023-12-04 DIAGNOSIS — I82.90 VENOUS THROMBOEMBOLISM: Primary | ICD-10-CM

## 2023-12-04 DIAGNOSIS — G47.33 OSA (OBSTRUCTIVE SLEEP APNEA): ICD-10-CM

## 2023-12-04 DIAGNOSIS — Z79.01 LONG TERM CURRENT USE OF ANTICOAGULANT THERAPY: ICD-10-CM

## 2023-12-04 DIAGNOSIS — Z86.16 HISTORY OF COVID-19: ICD-10-CM

## 2023-12-04 DIAGNOSIS — J45.40 MODERATE PERSISTENT ASTHMA WITHOUT COMPLICATION: ICD-10-CM

## 2023-12-04 DIAGNOSIS — D68.59 THROMBOPHILIA (HCC): ICD-10-CM

## 2023-12-04 DIAGNOSIS — G47.61 PERIODIC LIMB MOVEMENT SLEEP DISORDER: ICD-10-CM

## 2023-12-04 DIAGNOSIS — E66.01 MORBID OBESITY DUE TO EXCESS CALORIES (HCC): ICD-10-CM

## 2023-12-04 PROCEDURE — 3079F DIAST BP 80-89 MM HG: CPT | Performed by: INTERNAL MEDICINE

## 2023-12-04 PROCEDURE — 3077F SYST BP >= 140 MM HG: CPT | Performed by: INTERNAL MEDICINE

## 2023-12-04 PROCEDURE — 99214 OFFICE O/P EST MOD 30 MIN: CPT | Performed by: INTERNAL MEDICINE

## 2023-12-04 NOTE — PROGRESS NOTES
organomegaly  Extremities -has trace bilateral pedal edema noted  Skin - normal coloration and turgor, no rashes, no suspicious skin lesions noted     Labs:    Home sleep study was done on 11/13/2023 and showed an MORALES of 70.4    The CPAP titration study on 11/20/2023 showed the need for CPAP of 12 cm of water and also showed periodic limb movement disorder, mild    Echocardiogram was done on 11/29/2023 and showed mild diastolic dysfunction along with uneven contraction of the left ventricle walls      Assessment:   Diagnosis Orders   1. Venous thromboembolism        2. SUSY (obstructive sleep apnea)        3. Moderate persistent asthma without complication        4. Essential hypertension        5. Thrombophilia ([address])        6. Morbid obesity due to excess calories ([address])        7. Post-nasal drip        8. Long term current use of anticoagulant therapy        9. History of COVID-19        10. Chronic diastolic (congestive) heart failure (HCC)        11. Periodic limb movement sleep disorder             PLAN:    Reviewed home sleep study, CPAP titration study and echocardiogram  CPAP AT LEAST FOR 4 HRS, QHS AT 12 CM H2O. Recommend using it every night. Explained importance of compliance with treatment of sleep apnea and its benefits  WT LOSS. Patient is working on losing weight. Encouraged her to do so  [employer]  USE HUMIDIFIER AS NEEDED. QUESTIONS ANSWERED to her satisfaction  EXPLAINED IMPORTANCE OF COMPLIANCE WITH THERAPY, especially given that she has had history of thromboembolic disease of the lung  9. VACCINATIONS  -recommend flu vaccination and pneumococcal vaccinations  Patient did not want the COVID-19 vaccination  10. REC ANTICOAGULATION FOR LIFE BY HEME. 11. F/U IN 6 MONTHS. 12.  Refill provided-none  13. Recommend changing the supper time to 4 hours before bedtime to help decrease acid reflux symptoms. Thank you for having us involved in the care of your patient.

## 2023-12-04 NOTE — PATIENT INSTRUCTIONS
SURVEY:    You may be receiving a survey from [employer] regarding your visit today. Please complete the survey to enable us to provide the highest quality of care to you and your family. If you cannot score us a very good on any question, please call the office to discuss how we could have made your experience a very good one. Thank you.

## 2023-12-08 ENCOUNTER — HOSPITAL ENCOUNTER (OUTPATIENT)
Age: 58
Discharge: HOME | End: 2023-12-08
Payer: COMMERCIAL

## 2023-12-08 ENCOUNTER — OFFICE VISIT (OUTPATIENT)
Dept: CARDIOLOGY | Age: 58
End: 2023-12-08
Payer: COMMERCIAL

## 2023-12-08 VITALS
WEIGHT: 293 LBS | OXYGEN SATURATION: 98 % | DIASTOLIC BLOOD PRESSURE: 85 MMHG | BODY MASS INDEX: 48.1 KG/M2 | RESPIRATION RATE: 18 BRPM | HEART RATE: 81 BPM | SYSTOLIC BLOOD PRESSURE: 145 MMHG

## 2023-12-08 DIAGNOSIS — R93.1 ABNORMAL ECHOCARDIOGRAM: ICD-10-CM

## 2023-12-08 DIAGNOSIS — R94.31 ABNORMAL EKG: ICD-10-CM

## 2023-12-08 DIAGNOSIS — Z86.711 HISTORY OF PULMONARY EMBOLISM: ICD-10-CM

## 2023-12-08 DIAGNOSIS — R93.1 ABNORMAL ECHOCARDIOGRAM: Primary | ICD-10-CM

## 2023-12-08 DIAGNOSIS — E78.2 MIXED HYPERLIPIDEMIA: ICD-10-CM

## 2023-12-08 DIAGNOSIS — G47.33 OSA ON CPAP: ICD-10-CM

## 2023-12-08 DIAGNOSIS — I10 PRIMARY HYPERTENSION: ICD-10-CM

## 2023-12-08 DIAGNOSIS — I45.10 RBBB (RIGHT BUNDLE BRANCH BLOCK): ICD-10-CM

## 2023-12-08 DIAGNOSIS — R00.2 PALPITATIONS: ICD-10-CM

## 2023-12-08 DIAGNOSIS — R06.02 SOB (SHORTNESS OF BREATH): ICD-10-CM

## 2023-12-08 DIAGNOSIS — I44.0 1ST DEGREE AV BLOCK: ICD-10-CM

## 2023-12-08 DIAGNOSIS — E66.01 MORBID OBESITY (HCC): ICD-10-CM

## 2023-12-08 PROCEDURE — 80061 LIPID PANEL: CPT

## 2023-12-08 PROCEDURE — 36415 COLL VENOUS BLD VENIPUNCTURE: CPT

## 2023-12-08 PROCEDURE — 3079F DIAST BP 80-89 MM HG: CPT | Performed by: PHYSICIAN ASSISTANT

## 2023-12-08 PROCEDURE — 93271 ECG/MONITORING AND ANALYSIS: CPT

## 2023-12-08 PROCEDURE — 3077F SYST BP >= 140 MM HG: CPT | Performed by: PHYSICIAN ASSISTANT

## 2023-12-08 PROCEDURE — 99214 OFFICE O/P EST MOD 30 MIN: CPT | Performed by: PHYSICIAN ASSISTANT

## 2023-12-08 PROCEDURE — 93000 ELECTROCARDIOGRAM COMPLETE: CPT | Performed by: PHYSICIAN ASSISTANT

## 2023-12-08 RX ORDER — METOPROLOL SUCCINATE 50 MG/1
50 TABLET, EXTENDED RELEASE ORAL DAILY
Qty: 30 TABLET | Refills: 3 | Status: SHIPPED | OUTPATIENT
Start: 2023-12-08

## 2023-12-08 NOTE — PATIENT INSTRUCTIONS
SURVEY:    You may be receiving a survey from [employer] regarding your visit today. Please complete the survey to enable us to provide the highest quality of care to you and your family. If you cannot score us a very good on any question, please call the office to discuss how we could have made your experience a very good one. Thank you.

## 2023-12-08 NOTE — PROGRESS NOTES
Arcenio Jim am scribing for and in the presence of Colt Cordero PA-C. Patient: Herman Pavon  : 1965  Date of Visit: 2023    REASON FOR VISIT / CONSULTATION: Establish Cardiologist (Hx: CP, Hx of PE, HTN. Abn Echo. Always tired, arm and leg pain. Sleep apnea, just started using her machine last night. Fluttering in her chest majority of the time, hard to catch her breath. Lightheaded/dizziness at times. //)      History of Present Illness:        Dear Juni Gomez MD    I had the pleasure of seeing Ms. Zoraida Woods today who is a 47 y.o. female with an abnormal echo and to re-establish care. She was first seen as a hospital consult with a history of intermittent chest discomfort 2020. She has felt heart palpitations in the past that has caused her some shortness of breath. The last month she has had a burning feeling between her shoulder blades. It lasts for a few minutes to a couple hours. She is unsure if anything makes it worse. Her breathing is getting better, but she was sick with a stomach bug and upper respirator infection. She is eating and drinking better. She broke out into a sweat around . She is unsure if this is heart related or flu related. She had COVID at that time. No previous heart related history other than a heart murmur. She is on Eliquis due to pulmonary embolism. Her brother has a history of bypass surgery, however no other family history. Stress test done 2020: Overall, these results are most consistent with a low risk for  significant coronary artery disease. Echo done 2020: Global left ventricular systolic function appears preserved with an  estimated ejection fraction of 55%. Mildly increased left ventricular wall thickness with a normal left ventricular cavity size. No significant valvular abnormalities. Evidence of mild diastolic dysfunction is seen. Holter done 2021: The rhythm was sinus.  Average MD

## 2023-12-09 LAB
CHOLEST SERPL-MCNC: 180 MG/DL
CHOLESTEROL/HDL RATIO: 5.5
HDLC SERPL-MCNC: 33 MG/DL
LDLC SERPL CALC-MCNC: 116 MG/DL (ref 0–130)
TRIGL SERPL-MCNC: 154 MG/DL

## 2023-12-12 ENCOUNTER — TELEPHONE (OUTPATIENT)
Dept: CARDIOLOGY | Age: 58
End: 2023-12-12

## 2023-12-12 NOTE — TELEPHONE ENCOUNTER
----- Message from Chary Beaulieu PA-C sent at 12/12/2023  1:44 PM EST -----  Please notify patient that their lab results are normal. Her LDL is elevated and with her history of diabetes I would like to start her on a cholesterol medication. I would like to start Lipitor 20 mg daily if she is agreeable. Please continue current treatment and follow up.

## 2023-12-27 ENCOUNTER — HOSPITAL ENCOUNTER (OUTPATIENT)
Dept: NUCLEAR MEDICINE | Age: 58
Discharge: HOME | End: 2023-12-29
Payer: COMMERCIAL

## 2023-12-27 ENCOUNTER — HOSPITAL ENCOUNTER (OUTPATIENT)
Age: 58
Discharge: HOME | End: 2023-12-29
Payer: COMMERCIAL

## 2023-12-27 DIAGNOSIS — R93.1 ABNORMAL ECHOCARDIOGRAM: ICD-10-CM

## 2023-12-27 DIAGNOSIS — I10 PRIMARY HYPERTENSION: ICD-10-CM

## 2023-12-27 DIAGNOSIS — Z86.711 HISTORY OF PULMONARY EMBOLISM: ICD-10-CM

## 2023-12-27 DIAGNOSIS — E78.2 MIXED HYPERLIPIDEMIA: ICD-10-CM

## 2023-12-27 PROCEDURE — 3430000000 HC RX DIAGNOSTIC RADIOPHARMACEUTICAL: Performed by: PHYSICIAN ASSISTANT

## 2023-12-27 PROCEDURE — 6360000002 HC RX W HCPCS: Performed by: FAMILY MEDICINE

## 2023-12-27 PROCEDURE — A9500 TC99M SESTAMIBI: HCPCS | Performed by: PHYSICIAN ASSISTANT

## 2023-12-27 PROCEDURE — 93017 CV STRESS TEST TRACING ONLY: CPT

## 2023-12-27 RX ORDER — TETRAKIS(2-METHOXYISOBUTYLISOCYANIDE)COPPER(I) TETRAFLUOROBORATE 1 MG/ML
30 INJECTION, POWDER, LYOPHILIZED, FOR SOLUTION INTRAVENOUS
Status: COMPLETED | OUTPATIENT
Start: 2023-12-27 | End: 2023-12-27

## 2023-12-27 RX ORDER — REGADENOSON 0.08 MG/ML
0.4 INJECTION, SOLUTION INTRAVENOUS
Status: COMPLETED | OUTPATIENT
Start: 2023-12-27 | End: 2023-12-27

## 2023-12-27 RX ADMIN — REGADENOSON 0.4 MG: 0.08 INJECTION, SOLUTION INTRAVENOUS at 10:03

## 2023-12-27 RX ADMIN — Medication 30 MILLICURIE: at 10:28

## 2023-12-28 ENCOUNTER — HOSPITAL ENCOUNTER (OUTPATIENT)
Dept: CT IMAGING | Age: 58
Discharge: HOME | End: 2023-12-30
Payer: COMMERCIAL

## 2023-12-28 ENCOUNTER — HOSPITAL ENCOUNTER (OUTPATIENT)
Dept: NUCLEAR MEDICINE | Age: 58
Discharge: HOME | End: 2023-12-30
Payer: COMMERCIAL

## 2023-12-28 ENCOUNTER — HOSPITAL ENCOUNTER (OUTPATIENT)
Age: 58
Discharge: HOME | End: 2023-12-28
Payer: COMMERCIAL

## 2023-12-28 DIAGNOSIS — R93.1 ABNORMAL ECHOCARDIOGRAM: ICD-10-CM

## 2023-12-28 LAB
CREAT SERPL-MCNC: 0.8 MG/DL (ref 0.5–0.9)
GFR SERPL CREATININE-BSD FRML MDRD: >60 ML/MIN/1.73M2
NUC STRESS EJECTION FRACTION: 75 %
STRESS BASELINE DIAS BP: 82 MMHG
STRESS BASELINE HR: 90 BPM
STRESS BASELINE SYS BP: 138 MMHG
STRESS PEAK DIAS BP: 84 MMHG
STRESS PEAK SYS BP: 148 MMHG
STRESS PERCENT HR ACHIEVED: 74 %
STRESS POST PEAK HR: 120 BPM
STRESS RATE PRESSURE PRODUCT: NORMAL BPM*MMHG
STRESS STAGE RECOVERY 1 BP: NORMAL MMHG
STRESS STAGE RECOVERY 1 DURATION: 0 MIN:SEC
STRESS STAGE RECOVERY 1 HR: 118 BPM
STRESS STAGE RECOVERY 2 DURATION: 1 MIN:SEC
STRESS STAGE RECOVERY 2 HR: 115 BPM
STRESS STAGE RECOVERY 3 DURATION: 3 MIN:SEC
STRESS STAGE RECOVERY 3 HR: 88 BPM
STRESS STAGE RECOVERY 4 BP: NORMAL MMHG
STRESS STAGE RECOVERY 4 DURATION: 5 MIN:SEC
STRESS STAGE RECOVERY 4 HR: 85 BPM
STRESS TARGET HR: 162 BPM

## 2023-12-28 PROCEDURE — 74177 CT ABD & PELVIS W/CONTRAST: CPT

## 2023-12-28 PROCEDURE — A9500 TC99M SESTAMIBI: HCPCS | Performed by: PHYSICIAN ASSISTANT

## 2023-12-28 PROCEDURE — 3430000000 HC RX DIAGNOSTIC RADIOPHARMACEUTICAL: Performed by: PHYSICIAN ASSISTANT

## 2023-12-28 PROCEDURE — 93018 CV STRESS TEST I&R ONLY: CPT | Performed by: FAMILY MEDICINE

## 2023-12-28 PROCEDURE — 93016 CV STRESS TEST SUPVJ ONLY: CPT | Performed by: FAMILY MEDICINE

## 2023-12-28 PROCEDURE — 36415 COLL VENOUS BLD VENIPUNCTURE: CPT

## 2023-12-28 PROCEDURE — 6360000004 HC RX CONTRAST MEDICATION: Performed by: STUDENT IN AN ORGANIZED HEALTH CARE EDUCATION/TRAINING PROGRAM

## 2023-12-28 PROCEDURE — 82565 ASSAY OF CREATININE: CPT

## 2023-12-28 PROCEDURE — 78452 HT MUSCLE IMAGE SPECT MULT: CPT | Performed by: FAMILY MEDICINE

## 2023-12-28 RX ORDER — TETRAKIS(2-METHOXYISOBUTYLISOCYANIDE)COPPER(I) TETRAFLUOROBORATE 1 MG/ML
30 INJECTION, POWDER, LYOPHILIZED, FOR SOLUTION INTRAVENOUS
Status: COMPLETED | OUTPATIENT
Start: 2023-12-28 | End: 2023-12-28

## 2023-12-28 RX ADMIN — Medication 30 MILLICURIE: at 14:05

## 2023-12-28 RX ADMIN — IOPAMIDOL 75 ML: 755 INJECTION, SOLUTION INTRAVENOUS at 14:42

## 2023-12-29 ENCOUNTER — TELEPHONE (OUTPATIENT)
Dept: CARDIOLOGY | Age: 58
End: 2023-12-29

## 2023-12-29 NOTE — RESULT ENCOUNTER NOTE
Please let them know their stress test looked good, it was low risk. Will discuss at follow up. Thanks. Yes > 3 months ago

## 2023-12-29 NOTE — TELEPHONE ENCOUNTER
----- Message from Florian Cesar PA-C sent at 12/29/2023  8:45 AM EST -----  Please let them know their stress test looked good, it was low risk. Will discuss at follow up. Thanks.

## 2024-01-03 DIAGNOSIS — R10.2 PELVIC PAIN: Primary | ICD-10-CM

## 2024-01-04 ENCOUNTER — TELEPHONE (OUTPATIENT)
Dept: CARDIOLOGY | Age: 59
End: 2024-01-04

## 2024-01-04 NOTE — TELEPHONE ENCOUNTER
----- Message from Jazmine Roberts PA-C sent at 1/4/2024  8:05 AM EST -----  Please let them know that their CAM monitor was overall unremarkable. We will discuss at their follow up appointment.

## 2024-01-09 ENCOUNTER — HOSPITAL ENCOUNTER (OUTPATIENT)
Dept: ULTRASOUND IMAGING | Age: 59
Discharge: HOME | End: 2024-01-11
Attending: STUDENT IN AN ORGANIZED HEALTH CARE EDUCATION/TRAINING PROGRAM
Payer: COMMERCIAL

## 2024-01-09 DIAGNOSIS — R10.2 PELVIC PAIN: ICD-10-CM

## 2024-01-09 PROCEDURE — 76830 TRANSVAGINAL US NON-OB: CPT

## 2024-01-11 ENCOUNTER — TELEPHONE (OUTPATIENT)
Dept: OBGYN | Age: 59
End: 2024-01-11

## 2024-01-11 NOTE — TELEPHONE ENCOUNTER
Patient had transvaginal ultrasound 01/10/2024 was found to have thickened endometrium. Test was ordered  by PCP d/t  h/o pelvic pain and still has irregular bleeding. Patient had previously been scheduled for hysterectomy several years ago however had COVID and was on O2 so was not comfortable having surgery at that time however would like to consider now. Patient will be scheduled for endometrial biopsy , should she see you or schedule with Dr. Reyez as she may end up having hysterectomy.

## 2024-01-23 ENCOUNTER — PROCEDURE VISIT (OUTPATIENT)
Dept: OBGYN | Age: 59
End: 2024-01-23

## 2024-01-23 ENCOUNTER — HOSPITAL ENCOUNTER (OUTPATIENT)
Age: 59
Setting detail: SPECIMEN
Discharge: HOME | End: 2024-01-23
Payer: COMMERCIAL

## 2024-01-23 DIAGNOSIS — R10.2 PELVIC PAIN: Primary | ICD-10-CM

## 2024-01-23 DIAGNOSIS — N92.6 IRREGULAR MENSTRUATION: ICD-10-CM

## 2024-01-23 DIAGNOSIS — R93.89 ENDOMETRIAL THICKENING ON ULTRASOUND: ICD-10-CM

## 2024-01-23 DIAGNOSIS — N80.03 ADENOMYOSIS: ICD-10-CM

## 2024-01-23 DIAGNOSIS — D25.9 UTERINE LEIOMYOMA, UNSPECIFIED LOCATION: ICD-10-CM

## 2024-01-23 PROCEDURE — 88305 TISSUE EXAM BY PATHOLOGIST: CPT

## 2024-01-24 ENCOUNTER — OFFICE VISIT (OUTPATIENT)
Dept: CARDIOLOGY | Age: 59
End: 2024-01-24

## 2024-01-24 VITALS
WEIGHT: 293 LBS | DIASTOLIC BLOOD PRESSURE: 82 MMHG | BODY MASS INDEX: 47.09 KG/M2 | HEIGHT: 66 IN | SYSTOLIC BLOOD PRESSURE: 127 MMHG | HEART RATE: 73 BPM | OXYGEN SATURATION: 98 % | RESPIRATION RATE: 20 BRPM

## 2024-01-24 DIAGNOSIS — G47.33 OSA ON CPAP: ICD-10-CM

## 2024-01-24 DIAGNOSIS — Z79.01 CHRONIC ANTICOAGULATION: ICD-10-CM

## 2024-01-24 DIAGNOSIS — E66.01 MORBID OBESITY (HCC): ICD-10-CM

## 2024-01-24 DIAGNOSIS — Z82.49 FAMILY HISTORY OF CORONARY ARTERY DISEASE: ICD-10-CM

## 2024-01-24 DIAGNOSIS — R73.03 PREDIABETES: ICD-10-CM

## 2024-01-24 DIAGNOSIS — R07.89 ATYPICAL CHEST PAIN: ICD-10-CM

## 2024-01-24 DIAGNOSIS — Z86.711 HISTORY OF PULMONARY EMBOLISM: ICD-10-CM

## 2024-01-24 DIAGNOSIS — R94.31 ABNORMAL EKG: ICD-10-CM

## 2024-01-24 DIAGNOSIS — I10 PRIMARY HYPERTENSION: ICD-10-CM

## 2024-01-24 DIAGNOSIS — R06.02 SOB (SHORTNESS OF BREATH): ICD-10-CM

## 2024-01-24 DIAGNOSIS — R00.2 PALPITATIONS: ICD-10-CM

## 2024-01-24 DIAGNOSIS — R93.1 ABNORMAL ECHOCARDIOGRAM: Primary | ICD-10-CM

## 2024-01-24 RX ORDER — METOPROLOL SUCCINATE 25 MG/1
25 TABLET, EXTENDED RELEASE ORAL DAILY
Qty: 90 TABLET | Refills: 3 | Status: SHIPPED | OUTPATIENT
Start: 2024-01-24

## 2024-01-24 RX ORDER — ASPIRIN 81 MG/1
81 TABLET ORAL DAILY
Qty: 90 TABLET | Refills: 3 | Status: SHIPPED | OUTPATIENT
Start: 2024-01-24

## 2024-01-24 NOTE — PROGRESS NOTES
I, Mary Alice Torres am scribing for and in the presence of Noris Hopkins MD, F.A.C.C..    Patient: Tess Echeverria  : 1965  Date of Visit: 2024    REASON FOR VISIT / CONSULTATION: Chest Pain (Hx: CP, Hx of PE, HTN. Abn Echo. 6 week follow up. CAM and Stress completed. //She has been tired still but is using her CPAP machine now. Some improvement. Palpitations still at times. Sharp pain in chest/ Burps a lot, may be gas? SOB at times, thinks her weight. //Denies: Lightheaded/dizziness. )      History of Present Illness:        Dear Maximino Hansen MD    I had the pleasure of seeing Ms. Echeverria today who is a 54 y.o. female with an abnormal echo and to re-establish care.    She was first seen as a hospital consult with a history of intermittent chest discomfort 2020. She has felt heart palpitations in the past that has caused her some shortness of breath. The last month she has had a burning feeling between her shoulder blades. It lasts for a few minutes to a couple hours. She is unsure if anything makes it worse. Her breathing is getting better, but she was sick with a stomach bug and upper respirator infection. She is eating and drinking better. She broke out into a sweat around . She is unsure if this is heart related or flu related. She had COVID at that time.    No previous heart related history other than a heart murmur. She is on Eliquis due to pulmonary embolism. She was diagnosed with sleep apnea about 15 years ago. She's used her machine on and off.     Family cardiac history includes her mom and dad in their 60's-70's. Her dad had congestive heart failure. Her brother has a history of triple bypass surgery, however no other family history.     Stress test done 2020: Overall, these results are most consistent with a low risk for  significant coronary artery disease.    Echo done 2020: Global left ventricular systolic function appears preserved with an  estimated ejection

## 2024-01-25 LAB — SURGICAL PATHOLOGY REPORT: NORMAL

## 2024-02-15 DIAGNOSIS — D25.9 UTERINE LEIOMYOMA, UNSPECIFIED LOCATION: ICD-10-CM

## 2024-02-15 DIAGNOSIS — Z01.812 ENCOUNTER FOR PRE-OPERATIVE LABORATORY TESTING: Primary | ICD-10-CM

## 2024-02-15 DIAGNOSIS — I10 ESSENTIAL HYPERTENSION, BENIGN: ICD-10-CM

## 2024-02-15 DIAGNOSIS — R10.2 PELVIC PAIN: ICD-10-CM

## 2024-02-15 DIAGNOSIS — R93.89 ENDOMETRIAL THICKENING ON ULTRASOUND: ICD-10-CM

## 2024-02-15 DIAGNOSIS — N80.03 ADENOMYOSIS: ICD-10-CM

## 2024-02-16 RX ORDER — SPIRONOLACTONE 25 MG/1
TABLET ORAL
Qty: 90 TABLET | Refills: 0 | Status: SHIPPED | OUTPATIENT
Start: 2024-02-16

## 2024-03-01 ENCOUNTER — TELEPHONE (OUTPATIENT)
Dept: OBGYN | Age: 59
End: 2024-03-01

## 2024-03-01 ENCOUNTER — OFFICE VISIT (OUTPATIENT)
Dept: PRIMARY CARE CLINIC | Age: 59
End: 2024-03-01

## 2024-03-01 VITALS
DIASTOLIC BLOOD PRESSURE: 78 MMHG | SYSTOLIC BLOOD PRESSURE: 124 MMHG | HEART RATE: 75 BPM | OXYGEN SATURATION: 97 % | HEIGHT: 66 IN | WEIGHT: 293 LBS | BODY MASS INDEX: 47.09 KG/M2

## 2024-03-01 DIAGNOSIS — I26.99 OTHER ACUTE PULMONARY EMBOLISM, UNSPECIFIED WHETHER ACUTE COR PULMONALE PRESENT (HCC): ICD-10-CM

## 2024-03-01 DIAGNOSIS — I10 ESSENTIAL HYPERTENSION: Primary | ICD-10-CM

## 2024-03-01 DIAGNOSIS — I50.32 CHRONIC DIASTOLIC (CONGESTIVE) HEART FAILURE (HCC): ICD-10-CM

## 2024-03-01 DIAGNOSIS — G47.33 OSA (OBSTRUCTIVE SLEEP APNEA): ICD-10-CM

## 2024-03-01 DIAGNOSIS — R19.09 ABDOMINAL MASS OF OTHER SITE: ICD-10-CM

## 2024-03-01 DIAGNOSIS — J96.01 ACUTE RESPIRATORY FAILURE WITH HYPOXIA (HCC): ICD-10-CM

## 2024-03-01 DIAGNOSIS — D68.59 THROMBOPHILIA (HCC): ICD-10-CM

## 2024-03-01 DIAGNOSIS — F41.9 ANXIETY: ICD-10-CM

## 2024-03-01 PROBLEM — R19.00 ABDOMINAL LUMP: Status: ACTIVE | Noted: 2024-03-01

## 2024-03-01 ASSESSMENT — PATIENT HEALTH QUESTIONNAIRE - PHQ9
SUM OF ALL RESPONSES TO PHQ QUESTIONS 1-9: 0
1. LITTLE INTEREST OR PLEASURE IN DOING THINGS: 0
SUM OF ALL RESPONSES TO PHQ9 QUESTIONS 1 & 2: 0
DEPRESSION UNABLE TO ASSESS: PT REFUSES
2. FEELING DOWN, DEPRESSED OR HOPELESS: 0

## 2024-03-01 NOTE — TELEPHONE ENCOUNTER
Pt called in on Friday 3/1/24.  She is scheduled for a Hyst in June, and just had an endometrial bx in January.    She is scheduled for a pap in May of this year, she wanted to know if she needed to keep this appt, I told her some insurances require it.  Her last one was in 2021.    Please advise.

## 2024-03-01 NOTE — TELEPHONE ENCOUNTER
She should keep the yearly appointment.  This will include the overall wellness breast/pelvic exam.

## 2024-03-01 NOTE — PROGRESS NOTES
History   Problem Relation Age of Onset    Breast Cancer Mother     Cancer Mother         breast ca    Diabetes Mother     Depression Mother     Thyroid Disease Mother     High Blood Pressure Mother     Heart Disease Mother     Cancer Father         prostate ca    COPD Father     Heart Failure Father     Heart Disease Father     High Blood Pressure Father     Deep Vein Thrombosis Sister     Breast Cancer Sister     Deep Vein Thrombosis Sister            3/1/2024     8:55 AM 1/13/2023     9:06 AM 6/14/2022     8:37 AM 12/2/2021     3:29 PM 10/26/2021     9:03 AM 7/26/2021     9:11 AM 2/23/2021     8:52 AM   PHQ Scores   PHQ2 Score 0 0 0 0 0 0 2   PHQ9 Score 0 0 0 0 0 0 2     Interpretation of Total Score Depression Severity: 1-4 = Minimal depression, 5-9 = Mild depression, 10-14 = Moderate depression, 15-19 = Moderately severe depression, 20-27 = Severe depression    Review of Systems  Constitutional: Negative for activity change, appetite change, chills, diaphoresis, fatigue, fever and unexpected weight change.   HENT: Negative for sinus pressure, sinus pain, sore throat and trouble swallowing.    Respiratory: Negative for cough, shortness of breath and wheezing.    Cardiovascular: Negative for chest pain, palpitations and leg swelling.   Gastrointestinal: Negative for abdominal pain, diarrhea, nausea and vomiting. Positive for abdominal mass.  Endocrine: Negative for cold intolerance, polydipsia, polyphagia and polyuria.   Genitourinary: Negative for difficulty urinating, flank pain and frequency.   Musculoskeletal: Negative for gait problem and joint swelling. Negative for back pain, neck pain and neck stiffness.   Skin: Negative for color change and wound. Negative for pallor and rash.   Allergic/Immunologic: Negative for environmental allergies and food allergies.   Neurological: Negative for light-headedness, numbness and headaches.   Psychiatric/Behavioral: Negative for sleep disturbance. Negative for

## 2024-03-05 DIAGNOSIS — F41.1 GAD (GENERALIZED ANXIETY DISORDER): ICD-10-CM

## 2024-03-05 RX ORDER — ALPRAZOLAM 0.25 MG/1
TABLET ORAL
Qty: 90 TABLET | Refills: 0 | Status: SHIPPED | OUTPATIENT
Start: 2024-03-05 | End: 2024-04-05

## 2024-03-05 NOTE — TELEPHONE ENCOUNTER
Hello, please verify pharmacy thank you.  Electronically signed by Maximino Hansen MD on 3/5/2024 at 4:15 PM

## 2024-03-13 ENCOUNTER — TELEPHONE (OUTPATIENT)
Dept: PRIMARY CARE CLINIC | Age: 59
End: 2024-03-13

## 2024-03-13 NOTE — TELEPHONE ENCOUNTER
Called patient regarding referral for US ABDOMEN LIMITED to see if she is still wanting to complete. Patient stated yes and I was able to transfer patient with the scheduling dept to get scheduled.

## 2024-03-21 ENCOUNTER — HOSPITAL ENCOUNTER (OUTPATIENT)
Dept: ULTRASOUND IMAGING | Age: 59
Discharge: HOME | End: 2024-03-23
Attending: STUDENT IN AN ORGANIZED HEALTH CARE EDUCATION/TRAINING PROGRAM
Payer: COMMERCIAL

## 2024-03-21 DIAGNOSIS — R19.09 ABDOMINAL MASS OF OTHER SITE: ICD-10-CM

## 2024-03-21 PROCEDURE — 76705 ECHO EXAM OF ABDOMEN: CPT

## 2024-04-22 ENCOUNTER — HOSPITAL ENCOUNTER (OUTPATIENT)
Dept: GENERAL RADIOLOGY | Age: 59
Discharge: HOME | End: 2024-04-24
Payer: COMMERCIAL

## 2024-04-22 ENCOUNTER — HOSPITAL ENCOUNTER (OUTPATIENT)
Age: 59
Discharge: HOME | End: 2024-04-22
Payer: COMMERCIAL

## 2024-04-22 ENCOUNTER — HOSPITAL ENCOUNTER (OUTPATIENT)
Age: 59
Discharge: HOME | End: 2024-04-24
Payer: COMMERCIAL

## 2024-04-22 ENCOUNTER — OFFICE VISIT (OUTPATIENT)
Dept: PRIMARY CARE CLINIC | Age: 59
End: 2024-04-22
Payer: COMMERCIAL

## 2024-04-22 VITALS
SYSTOLIC BLOOD PRESSURE: 138 MMHG | WEIGHT: 293 LBS | RESPIRATION RATE: 20 BRPM | HEART RATE: 74 BPM | BODY MASS INDEX: 47.09 KG/M2 | HEIGHT: 66 IN | DIASTOLIC BLOOD PRESSURE: 86 MMHG

## 2024-04-22 DIAGNOSIS — K21.9 GASTROESOPHAGEAL REFLUX DISEASE, UNSPECIFIED WHETHER ESOPHAGITIS PRESENT: Primary | ICD-10-CM

## 2024-04-22 DIAGNOSIS — R07.89 CHEST WALL DISCOMFORT: ICD-10-CM

## 2024-04-22 LAB
ALBUMIN SERPL-MCNC: 4.2 G/DL (ref 3.5–5.2)
ALBUMIN/GLOB SERPL: 1.1 {RATIO} (ref 1–2.5)
ALP SERPL-CCNC: 92 U/L (ref 35–104)
ALT SERPL-CCNC: 39 U/L (ref 5–33)
ANION GAP SERPL CALCULATED.3IONS-SCNC: 13 MMOL/L (ref 9–17)
AST SERPL-CCNC: 39 U/L
BASOPHILS # BLD: 0.04 K/UL (ref 0–0.2)
BASOPHILS NFR BLD: 1 % (ref 0–2)
BILIRUB SERPL-MCNC: 0.6 MG/DL (ref 0.3–1.2)
BUN SERPL-MCNC: 10 MG/DL (ref 6–20)
BUN/CREAT SERPL: 13 (ref 9–20)
CALCIUM SERPL-MCNC: 9.6 MG/DL (ref 8.6–10.4)
CHLORIDE SERPL-SCNC: 102 MMOL/L (ref 98–107)
CO2 SERPL-SCNC: 26 MMOL/L (ref 20–31)
CREAT SERPL-MCNC: 0.8 MG/DL (ref 0.5–0.9)
EKG ATRIAL RATE: 66 BPM
EKG P AXIS: 40 DEGREES
EKG P-R INTERVAL: 228 MS
EKG Q-T INTERVAL: 400 MS
EKG QRS DURATION: 128 MS
EKG QTC CALCULATION (BAZETT): 419 MS
EKG R AXIS: -39 DEGREES
EKG T AXIS: 47 DEGREES
EKG VENTRICULAR RATE: 66 BPM
EOSINOPHIL # BLD: 0.13 K/UL (ref 0–0.44)
EOSINOPHILS RELATIVE PERCENT: 2 % (ref 1–4)
ERYTHROCYTE [DISTWIDTH] IN BLOOD BY AUTOMATED COUNT: 13.2 % (ref 11.8–14.4)
GFR SERPL CREATININE-BSD FRML MDRD: 85 ML/MIN/1.73M2
GLUCOSE SERPL-MCNC: 117 MG/DL (ref 70–99)
HCT VFR BLD AUTO: 48 % (ref 36.3–47.1)
HGB BLD-MCNC: 16.3 G/DL (ref 11.9–15.1)
IMM GRANULOCYTES # BLD AUTO: 0.03 K/UL (ref 0–0.3)
IMM GRANULOCYTES NFR BLD: 0 %
LYMPHOCYTES NFR BLD: 3.35 K/UL (ref 1.1–3.7)
LYMPHOCYTES RELATIVE PERCENT: 40 % (ref 24–43)
MCH RBC QN AUTO: 30.4 PG (ref 25.2–33.5)
MCHC RBC AUTO-ENTMCNC: 34 G/DL (ref 28.4–34.8)
MCV RBC AUTO: 89.4 FL (ref 82.6–102.9)
MONOCYTES NFR BLD: 0.64 K/UL (ref 0.1–1.2)
MONOCYTES NFR BLD: 8 % (ref 3–12)
NEUTROPHILS NFR BLD: 49 % (ref 36–65)
NEUTS SEG NFR BLD: 4.29 K/UL (ref 1.5–8.1)
NRBC BLD-RTO: 0 PER 100 WBC
PLATELET # BLD AUTO: 247 K/UL (ref 138–453)
PMV BLD AUTO: 10.3 FL (ref 8.1–13.5)
POTASSIUM SERPL-SCNC: 4.5 MMOL/L (ref 3.7–5.3)
PROT SERPL-MCNC: 8.2 G/DL (ref 6.4–8.3)
RBC # BLD AUTO: 5.37 M/UL (ref 3.95–5.11)
SODIUM SERPL-SCNC: 141 MMOL/L (ref 135–144)
TROPONIN I SERPL HS-MCNC: 17 NG/L (ref 0–14)
WBC OTHER # BLD: 8.5 K/UL (ref 3.5–11.3)

## 2024-04-22 PROCEDURE — 99214 OFFICE O/P EST MOD 30 MIN: CPT | Performed by: STUDENT IN AN ORGANIZED HEALTH CARE EDUCATION/TRAINING PROGRAM

## 2024-04-22 PROCEDURE — 3075F SYST BP GE 130 - 139MM HG: CPT | Performed by: STUDENT IN AN ORGANIZED HEALTH CARE EDUCATION/TRAINING PROGRAM

## 2024-04-22 PROCEDURE — 71046 X-RAY EXAM CHEST 2 VIEWS: CPT

## 2024-04-22 PROCEDURE — 93010 ELECTROCARDIOGRAM REPORT: CPT | Performed by: INTERNAL MEDICINE

## 2024-04-22 PROCEDURE — 36415 COLL VENOUS BLD VENIPUNCTURE: CPT

## 2024-04-22 PROCEDURE — 85025 COMPLETE CBC W/AUTO DIFF WBC: CPT

## 2024-04-22 PROCEDURE — 93005 ELECTROCARDIOGRAM TRACING: CPT

## 2024-04-22 PROCEDURE — 80053 COMPREHEN METABOLIC PANEL: CPT

## 2024-04-22 PROCEDURE — 3079F DIAST BP 80-89 MM HG: CPT | Performed by: STUDENT IN AN ORGANIZED HEALTH CARE EDUCATION/TRAINING PROGRAM

## 2024-04-22 PROCEDURE — 84484 ASSAY OF TROPONIN QUANT: CPT

## 2024-04-22 RX ORDER — PANTOPRAZOLE SODIUM 40 MG/1
40 TABLET, DELAYED RELEASE ORAL DAILY
COMMUNITY

## 2024-04-22 NOTE — PROGRESS NOTES
[facility]  [address] [place] [address] , [address]  [place], [place], [address]    Tess Echeverria is a 58 y.o. female with  has a past medical history of Acute pulmonary embolism (HCC), Anxiety, Asthma, Constipation, chronic, COVID-19, CPAP (continuous positive airway pressure) dependence, Diabetes mellitus (HCC), Embolism - blood clot, Esophageal reflux, Factor V Leiden (HCC), Heart murmur, HTN (hypertension), Hyperlipidemia, Irritable bladder, MTHFR mutation, MVP (mitral valve prolapse), Palpitations, Pulmonary embolism ([facility]), Rhinitis, allergic, Sleep apnea, and Venous thromboembolism.  Presented to the office today for:  Chief Complaint   Patient presents with    Heartburn       Assessment/Plan   1. Gastroesophageal reflux disease, unspecified whether esophagitis present  2. Chest wall discomfort  -     CBC with Auto Differential; Future  -     Comprehensive Metabolic Panel; Future  -     Troponin; Future  -     XR CHEST STANDARD (2 VW); Future  -     EKG 12 lead; Future  Return if symptoms worsen or fail to improve.  Suspected GERD vs. MSK etiology.   Protonix BID for 7 days  The pathophysiology of reflux is discussed.  Anti-reflux measures such as raising the head of the bed, avoiding tight clothing or belts, avoiding eating late at night and not lying down shortly after mealtime and achieving weight loss are discussed. Avoid ASA, NSAID's, caffeine, peppermints, alcohol and tobacco. OTC H2 blockers and/or antacids are often very helpful for PRN use. However, for persisting chronic or daily symptoms, prescription strength H2 blockers or a trial of PPI's are often used. Further recommendations to her: Rx for PPI is written. She should alert me if there are persistent symptoms, dysphagia, weight loss or GI bleeding. FUV is scheduled.     All patient questions answered.  Pt voiced understanding.   There are no discontinued medications.    Patient received counseling on the following healthy behaviors:

## 2024-04-23 ENCOUNTER — HOSPITAL ENCOUNTER (OUTPATIENT)
Age: 59
Discharge: HOME | End: 2024-04-23
Payer: COMMERCIAL

## 2024-04-23 DIAGNOSIS — R07.89 CHEST WALL DISCOMFORT: ICD-10-CM

## 2024-04-23 DIAGNOSIS — R07.89 CHEST WALL DISCOMFORT: Primary | ICD-10-CM

## 2024-04-23 LAB — TROPONIN I SERPL HS-MCNC: 9 NG/L (ref 0–14)

## 2024-04-23 PROCEDURE — 36415 COLL VENOUS BLD VENIPUNCTURE: CPT

## 2024-04-23 PROCEDURE — 84484 ASSAY OF TROPONIN QUANT: CPT

## 2024-04-23 RX ORDER — TAMSULOSIN HYDROCHLORIDE 0.4 MG/1
0.4 CAPSULE ORAL DAILY
Qty: 30 CAPSULE | Refills: 0 | Status: SHIPPED | OUTPATIENT
Start: 2024-04-23

## 2024-05-03 ENCOUNTER — OFFICE VISIT (OUTPATIENT)
Dept: PRIMARY CARE CLINIC | Age: 59
End: 2024-05-03

## 2024-05-03 VITALS
RESPIRATION RATE: 16 BRPM | DIASTOLIC BLOOD PRESSURE: 90 MMHG | SYSTOLIC BLOOD PRESSURE: 154 MMHG | BODY MASS INDEX: 47.09 KG/M2 | TEMPERATURE: 97.1 F | HEART RATE: 104 BPM | HEIGHT: 66 IN | WEIGHT: 293 LBS | OXYGEN SATURATION: 96 %

## 2024-05-03 DIAGNOSIS — J06.9 VIRAL URI: ICD-10-CM

## 2024-05-03 DIAGNOSIS — R05.1 ACUTE COUGH: Primary | ICD-10-CM

## 2024-05-03 RX ORDER — DEXTROMETHORPHAN HYDROBROMIDE AND PROMETHAZINE HYDROCHLORIDE 15; 6.25 MG/5ML; MG/5ML
2.5 SYRUP ORAL 4 TIMES DAILY PRN
Qty: 70 ML | Refills: 0 | Status: SHIPPED | OUTPATIENT
Start: 2024-05-03 | End: 2024-05-10

## 2024-05-03 NOTE — PROGRESS NOTES
[facility]      Patient:  Tess Echeverria 58 y.o. female     Date of Service: 5/3/24      Chief Complaint:   Chief Complaint   Patient presents with    Congestion     -patient is here with c/o congestion, cough and sneezing x 3 days.   -c/o ear pain, sinus pressure and chest congestion.   -denies fever, but feels clammy.  -admits to feeling fatigue.            History of Present Illness     Symptoms began approximately 3 to 4 days ago include cough, congestion, ear pain, chills.  No known exposure to COVID-19 or influenza.  No current change in taste, smell.  Does not recall any specific sick contacts in close proximity.  Has noted increased fatigue recently.  Allergies:    Levaquin [levofloxacin in d5w] and Pcn [penicillins]    Medication List:    Current Outpatient Medications   Medication Sig Dispense Refill    promethazine-dextromethorphan (PROMETHAZINE-DM) 6.25-15 MG/5ML syrup Take 2.5 mLs by mouth 4 times daily as needed for Cough 70 mL 0    tamsulosin (FLOMAX) 0.4 MG capsule Take 1 capsule by mouth daily 30 capsule 0    pantoprazole (PROTONIX) 40 MG tablet Take 1 tablet by mouth daily      spironolactone (ALDACTONE) 25 MG tablet TAKE ONE TABLET BY MOUTH ONE TIME A DAY 90 tablet 0    metoprolol succinate (TOPROL XL) 25 MG extended release tablet Take 1 tablet by mouth daily 90 tablet 3    aspirin 81 MG EC tablet Take 1 tablet by mouth daily 90 tablet 3    vitamin D (ERGOCALCIFEROL) 1.25 MG (95488 UT) CAPS capsule Take 1 capsule by mouth once a week 12 capsule 1    linaCLOtide (LINZESS) 72 MCG CAPS capsule Take 1 capsule by mouth every morning (before breakfast) 90 capsule 0    apixaban (ELIQUIS) 5 MG TABS tablet Take 1 tablet by mouth 2 times daily 180 tablet 0    famotidine (PEPCID) 40 MG tablet Take 1 tablet by mouth every evening 90 tablet 1    albuterol sulfate HFA (PROAIR HFA) 108 (90 Base) MCG/ACT inhaler Inhale 2 puffs into the lungs 4 times daily as needed for Wheezing or Shortness

## 2024-05-14 ENCOUNTER — HOSPITAL ENCOUNTER (OUTPATIENT)
Age: 59
Setting detail: SPECIMEN
Discharge: HOME | End: 2024-05-14
Payer: COMMERCIAL

## 2024-05-14 ENCOUNTER — OFFICE VISIT (OUTPATIENT)
Dept: OBGYN | Age: 59
End: 2024-05-14
Payer: COMMERCIAL

## 2024-05-14 VITALS
WEIGHT: 293 LBS | HEIGHT: 66 IN | SYSTOLIC BLOOD PRESSURE: 134 MMHG | BODY MASS INDEX: 47.09 KG/M2 | DIASTOLIC BLOOD PRESSURE: 80 MMHG

## 2024-05-14 DIAGNOSIS — Z12.4 SCREENING FOR MALIGNANT NEOPLASM OF CERVIX: ICD-10-CM

## 2024-05-14 DIAGNOSIS — Z01.419 ENCOUNTER FOR ANNUAL ROUTINE GYNECOLOGICAL EXAMINATION: Primary | ICD-10-CM

## 2024-05-14 DIAGNOSIS — Z12.31 SCREENING MAMMOGRAM, ENCOUNTER FOR: ICD-10-CM

## 2024-05-14 PROCEDURE — 99396 PREV VISIT EST AGE 40-64: CPT | Performed by: ADVANCED PRACTICE MIDWIFE

## 2024-05-14 PROCEDURE — G0145 SCR C/V CYTO,THINLAYER,RESCR: HCPCS

## 2024-05-14 PROCEDURE — 87624 HPV HI-RISK TYP POOLED RSLT: CPT

## 2024-05-14 PROCEDURE — 3079F DIAST BP 80-89 MM HG: CPT | Performed by: ADVANCED PRACTICE MIDWIFE

## 2024-05-14 PROCEDURE — 3075F SYST BP GE 130 - 139MM HG: CPT | Performed by: ADVANCED PRACTICE MIDWIFE

## 2024-05-14 ASSESSMENT — ENCOUNTER SYMPTOMS
SHORTNESS OF BREATH: 0
BACK PAIN: 1

## 2024-05-14 NOTE — PROGRESS NOTES
Respiratory:  Negative for shortness of breath.    Cardiovascular:  Negative for chest pain.   Gastrointestinal:  Negative for abdominal pain.   Genitourinary:  Negative for dysuria.   Musculoskeletal:  Positive for back pain.   Skin:  Negative for rash.   Psychiatric/Behavioral:  The patient is nervous/anxious.          Objective  Lymphatic:   no lymphadenopathy  Heent:   negative   Cor: regular rate and rhythm, no murmurs              Pul:clear to auscultation bilaterally- no wheezes, rales or rhonchi, normal air movement, no respiratory distress      GI: Abdomen soft, non-tender. BS normal. No masses,  No organomegaly           Extremities: normal strength, tone, and muscle mass   Breasts: Breast:normal appearance, no masses or tenderness   Pelvic Exam: GENITAL/URINARY:  External Genitalia:  General appearance; normal, Hair distribution; normal, Lesions absent  Urethral Meatus:  Size normal, Location normal, Lesions absent, Prolapse absent  Urethra:  Fullness absent, Masses absent  Bladder:  Fullness absent, Masses absent, Tenderness absent, Cystocele absent  Vagina:  General appearance normal, Estrogen effect normal, Discharge absent, Lesions absent, Pelvic support normal  Cervix:  General appearance normal, Lesions absent, Discharge absent, Tenderness absent, Enlargement absent, Nodularity absent  Uterus:  Size normal, Tenderness absent  Adenexa:  Masses absent, Tenderness absent  Anus/Perineum:  Lesions absent and Masses absent                                                     Assessment and Plan          Diagnosis Orders   1. Encounter for annual routine gynecological examination        2. Screening for malignant neoplasm of cervix  PAP SMEAR      3. Screening mammogram, encounter for  [facility] SATURNINO DIGITAL SCREEN BILATERAL                I have discontinued Tess Echeverria's linaCLOtide. I am also having her maintain her PreviDent 5000 Booster Plus, polyethylene glycol, albuterol sulfate HFA,

## 2024-05-16 ENCOUNTER — HOSPITAL ENCOUNTER (OUTPATIENT)
Age: 59
Discharge: HOME | End: 2024-05-16
Payer: COMMERCIAL

## 2024-05-16 DIAGNOSIS — D25.9 UTERINE LEIOMYOMA, UNSPECIFIED LOCATION: ICD-10-CM

## 2024-05-16 DIAGNOSIS — R10.2 PELVIC PAIN: ICD-10-CM

## 2024-05-16 DIAGNOSIS — R93.89 ENDOMETRIAL THICKENING ON ULTRASOUND: ICD-10-CM

## 2024-05-16 DIAGNOSIS — Z01.812 ENCOUNTER FOR PRE-OPERATIVE LABORATORY TESTING: ICD-10-CM

## 2024-05-16 DIAGNOSIS — I10 ESSENTIAL HYPERTENSION, BENIGN: ICD-10-CM

## 2024-05-16 DIAGNOSIS — N80.03 ADENOMYOSIS: ICD-10-CM

## 2024-05-16 LAB
ALBUMIN SERPL-MCNC: 4 G/DL (ref 3.5–5.2)
ALBUMIN/GLOB SERPL: 1.2 {RATIO} (ref 1–2.5)
ALP SERPL-CCNC: 85 U/L (ref 35–104)
ALT SERPL-CCNC: 35 U/L (ref 5–33)
ANION GAP SERPL CALCULATED.3IONS-SCNC: 9 MMOL/L (ref 9–17)
AST SERPL-CCNC: 33 U/L
BASOPHILS # BLD: 0.03 K/UL (ref 0–0.2)
BASOPHILS NFR BLD: 0 % (ref 0–2)
BILIRUB SERPL-MCNC: 0.4 MG/DL (ref 0.3–1.2)
BUN SERPL-MCNC: 14 MG/DL (ref 6–20)
BUN/CREAT SERPL: 18 (ref 9–20)
CALCIUM SERPL-MCNC: 9.3 MG/DL (ref 8.6–10.4)
CHLORIDE SERPL-SCNC: 106 MMOL/L (ref 98–107)
CO2 SERPL-SCNC: 26 MMOL/L (ref 20–31)
CREAT SERPL-MCNC: 0.8 MG/DL (ref 0.5–0.9)
EOSINOPHIL # BLD: 0.2 K/UL (ref 0–0.44)
EOSINOPHILS RELATIVE PERCENT: 2 % (ref 1–4)
ERYTHROCYTE [DISTWIDTH] IN BLOOD BY AUTOMATED COUNT: 13.3 % (ref 11.8–14.4)
EST. AVERAGE GLUCOSE BLD GHB EST-MCNC: 157 MG/DL
GFR, ESTIMATED: 85 ML/MIN/1.73M2
GLUCOSE SERPL-MCNC: 139 MG/DL (ref 70–99)
HBA1C MFR BLD: 7.1 % (ref 4–6)
HCT VFR BLD AUTO: 45.2 % (ref 36.3–47.1)
HGB BLD-MCNC: 15.3 G/DL (ref 11.9–15.1)
IMM GRANULOCYTES # BLD AUTO: <0.03 K/UL (ref 0–0.3)
IMM GRANULOCYTES NFR BLD: 0 %
LYMPHOCYTES NFR BLD: 3.09 K/UL (ref 1.1–3.7)
LYMPHOCYTES RELATIVE PERCENT: 38 % (ref 24–43)
MCH RBC QN AUTO: 30.2 PG (ref 25.2–33.5)
MCHC RBC AUTO-ENTMCNC: 33.8 G/DL (ref 28.4–34.8)
MCV RBC AUTO: 89.3 FL (ref 82.6–102.9)
MONOCYTES NFR BLD: 0.62 K/UL (ref 0.1–1.2)
MONOCYTES NFR BLD: 8 % (ref 3–12)
NEUTROPHILS NFR BLD: 52 % (ref 36–65)
NEUTS SEG NFR BLD: 4.23 K/UL (ref 1.5–8.1)
NRBC BLD-RTO: 0 PER 100 WBC
PLATELET # BLD AUTO: 228 K/UL (ref 138–453)
PMV BLD AUTO: 10.2 FL (ref 8.1–13.5)
POTASSIUM SERPL-SCNC: 4.3 MMOL/L (ref 3.7–5.3)
PROT SERPL-MCNC: 7.4 G/DL (ref 6.4–8.3)
RBC # BLD AUTO: 5.06 M/UL (ref 3.95–5.11)
SODIUM SERPL-SCNC: 141 MMOL/L (ref 135–144)
WBC OTHER # BLD: 8.2 K/UL (ref 3.5–11.3)

## 2024-05-16 PROCEDURE — 85025 COMPLETE CBC W/AUTO DIFF WBC: CPT

## 2024-05-16 PROCEDURE — 83036 HEMOGLOBIN GLYCOSYLATED A1C: CPT

## 2024-05-16 PROCEDURE — 80053 COMPREHEN METABOLIC PANEL: CPT

## 2024-05-16 PROCEDURE — 36415 COLL VENOUS BLD VENIPUNCTURE: CPT

## 2024-05-17 LAB
HPV I/H RISK 4 DNA CVX QL NAA+PROBE: NOT DETECTED
HPV SAMPLE: NORMAL
HPV, INTERPRETATION: NORMAL
HPV16 DNA CVX QL NAA+PROBE: NOT DETECTED
HPV18 DNA CVX QL NAA+PROBE: NOT DETECTED
SPECIMEN DESCRIPTION: NORMAL

## 2024-05-23 LAB — CYTOLOGY REPORT: NORMAL

## 2024-05-29 NOTE — TELEPHONE ENCOUNTER
Patient called stating [facility] was supposed to reach out for refills on her eliquis. Order pended for Eliquis. Pharmacy verified.

## 2024-06-03 ENCOUNTER — TELEPHONE (OUTPATIENT)
Dept: OBGYN | Age: 59
End: 2024-06-03

## 2024-06-03 NOTE — TELEPHONE ENCOUNTER
Patient calls and is questioning when to stop her Eliquis.  She is scheduled for surgery on 6/17/24.

## 2024-06-04 ENCOUNTER — OFFICE VISIT (OUTPATIENT)
Dept: OBGYN | Age: 59
End: 2024-06-04
Payer: COMMERCIAL

## 2024-06-04 VITALS
HEIGHT: 66 IN | WEIGHT: 293 LBS | BODY MASS INDEX: 47.09 KG/M2 | SYSTOLIC BLOOD PRESSURE: 130 MMHG | DIASTOLIC BLOOD PRESSURE: 84 MMHG

## 2024-06-04 DIAGNOSIS — R93.89 ENDOMETRIAL THICKENING ON ULTRASOUND: ICD-10-CM

## 2024-06-04 DIAGNOSIS — D25.9 UTERINE LEIOMYOMA, UNSPECIFIED LOCATION: Primary | ICD-10-CM

## 2024-06-04 DIAGNOSIS — R10.2 PELVIC PAIN: ICD-10-CM

## 2024-06-04 PROCEDURE — 3075F SYST BP GE 130 - 139MM HG: CPT | Performed by: OBSTETRICS & GYNECOLOGY

## 2024-06-04 PROCEDURE — 99213 OFFICE O/P EST LOW 20 MIN: CPT | Performed by: OBSTETRICS & GYNECOLOGY

## 2024-06-04 PROCEDURE — 3079F DIAST BP 80-89 MM HG: CPT | Performed by: OBSTETRICS & GYNECOLOGY

## 2024-06-04 NOTE — H&P (VIEW-ONLY)
DATE OF VISIT:  6/4/24    PATIENT NAME:  Tess Echeverria     YOB: 1965    REASON FOR VISIT:    Chief Complaint   Patient presents with    Pelvic Pain     Pt is being seen to discuss ongoing pelvic pain and treatment options.        HISTORY OF PRESENT ILLNESS:  Pt continues to have pelvic pain; usn showed 11.7 cm ut with less than 2 cm fibroid and 11 mm lining; pt interested in definitive treatment; disc'd tlh/bso; r/b/a reviewed; restrictions and recovery disc'd        Patient's last menstrual period was 02/14/2024 (approximate).  Vitals:    06/04/24 0940   BP: 130/84   Weight: 136 kg (299 lb 14.4 oz)   Height: 1.676 m (5' 6\")     Body mass index is 48.41 kg/m².  Allergies   Allergen Reactions    Levaquin [Levofloxacin In D5w]      FLUSHED AND WHEEZING    Pcn [Penicillins]      Current Outpatient Medications   Medication Sig Dispense Refill    apixaban (ELIQUIS) 5 MG TABS tablet Take 1 tablet by mouth 2 times daily 180 tablet 0    pantoprazole (PROTONIX) 40 MG tablet Take 1 tablet by mouth daily      spironolactone (ALDACTONE) 25 MG tablet TAKE ONE TABLET BY MOUTH ONE TIME A DAY 90 tablet 0    metoprolol succinate (TOPROL XL) 25 MG extended release tablet Take 1 tablet by mouth daily 90 tablet 3    aspirin 81 MG EC tablet Take 1 tablet by mouth daily 90 tablet 3    vitamin D (ERGOCALCIFEROL) 1.25 MG (02203 UT) CAPS capsule Take 1 capsule by mouth once a week 12 capsule 1    famotidine (PEPCID) 40 MG tablet Take 1 tablet by mouth every evening 90 tablet 1    albuterol sulfate HFA (PROAIR HFA) 108 (90 Base) MCG/ACT inhaler Inhale 2 puffs into the lungs 4 times daily as needed for Wheezing or Shortness of Breath 1 each 0    polyethylene glycol (MIRALAX) 17 g PACK packet Take 17 g by mouth daily      PREVIDENT 5000 BOOSTER PLUS 1.1 % PSTE   0    tamsulosin (FLOMAX) 0.4 MG capsule Take 1 capsule by mouth daily (Patient not taking: Reported on 6/4/2024) 30 capsule 0    budesonide-formoterol (SYMBICORT)

## 2024-06-04 NOTE — PROGRESS NOTES
alert and oriented to person, place, and time.   Skin:     General: Skin is warm and dry.   Psychiatric:         Mood and Affect: Mood normal.         Behavior: Behavior normal.     The patient, Tess Echeverria is a 58 y.o. female, was seen with a total time spent of 20 minutes for the visit on this date of service by the E/M provider. The time component had both face to face and non face to face time spent in determining the total time component.  Counseling and education regarding her diagnosis listed below and her options regarding those diagnoses were also included in determining her time component.      Diagnosis Orders   1. Uterine leiomyoma, unspecified location        2. Endometrial thickening on ultrasound        3. Pelvic pain             The patient had her preventative health maintenance recommendations and follow-up reviewed with her at the completion of her visit.      ASSESSMENT:      1. Uterine leiomyoma, unspecified location    2. Endometrial thickening on ultrasound    3. Pelvic pain        PLAN:  No orders of the defined types were placed in this encounter.    Return for RA TLH/BSO.       Electronically signed by Ivory Sotelo DO on 06/04/24

## 2024-06-05 ENCOUNTER — TELEPHONE (OUTPATIENT)
Dept: PRIMARY CARE CLINIC | Age: 59
End: 2024-06-05

## 2024-06-05 NOTE — TELEPHONE ENCOUNTER
This writer spoke with Dr. Hopkins's office regarding eliquis. Ok to stop eliquis 2-3 days prior to surgery. Called patient to let them know.

## 2024-06-05 NOTE — TELEPHONE ENCOUNTER
Thank you - I believe that I placed for similar recommendations on the faxed sheet;  Electronically signed by Maximino Hansen MD on 6/5/2024 at 4:18 PM

## 2024-06-07 ENCOUNTER — OFFICE VISIT (OUTPATIENT)
Dept: PRIMARY CARE CLINIC | Age: 59
End: 2024-06-07
Payer: COMMERCIAL

## 2024-06-07 ENCOUNTER — ANESTHESIA EVENT (OUTPATIENT)
Dept: OPERATING ROOM | Age: 59
End: 2024-06-07
Payer: COMMERCIAL

## 2024-06-07 ENCOUNTER — TELEPHONE (OUTPATIENT)
Dept: PREADMISSION TESTING | Age: 59
End: 2024-06-07

## 2024-06-07 VITALS
OXYGEN SATURATION: 99 % | BODY MASS INDEX: 47.09 KG/M2 | HEART RATE: 78 BPM | WEIGHT: 293 LBS | HEIGHT: 66 IN | RESPIRATION RATE: 16 BRPM | SYSTOLIC BLOOD PRESSURE: 130 MMHG | DIASTOLIC BLOOD PRESSURE: 86 MMHG

## 2024-06-07 DIAGNOSIS — E11.69 TYPE 2 DIABETES MELLITUS WITH OTHER SPECIFIED COMPLICATION, UNSPECIFIED WHETHER LONG TERM INSULIN USE (HCC): ICD-10-CM

## 2024-06-07 DIAGNOSIS — I10 ESSENTIAL HYPERTENSION: Primary | ICD-10-CM

## 2024-06-07 DIAGNOSIS — K21.9 GASTROESOPHAGEAL REFLUX DISEASE, UNSPECIFIED WHETHER ESOPHAGITIS PRESENT: ICD-10-CM

## 2024-06-07 PROCEDURE — 3075F SYST BP GE 130 - 139MM HG: CPT | Performed by: STUDENT IN AN ORGANIZED HEALTH CARE EDUCATION/TRAINING PROGRAM

## 2024-06-07 PROCEDURE — 99214 OFFICE O/P EST MOD 30 MIN: CPT | Performed by: STUDENT IN AN ORGANIZED HEALTH CARE EDUCATION/TRAINING PROGRAM

## 2024-06-07 PROCEDURE — G2211 COMPLEX E/M VISIT ADD ON: HCPCS | Performed by: STUDENT IN AN ORGANIZED HEALTH CARE EDUCATION/TRAINING PROGRAM

## 2024-06-07 PROCEDURE — 3079F DIAST BP 80-89 MM HG: CPT | Performed by: STUDENT IN AN ORGANIZED HEALTH CARE EDUCATION/TRAINING PROGRAM

## 2024-06-07 PROCEDURE — 3051F HG A1C>EQUAL 7.0%<8.0%: CPT | Performed by: STUDENT IN AN ORGANIZED HEALTH CARE EDUCATION/TRAINING PROGRAM

## 2024-06-07 RX ORDER — GLUCOSAMINE HCL/CHONDROITIN SU 500-400 MG
CAPSULE ORAL
Qty: 100 STRIP | Refills: 0 | Status: SHIPPED | OUTPATIENT
Start: 2024-06-07

## 2024-06-07 RX ORDER — SPIRONOLACTONE 25 MG/1
TABLET ORAL
Qty: 90 TABLET | Refills: 3 | Status: SHIPPED | OUTPATIENT
Start: 2024-06-07

## 2024-06-07 NOTE — TELEPHONE ENCOUNTER
Chart reviewed.  PMH: MO - bmi 48, HTN, GERD, DM2, SUSY, MVP, asthma, h/o DVT/PE - Factor V Leiden   & MTHFR  PCP visit 6/7 - BP well controlled  at 130/86, SpO2 99%  EKG 4/22/2024 NSR with 1' AV blcok, non-specific intrventricular conduction block\.  12/27/2023 stress test - low risk for significant myocardial ischemia  5/15/2024 CBC and CMP unremarkable other than elevated glucose.  A1C 7.1    OK to proceed with anesthesia

## 2024-06-07 NOTE — PROGRESS NOTES
for cold intolerance, polydipsia, polyphagia and polyuria.   Genitourinary: Negative for difficulty urinating, flank pain and frequency.   Musculoskeletal: Negative for gait problem and joint swelling. Negative for back pain, neck pain and neck stiffness.   Skin: Negative for color change and wound. Negative for pallor and rash.   Allergic/Immunologic: Negative for environmental allergies and food allergies.   Neurological: Negative for light-headedness, numbness and headaches.   Psychiatric/Behavioral: Negative for sleep disturbance. Negative for confusion and suicidal ideas.     Objective:    /86   Pulse 78   Resp 16   Ht 1.676 m (5' 6\")   Wt 135.6 kg (299 lb)   LMP 02/14/2024 (Approximate)   SpO2 99%   BMI 48.26 kg/m²    BP Readings from Last 3 Encounters:   06/07/24 130/86   06/04/24 130/84   05/14/24 134/80     Physical Exam  Constitutional: Patient is oriented to person, place, and time. Patient appears well-developed and well-nourished. No distress.   HENT: Head: Normocephalic and atraumatic.   Eyes: Pupils are equal, round, and reactive to light. Conjunctivae are normal. Right eye exhibits no discharge. Left eye exhibits no discharge.   Cardiovascular: Normal rate, regular rhythm and normal heart sounds.   Pulmonary/Chest: Effort normal and breath sounds normal. No respiratory distress. Patient has no wheezes.   Abdominal: Soft. Bowel sounds are normal. Patient exhibits no distension. There is no tenderness.   Musculoskeletal:  Patient exhibits no edema and tenderness. Patient exhibits no deformity.   Neurological: Patient is alert and oriented to person, place, and time.   Skin: Skin is warm and dry. Patient is not diaphoretic.   Psychiatric: Patient's speech is normal and behavior is normal. Thought content normal.   Vitals reviewed.      Lab Results   Component Value Date    WBC 8.2 05/16/2024    HGB 15.3 (H) 05/16/2024    HCT 45.2 05/16/2024     05/16/2024    CHOL 180 12/08/2023

## 2024-06-07 NOTE — TELEPHONE ENCOUNTER
Please review chart. Pt has significant health history and is scheduled with Dr. Reyez 6/17/24. Dr. Hansen saw pt on 6/7/24. Thank you.

## 2024-06-07 NOTE — PROGRESS NOTES
Patient instructed on the pre-operative, intra-operative, and post-operative process. Patient instructed on NPO status. Medication instructions and pre operative instruction sheet reviewed with the patient. Pt may use Symbicort and take Toprol XL, and Protonix with a small sip of water AM of the procedure. CHG skin prep instructions reviewed with patient. Clearance from Dr Hansen noted 6/7/24. Pt states was told per Dr. Hansen to hold Eliquis 2 days prior to procedure.

## 2024-06-12 ENCOUNTER — HOSPITAL ENCOUNTER (OUTPATIENT)
Dept: DIABETES SERVICES | Age: 59
Setting detail: THERAPIES SERIES
Discharge: HOME | End: 2024-06-12
Payer: COMMERCIAL

## 2024-06-12 PROCEDURE — G0108 DIAB MANAGE TRN  PER INDIV: HCPCS

## 2024-06-12 SDOH — ECONOMIC STABILITY: FOOD INSECURITY: ADDITIONAL INFORMATION: NO

## 2024-06-12 ASSESSMENT — PROBLEM AREAS IN DIABETES QUESTIONNAIRE (PAID)
FEELING THAT DIABETES IS TAKING UP TOO MUCH OF YOUR MENTAL AND PHYSICAL ENERGY EVERY DAY: NOT A PROBLEM
COPING WITH COMPLICATIONS OF DIABETES: NOT A PROBLEM
WORRYING ABOUT THE FUTURE AND THE POSSIBILITY OF SERIOUS COMPLICATIONS: MINOR PROBLEM
FEELING DEPRESSED WHEN YOU THINK ABOUT LIVING WITH DIABETES: NOT A PROBLEM
FEELING SCARED WHEN YOU THINK ABOUT LIVING WITH DIABETES: NOT A PROBLEM
PAID-5 TOTAL SCORE: 1

## 2024-06-12 ASSESSMENT — SLEEP AND FATIGUE QUESTIONNAIRES
HOW DO YOU RATE THE QUALITY OF YOUR SLEEP: GOOD
HOW MANY HOURS OF SLEEP ARE YOU GETTING, ON AVERAGE: 7 OR MORE
HAVE YOU EVER BEEN TESTED FOR SLEEP APNEA: YES

## 2024-06-12 ASSESSMENT — PATIENT HEALTH QUESTIONNAIRE - PHQ9
2. FEELING DOWN, DEPRESSED OR HOPELESS: NOT AT ALL
SUM OF ALL RESPONSES TO PHQ QUESTIONS 1-9: 0
SUM OF ALL RESPONSES TO PHQ QUESTIONS 1-9: 0
1. LITTLE INTEREST OR PLEASURE IN DOING THINGS: NOT AT ALL
SUM OF ALL RESPONSES TO PHQ9 QUESTIONS 1 & 2: 0
SUM OF ALL RESPONSES TO PHQ QUESTIONS 1-9: 0
SUM OF ALL RESPONSES TO PHQ QUESTIONS 1-9: 0

## 2024-06-12 NOTE — PROGRESS NOTES
Diabetes Self-Management Education Record    Progress Note:  Patient arrived to appointment by self for initial assessment for diabetes education for a diagnosis of Type 2 diabetes. Her most recent A1C was 7.1 on 05/16/24 with previous A1C in prediabetes range for the last several years. Family history includes sister, brother, and her mother. Had gestational diabetes. Has sleep apnea and wears CPAP. No medication for diabetes and she is interested and willing to make lifestyle adjustments. No exercise. Beverages include water, unsweetened beverages, and coffee. Meals are fairly consistent with breakfast, lunch, supper during the week and weekends tends to skip either breakfast or lunch. While discussing she states \"my biggest problem is pasta, bread, and sweets.\" Had cataracts removed and is due for dilated eye exam and plans to schedule. She admits to increased stress due to  and medical issues and declining health. She is not currently monitoring but plans to start once she has picked up glucometer and supplies. Given target ranges.    Begin by allowing her to discuss her concerns and feelings regarding her  and increased stress. She is tearful during conversation. Discuss the role of stress on glucose and discuss the need to allow time for herself and take breaks as needed. She feels she has some healthy ways of dealing with stress by doing pottery, listening to music, and at times just walking away for a break if needed. She does have daughters that are supportive. Encourage portion control of food particularly carbs. Measure amount of pasta and decrease sweets. Shown my plate and portion sizes. Given and briefly reviewed my Carbohydrate Guide. Discuss the need to have protein with carb as breakfast sometimes is toast or waffle. Given examples of protein with carb snacks. She is having a hysterectomy next week. Plans to look at information further at home. Going to look at Glycemic index book. All

## 2024-06-17 ENCOUNTER — HOSPITAL ENCOUNTER (OUTPATIENT)
Age: 59
Setting detail: OUTPATIENT SURGERY
Discharge: HOME | End: 2024-06-17
Attending: OBSTETRICS & GYNECOLOGY | Admitting: OBSTETRICS & GYNECOLOGY
Payer: COMMERCIAL

## 2024-06-17 ENCOUNTER — ANESTHESIA (OUTPATIENT)
Dept: OPERATING ROOM | Age: 59
End: 2024-06-17
Payer: COMMERCIAL

## 2024-06-17 VITALS
RESPIRATION RATE: 17 BRPM | HEART RATE: 68 BPM | TEMPERATURE: 98 F | SYSTOLIC BLOOD PRESSURE: 120 MMHG | DIASTOLIC BLOOD PRESSURE: 58 MMHG | BODY MASS INDEX: 47.09 KG/M2 | HEIGHT: 66 IN | WEIGHT: 293 LBS | OXYGEN SATURATION: 93 %

## 2024-06-17 DIAGNOSIS — N92.6 IRREGULAR MENSES: ICD-10-CM

## 2024-06-17 DIAGNOSIS — G89.18 POSTOPERATIVE PAIN: Primary | ICD-10-CM

## 2024-06-17 PROCEDURE — 7100000001 HC PACU RECOVERY - ADDTL 15 MIN: Performed by: OBSTETRICS & GYNECOLOGY

## 2024-06-17 PROCEDURE — 2580000003 HC RX 258: Performed by: NURSE ANESTHETIST, CERTIFIED REGISTERED

## 2024-06-17 PROCEDURE — 6360000002 HC RX W HCPCS: Performed by: NURSE ANESTHETIST, CERTIFIED REGISTERED

## 2024-06-17 PROCEDURE — S2900 ROBOTIC SURGICAL SYSTEM: HCPCS | Performed by: OBSTETRICS & GYNECOLOGY

## 2024-06-17 PROCEDURE — 6360000002 HC RX W HCPCS

## 2024-06-17 PROCEDURE — 3700000001 HC ADD 15 MINUTES (ANESTHESIA): Performed by: OBSTETRICS & GYNECOLOGY

## 2024-06-17 PROCEDURE — 7100000011 HC PHASE II RECOVERY - ADDTL 15 MIN: Performed by: OBSTETRICS & GYNECOLOGY

## 2024-06-17 PROCEDURE — 58573 TLH W/T/O UTERUS OVER 250 G: CPT | Performed by: OBSTETRICS & GYNECOLOGY

## 2024-06-17 PROCEDURE — 64488 TAP BLOCK BI INJECTION: CPT | Performed by: NURSE ANESTHETIST, CERTIFIED REGISTERED

## 2024-06-17 PROCEDURE — 7100000000 HC PACU RECOVERY - FIRST 15 MIN: Performed by: OBSTETRICS & GYNECOLOGY

## 2024-06-17 PROCEDURE — 2500000003 HC RX 250 WO HCPCS: Performed by: NURSE ANESTHETIST, CERTIFIED REGISTERED

## 2024-06-17 PROCEDURE — 3600000009 HC SURGERY ROBOT BASE: Performed by: OBSTETRICS & GYNECOLOGY

## 2024-06-17 PROCEDURE — 7100000010 HC PHASE II RECOVERY - FIRST 15 MIN: Performed by: OBSTETRICS & GYNECOLOGY

## 2024-06-17 PROCEDURE — 3700000000 HC ANESTHESIA ATTENDED CARE: Performed by: OBSTETRICS & GYNECOLOGY

## 2024-06-17 PROCEDURE — 88307 TISSUE EXAM BY PATHOLOGIST: CPT

## 2024-06-17 PROCEDURE — 2709999900 HC NON-CHARGEABLE SUPPLY: Performed by: OBSTETRICS & GYNECOLOGY

## 2024-06-17 PROCEDURE — 94664 DEMO&/EVAL PT USE INHALER: CPT

## 2024-06-17 PROCEDURE — 6370000000 HC RX 637 (ALT 250 FOR IP): Performed by: NURSE ANESTHETIST, CERTIFIED REGISTERED

## 2024-06-17 PROCEDURE — 3600000019 HC SURGERY ROBOT ADDTL 15MIN: Performed by: OBSTETRICS & GYNECOLOGY

## 2024-06-17 PROCEDURE — 6370000000 HC RX 637 (ALT 250 FOR IP): Performed by: OBSTETRICS & GYNECOLOGY

## 2024-06-17 RX ORDER — ACETAMINOPHEN 325 MG/1
650 TABLET ORAL ONCE
Status: COMPLETED | OUTPATIENT
Start: 2024-06-17 | End: 2024-06-17

## 2024-06-17 RX ORDER — HYDROCODONE BITARTRATE AND ACETAMINOPHEN 5; 325 MG/1; MG/1
1 TABLET ORAL ONCE
Status: COMPLETED | OUTPATIENT
Start: 2024-06-17 | End: 2024-06-17

## 2024-06-17 RX ORDER — ONDANSETRON 2 MG/ML
INJECTION INTRAMUSCULAR; INTRAVENOUS PRN
Status: DISCONTINUED | OUTPATIENT
Start: 2024-06-17 | End: 2024-06-17 | Stop reason: SDUPTHER

## 2024-06-17 RX ORDER — MIDAZOLAM HYDROCHLORIDE 1 MG/ML
INJECTION INTRAMUSCULAR; INTRAVENOUS PRN
Status: DISCONTINUED | OUTPATIENT
Start: 2024-06-17 | End: 2024-06-17 | Stop reason: SDUPTHER

## 2024-06-17 RX ORDER — OXYCODONE HYDROCHLORIDE AND ACETAMINOPHEN 5; 325 MG/1; MG/1
2 TABLET ORAL
Status: DISCONTINUED | OUTPATIENT
Start: 2024-06-17 | End: 2024-06-17 | Stop reason: HOSPADM

## 2024-06-17 RX ORDER — DEXAMETHASONE SODIUM PHOSPHATE 10 MG/ML
INJECTION, SOLUTION INTRAMUSCULAR; INTRAVENOUS PRN
Status: DISCONTINUED | OUTPATIENT
Start: 2024-06-17 | End: 2024-06-17 | Stop reason: SDUPTHER

## 2024-06-17 RX ORDER — SODIUM CHLORIDE 9 MG/ML
INJECTION, SOLUTION INTRAMUSCULAR; INTRAVENOUS; SUBCUTANEOUS PRN
Status: DISCONTINUED | OUTPATIENT
Start: 2024-06-17 | End: 2024-06-17 | Stop reason: SDUPTHER

## 2024-06-17 RX ORDER — PHENAZOPYRIDINE HYDROCHLORIDE 100 MG/1
200 TABLET, FILM COATED ORAL ONCE
Status: COMPLETED | OUTPATIENT
Start: 2024-06-17 | End: 2024-06-17

## 2024-06-17 RX ORDER — ALBUTEROL SULFATE 2.5 MG/3ML
2.5 SOLUTION RESPIRATORY (INHALATION) ONCE
Status: COMPLETED | OUTPATIENT
Start: 2024-06-17 | End: 2024-06-17

## 2024-06-17 RX ORDER — SODIUM CHLORIDE, SODIUM LACTATE, POTASSIUM CHLORIDE, CALCIUM CHLORIDE 600; 310; 30; 20 MG/100ML; MG/100ML; MG/100ML; MG/100ML
INJECTION, SOLUTION INTRAVENOUS CONTINUOUS
Status: DISCONTINUED | OUTPATIENT
Start: 2024-06-17 | End: 2024-06-17 | Stop reason: HOSPADM

## 2024-06-17 RX ORDER — SODIUM CHLORIDE 0.9 % (FLUSH) 0.9 %
5-40 SYRINGE (ML) INJECTION EVERY 12 HOURS SCHEDULED
Status: DISCONTINUED | OUTPATIENT
Start: 2024-06-17 | End: 2024-06-17 | Stop reason: HOSPADM

## 2024-06-17 RX ORDER — PROPOFOL 10 MG/ML
INJECTION, EMULSION INTRAVENOUS PRN
Status: DISCONTINUED | OUTPATIENT
Start: 2024-06-17 | End: 2024-06-17 | Stop reason: SDUPTHER

## 2024-06-17 RX ORDER — HYDROCODONE BITARTRATE AND ACETAMINOPHEN 5; 325 MG/1; MG/1
1 TABLET ORAL EVERY 6 HOURS PRN
Qty: 10 TABLET | Refills: 0 | Status: SHIPPED | OUTPATIENT
Start: 2024-06-17 | End: 2024-06-22

## 2024-06-17 RX ORDER — FENTANYL CITRATE 50 UG/ML
25 INJECTION, SOLUTION INTRAMUSCULAR; INTRAVENOUS EVERY 5 MIN PRN
Status: DISCONTINUED | OUTPATIENT
Start: 2024-06-17 | End: 2024-06-17 | Stop reason: HOSPADM

## 2024-06-17 RX ORDER — SODIUM CHLORIDE 9 MG/ML
INJECTION, SOLUTION INTRAVENOUS PRN
Status: DISCONTINUED | OUTPATIENT
Start: 2024-06-17 | End: 2024-06-17 | Stop reason: HOSPADM

## 2024-06-17 RX ORDER — DEXAMETHASONE SODIUM PHOSPHATE 4 MG/ML
INJECTION, SOLUTION INTRA-ARTICULAR; INTRALESIONAL; INTRAMUSCULAR; INTRAVENOUS; SOFT TISSUE PRN
Status: DISCONTINUED | OUTPATIENT
Start: 2024-06-17 | End: 2024-06-17 | Stop reason: SDUPTHER

## 2024-06-17 RX ORDER — DIMENHYDRINATE 50 MG
50 TABLET ORAL ONCE
Status: COMPLETED | OUTPATIENT
Start: 2024-06-17 | End: 2024-06-17

## 2024-06-17 RX ORDER — SODIUM CHLORIDE 0.9 % (FLUSH) 0.9 %
5-40 SYRINGE (ML) INJECTION PRN
Status: DISCONTINUED | OUTPATIENT
Start: 2024-06-17 | End: 2024-06-17 | Stop reason: HOSPADM

## 2024-06-17 RX ORDER — FENTANYL CITRATE 50 UG/ML
INJECTION, SOLUTION INTRAMUSCULAR; INTRAVENOUS PRN
Status: DISCONTINUED | OUTPATIENT
Start: 2024-06-17 | End: 2024-06-17 | Stop reason: SDUPTHER

## 2024-06-17 RX ORDER — KETOROLAC TROMETHAMINE 10 MG/1
10 TABLET, FILM COATED ORAL EVERY 6 HOURS PRN
Qty: 20 TABLET | Refills: 0 | Status: SHIPPED | OUTPATIENT
Start: 2024-06-17 | End: 2025-06-17

## 2024-06-17 RX ORDER — FENTANYL CITRATE 50 UG/ML
50 INJECTION, SOLUTION INTRAMUSCULAR; INTRAVENOUS EVERY 5 MIN PRN
Status: COMPLETED | OUTPATIENT
Start: 2024-06-17 | End: 2024-06-17

## 2024-06-17 RX ORDER — KETOROLAC TROMETHAMINE 30 MG/ML
INJECTION, SOLUTION INTRAMUSCULAR; INTRAVENOUS PRN
Status: DISCONTINUED | OUTPATIENT
Start: 2024-06-17 | End: 2024-06-17 | Stop reason: SDUPTHER

## 2024-06-17 RX ORDER — LIDOCAINE HYDROCHLORIDE 20 MG/ML
INJECTION, SOLUTION EPIDURAL; INFILTRATION; INTRACAUDAL; PERINEURAL PRN
Status: DISCONTINUED | OUTPATIENT
Start: 2024-06-17 | End: 2024-06-17 | Stop reason: SDUPTHER

## 2024-06-17 RX ORDER — NALOXONE HYDROCHLORIDE 0.4 MG/ML
INJECTION, SOLUTION INTRAMUSCULAR; INTRAVENOUS; SUBCUTANEOUS PRN
Status: DISCONTINUED | OUTPATIENT
Start: 2024-06-17 | End: 2024-06-17 | Stop reason: HOSPADM

## 2024-06-17 RX ORDER — ONDANSETRON 2 MG/ML
4 INJECTION INTRAMUSCULAR; INTRAVENOUS
Status: DISCONTINUED | OUTPATIENT
Start: 2024-06-17 | End: 2024-06-17 | Stop reason: HOSPADM

## 2024-06-17 RX ORDER — ENOXAPARIN SODIUM 100 MG/ML
40 INJECTION SUBCUTANEOUS ONCE
Status: COMPLETED | OUTPATIENT
Start: 2024-06-17 | End: 2024-06-17

## 2024-06-17 RX ORDER — ROCURONIUM BROMIDE 10 MG/ML
INJECTION, SOLUTION INTRAVENOUS PRN
Status: DISCONTINUED | OUTPATIENT
Start: 2024-06-17 | End: 2024-06-17 | Stop reason: SDUPTHER

## 2024-06-17 RX ORDER — ROPIVACAINE HYDROCHLORIDE 5 MG/ML
INJECTION, SOLUTION EPIDURAL; INFILTRATION; PERINEURAL PRN
Status: DISCONTINUED | OUTPATIENT
Start: 2024-06-17 | End: 2024-06-17 | Stop reason: SDUPTHER

## 2024-06-17 RX ADMIN — ENOXAPARIN SODIUM 40 MG: 100 INJECTION SUBCUTANEOUS at 07:27

## 2024-06-17 RX ADMIN — KETOROLAC TROMETHAMINE 30 MG: 30 INJECTION, SOLUTION INTRAMUSCULAR at 10:27

## 2024-06-17 RX ADMIN — FENTANYL CITRATE 25 MCG: 50 INJECTION INTRAMUSCULAR; INTRAVENOUS at 10:55

## 2024-06-17 RX ADMIN — ROCURONIUM BROMIDE 10 MG: 10 INJECTION, SOLUTION INTRAVENOUS at 09:47

## 2024-06-17 RX ADMIN — FENTANYL CITRATE 25 MCG: 50 INJECTION INTRAMUSCULAR; INTRAVENOUS at 10:07

## 2024-06-17 RX ADMIN — PROPOFOL 200 MG: 10 INJECTION, EMULSION INTRAVENOUS at 08:41

## 2024-06-17 RX ADMIN — FENTANYL CITRATE 50 MCG: 50 INJECTION INTRAMUSCULAR; INTRAVENOUS at 11:17

## 2024-06-17 RX ADMIN — FENTANYL CITRATE 50 MCG: 50 INJECTION INTRAMUSCULAR; INTRAVENOUS at 11:06

## 2024-06-17 RX ADMIN — FENTANYL CITRATE 50 MCG: 50 INJECTION INTRAMUSCULAR; INTRAVENOUS at 08:25

## 2024-06-17 RX ADMIN — SODIUM CHLORIDE, POTASSIUM CHLORIDE, SODIUM LACTATE AND CALCIUM CHLORIDE: 600; 310; 30; 20 INJECTION, SOLUTION INTRAVENOUS at 08:17

## 2024-06-17 RX ADMIN — DIMENHYDRINATE 50 MG: 50 TABLET ORAL at 07:25

## 2024-06-17 RX ADMIN — SODIUM CHLORIDE 30 ML: 9 INJECTION, SOLUTION INTRAMUSCULAR; INTRAVENOUS; SUBCUTANEOUS at 08:29

## 2024-06-17 RX ADMIN — ACETAMINOPHEN 650 MG: 325 TABLET ORAL at 07:25

## 2024-06-17 RX ADMIN — ROCURONIUM BROMIDE 50 MG: 10 INJECTION, SOLUTION INTRAVENOUS at 08:41

## 2024-06-17 RX ADMIN — PHENAZOPYRIDINE 200 MG: 100 TABLET ORAL at 12:55

## 2024-06-17 RX ADMIN — LIDOCAINE HYDROCHLORIDE 100 MG: 20 INJECTION, SOLUTION EPIDURAL; INFILTRATION; INTRACAUDAL; PERINEURAL at 08:41

## 2024-06-17 RX ADMIN — HYDROCODONE BITARTRATE AND ACETAMINOPHEN 1 TABLET: 5; 325 TABLET ORAL at 12:46

## 2024-06-17 RX ADMIN — DEXAMETHASONE SODIUM PHOSPHATE 4 MG: 4 INJECTION, SOLUTION INTRA-ARTICULAR; INTRALESIONAL; INTRAMUSCULAR; INTRAVENOUS; SOFT TISSUE at 08:52

## 2024-06-17 RX ADMIN — ONDANSETRON 4 MG: 2 INJECTION INTRAMUSCULAR; INTRAVENOUS at 08:33

## 2024-06-17 RX ADMIN — MIDAZOLAM 1 MG: 1 INJECTION INTRAMUSCULAR; INTRAVENOUS at 08:33

## 2024-06-17 RX ADMIN — ROPIVACAINE HYDROCHLORIDE 40 ML: 5 INJECTION, SOLUTION EPIDURAL; INFILTRATION; PERINEURAL at 08:29

## 2024-06-17 RX ADMIN — ROCURONIUM BROMIDE 20 MG: 10 INJECTION, SOLUTION INTRAVENOUS at 09:20

## 2024-06-17 RX ADMIN — ROCURONIUM BROMIDE 20 MG: 10 INJECTION, SOLUTION INTRAVENOUS at 09:06

## 2024-06-17 RX ADMIN — DEXAMETHASONE SODIUM PHOSPHATE 10 MG: 10 INJECTION, SOLUTION INTRAMUSCULAR; INTRAVENOUS at 08:29

## 2024-06-17 RX ADMIN — MIDAZOLAM 1 MG: 1 INJECTION INTRAMUSCULAR; INTRAVENOUS at 08:25

## 2024-06-17 RX ADMIN — SUGAMMADEX 250 MG: 100 INJECTION, SOLUTION INTRAVENOUS at 10:45

## 2024-06-17 RX ADMIN — Medication 3000 MG: at 08:31

## 2024-06-17 RX ADMIN — ALBUTEROL SULFATE 2.5 MG: 2.5 SOLUTION RESPIRATORY (INHALATION) at 13:21

## 2024-06-17 ASSESSMENT — PAIN DESCRIPTION - DESCRIPTORS
DESCRIPTORS: ACHING;SORE
DESCRIPTORS: CRAMPING
DESCRIPTORS: CRAMPING;SORE
DESCRIPTORS: ACHING;SORE
DESCRIPTORS: CRAMPING
DESCRIPTORS: ACHING;SORE
DESCRIPTORS: CRAMPING;SORE
DESCRIPTORS: CRAMPING;SORE
DESCRIPTORS: ACHING;SORE
DESCRIPTORS: CRAMPING;SORE

## 2024-06-17 ASSESSMENT — ENCOUNTER SYMPTOMS: DYSPNEA ACTIVITY LEVEL: AFTER AMBULATING 1 FLIGHT OF STAIRS

## 2024-06-17 NOTE — OP NOTE
Preoperative diagnosis:   Pelvic pain in female   2.   Enlarged uterus  3.   Uterine fibroid    Postoperative diagnosis: Same    Procedure:  Robotic-assisted Total Laparoscopic Hysterectomy with Bilateral Salpingo-oophorectomy    Surgeon: Dr. Ivory Sotelo    Asst.: Cherry Ahumada PGY2    Anesthesia: Gen.    Estimated blood loss: 25 mL    Fluids: LR    Urine: 250 mL of clear urine    Condition: Stable    Complications: None    Findings: The patient had an anteverted uterus which sounded to 12 cm in depth.  There was evidence of prior bilateral salpingectomy with fallope rings.  Bilateral ureteral peristalsis was noted throughout the case and after closure of the vaginal cuff.    Specimen removed: Uterus and Bilateral tubes and ovaries    Operative note: The patient was taken to the operating room where general anesthesia was obtained without difficulty.  She was prepped and draped usual sterile fashion in a dorsal lithotomy position.  Timeout was performed.  A weighted speculum was placed in the patient's vagina and the anterior lip of the cervix was grasped with a single-tooth tenaculum.  The cervix was progressively dilated and the uterus was sounded with the findings noted above.  A V care uterine manipulator was then placed.  A Rousseau catheter was placed and left to gravity drainage.  At this point attention was turned to the patient's abdomen where a supraumbilical incision was made with a scalpel.   A veress needle was then carefully introduced at a 45° angle while tenting the abdominal wall.  Intraabdominal placement was confirmed by use of water-filled syringe and drop in intra-abdominal pressure with insufflation of CO2.  Pneumoperitoneum was obtained to half liters of CO2.  A robotic port was then placed without difficulty and intra-abdominal placement was confirmed by laparoscopy.  Second and third ports were then placed under direct visualization approximately 4 cm

## 2024-06-17 NOTE — DISCHARGE INSTRUCTIONS
meds DO NOT drive, operate machinery or make business decisions.    14.  Try to avoid constipation (no bowel movement) by using over the counter Colace once or twice daily and increasing your fluid intake.  Please call if no bowel movement after increasing fluid intake, use of Milk of Magnesia, Pericolace (laxative) or Dulcolax suppositories.      15.  No sexual activity, tampons, douches, sitting in hot tubs/saunas or swimming in pools/ponds for 6 weeks or until cleared by your surgeon.    16.  Call your surgeon for any questions regarding your surgery.    17.  Call for an appointment to see NIKI Castellon in 2 weeks.   Dr. Goldstein -- [place] office      [phone]      [place] office   [phone]

## 2024-06-17 NOTE — ANESTHESIA PROCEDURE NOTES
Peripheral Block    Patient location during procedure: pre-op  Reason for block: procedure for pain, post-op pain management and at surgeon's request  Start time: 6/17/2024 8:25 AM  End time: 6/17/2024 8:29 AM  Staffing  Performed: resident/CRNA   Resident/CRNA: Julia Davies APRN - CRNA  Performed by: Julia Davies APRN - CRNA  Authorized by: Julia Davies APRN - CRNA    Preanesthetic Checklist  Completed: patient identified, IV checked, site marked, risks and benefits discussed, surgical/procedural consents, equipment checked, pre-op evaluation, timeout performed, anesthesia consent given, oxygen available, monitors applied/VS acknowledged, fire risk safety assessment completed and verbalized and blood product R/B/A discussed and consented  Peripheral Block   Patient position: supine  Prep: ChloraPrep  Provider prep: mask and sterile gloves  Patient monitoring: cardiac monitor, continuous pulse ox, frequent blood pressure checks, IV access and responsive to questions  Block type: TAP and Rectus sheath  Laterality: bilateral  Injection technique: single-shot  Guidance: ultrasound guided    Needle   Needle type: short-bevel   Needle gauge: 22 G  Needle localization: anatomical landmarks and ultrasound guidance  Needle length: 8 cm  Assessment   Injection assessment: negative aspiration for heme, no paresthesia on injection and no intravascular symptoms  Paresthesia pain: none  Slow fractionated injection: yes  Hemodynamics: stable  Outcomes: uncomplicated and patient tolerated procedure well    Additional Notes  Bilateral TAP: 25 mL volume per side using 15 mL ropivacaine with 5 mg decadron and 10 mL NS. Bilateral rectus sheath: 10 mL volume per side with 5 mL ropivacaine and 5 mL NS per side. Total ropivacaine volume 40 mL, total volume with NS and decadron, 70 mL. Pt tolerated procedure well, all VSS.

## 2024-06-17 NOTE — ANESTHESIA POSTPROCEDURE EVALUATION
Department of Anesthesiology  Postprocedure Note    Patient: Tess Echeverria  MRN: [id number]  YOB: 1965  Date of evaluation: 6/17/2024    Procedure Summary       Date: 06/17/24 Room / Location: [address]    Anesthesia Start: 0833 Anesthesia Stop: 1101    Procedure: HYSTERECTOMY VAGINAL LAPAROSCOPIC ROBOTIC ASSISTED, BSO, POSS LAP COLPOPEXY (Abdomen/Perineum) Diagnosis:       Irregular menses      (Irregular menses [N92.6])    Surgeons: Ivory Fields DO Responsible Provider: Julia Davies APRN - CRNA    Anesthesia Type: General ASA Status: 3            Anesthesia Type: General    Nate Phase I: Nate Score: 8    Nate Phase II: Nate Score: 9    Anesthesia Post Evaluation    Patient location during evaluation: PACU  Patient participation: complete - patient participated  Level of consciousness: awake and alert  Pain score: 3  Airway patency: patent  Nausea & Vomiting: no vomiting and no nausea  Cardiovascular status: blood pressure returned to baseline  Respiratory status: acceptable  Hydration status: stable  Multimodal analgesia pain management approach  Pain management: adequate    No notable events documented.   Azithromycin Counseling:  I discussed with the patient the risks of azithromycin including but not limited to GI upset, allergic reaction, drug rash, diarrhea, and yeast infections.

## 2024-06-17 NOTE — ANESTHESIA PRE PROCEDURE
Department of Anesthesiology  Preprocedure Note       Name:  Tess Echeverria   Age:  58 y.o.  :  1965                                          MRN:  [id number]         Date:  2024      Surgeon: Surgeon(s):  Ivory Fields DO    Procedure: Procedure(s):  HYSTERECTOMY VAGINAL LAPAROSCOPIC ROBOTIC ASSISTED, BSO, POSS LAP COLPOPEXY    Medications prior to admission:   Prior to Admission medications    Medication Sig Start Date End Date Taking? Authorizing Provider   linaCLOtide (LINZESS PO) Take by mouth daily as needed   Yes Alex Rodrigues MD   blood glucose monitor kit and supplies Dispense sufficient amount for indicated testing frequency (BID) plus additional to accommodate PRN testing needs. Dispense all needed supplies to include: monitor, strips, lancing device, lancets, control solutions, alcohol swabs. 24   Maximino Hansen MD   blood glucose monitor strips Test 2 times a day & as needed for symptoms of irregular blood glucose. Dispense sufficient amount for indicated testing frequency plus additional to accommodate PRN testing needs. 24   Maximino Hansen MD   spironolactone (ALDACTONE) 25 MG tablet TAKE ONE TABLET BY MOUTH ONE TIME A DAY 24   Maximino Hansen MD   apixaban (ELIQUIS) 5 MG TABS tablet Take 1 tablet by mouth 2 times daily 24   Maximino Hansen MD   tamsulosin (FLOMAX) 0.4 MG capsule Take 1 capsule by mouth daily 24   Maximino Hansen MD   pantoprazole (PROTONIX) 40 MG tablet Take 1 tablet by mouth daily    Alex Rodrigues MD   metoprolol succinate (TOPROL XL) 25 MG extended release tablet Take 1 tablet by mouth daily 24   Noris Hopkins MD   aspirin 81 MG EC tablet Take 1 tablet by mouth daily 24   Noris Hopkins MD   vitamin D (ERGOCALCIFEROL) 1.25 MG (73075 UT) CAPS capsule Take 1 capsule by mouth once a week 23   Maximino Hansen MD   famotidine (PEPCID) 40 MG tablet Take 1 tablet by mouth every evening 23   Maximino Hansen MD

## 2024-06-17 NOTE — PROGRESS NOTES
Pt verbalized readiness to go home.    Discharge Criteria    Inpatients must meet Criteria 1 through 7. All other patients are either YES or N/A. If a NO is chosen then Anesthesia or Surgeon must be notified.      1.  Minimum 30 minutes after last dose of sedative medication.    Yes      2.  Systolic BP between 90 - 160. Diastolic BP between 60 - 90.    Yes      3.  Pulse between 60 - 120    Yes      4.  Respirations between 8 - 25.    Yes      5.  SpO2 92% - 100%.    Yes      6.  Able to cough and swallow or return to baseline function.    Yes      7.  Alert and oriented or return to baseline mental status.    Yes      8.  Demonstrates controlled, coordinated movements, ambulates with steady gait, or return to baseline activity function.    Yes      9.  Minimal or no pain or nausea, or at a level tolerable and acceptable to patient.    Yes      10. Takes and retains oral fluids as allowed.    Yes      11. Procedural / perioperative site stable.  Minimal or no bleeding.    Yes          12. If GI endoscopy procedure, minimal or no abdominal distention or passing flatus.    N/A      13. Written discharge instructions and emergency telephone number provided.    Yes      14. Accompanied by a responsible adult.    Yes

## 2024-06-17 NOTE — PROGRESS NOTES
Giselle JOSE to assess pt.  Okay with pt's current O2 sats and olay to discharge.  Pt instructed to continue deep breathing and IS and to wear CPAP if sleeping at all.

## 2024-06-17 NOTE — PROGRESS NOTES
Giselle JOSE notified of pt's continued low 02 sats despite being more awake and using the incentive spirometer.  New order for nebulizer treatment received.

## 2024-06-19 ENCOUNTER — OFFICE VISIT (OUTPATIENT)
Dept: PRIMARY CARE CLINIC | Age: 59
End: 2024-06-19
Payer: COMMERCIAL

## 2024-06-19 VITALS
TEMPERATURE: 96.5 F | WEIGHT: 293 LBS | HEART RATE: 86 BPM | OXYGEN SATURATION: 95 % | DIASTOLIC BLOOD PRESSURE: 70 MMHG | SYSTOLIC BLOOD PRESSURE: 160 MMHG | BODY MASS INDEX: 48.42 KG/M2

## 2024-06-19 DIAGNOSIS — K13.6 IRRITATION OF ORAL CAVITY: Primary | ICD-10-CM

## 2024-06-19 DIAGNOSIS — E11.69 TYPE 2 DIABETES MELLITUS WITH OTHER SPECIFIED COMPLICATION, WITHOUT LONG-TERM CURRENT USE OF INSULIN (HCC): ICD-10-CM

## 2024-06-19 LAB — SURGICAL PATHOLOGY REPORT: NORMAL

## 2024-06-19 PROCEDURE — 3077F SYST BP >= 140 MM HG: CPT | Performed by: NURSE PRACTITIONER

## 2024-06-19 PROCEDURE — 3078F DIAST BP <80 MM HG: CPT | Performed by: NURSE PRACTITIONER

## 2024-06-19 PROCEDURE — 99214 OFFICE O/P EST MOD 30 MIN: CPT | Performed by: NURSE PRACTITIONER

## 2024-06-19 PROCEDURE — 3051F HG A1C>EQUAL 7.0%<8.0%: CPT | Performed by: NURSE PRACTITIONER

## 2024-06-19 RX ORDER — CHLORHEXIDINE GLUCONATE ORAL RINSE 1.2 MG/ML
SOLUTION DENTAL
Qty: 420 ML | Refills: 0 | Status: SHIPPED | OUTPATIENT
Start: 2024-06-19

## 2024-06-19 NOTE — PROGRESS NOTES
COLONOSCOPY N/A 7/12/2021    COLONOSCOPY POLYPECTOMY CECUM COLD BIOPSY performed by Agapito Lopez MD at [facility] OR    HYSTERECTOMY (CERVIX STATUS UNKNOWN) N/A 6/17/2024    HYSTERECTOMY VAGINAL LAPAROSCOPIC ROBOTIC ASSISTED, BSO, POSS LAP COLPOPEXY performed by Ivory Fields DO at [facility] OR    MANDIBLE FRACTURE SURGERY  1983    DC COLSC FLEXIBLE W/CONTROL BLEEDING ANY METHOD  3/27/2017    COLONOSCOPY CONTROL HEMORRHAGE performed by Suman Hawk MD at [facility] OR    DC COLSC FLX W/RMVL OF TUMOR POLYP LESION SNARE TQ  3/27/2017    COLONOSCOPY POLYPECTOMY SNARE/COLD BIOPSY performed by Suman Hawk MD at [facility] OR    DC XCAPSL CTRC RMVL INSJ IO LENS PROSTH W/O ECP Left 4/2/2018    EYE CATARACT EMULSIFICATION IOL IMPLANT performed by Jamar Romero MD at [facility] OR    TUBAL LIGATION      UPPER GASTROINTESTINAL ENDOSCOPY  1/2010    with biopsy    UPPER GASTROINTESTINAL ENDOSCOPY N/A 7/12/2021    EGD BIOPSY OF STOMACH, GASTRIC POLYPECTOMY AND DUODENUM BIOPSY performed by Agapito Lopez MD at [facility] OR        Medications:       Prior to Admission medications    Medication Sig Start Date End Date Taking? Authorizing Provider   blood glucose monitor kit and supplies Dispense sufficient amount for indicated testing frequency plus additional to accommodate QD testing needs. Dispense all needed supplies to include: monitor, strips, lancing device, lancets, control solutions, alcohol swabs. 6/19/24  Yes Liliane Mcintyre APRN - CNP   chlorhexidine (PERIDEX) 0.12 % solution Swish 15 mL (1 capful) in the mouth for 30 seconds then spit.  Complete twice daily until symptoms resolve 6/19/24  Yes Liliane Mcintyre APRN - CNP   linaCLOtide (LINZESS PO) Take by mouth daily as needed   Yes Provider, MD Alex   ketorolac (TORADOL) 10 MG tablet Take 1 tablet by mouth every 6 hours as needed for Pain 6/17/24 6/17/25 Yes Cherry Lombardi PA-C   HYDROcodone-acetaminophen (NORCO) 5-325 MG per tablet Take 1 tablet

## 2024-06-21 ENCOUNTER — TELEPHONE (OUTPATIENT)
Dept: PRIMARY CARE CLINIC | Age: 59
End: 2024-06-21

## 2024-06-21 RX ORDER — DOXYCYCLINE HYCLATE 100 MG
100 TABLET ORAL 2 TIMES DAILY
Qty: 14 TABLET | Refills: 0 | Status: SHIPPED | OUTPATIENT
Start: 2024-06-21 | End: 2024-06-28

## 2024-06-21 NOTE — TELEPHONE ENCOUNTER
ABX rx sent to pharmacy. Of note, PCN allergy listed in chart with no reaction listed. Therefore, PCN and cephalosporins avoided.

## 2024-06-21 NOTE — TELEPHONE ENCOUNTER
Pt is concerned about pain in both ears that just started, she is wondering if it's connected to the sores in her mouth. Per Liliane, patient is to try an oral antibiotic over the weekend and then call office with an update on Monday 6/24/24. Pt [place] [facility].

## 2024-06-26 ENCOUNTER — OFFICE VISIT (OUTPATIENT)
Dept: OBGYN | Age: 59
End: 2024-06-26

## 2024-06-26 VITALS
BODY MASS INDEX: 46.61 KG/M2 | DIASTOLIC BLOOD PRESSURE: 82 MMHG | SYSTOLIC BLOOD PRESSURE: 124 MMHG | HEIGHT: 66 IN | WEIGHT: 290 LBS

## 2024-06-26 DIAGNOSIS — Z09 POSTOPERATIVE EXAMINATION: Primary | ICD-10-CM

## 2024-06-26 PROCEDURE — 99024 POSTOP FOLLOW-UP VISIT: CPT

## 2024-06-26 NOTE — PROGRESS NOTES
Postop Progress Note    Subjective    Tess Echeverria presents to the office for postop follow up.  Chief Complaint   Patient presents with    Post-Op Check     Pt had Robotic-assisted Total Laparoscopic Hysterectomy with Bilateral Salpingo-oophorectomy on 6/17/24. Pt states after surgery she got sick she had ulcers in mouth and burning in abdominal area, she is doing better now and is on an antibiotic. Pt reports being very weak and tired.     Objective    Vitals:    06/26/24 0924   BP: 124/82     General: alert, cooperative and no distress  Incision: healing well    Assessment  Doing well postoperatively.  She did have upper respiratory infection and oral ulcers after surgery - she is currently on ABX and using mouthwash for pain relief.  Denies abnormal bleeding, discharge - did have one episode of bright red blood after scooting back in recliner but it quickly resolved.  She is doing well with restrictions.  Aware of benign pathology.  Aware that she can remove steri-strips at this time.     Plan  1. Continue any current medications  2. Wound care discussed  3. Pt is to continue with activity restrictions   4. Usual diet  5. Follow up: 4 weeks     Electronically signed by Cherry Lombardi PA-C on 6/26/2024 at 10:17 AM

## 2024-07-08 PROBLEM — D28.2: Status: ACTIVE | Noted: 2024-07-08

## 2024-07-08 PROBLEM — N72 INFLAMMATORY DISEASE OF CERVIX UTERI: Status: ACTIVE | Noted: 2024-07-08

## 2024-07-08 PROBLEM — N85.2 HYPERTROPHY OF UTERUS: Status: ACTIVE | Noted: 2024-07-08

## 2024-07-08 PROBLEM — D27.0 BENIGN NEOPLASM OF RIGHT OVARY: Status: ACTIVE | Noted: 2024-07-08

## 2024-07-08 PROBLEM — D25.9 UTERINE LEIOMYOMA: Status: ACTIVE | Noted: 2024-07-08

## 2024-07-08 PROBLEM — R10.2 PELVIC AND PERINEAL PAIN: Status: ACTIVE | Noted: 2024-07-08

## 2024-07-08 PROBLEM — D27.1 BENIGN NEOPLASM OF LEFT OVARY: Status: ACTIVE | Noted: 2024-07-08

## 2024-07-30 ENCOUNTER — OFFICE VISIT (OUTPATIENT)
Dept: OBGYN | Age: 59
End: 2024-07-30

## 2024-07-30 VITALS
BODY MASS INDEX: 45.48 KG/M2 | WEIGHT: 283 LBS | SYSTOLIC BLOOD PRESSURE: 134 MMHG | HEIGHT: 66 IN | DIASTOLIC BLOOD PRESSURE: 84 MMHG

## 2024-07-30 DIAGNOSIS — Z09 POSTOPERATIVE EXAMINATION: Primary | ICD-10-CM

## 2024-07-30 PROCEDURE — 99024 POSTOP FOLLOW-UP VISIT: CPT | Performed by: OBSTETRICS & GYNECOLOGY

## 2024-07-30 NOTE — PROGRESS NOTES
Postop Progress Note    Subjective    Tess Echeverria presents to the office for postop follow up.    Chief Complaint   Patient presents with    Post-Op Check     Patient is being seen for 6 week post op after Robotic-assisted Total Laparoscopic Hysterectomy with Bilateral Salpingo-oophorectomy on 6/17/24.  She notes some discomfort with bowel movements or when she feels bloated/gas.           Objective    Vitals:    07/30/24 0952   BP: 134/84     General: alert, cooperative and no distress  Incision: healing well; cuff well supported and intact    Assessment  Doing well postoperatively.    Plan  1. Continue any current medications  2. Wound care discussed  3. Pt is to increase activities as tolerated after 8 weeks  4. Usual diet  5. Follow up: as needed - pt to try herbal supplements for hot flashes and to let us know if she needs more    Electronically signed by Ivory Sotelo DO on 7/30/2024 at 10:31 AM

## 2024-08-12 NOTE — PROGRESS NOTES
/79 (Site: Left Upper Arm, Position: Sitting, Cuff Size: Large Adult)   Pulse 66   Temp 97.7 °F (36.5 °C) (Temporal)   Resp 18   Ht 1.676 m (5' 6\")   Wt 130.1 kg (286 lb 12.8 oz)   LMP 02/14/2024 (Approximate)   SpO2 96%   BMI 46.29 kg/m²     Past Medical History:   Diagnosis Date    Acute pulmonary embolism (HCC) 11/02/2015    Anxiety     Asthma     Constipation, chronic     COVID-19 09/2021    CPAP (continuous positive airway pressure) dependence     Diabetes mellitus (HCC)     pt states she was prediabetic at one point    Embolism - blood clot     right lung and right leg    Esophageal reflux     Factor V Leiden (HCC)     Heart murmur     HTN (hypertension)     Hyperlipidemia     Irritable bladder     Kidney stone     MTHFR mutation     MVP (mitral valve prolapse)     Palpitations     Pulmonary embolism (HCC) 10/15/2012    Rhinitis, allergic 10/15/2012    Sleep apnea     Venous thromboembolism 05/02/2016     Past Surgical History:   Procedure Laterality Date    CATARACT REMOVAL WITH IMPLANT Left 04/02/2018    CHOLECYSTECTOMY      around 2000    COLONOSCOPY  07/12/2021    COLONOSCOPY N/A 7/12/2021    COLONOSCOPY POLYPECTOMY CECUM COLD BIOPSY performed by Agapito Lopez MD at [facility] OR    HYSTERECTOMY (CERVIX STATUS UNKNOWN) N/A 6/17/2024    HYSTERECTOMY VAGINAL LAPAROSCOPIC ROBOTIC ASSISTED, BSO, POSS LAP COLPOPEXY performed by Ivory Fields DO at [facility] OR    MANDIBLE FRACTURE SURGERY  1983    ME COLSC FLEXIBLE W/CONTROL BLEEDING ANY METHOD  3/27/2017    COLONOSCOPY CONTROL HEMORRHAGE performed by Suman Hawk MD at [facility] OR    ME COLSC FLX W/RMVL OF TUMOR POLYP LESION SNARE TQ  3/27/2017    COLONOSCOPY POLYPECTOMY SNARE/COLD BIOPSY performed by Suman Hawk MD at [facility] OR    ME XCAPSL CTRC RMVL INSJ IO LENS PROSTH W/O ECP Left 4/2/2018    EYE CATARACT EMULSIFICATION IOL IMPLANT performed by Jamar Romero MD at [facility] OR    TUBAL LIGATION      UPPER GASTROINTESTINAL

## 2024-08-21 ENCOUNTER — OFFICE VISIT (OUTPATIENT)
Dept: PULMONOLOGY | Age: 59
End: 2024-08-21

## 2024-08-21 VITALS
HEART RATE: 66 BPM | SYSTOLIC BLOOD PRESSURE: 135 MMHG | RESPIRATION RATE: 18 BRPM | OXYGEN SATURATION: 96 % | WEIGHT: 286.8 LBS | TEMPERATURE: 97.7 F | HEIGHT: 66 IN | DIASTOLIC BLOOD PRESSURE: 79 MMHG | BODY MASS INDEX: 46.09 KG/M2

## 2024-08-21 DIAGNOSIS — Z79.01 LONG TERM CURRENT USE OF ANTICOAGULANT THERAPY: ICD-10-CM

## 2024-08-21 DIAGNOSIS — I82.90 VENOUS THROMBOEMBOLISM: ICD-10-CM

## 2024-08-21 DIAGNOSIS — E66.01 MORBID OBESITY DUE TO EXCESS CALORIES (HCC): ICD-10-CM

## 2024-08-21 DIAGNOSIS — J45.40 MODERATE PERSISTENT ASTHMA WITHOUT COMPLICATION: ICD-10-CM

## 2024-08-21 DIAGNOSIS — G47.33 OSA (OBSTRUCTIVE SLEEP APNEA): Primary | ICD-10-CM

## 2024-08-21 RX ORDER — BUDESONIDE AND FORMOTEROL FUMARATE DIHYDRATE 160; 4.5 UG/1; UG/1
2 AEROSOL RESPIRATORY (INHALATION) 2 TIMES DAILY
Qty: 6 EACH | Refills: 3 | Status: SHIPPED | OUTPATIENT
Start: 2024-08-21 | End: 2025-08-16

## 2024-08-21 RX ORDER — ALBUTEROL SULFATE 90 UG/1
2 AEROSOL, METERED RESPIRATORY (INHALATION) 4 TIMES DAILY PRN
Qty: 1 EACH | Refills: 0 | Status: SHIPPED | OUTPATIENT
Start: 2024-08-21

## 2024-08-21 ASSESSMENT — ENCOUNTER SYMPTOMS
EYES NEGATIVE: 1
GASTROINTESTINAL NEGATIVE: 1
ALLERGIC/IMMUNOLOGIC NEGATIVE: 1

## 2024-08-21 NOTE — PATIENT INSTRUCTIONS
SURVEY:    Thank you for allowing us to care for you today.    You may be receiving a survey from [facility] regarding your visit today- electronically or via mail.      Please help us by completing the survey as this will provide the needed feedback to ensure we are providing the very best care for you and your family.    If you cannot score us a very good on any question, please call the office to discuss how we could have made your experience a very good one.    Thank you.       STAFF:    Jyoti Guaman, Rossana Taylor, Felicitas Juarez      CLINICAL STAFF:    Jamila Miramontes LPN, Selena Luna LPN, Michelle Mota LPN, Yoselin Camp CMA

## 2024-09-12 ENCOUNTER — OFFICE VISIT (OUTPATIENT)
Dept: PRIMARY CARE CLINIC | Age: 59
End: 2024-09-12

## 2024-09-12 VITALS
HEIGHT: 66 IN | DIASTOLIC BLOOD PRESSURE: 78 MMHG | WEIGHT: 285 LBS | BODY MASS INDEX: 45.8 KG/M2 | OXYGEN SATURATION: 97 % | HEART RATE: 77 BPM | SYSTOLIC BLOOD PRESSURE: 136 MMHG

## 2024-09-12 DIAGNOSIS — E11.69 TYPE 2 DIABETES MELLITUS WITH OTHER SPECIFIED COMPLICATION, WITHOUT LONG-TERM CURRENT USE OF INSULIN (HCC): Primary | ICD-10-CM

## 2024-09-12 DIAGNOSIS — E55.9 HYPOVITAMINOSIS D: ICD-10-CM

## 2024-09-12 DIAGNOSIS — D68.59 THROMBOPHILIA (HCC): ICD-10-CM

## 2024-09-12 DIAGNOSIS — K21.9 GASTROESOPHAGEAL REFLUX DISEASE, UNSPECIFIED WHETHER ESOPHAGITIS PRESENT: ICD-10-CM

## 2024-09-12 DIAGNOSIS — I10 ESSENTIAL HYPERTENSION: ICD-10-CM

## 2024-09-12 DIAGNOSIS — E67.8 HYPERVITAMINOSIS: ICD-10-CM

## 2024-09-12 RX ORDER — GLUCOSAMINE HCL/CHONDROITIN SU 500-400 MG
CAPSULE ORAL
Qty: 100 STRIP | Refills: 3 | Status: SHIPPED | OUTPATIENT
Start: 2024-09-12

## 2024-09-12 RX ORDER — ALPRAZOLAM 0.25 MG
0.25 TABLET ORAL NIGHTLY PRN
COMMUNITY

## 2024-09-12 SDOH — ECONOMIC STABILITY: FOOD INSECURITY: WITHIN THE PAST 12 MONTHS, YOU WORRIED THAT YOUR FOOD WOULD RUN OUT BEFORE YOU GOT MONEY TO BUY MORE.: NEVER TRUE

## 2024-09-12 SDOH — ECONOMIC STABILITY: FOOD INSECURITY: WITHIN THE PAST 12 MONTHS, THE FOOD YOU BOUGHT JUST DIDN'T LAST AND YOU DIDN'T HAVE MONEY TO GET MORE.: NEVER TRUE

## 2024-09-12 SDOH — ECONOMIC STABILITY: INCOME INSECURITY: HOW HARD IS IT FOR YOU TO PAY FOR THE VERY BASICS LIKE FOOD, HOUSING, MEDICAL CARE, AND HEATING?: NOT HARD AT ALL

## 2024-09-13 RX ORDER — BLOOD-GLUCOSE CONTROL, LOW
1 EACH MISCELLANEOUS 2 TIMES DAILY
Qty: 100 EACH | Refills: 1 | Status: SHIPPED | OUTPATIENT
Start: 2024-09-13

## 2024-09-13 RX ORDER — BLOOD-GLUCOSE METER
1 EACH MISCELLANEOUS 2 TIMES DAILY
Qty: 1 EACH | Refills: 0 | Status: SHIPPED | OUTPATIENT
Start: 2024-09-13

## 2024-10-04 DIAGNOSIS — E55.9 VITAMIN D DEFICIENCY: ICD-10-CM

## 2024-10-04 RX ORDER — SPIRONOLACTONE 25 MG/1
TABLET ORAL
Qty: 90 TABLET | Refills: 3 | Status: SHIPPED | OUTPATIENT
Start: 2024-10-04

## 2024-10-04 RX ORDER — ERGOCALCIFEROL 1.25 MG/1
50000 CAPSULE, LIQUID FILLED ORAL WEEKLY
Qty: 12 CAPSULE | Refills: 1 | Status: SHIPPED | OUTPATIENT
Start: 2024-10-04

## 2024-10-07 ENCOUNTER — TELEPHONE (OUTPATIENT)
Dept: PRIMARY CARE CLINIC | Age: 59
End: 2024-10-07

## 2024-10-07 NOTE — TELEPHONE ENCOUNTER
Hello, difficult to determine without further eval, OTC measures can be tylenol PRN, lidocaine patches, voltaren get, thank you.  Electronically signed by Maximino Hansen MD on 10/7/2024 at 6:50 PM

## 2024-10-07 NOTE — TELEPHONE ENCOUNTER
Patient stops in with c/o left side mid-upper back pain x4 days. She states she did recently go on a 7 day bus trip. She reports pain is sharp with sudden movements, raising arm above head, transitioning from sitting to standing position. She would like to know if there is anything that she can take to offer some relief. She has tried applying heat and this is helpful only during application. Please advise.

## 2024-10-16 ENCOUNTER — HOSPITAL ENCOUNTER (OUTPATIENT)
Dept: WOMENS IMAGING | Age: 59
Discharge: HOME | End: 2024-10-18
Payer: COMMERCIAL

## 2024-10-16 DIAGNOSIS — Z12.31 SCREENING MAMMOGRAM, ENCOUNTER FOR: ICD-10-CM

## 2024-10-16 PROCEDURE — 77063 BREAST TOMOSYNTHESIS BI: CPT

## 2024-10-17 NOTE — PROGRESS NOTES
Endometrial Biopsy Procedure Note    Pre-operative Diagnosis: Irregular menstruation  Thickened endometrium on USN    Post-operative Diagnosis: same    Indications: abnormal uterine bleeding    Procedure Details    Urine pregnancy test was not done.  The risks (including infection, bleeding, pain, and uterine perforation) and benefits of the procedure were explained to the patient and Written informed consent was obtained.  Antibiotic prophylaxis against endocarditis was not indicated.     The patient was placed in the dorsal lithotomy position.  Bimanual exam showed the uterus to be in the neutral position.  A Graves' speculum inserted in the vagina, and the cervix prepped with povidone iodine.  Endocervical curettage with a Kevorkian curette was not performed.     A sharp tenaculum was applied to the anterior lip of the cervix for stabilization.  A sterile uterine sound was used to sound the uterus to a depth of 10cm.  A Pipelle endometrial aspirator was used to sample the endometrium.  Sample was sent for pathologic examination.    Condition:  Stable    Complications:  None    Plan:    The patient was advised to call for any fever or for prolonged or severe pain or bleeding. She was advised to use OTC analgesics as needed for mild to moderate pain. She was advised to avoid vaginal intercourse for 48 hours or until the bleeding has completely stopped.    Attending Physician Documentation:  I was present for or participated in the entire procedure, including opening and closing.   
Pt to meet >80% of estimated nutritional needs during hospital stay.

## 2024-10-21 ENCOUNTER — TELEPHONE (OUTPATIENT)
Dept: OBGYN | Age: 59
End: 2024-10-21

## 2024-10-21 DIAGNOSIS — Z91.89 AT HIGH RISK FOR BREAST CANCER: Primary | ICD-10-CM

## 2024-10-21 NOTE — TELEPHONE ENCOUNTER
Left message for patient to call to further discuss mammogram that was normal and recommendation of breast MRI in 6  months.

## 2024-10-31 NOTE — PROGRESS NOTES
Phone: [facility]           Fax: [phone]                           Outpatient Physical Therapy                                                                            Daily Note    Patient: Jasson Oliveira : 1965  CSN #: [id number]   Referring Practitioner:  Dr. Inga Gaspar    Referral Date : 21     Date: 3/16/2021    Diagnosis: Acute left-sided low back pain with left-sided sciatica (M54.42)  Treatment Diagnosis: L-sided low back pain with sciatica    Onset Date: 21  PT Insurance Information: [employer] [place]  Total # of Visits Approved: 12 Per Physician Order  Total # of Visits to Date: 4  No Show: 0  Canceled Appointment: 0      Pre-Treatment Pain:  1-2/10  Subjective: Pt states she has been having LLE symptoms and is pleased with current progress. Pt states she has been sleeping better at night as well. Exercises:  Exercise 2: Bike 10 mins    Manual:  Manual traction: Manual lumbar traction with LE's on PB  Other: Manual nerve glides on LLE    Modalities:  Moist heat: x15min  E-stim (parameters): IFC+MHP x15min L SIJ/piriformis for pain--  supine this date       Assessment  Assessment: Decreased LLE symptoms and centralization, with good feedback from pt regarding progress made thus far with PT. Manual lumbar traction with BLE's on PB for spinal decompression, which pt tolerates well. Also cont with manual nerve glides/flossing on LLE to assist with LLE symptoms. IFC and MHP applied at end of tx for pain. Will cont to progress as tolerated. Activity Tolerance  Activity Tolerance: Patient Tolerated treatment well    Patient Education  Patient Education: Cont HEP. Pt verbalized/demonstrated good understanding:     [x] Yes         [] No, pt required further clarification.        Post Treatment Pain:  1/10      Plan  Times per week: 2  Plan weeks: 4-6      Goals  (Total # of Visits to Date: 4)      Short term goals  Time Frame for Short term goals: 3 weeks  Short term goal 1: Pt will initiate HEP. - met  Short term goal 2: Pt will initiate manual techniques/modalities as needed to decrease pain. - met/cont    Long term goals  Time Frame for Long term goals : 6 weeks  Long term goal 1: Pt will be independent and compliant with HEP. Long term goal 2: Pt will test (-) negative with Slump test to decrease neural sensitivity/irritation. Long term goal 3: Pt will test (-) Supine to Long Sit indicating improved pelvic alignment. Long term goal 4: Pt will report >/= 75% improvement in LLE symptoms to indicate decreased neural compression and improve sleep quality.     Minutes Tracking:  Time In: 1640  Time Out: 1735  Minutes: 55  Timed Code Treatment Minutes: [address], [place], DPT      Date: 3/16/2021 verbal instruction/written material

## 2024-11-14 ENCOUNTER — HOSPITAL ENCOUNTER (OUTPATIENT)
Age: 59
Setting detail: SPECIMEN
Discharge: HOME | End: 2024-11-14
Payer: COMMERCIAL

## 2024-11-14 ENCOUNTER — OFFICE VISIT (OUTPATIENT)
Dept: PRIMARY CARE CLINIC | Age: 59
End: 2024-11-14
Payer: COMMERCIAL

## 2024-11-14 ENCOUNTER — HOSPITAL ENCOUNTER (OUTPATIENT)
Age: 59
Discharge: HOME | End: 2024-11-14
Payer: COMMERCIAL

## 2024-11-14 ENCOUNTER — HOSPITAL ENCOUNTER (OUTPATIENT)
Dept: CT IMAGING | Age: 59
Discharge: HOME | End: 2024-11-16
Payer: COMMERCIAL

## 2024-11-14 VITALS
HEART RATE: 67 BPM | BODY MASS INDEX: 45.84 KG/M2 | OXYGEN SATURATION: 97 % | DIASTOLIC BLOOD PRESSURE: 80 MMHG | WEIGHT: 284 LBS | SYSTOLIC BLOOD PRESSURE: 130 MMHG | TEMPERATURE: 95.7 F

## 2024-11-14 DIAGNOSIS — R10.31 RIGHT LOWER QUADRANT ABDOMINAL PAIN: ICD-10-CM

## 2024-11-14 DIAGNOSIS — Z87.442 HISTORY OF KIDNEY STONES: ICD-10-CM

## 2024-11-14 DIAGNOSIS — R10.9 RIGHT FLANK PAIN: ICD-10-CM

## 2024-11-14 DIAGNOSIS — R10.31 RIGHT LOWER QUADRANT ABDOMINAL PAIN: Primary | ICD-10-CM

## 2024-11-14 LAB
ANION GAP SERPL CALCULATED.3IONS-SCNC: 10 MMOL/L (ref 9–16)
BILIRUBIN, POC: 0
BLOOD URINE, POC: NORMAL
BUN SERPL-MCNC: 13 MG/DL (ref 6–20)
BUN/CREAT SERPL: 16 (ref 9–20)
CALCIUM SERPL-MCNC: 9.7 MG/DL (ref 8.6–10.4)
CHLORIDE SERPL-SCNC: 103 MMOL/L (ref 98–107)
CLARITY, POC: NORMAL
CO2 SERPL-SCNC: 28 MMOL/L (ref 20–31)
COLOR, POC: YELLOW
CREAT SERPL-MCNC: 0.8 MG/DL (ref 0.5–0.9)
ERYTHROCYTE [DISTWIDTH] IN BLOOD BY AUTOMATED COUNT: 12.7 % (ref 11.8–14.4)
GFR, ESTIMATED: 83 ML/MIN/1.73M2
GLUCOSE SERPL-MCNC: 150 MG/DL (ref 74–99)
GLUCOSE URINE, POC: NORMAL MG/DL
HCT VFR BLD AUTO: 44.2 % (ref 36.3–47.1)
HGB BLD-MCNC: 15.3 G/DL (ref 11.9–15.1)
KETONES, POC: NORMAL MG/DL
LEUKOCYTE EST, POC: NORMAL
MCH RBC QN AUTO: 30.4 PG (ref 25.2–33.5)
MCHC RBC AUTO-ENTMCNC: 34.6 G/DL (ref 28.4–34.8)
MCV RBC AUTO: 87.9 FL (ref 82.6–102.9)
NITRITE, POC: NORMAL
NRBC BLD-RTO: 0 PER 100 WBC
PH, POC: 5
PLATELET # BLD AUTO: 231 K/UL (ref 138–453)
PMV BLD AUTO: 10 FL (ref 8.1–13.5)
POTASSIUM SERPL-SCNC: 4.6 MMOL/L (ref 3.7–5.3)
PROTEIN, POC: NORMAL MG/DL
RBC # BLD AUTO: 5.03 M/UL (ref 3.95–5.11)
SODIUM SERPL-SCNC: 141 MMOL/L (ref 136–145)
SPECIFIC GRAVITY, POC: 1.03
UROBILINOGEN, POC: 0.2 MG/DL
WBC OTHER # BLD: 7.2 K/UL (ref 3.5–11.3)

## 2024-11-14 PROCEDURE — 85027 COMPLETE CBC AUTOMATED: CPT

## 2024-11-14 PROCEDURE — 74176 CT ABD & PELVIS W/O CONTRAST: CPT

## 2024-11-14 PROCEDURE — 81002 URINALYSIS NONAUTO W/O SCOPE: CPT | Performed by: NURSE PRACTITIONER

## 2024-11-14 PROCEDURE — 99214 OFFICE O/P EST MOD 30 MIN: CPT | Performed by: NURSE PRACTITIONER

## 2024-11-14 PROCEDURE — 36415 COLL VENOUS BLD VENIPUNCTURE: CPT

## 2024-11-14 PROCEDURE — 3075F SYST BP GE 130 - 139MM HG: CPT | Performed by: NURSE PRACTITIONER

## 2024-11-14 PROCEDURE — 3079F DIAST BP 80-89 MM HG: CPT | Performed by: NURSE PRACTITIONER

## 2024-11-14 PROCEDURE — 80048 BASIC METABOLIC PNL TOTAL CA: CPT

## 2024-11-14 PROCEDURE — 87086 URINE CULTURE/COLONY COUNT: CPT

## 2024-11-14 NOTE — PROGRESS NOTES
Name: Tess Echeverria  : 1965         Chief Complaint:     Chief Complaint   Patient presents with    right lower abdominal pain     -right lower abdominal pain  -nagging constant pain, sometimes its sharp  -started a couple weeks ago       History of Present Illness:      Tess Echeverria is a 58 y.o.  female who presents with right lower abdominal pain (-right lower abdominal pain/-nagging constant pain, sometimes its sharp/-started a couple weeks ago)      HPI    The patient presents with concerns with right lower abdominal pain that has been ongoing for several weeks. Pain is described as nagging and can be sharp. Appendix is intact. She does get some occasional nausea. Admits right lower back pain. She does have known hx of kidney stones. Admits urine that is abnormal color. Denies gross hematuria. Admits chronic urinary frequency and urgency, without worsening. Admits chronic constipation, taking Linzess. Denies acute changes in bowel habits. Denies blood in the stool or dark tarry stools. Denies fever or chills. S/p hysterectomy 2024.     Past Medical History:     Past Medical History:   Diagnosis Date    Acute pulmonary embolism (HCC) 2015    Anxiety     Asthma     Constipation, chronic     COVID-19 2021    CPAP (continuous positive airway pressure) dependence     Diabetes mellitus (HCC)     pt states she was prediabetic at one point    Embolism - blood clot     right lung and right leg    Esophageal reflux     Factor V Leiden (HCC)     Heart murmur     HTN (hypertension)     Hyperlipidemia     Irritable bladder     Kidney stone     MTHFR mutation     MVP (mitral valve prolapse)     Palpitations     Pulmonary embolism (HCC) 10/15/2012    Rhinitis, allergic 10/15/2012    Sleep apnea     Venous thromboembolism 2016      Reviewed all health maintenance requirements and ordered appropriate tests  Health Maintenance Due   Topic Date Due    Diabetic foot exam  Never done    Diabetic Alb to Cr

## 2024-11-15 LAB
MICROORGANISM SPEC CULT: NORMAL
SERVICE CMNT-IMP: NORMAL
SPECIMEN DESCRIPTION: NORMAL

## 2024-11-18 ENCOUNTER — OFFICE VISIT (OUTPATIENT)
Dept: UROLOGY | Age: 59
End: 2024-11-18

## 2024-11-18 VITALS
HEIGHT: 66 IN | BODY MASS INDEX: 45.48 KG/M2 | SYSTOLIC BLOOD PRESSURE: 128 MMHG | DIASTOLIC BLOOD PRESSURE: 72 MMHG | WEIGHT: 283 LBS | TEMPERATURE: 97.3 F

## 2024-11-18 DIAGNOSIS — N20.0 RENAL CALCULUS: Primary | ICD-10-CM

## 2024-11-18 NOTE — PROGRESS NOTES
Bladderscan performed in office today:  Pt voided - 80 mL, PVR - 19 mL   
resting comfortably, in no acute distress.   Neuro: Alert and oriented to person place and time. Cranial nerves grossly intact.    Psych: Mood and affect normal.  Skin: Warm, dry  HEENT: normocephalic, atraumatic  Lymphatics: No palpable lymphadenopathy  Lungs: Respiratory effort normal, unlabored  Cardiovascular:  Normal peripheral pulses  Abdomen: Soft, non-tender, non-distended with no organomegaly or palpable masses.  : No CVA tenderness. Bladder non-tender and not distended.  Pelvic: deferred    Lab Results   Component Value Date    BUN 13 11/14/2024     Lab Results   Component Value Date    CREATININE 0.8 11/14/2024       ASSESSMENT:  This is a 58 y.o. female with the following diagnoses:   Diagnosis Orders   1. Renal calculus  EKG 12 Lead             PLAN:  Patient will be scheduled for right-sided ESWL.  Will have his cystoscopy with a stent with this.  She has amenable to this procedure.  She does understand the risks and benefits.  She would like to proceed

## 2024-11-20 DIAGNOSIS — N20.0 RENAL CALCULUS: ICD-10-CM

## 2024-11-22 ENCOUNTER — TELEPHONE (OUTPATIENT)
Dept: UROLOGY | Age: 59
End: 2024-11-22

## 2024-11-22 NOTE — TELEPHONE ENCOUNTER
Patient unable to accommodate her 12/3/24 surgery date. Her  is having a procedure around that date.  Procedure to be rescheduled to 12/17/24 as she is an ESWL.  Message sent to OR schedulers.  Reminded patient to do her ordered EKG.

## 2024-12-02 ENCOUNTER — TELEPHONE (OUTPATIENT)
Dept: UROLOGY | Age: 59
End: 2024-12-02

## 2024-12-02 RX ORDER — FAMOTIDINE 40 MG/1
40 TABLET, FILM COATED ORAL EVERY EVENING
Qty: 90 TABLET | Refills: 1 | Status: SHIPPED | OUTPATIENT
Start: 2024-12-02

## 2024-12-02 NOTE — TELEPHONE ENCOUNTER
URGENT: NEEDS REPLY PRIOR TO END OF DAY 12/02/2024 Dr. Zheng Spence's office called because patient is scheduled to have surgery with Dr. CHINCHILLA tomorrow and currently takes eliquis 5 mg bid and 81 mg qd. They are looking for instructions for holding these meds. They faxed this request last week and Dr. Hansen recommended that these instructions come from cardiology and requested this form and request be sent to cardiology however Dr. Hansen is the prescriber for the eliquis. Please advise.  Return phone number to follow up is ext. [phone]

## 2024-12-02 NOTE — TELEPHONE ENCOUNTER
Patient is scheduled for ESWL right stent on 12/17/24.  Her last visit with cardiology appears to be 1/24/24.  Patient has an outstanding EKG and is aware it needs completed.  Medical clearance was ordered and faxed to Dr Hansen.  Does she need clearance from cardiac standpoint?  Still waiting to hear medication instruction on eliquis and asa.

## 2024-12-02 NOTE — TELEPHONE ENCOUNTER
Patient is scheduled to have a LITHOTRIPSY- Extracorporeal Shockwave Lithotripsy with RIGHT stent placement  procedure scheduled for 12/17/24.    Surgery requires the following medications Eliquis and asprin to be stopped prior to surgery Please advise on how many days anticoagulation should be stopped.    Please indicate if patient is cleared for surgery      EKG has not been completed yet.

## 2024-12-02 NOTE — TELEPHONE ENCOUNTER
Patient is scheduled for 12/17/24.  We do need cardiac clearance. Will notify Dr Hopkins's office.

## 2024-12-03 ENCOUNTER — HOSPITAL ENCOUNTER (OUTPATIENT)
Age: 59
Discharge: HOME | End: 2024-12-03
Payer: COMMERCIAL

## 2024-12-03 DIAGNOSIS — N20.0 RENAL CALCULUS: ICD-10-CM

## 2024-12-03 LAB
EKG ATRIAL RATE: 69 BPM
EKG P AXIS: 41 DEGREES
EKG P-R INTERVAL: 230 MS
EKG Q-T INTERVAL: 412 MS
EKG QRS DURATION: 130 MS
EKG QTC CALCULATION (BAZETT): 441 MS
EKG R AXIS: -41 DEGREES
EKG T AXIS: 45 DEGREES
EKG VENTRICULAR RATE: 69 BPM

## 2024-12-03 PROCEDURE — 93010 ELECTROCARDIOGRAM REPORT: CPT | Performed by: INTERNAL MEDICINE

## 2024-12-03 PROCEDURE — 93005 ELECTROCARDIOGRAM TRACING: CPT

## 2024-12-06 ENCOUNTER — OFFICE VISIT (OUTPATIENT)
Dept: CARDIOLOGY | Age: 59
End: 2024-12-06
Payer: COMMERCIAL

## 2024-12-06 VITALS
HEIGHT: 66 IN | RESPIRATION RATE: 18 BRPM | BODY MASS INDEX: 45.64 KG/M2 | OXYGEN SATURATION: 95 % | WEIGHT: 284 LBS | SYSTOLIC BLOOD PRESSURE: 97 MMHG | DIASTOLIC BLOOD PRESSURE: 65 MMHG | HEART RATE: 62 BPM

## 2024-12-06 DIAGNOSIS — Z01.818 PREOPERATIVE CLEARANCE: Primary | ICD-10-CM

## 2024-12-06 DIAGNOSIS — Z86.711 HISTORY OF PULMONARY EMBOLISM: ICD-10-CM

## 2024-12-06 DIAGNOSIS — R94.31 ABNORMAL EKG: ICD-10-CM

## 2024-12-06 DIAGNOSIS — R06.02 SOB (SHORTNESS OF BREATH): ICD-10-CM

## 2024-12-06 DIAGNOSIS — G47.33 OSA ON CPAP: ICD-10-CM

## 2024-12-06 DIAGNOSIS — E66.01 MORBID OBESITY: ICD-10-CM

## 2024-12-06 DIAGNOSIS — I10 PRIMARY HYPERTENSION: ICD-10-CM

## 2024-12-06 DIAGNOSIS — R00.2 PALPITATIONS: ICD-10-CM

## 2024-12-06 DIAGNOSIS — E78.2 MIXED HYPERLIPIDEMIA: ICD-10-CM

## 2024-12-06 DIAGNOSIS — R93.1 ABNORMAL ECHOCARDIOGRAM: ICD-10-CM

## 2024-12-06 DIAGNOSIS — R07.89 ATYPICAL CHEST PAIN: ICD-10-CM

## 2024-12-06 PROCEDURE — 3078F DIAST BP <80 MM HG: CPT | Performed by: INTERNAL MEDICINE

## 2024-12-06 PROCEDURE — 99214 OFFICE O/P EST MOD 30 MIN: CPT | Performed by: INTERNAL MEDICINE

## 2024-12-06 PROCEDURE — 3074F SYST BP LT 130 MM HG: CPT | Performed by: INTERNAL MEDICINE

## 2024-12-06 NOTE — PROGRESS NOTES
I, Chrissy Mead am scribing for and in the presence of Noris Hopkins MD, F.A.C.C..    Patient: Tess Echeverria  : 1965  Date of Visit: 2024    REASON FOR VISIT / CONSULTATION: Cardiac Clearance (Hx: CP, Hx of PE, HTN. Pt is here for a lithrotripsy with Dr. CHINCHILLA on 24.  Pt is feeling fine cardiac wise. Light headedness on occasion. No falls or syncope. SOB only when she overexerts her self. Denies CP, palps. )      History of Present Illness:        Dear Maximino Hansen MD    I had the pleasure of seeing Ms. Echeverria today for follow up.    She was first seen as a hospital consult with a history of intermittent chest discomfort 2020. She has felt heart palpitations in the past that has caused her some shortness of breath. The last month she has had a burning feeling between her shoulder blades. It lasts for a few minutes to a couple hours. She is unsure if anything makes it worse. Her breathing is getting better, but she was sick with a stomach bug and upper respirator infection. She is eating and drinking better. She broke out into a sweat around . She is unsure if this is heart related or flu related. She had COVID at that time.    No previous heart related history other than a heart murmur. She is on Eliquis due to pulmonary embolism. She was diagnosed with sleep apnea about 15 years ago. She's used her machine on and off.     Family cardiac history includes her mom and dad in their 60's-70's. Her dad had congestive heart failure. Her brother has a history of triple bypass surgery, however no other family history.     Stress test done 2020: Overall, these results are most consistent with a low risk for significant coronary artery disease.    Echo done 2020: Global left ventricular systolic function appears preserved with an  estimated ejection fraction of 55%. Mildly increased left ventricular wall thickness with a normal left ventricular cavity size. No significant

## 2024-12-10 NOTE — TELEPHONE ENCOUNTER
Anticoagulant Agent: I would suggest holding her Apixaban (Eliquis) 2-3 days prior to the procedure.

## 2024-12-10 NOTE — TELEPHONE ENCOUNTER
Patient called and notified to stop her aspirin today and also to hold her eliquis for three days.  Patient HE.

## 2024-12-11 NOTE — PROGRESS NOTES
Patient instructed on the pre-operative, intra-operative, and post-operative process. Patient instructed on NPO status. Medication instructions and pre operative instruction sheet reviewed with the patient.   Aspirin had already been stopped. Patient will hold all vitamins and supplements starting today. She will take her last dose of Eliquis Friday 12/13/24 per her physicians instructions.

## 2024-12-16 ENCOUNTER — OFFICE VISIT (OUTPATIENT)
Dept: PRIMARY CARE CLINIC | Age: 59
End: 2024-12-16

## 2024-12-16 ENCOUNTER — ANESTHESIA EVENT (OUTPATIENT)
Dept: OPERATING ROOM | Age: 59
End: 2024-12-16
Payer: COMMERCIAL

## 2024-12-16 VITALS
DIASTOLIC BLOOD PRESSURE: 72 MMHG | WEIGHT: 270 LBS | HEIGHT: 66 IN | SYSTOLIC BLOOD PRESSURE: 126 MMHG | BODY MASS INDEX: 43.39 KG/M2

## 2024-12-16 DIAGNOSIS — R94.31 ABNORMAL EKG: ICD-10-CM

## 2024-12-16 DIAGNOSIS — E66.01 MORBID OBESITY: ICD-10-CM

## 2024-12-16 DIAGNOSIS — G47.33 OSA ON CPAP: ICD-10-CM

## 2024-12-16 DIAGNOSIS — R07.89 ATYPICAL CHEST PAIN: ICD-10-CM

## 2024-12-16 DIAGNOSIS — R93.1 ABNORMAL ECHOCARDIOGRAM: ICD-10-CM

## 2024-12-16 DIAGNOSIS — Z79.01 CHRONIC ANTICOAGULATION: ICD-10-CM

## 2024-12-16 DIAGNOSIS — R73.03 PREDIABETES: ICD-10-CM

## 2024-12-16 DIAGNOSIS — R00.2 PALPITATIONS: ICD-10-CM

## 2024-12-16 DIAGNOSIS — I10 PRIMARY HYPERTENSION: ICD-10-CM

## 2024-12-16 DIAGNOSIS — Z82.49 FAMILY HISTORY OF CORONARY ARTERY DISEASE: ICD-10-CM

## 2024-12-16 DIAGNOSIS — E11.69 TYPE 2 DIABETES MELLITUS WITH OTHER SPECIFIED COMPLICATION, WITHOUT LONG-TERM CURRENT USE OF INSULIN (HCC): Primary | ICD-10-CM

## 2024-12-16 DIAGNOSIS — R06.02 SOB (SHORTNESS OF BREATH): ICD-10-CM

## 2024-12-16 DIAGNOSIS — N20.0 NEPHROLITHIASIS: ICD-10-CM

## 2024-12-16 DIAGNOSIS — Z86.711 HISTORY OF PULMONARY EMBOLISM: ICD-10-CM

## 2024-12-16 LAB — HBA1C MFR BLD: 7.6 %

## 2024-12-16 RX ORDER — METOPROLOL SUCCINATE 25 MG/1
25 TABLET, EXTENDED RELEASE ORAL DAILY
Qty: 90 TABLET | Refills: 3 | Status: SHIPPED | OUTPATIENT
Start: 2024-12-16

## 2024-12-16 NOTE — PROGRESS NOTES
normal. Right eye exhibits no discharge. Left eye exhibits no discharge.   Cardiovascular: Normal rate, regular rhythm and normal heart sounds.   Pulmonary/Chest: Effort normal and breath sounds normal. No respiratory distress. Patient has no wheezes.   Abdominal: Soft. Bowel sounds are normal. Patient exhibits no distension. There is no tenderness.   Musculoskeletal:  Patient exhibits no edema and tenderness. Patient exhibits no deformity.   Neurological: Patient is alert and oriented to person, place, and time.   Skin: Skin is warm and dry. Patient is not diaphoretic.   Psychiatric: Patient's speech is normal and behavior is normal. Thought content normal.   Vitals reviewed.      Lab Results   Component Value Date    WBC 7.2 11/14/2024    HGB 15.3 (H) 11/14/2024    HCT 44.2 11/14/2024     11/14/2024    CHOL 180 12/08/2023    TRIG 154 (H) 12/08/2023    HDL 33 (L) 12/08/2023    ALT 35 (H) 05/16/2024    AST 33 (H) 05/16/2024     11/14/2024    K 4.6 11/14/2024     11/14/2024    CREATININE 0.8 11/14/2024    BUN 13 11/14/2024    CO2 28 11/14/2024    TSH 2.77 10/18/2022    INR 1.1 09/26/2021    LABA1C 7.1 (H) 05/16/2024     Lab Results   Component Value Date    CALCIUM 9.7 11/14/2024    PHOS 3.6 09/07/2012     No results found for: \"LDLDIRECT\"    Please note that this chart was generated using voice recognition Dragon dictation software. Although every effort was made to ensure the accuracy of this automated transcription, some errors in transcription may have occurred.    The patient (or guardian, if applicable) and other individuals in attendance with the patient were advised that Artificial Intelligence will be utilized during this visit to record, process the conversation to generate a clinical note, and support improvement of the AI technology. The patient (or guardian, if applicable) and other individuals in attendance at the appointment consented to the use of AI, including the recording.

## 2024-12-16 NOTE — H&P
History and Physical    Patient:  Tess Echeverria  MRN: [id number]    CHIEF COMPLAINT:  right flank pain    HISTORY OF PRESENT ILLNESS:   The patient is a 59 y.o. female who presents with right flank pain. Patient being seen here today as a new patient.  Patient was referred to us by AIXA ADAMES.  The patient was referred here for flank pain.  Patient has been having right sided lower abdominal pain as well.  Patient did have a recent hysterectomy.  She is having issues with bowel movements.  She does take Linzess.  Patient has never seen urology in the past.  She reports no history of stones.  She has had some intermittent gross hematuria.  She reports no current baseline lower urinary tract symptoms.  No nausea vomiting fevers recorded today.     Past Medical History:    Past Medical History:   Diagnosis Date    Acute pulmonary embolism (HCC) 11/02/2015    Anxiety     Asthma     Constipation, chronic     COVID-19 09/2021    CPAP (continuous positive airway pressure) dependence     Diabetes mellitus (HCC)     pt states she was prediabetic at one point    Embolism - blood clot     right lung and right leg    Esophageal reflux     Factor V Leiden (HCC)     Heart murmur     HTN (hypertension)     Hyperlipidemia     Irritable bladder     Kidney stone     MTHFR mutation     MVP (mitral valve prolapse)     Palpitations     Pulmonary embolism (HCC) 10/15/2012    Rhinitis, allergic 10/15/2012    Sleep apnea     Venous thromboembolism 05/02/2016       Past Surgical History:    Past Surgical History:   Procedure Laterality Date    CATARACT REMOVAL WITH IMPLANT Left 04/02/2018    CHOLECYSTECTOMY      around 2000    COLONOSCOPY  07/12/2021    COLONOSCOPY N/A 7/12/2021    COLONOSCOPY POLYPECTOMY CECUM COLD BIOPSY performed by Agapito Lopez MD at [facility] OR    HYSTERECTOMY (CERVIX STATUS UNKNOWN) N/A 6/17/2024    HYSTERECTOMY VAGINAL LAPAROSCOPIC ROBOTIC ASSISTED, BSO, POSS LAP COLPOPEXY performed by Ivory Fields

## 2024-12-17 ENCOUNTER — TELEPHONE (OUTPATIENT)
Dept: UROLOGY | Age: 59
End: 2024-12-17

## 2024-12-17 ENCOUNTER — HOSPITAL ENCOUNTER (OUTPATIENT)
Age: 59
Setting detail: OUTPATIENT SURGERY
Discharge: HOME | DRG: 694 | End: 2024-12-17
Attending: UROLOGY | Admitting: UROLOGY
Payer: COMMERCIAL

## 2024-12-17 ENCOUNTER — APPOINTMENT (OUTPATIENT)
Dept: GENERAL RADIOLOGY | Age: 59
DRG: 694 | End: 2024-12-17
Attending: UROLOGY
Payer: COMMERCIAL

## 2024-12-17 ENCOUNTER — ANESTHESIA (OUTPATIENT)
Dept: OPERATING ROOM | Age: 59
End: 2024-12-17
Payer: COMMERCIAL

## 2024-12-17 VITALS
RESPIRATION RATE: 16 BRPM | TEMPERATURE: 98.1 F | BODY MASS INDEX: 45.19 KG/M2 | HEIGHT: 66 IN | DIASTOLIC BLOOD PRESSURE: 71 MMHG | WEIGHT: 281.2 LBS | HEART RATE: 65 BPM | SYSTOLIC BLOOD PRESSURE: 136 MMHG | OXYGEN SATURATION: 92 %

## 2024-12-17 LAB — GLUCOSE BLD-MCNC: 156 MG/DL (ref 74–100)

## 2024-12-17 PROCEDURE — 0TF3XZZ FRAGMENTATION IN RIGHT KIDNEY PELVIS, EXTERNAL APPROACH: ICD-10-PCS | Performed by: UROLOGY

## 2024-12-17 PROCEDURE — 2709999900 HC NON-CHARGEABLE SUPPLY: Performed by: UROLOGY

## 2024-12-17 PROCEDURE — 3700000000 HC ANESTHESIA ATTENDED CARE: Performed by: UROLOGY

## 2024-12-17 PROCEDURE — 3700000001 HC ADD 15 MINUTES (ANESTHESIA): Performed by: UROLOGY

## 2024-12-17 PROCEDURE — 3600000013 HC SURGERY LEVEL 3 ADDTL 15MIN: Performed by: UROLOGY

## 2024-12-17 PROCEDURE — 7100000010 HC PHASE II RECOVERY - FIRST 15 MIN: Performed by: UROLOGY

## 2024-12-17 PROCEDURE — 6360000002 HC RX W HCPCS: Performed by: UROLOGY

## 2024-12-17 PROCEDURE — 3600000003 HC SURGERY LEVEL 3 BASE: Performed by: UROLOGY

## 2024-12-17 PROCEDURE — 7100000011 HC PHASE II RECOVERY - ADDTL 15 MIN: Performed by: UROLOGY

## 2024-12-17 PROCEDURE — 6370000000 HC RX 637 (ALT 250 FOR IP): Performed by: NURSE ANESTHETIST, CERTIFIED REGISTERED

## 2024-12-17 PROCEDURE — 6360000002 HC RX W HCPCS

## 2024-12-17 PROCEDURE — 82947 ASSAY GLUCOSE BLOOD QUANT: CPT

## 2024-12-17 RX ORDER — DEXAMETHASONE SODIUM PHOSPHATE 4 MG/ML
INJECTION, SOLUTION INTRA-ARTICULAR; INTRALESIONAL; INTRAMUSCULAR; INTRAVENOUS; SOFT TISSUE
Status: DISCONTINUED | OUTPATIENT
Start: 2024-12-17 | End: 2024-12-17 | Stop reason: SDUPTHER

## 2024-12-17 RX ORDER — ONDANSETRON 2 MG/ML
4 INJECTION INTRAMUSCULAR; INTRAVENOUS
Status: DISCONTINUED | OUTPATIENT
Start: 2024-12-17 | End: 2024-12-17 | Stop reason: HOSPADM

## 2024-12-17 RX ORDER — NALOXONE HYDROCHLORIDE 0.4 MG/ML
INJECTION, SOLUTION INTRAMUSCULAR; INTRAVENOUS; SUBCUTANEOUS PRN
Status: DISCONTINUED | OUTPATIENT
Start: 2024-12-17 | End: 2024-12-17 | Stop reason: HOSPADM

## 2024-12-17 RX ORDER — ACETAMINOPHEN 325 MG/1
650 TABLET ORAL ONCE
Status: COMPLETED | OUTPATIENT
Start: 2024-12-17 | End: 2024-12-17

## 2024-12-17 RX ORDER — SODIUM CHLORIDE 9 MG/ML
INJECTION, SOLUTION INTRAVENOUS PRN
Status: DISCONTINUED | OUTPATIENT
Start: 2024-12-17 | End: 2024-12-17 | Stop reason: HOSPADM

## 2024-12-17 RX ORDER — FENTANYL CITRATE 50 UG/ML
25 INJECTION, SOLUTION INTRAMUSCULAR; INTRAVENOUS EVERY 5 MIN PRN
Status: DISCONTINUED | OUTPATIENT
Start: 2024-12-17 | End: 2024-12-17 | Stop reason: HOSPADM

## 2024-12-17 RX ORDER — PROPOFOL 10 MG/ML
INJECTION, EMULSION INTRAVENOUS
Status: DISCONTINUED | OUTPATIENT
Start: 2024-12-17 | End: 2024-12-17 | Stop reason: SDUPTHER

## 2024-12-17 RX ORDER — SODIUM CHLORIDE 0.9 % (FLUSH) 0.9 %
5-40 SYRINGE (ML) INJECTION EVERY 12 HOURS SCHEDULED
Status: DISCONTINUED | OUTPATIENT
Start: 2024-12-17 | End: 2024-12-17 | Stop reason: HOSPADM

## 2024-12-17 RX ORDER — LIDOCAINE HYDROCHLORIDE 20 MG/ML
INJECTION, SOLUTION EPIDURAL; INFILTRATION; INTRACAUDAL; PERINEURAL
Status: DISCONTINUED | OUTPATIENT
Start: 2024-12-17 | End: 2024-12-17 | Stop reason: SDUPTHER

## 2024-12-17 RX ORDER — SODIUM CHLORIDE 9 MG/ML
INJECTION, SOLUTION INTRAVENOUS CONTINUOUS
Status: DISCONTINUED | OUTPATIENT
Start: 2024-12-17 | End: 2024-12-17 | Stop reason: HOSPADM

## 2024-12-17 RX ORDER — FENTANYL CITRATE 50 UG/ML
50 INJECTION, SOLUTION INTRAMUSCULAR; INTRAVENOUS EVERY 5 MIN PRN
Status: DISCONTINUED | OUTPATIENT
Start: 2024-12-17 | End: 2024-12-17 | Stop reason: HOSPADM

## 2024-12-17 RX ORDER — SUCCINYLCHOLINE CHLORIDE 20 MG/ML
INJECTION INTRAMUSCULAR; INTRAVENOUS
Status: DISCONTINUED | OUTPATIENT
Start: 2024-12-17 | End: 2024-12-17 | Stop reason: SDUPTHER

## 2024-12-17 RX ORDER — SODIUM CHLORIDE 0.9 % (FLUSH) 0.9 %
5-40 SYRINGE (ML) INJECTION PRN
Status: DISCONTINUED | OUTPATIENT
Start: 2024-12-17 | End: 2024-12-17 | Stop reason: HOSPADM

## 2024-12-17 RX ORDER — DIMENHYDRINATE 50 MG
50 TABLET ORAL ONCE
Status: COMPLETED | OUTPATIENT
Start: 2024-12-17 | End: 2024-12-17

## 2024-12-17 RX ORDER — FENTANYL CITRATE 50 UG/ML
INJECTION, SOLUTION INTRAMUSCULAR; INTRAVENOUS
Status: DISCONTINUED | OUTPATIENT
Start: 2024-12-17 | End: 2024-12-17 | Stop reason: SDUPTHER

## 2024-12-17 RX ORDER — ONDANSETRON 2 MG/ML
INJECTION INTRAMUSCULAR; INTRAVENOUS
Status: DISCONTINUED | OUTPATIENT
Start: 2024-12-17 | End: 2024-12-17 | Stop reason: SDUPTHER

## 2024-12-17 RX ADMIN — DEXAMETHASONE SODIUM PHOSPHATE 4 MG: 4 INJECTION INTRA-ARTICULAR; INTRALESIONAL; INTRAMUSCULAR; INTRAVENOUS; SOFT TISSUE at 09:16

## 2024-12-17 RX ADMIN — PROPOFOL 50 MG: 10 INJECTION, EMULSION INTRAVENOUS at 09:57

## 2024-12-17 RX ADMIN — FENTANYL CITRATE 50 MCG: 50 INJECTION INTRAMUSCULAR; INTRAVENOUS at 09:16

## 2024-12-17 RX ADMIN — ONDANSETRON 4 MG: 2 INJECTION, SOLUTION INTRAMUSCULAR; INTRAVENOUS at 09:47

## 2024-12-17 RX ADMIN — DIMENHYDRINATE 50 MG: 50 TABLET ORAL at 08:06

## 2024-12-17 RX ADMIN — PROPOFOL 50 MG: 10 INJECTION, EMULSION INTRAVENOUS at 09:45

## 2024-12-17 RX ADMIN — PROPOFOL 50 MG: 10 INJECTION, EMULSION INTRAVENOUS at 09:16

## 2024-12-17 RX ADMIN — PROPOFOL 150 MG: 10 INJECTION, EMULSION INTRAVENOUS at 09:08

## 2024-12-17 RX ADMIN — ACETAMINOPHEN 650 MG: 325 TABLET ORAL at 08:06

## 2024-12-17 RX ADMIN — Medication 3000 MG: at 09:15

## 2024-12-17 RX ADMIN — SUCCINYLCHOLINE CHLORIDE 120 MG: 20 INJECTION, SOLUTION INTRAMUSCULAR; INTRAVENOUS at 09:08

## 2024-12-17 RX ADMIN — FENTANYL CITRATE 50 MCG: 50 INJECTION INTRAMUSCULAR; INTRAVENOUS at 09:08

## 2024-12-17 RX ADMIN — LIDOCAINE HYDROCHLORIDE 100 MG: 20 INJECTION, SOLUTION EPIDURAL; INFILTRATION; INTRACAUDAL; PERINEURAL at 09:08

## 2024-12-17 ASSESSMENT — PAIN - FUNCTIONAL ASSESSMENT
PAIN_FUNCTIONAL_ASSESSMENT: 0-10
PAIN_FUNCTIONAL_ASSESSMENT: NONE - DENIES PAIN
PAIN_FUNCTIONAL_ASSESSMENT: NONE - DENIES PAIN
PAIN_FUNCTIONAL_ASSESSMENT: FACE, LEGS, ACTIVITY, CRY, AND CONSOLABILITY (FLACC)
PAIN_FUNCTIONAL_ASSESSMENT: 0-10

## 2024-12-17 ASSESSMENT — PAIN DESCRIPTION - DESCRIPTORS: DESCRIPTORS: CRAMPING

## 2024-12-17 NOTE — TELEPHONE ENCOUNTER
Patient called office. She had ESWL this morning. She states she is in so much pain she is unable to sleep. She took OTC Tylenol at 2PM it has not helped. She said its like the worse menstrual cramping you could have. She has nausea from the pain but denies fever,vomiting. Please Advise

## 2024-12-17 NOTE — TELEPHONE ENCOUNTER
You may experience waves of pain and/or nausea for the next 24-72 hrs.  You may also experience burning with urination, frequency, urgency, bladder spasms, and blood in the urine. All of this should continue to improve over the next several days. The blood in the urine can last up to two weeks.      1) take ibuprofen (motrin) 600 mg (3 of the 200mg tabs) every 6 hours WITH FOOD for the next 72 hours.    2) drink at least 80 oz fluid (water, juice, Gatorade - NOT tea, coffee, soda pop) daily    For pain NOT controlled by ibuprofen use otc acetaminophen as directed as needed.       Call our office [phone] or go to ER (if after normal office hours) if you develop fever, intractable vomiting, severe/intolerable pain.

## 2024-12-17 NOTE — ANESTHESIA POSTPROCEDURE EVALUATION
Department of Anesthesiology  Postprocedure Note    Patient: Tess Echeverria  MRN: [id number]  YOB: 1965  Date of evaluation: 12/17/2024    Procedure Summary       Date: 12/17/24 Room / Location: [address]    Anesthesia Start: 0904 Anesthesia Stop: 1014    Procedure: EXTRACORPOREAL SHOCK WAVE LITHOTRIPSY - ESWL (Right: Kidney) Diagnosis:       Renal calculus      (Renal calculus [N20.0])    Surgeons: Rosalio Calzada MD Responsible Provider: Chrissy Ruano APRN - CRNA    Anesthesia Type: General ASA Status: 3            Anesthesia Type: General    Nate Phase I: Nate Score: 10    Nate Phase II: Nate Score: 10    Anesthesia Post Evaluation    Patient location during evaluation: PACU  Patient participation: complete - patient participated  Level of consciousness: awake and alert  Airway patency: patent  Nausea & Vomiting: no nausea and no vomiting  Cardiovascular status: blood pressure returned to baseline  Respiratory status: acceptable  Hydration status: euvolemic  Pain management: adequate and satisfactory to patient    No notable events documented.

## 2024-12-17 NOTE — ANESTHESIA PRE PROCEDURE
Agapito Lopez MD at [facility] OR   • HYSTERECTOMY (CERVIX STATUS UNKNOWN) N/A 6/17/2024    HYSTERECTOMY VAGINAL LAPAROSCOPIC ROBOTIC ASSISTED, BSO, POSS LAP COLPOPEXY performed by Ivory Fields DO at [facility] OR   • MANDIBLE FRACTURE SURGERY  1983   • KS COLSC FLEXIBLE W/CONTROL BLEEDING ANY METHOD  3/27/2017    COLONOSCOPY CONTROL HEMORRHAGE performed by Suman Hawk MD at [facility] OR   • KS COLSC FLX W/RMVL OF TUMOR POLYP LESION SNARE TQ  3/27/2017    COLONOSCOPY POLYPECTOMY SNARE/COLD BIOPSY performed by Suman Hawk MD at [facility] OR   • KS XCAPSL CTRC RMVL INSJ IO LENS PROSTH W/O ECP Left 4/2/2018    EYE CATARACT EMULSIFICATION IOL IMPLANT performed by Jamar Romero MD at [facility] OR   • TUBAL LIGATION     • UPPER GASTROINTESTINAL ENDOSCOPY  1/2010    with biopsy   • UPPER GASTROINTESTINAL ENDOSCOPY N/A 7/12/2021    EGD BIOPSY OF STOMACH, GASTRIC POLYPECTOMY AND DUODENUM BIOPSY performed by Agapito Lopez MD at [facility] OR       Social History:    Social History     Tobacco Use   • Smoking status: Never   • Smokeless tobacco: Never   Substance Use Topics   • Alcohol use: Yes     Comment: Possible 1 glass a month of wine                                Counseling given: Not Answered      Vital Signs (Current):   Vitals:    12/11/24 1526 12/17/24 0754 12/17/24 0800   BP:   (!) 181/94   Pulse:   80   Resp:   18   Temp:   36.1 °C (96.9 °F)   TempSrc:   Temporal   SpO2:   98%   Weight: 122.5 kg (270 lb) 127.6 kg (281 lb 3.2 oz)    Height: 1.676 m (5' 6\")                                                BP Readings from Last 3 Encounters:   12/17/24 (!) 181/94   12/16/24 126/72   12/06/24 97/65       NPO Status: Time of last liquid consumption: 2230                        Time of last solid consumption: 1930                        Date of last liquid consumption: 12/16/24                        Date of last solid food consumption: 12/16/24    BMI:   Wt Readings from Last 3 Encounters:   12/17/24 127.6 kg

## 2024-12-17 NOTE — PROGRESS NOTES
Discharge instructions given to patient,  and sister with understanding voiced. Questions answered.

## 2024-12-17 NOTE — DISCHARGE INSTRUCTIONS
SAME DAY SURGERY DISCHARGE INSTRUCTIONS    1.  Do not drive or operate hazardous machinery for 24 hours.    2.  Do not make important personal or business decisions for 24 hours.    3.  Do not drink alcoholic beverages for 24 hours.    4.  Do not smoke tobacco products for 24 hours.    5.  Eat light foods (Jell-O, soups, etc....) and drink plenty of fluids (water, Sprite, etc...) up to 8 glasses per day, as you can tolerate.    6.  Limit your activities for 24 hours.  Do not engage in heavy work until your surgeon gives you permission.      7.  Patient should not be left alone for 12-24 hours following surgical procedure.    8.  Wash hands before and after incision care.  It is important to practice good personal hygiene during the post op period.    9.  Call your surgeon for any questions regarding your surgery.    CYSTOSCOPY DISCHARGE INSTRUCTIONS    Possible burning during urination and/or blood tinged urine.    Drink 6-8 glasses of water for the next day or so.  (This helps to flush the urinary tract.)    Call Dr. Calzada ([phone]) if you develop:    Fever over 100 degrees  Prolonged soreness/pain  Unusual bleeding/bruising  Unable to urinate or if urine is bloody  You cannot pass urine 8 hours after the test.  You have pain in your belly or your back just below your rib cage.  (This is called flank pain.)  You have frequent urge to urinate but can pass only small amounts of urine.    Call Dr. Calzada office for follow-up appointment ([phone]).

## 2024-12-18 ENCOUNTER — APPOINTMENT (OUTPATIENT)
Dept: CT IMAGING | Age: 59
DRG: 694 | End: 2024-12-18
Payer: COMMERCIAL

## 2024-12-18 ENCOUNTER — HOSPITAL ENCOUNTER (INPATIENT)
Age: 59
LOS: 2 days | Discharge: HOME | DRG: 694 | End: 2024-12-20
Attending: EMERGENCY MEDICINE | Admitting: STUDENT IN AN ORGANIZED HEALTH CARE EDUCATION/TRAINING PROGRAM
Payer: COMMERCIAL

## 2024-12-18 ENCOUNTER — APPOINTMENT (OUTPATIENT)
Dept: GENERAL RADIOLOGY | Age: 59
DRG: 694 | End: 2024-12-18
Payer: COMMERCIAL

## 2024-12-18 ENCOUNTER — TELEPHONE (OUTPATIENT)
Dept: UROLOGY | Age: 59
End: 2024-12-18

## 2024-12-18 DIAGNOSIS — N20.1 URETEROLITHIASIS: ICD-10-CM

## 2024-12-18 DIAGNOSIS — R79.89 ELEVATED LACTIC ACID LEVEL: ICD-10-CM

## 2024-12-18 DIAGNOSIS — R52 INTRACTABLE PAIN: ICD-10-CM

## 2024-12-18 DIAGNOSIS — R65.10 SIRS (SYSTEMIC INFLAMMATORY RESPONSE SYNDROME) (HCC): ICD-10-CM

## 2024-12-18 DIAGNOSIS — R10.9 FLANK PAIN: Primary | ICD-10-CM

## 2024-12-18 PROBLEM — A41.9 SEPSIS DUE TO URINARY TRACT INFECTION (HCC): Status: ACTIVE | Noted: 2024-12-18

## 2024-12-18 PROBLEM — N39.0 SEPSIS DUE TO URINARY TRACT INFECTION (HCC): Status: ACTIVE | Noted: 2024-12-18

## 2024-12-18 LAB
ALBUMIN SERPL-MCNC: 4.3 G/DL (ref 3.5–5.2)
ALBUMIN/GLOB SERPL: 1.3 {RATIO} (ref 1–2.5)
ALP SERPL-CCNC: 125 U/L (ref 35–104)
ALT SERPL-CCNC: 28 U/L (ref 10–35)
AMORPH SED URNS QL MICRO: ABNORMAL
ANION GAP SERPL CALCULATED.3IONS-SCNC: 13 MMOL/L (ref 9–16)
AST SERPL-CCNC: 41 U/L (ref 10–35)
BACTERIA URNS QL MICRO: ABNORMAL
BASOPHILS # BLD: 0.04 K/UL (ref 0–0.2)
BASOPHILS NFR BLD: 0 % (ref 0–2)
BILIRUB SERPL-MCNC: 0.4 MG/DL (ref 0–1.2)
BILIRUB UR QL STRIP: NEGATIVE
BUN SERPL-MCNC: 11 MG/DL (ref 6–20)
BUN/CREAT SERPL: 11 (ref 9–20)
CALCIUM SERPL-MCNC: 9.4 MG/DL (ref 8.6–10.4)
CHLORIDE SERPL-SCNC: 99 MMOL/L (ref 98–107)
CLARITY UR: ABNORMAL
CO2 SERPL-SCNC: 25 MMOL/L (ref 20–31)
COLOR UR: ABNORMAL
CREAT SERPL-MCNC: 1 MG/DL (ref 0.5–0.9)
EOSINOPHIL # BLD: 0.06 K/UL (ref 0–0.44)
EOSINOPHILS RELATIVE PERCENT: 0 % (ref 1–4)
EPI CELLS #/AREA URNS HPF: ABNORMAL /HPF (ref 0–25)
ERYTHROCYTE [DISTWIDTH] IN BLOOD BY AUTOMATED COUNT: 12.7 % (ref 11.8–14.4)
GFR, ESTIMATED: 65 ML/MIN/1.73M2
GLUCOSE SERPL-MCNC: 258 MG/DL (ref 74–99)
GLUCOSE UR STRIP-MCNC: NEGATIVE MG/DL
HCT VFR BLD AUTO: 46 % (ref 36.3–47.1)
HGB BLD-MCNC: 16.2 G/DL (ref 11.9–15.1)
HGB UR QL STRIP.AUTO: ABNORMAL
IMM GRANULOCYTES # BLD AUTO: 0.06 K/UL (ref 0–0.3)
IMM GRANULOCYTES NFR BLD: 0 %
KETONES UR STRIP-MCNC: NEGATIVE MG/DL
LACTATE BLDV-SCNC: 1.7 MMOL/L (ref 0.5–1.9)
LACTATE BLDV-SCNC: 3.6 MMOL/L (ref 0.5–2.2)
LEUKOCYTE ESTERASE UR QL STRIP: ABNORMAL
LIPASE SERPL-CCNC: 43 U/L (ref 13–60)
LYMPHOCYTES NFR BLD: 4.45 K/UL (ref 1.1–3.7)
LYMPHOCYTES RELATIVE PERCENT: 31 % (ref 24–43)
MCH RBC QN AUTO: 30.6 PG (ref 25.2–33.5)
MCHC RBC AUTO-ENTMCNC: 35.2 G/DL (ref 28.4–34.8)
MCV RBC AUTO: 86.8 FL (ref 82.6–102.9)
MONOCYTES NFR BLD: 0.78 K/UL (ref 0.1–1.2)
MONOCYTES NFR BLD: 5 % (ref 3–12)
NEUTROPHILS NFR BLD: 64 % (ref 36–65)
NEUTS SEG NFR BLD: 9.17 K/UL (ref 1.5–8.1)
NITRITE UR QL STRIP: NEGATIVE
NRBC BLD-RTO: 0 PER 100 WBC
PH UR STRIP: 5.5 [PH] (ref 5–9)
PLATELET # BLD AUTO: 248 K/UL (ref 138–453)
PMV BLD AUTO: 10.4 FL (ref 8.1–13.5)
POTASSIUM SERPL-SCNC: 4.2 MMOL/L (ref 3.7–5.3)
PROT SERPL-MCNC: 7.7 G/DL (ref 6.6–8.7)
PROT UR STRIP-MCNC: ABNORMAL MG/DL
RBC # BLD AUTO: 5.3 M/UL (ref 3.95–5.11)
RBC #/AREA URNS HPF: ABNORMAL /HPF (ref 0–2)
SODIUM SERPL-SCNC: 137 MMOL/L (ref 136–145)
SP GR UR STRIP: 1.01 (ref 1.01–1.02)
UROBILINOGEN UR STRIP-ACNC: NORMAL EU/DL (ref 0–1)
WBC #/AREA URNS HPF: ABNORMAL /HPF (ref 0–5)
WBC OTHER # BLD: 14.6 K/UL (ref 3.5–11.3)

## 2024-12-18 PROCEDURE — 83690 ASSAY OF LIPASE: CPT

## 2024-12-18 PROCEDURE — 2500000003 HC RX 250 WO HCPCS: Performed by: INTERNAL MEDICINE

## 2024-12-18 PROCEDURE — 80053 COMPREHEN METABOLIC PANEL: CPT

## 2024-12-18 PROCEDURE — 96365 THER/PROPH/DIAG IV INF INIT: CPT

## 2024-12-18 PROCEDURE — 85025 COMPLETE CBC W/AUTO DIFF WBC: CPT

## 2024-12-18 PROCEDURE — 1200000000 HC SEMI PRIVATE

## 2024-12-18 PROCEDURE — 96375 TX/PRO/DX INJ NEW DRUG ADDON: CPT

## 2024-12-18 PROCEDURE — 83605 ASSAY OF LACTIC ACID: CPT

## 2024-12-18 PROCEDURE — 71045 X-RAY EXAM CHEST 1 VIEW: CPT

## 2024-12-18 PROCEDURE — 6370000000 HC RX 637 (ALT 250 FOR IP)

## 2024-12-18 PROCEDURE — 2580000003 HC RX 258: Performed by: EMERGENCY MEDICINE

## 2024-12-18 PROCEDURE — 2580000003 HC RX 258: Performed by: INTERNAL MEDICINE

## 2024-12-18 PROCEDURE — 36415 COLL VENOUS BLD VENIPUNCTURE: CPT

## 2024-12-18 PROCEDURE — 94761 N-INVAS EAR/PLS OXIMETRY MLT: CPT

## 2024-12-18 PROCEDURE — 6370000000 HC RX 637 (ALT 250 FOR IP): Performed by: INTERNAL MEDICINE

## 2024-12-18 PROCEDURE — 94664 DEMO&/EVAL PT USE INHALER: CPT

## 2024-12-18 PROCEDURE — 81001 URINALYSIS AUTO W/SCOPE: CPT

## 2024-12-18 PROCEDURE — 6360000002 HC RX W HCPCS: Performed by: EMERGENCY MEDICINE

## 2024-12-18 PROCEDURE — 87086 URINE CULTURE/COLONY COUNT: CPT

## 2024-12-18 PROCEDURE — 87040 BLOOD CULTURE FOR BACTERIA: CPT

## 2024-12-18 PROCEDURE — 74176 CT ABD & PELVIS W/O CONTRAST: CPT

## 2024-12-18 PROCEDURE — 99285 EMERGENCY DEPT VISIT HI MDM: CPT

## 2024-12-18 PROCEDURE — 0TC63ZZ EXTIRPATION OF MATTER FROM RIGHT URETER, PERCUTANEOUS APPROACH: ICD-10-PCS | Performed by: UROLOGY

## 2024-12-18 RX ORDER — BUDESONIDE AND FORMOTEROL FUMARATE DIHYDRATE 160; 4.5 UG/1; UG/1
2 AEROSOL RESPIRATORY (INHALATION) 2 TIMES DAILY
Status: DISCONTINUED | OUTPATIENT
Start: 2024-12-18 | End: 2024-12-20 | Stop reason: HOSPADM

## 2024-12-18 RX ORDER — SODIUM CHLORIDE 9 MG/ML
INJECTION, SOLUTION INTRAVENOUS PRN
Status: DISCONTINUED | OUTPATIENT
Start: 2024-12-18 | End: 2024-12-20 | Stop reason: HOSPADM

## 2024-12-18 RX ORDER — FAMOTIDINE 20 MG/1
40 TABLET, FILM COATED ORAL EVERY EVENING
Status: DISCONTINUED | OUTPATIENT
Start: 2024-12-18 | End: 2024-12-20 | Stop reason: HOSPADM

## 2024-12-18 RX ORDER — POLYETHYLENE GLYCOL 3350 17 G/17G
17 POWDER, FOR SOLUTION ORAL DAILY PRN
Status: DISCONTINUED | OUTPATIENT
Start: 2024-12-18 | End: 2024-12-18 | Stop reason: SDUPTHER

## 2024-12-18 RX ORDER — METOPROLOL SUCCINATE 25 MG/1
25 TABLET, EXTENDED RELEASE ORAL DAILY
Status: DISCONTINUED | OUTPATIENT
Start: 2024-12-18 | End: 2024-12-20 | Stop reason: HOSPADM

## 2024-12-18 RX ORDER — SODIUM CHLORIDE 0.9 % (FLUSH) 0.9 %
10 SYRINGE (ML) INJECTION PRN
Status: DISCONTINUED | OUTPATIENT
Start: 2024-12-18 | End: 2024-12-20 | Stop reason: HOSPADM

## 2024-12-18 RX ORDER — ONDANSETRON 4 MG/1
4 TABLET, ORALLY DISINTEGRATING ORAL EVERY 8 HOURS PRN
Status: DISCONTINUED | OUTPATIENT
Start: 2024-12-18 | End: 2024-12-20 | Stop reason: HOSPADM

## 2024-12-18 RX ORDER — SODIUM CHLORIDE 0.9 % (FLUSH) 0.9 %
5-40 SYRINGE (ML) INJECTION EVERY 12 HOURS SCHEDULED
Status: DISCONTINUED | OUTPATIENT
Start: 2024-12-18 | End: 2024-12-20 | Stop reason: HOSPADM

## 2024-12-18 RX ORDER — BUDESONIDE AND FORMOTEROL FUMARATE DIHYDRATE 160; 4.5 UG/1; UG/1
AEROSOL RESPIRATORY (INHALATION)
Status: COMPLETED
Start: 2024-12-18 | End: 2024-12-18

## 2024-12-18 RX ORDER — ACETAMINOPHEN 650 MG/1
650 SUPPOSITORY RECTAL EVERY 6 HOURS PRN
Status: DISCONTINUED | OUTPATIENT
Start: 2024-12-18 | End: 2024-12-20 | Stop reason: HOSPADM

## 2024-12-18 RX ORDER — MAGNESIUM SULFATE IN WATER 40 MG/ML
2000 INJECTION, SOLUTION INTRAVENOUS PRN
Status: DISCONTINUED | OUTPATIENT
Start: 2024-12-18 | End: 2024-12-20 | Stop reason: HOSPADM

## 2024-12-18 RX ORDER — OXYCODONE HYDROCHLORIDE 5 MG/1
5 TABLET ORAL EVERY 4 HOURS PRN
Status: DISCONTINUED | OUTPATIENT
Start: 2024-12-18 | End: 2024-12-20 | Stop reason: HOSPADM

## 2024-12-18 RX ORDER — 0.9 % SODIUM CHLORIDE 0.9 %
500 INTRAVENOUS SOLUTION INTRAVENOUS ONCE
Status: DISCONTINUED | OUTPATIENT
Start: 2024-12-18 | End: 2024-12-18

## 2024-12-18 RX ORDER — FENTANYL CITRATE 50 UG/ML
50 INJECTION, SOLUTION INTRAMUSCULAR; INTRAVENOUS ONCE
Status: COMPLETED | OUTPATIENT
Start: 2024-12-18 | End: 2024-12-18

## 2024-12-18 RX ORDER — POTASSIUM CHLORIDE 7.45 MG/ML
10 INJECTION INTRAVENOUS PRN
Status: DISCONTINUED | OUTPATIENT
Start: 2024-12-18 | End: 2024-12-20 | Stop reason: HOSPADM

## 2024-12-18 RX ORDER — SODIUM CHLORIDE 9 MG/ML
INJECTION, SOLUTION INTRAVENOUS CONTINUOUS
Status: DISCONTINUED | OUTPATIENT
Start: 2024-12-18 | End: 2024-12-20

## 2024-12-18 RX ORDER — SPIRONOLACTONE 25 MG/1
25 TABLET ORAL DAILY
Status: DISCONTINUED | OUTPATIENT
Start: 2024-12-18 | End: 2024-12-20 | Stop reason: HOSPADM

## 2024-12-18 RX ORDER — 0.9 % SODIUM CHLORIDE 0.9 %
30 INTRAVENOUS SOLUTION INTRAVENOUS ONCE
Status: COMPLETED | OUTPATIENT
Start: 2024-12-18 | End: 2024-12-18

## 2024-12-18 RX ORDER — OXYCODONE HYDROCHLORIDE 5 MG/1
10 TABLET ORAL EVERY 4 HOURS PRN
Status: DISCONTINUED | OUTPATIENT
Start: 2024-12-18 | End: 2024-12-20 | Stop reason: HOSPADM

## 2024-12-18 RX ORDER — PANTOPRAZOLE SODIUM 40 MG/1
40 TABLET, DELAYED RELEASE ORAL DAILY
Status: DISCONTINUED | OUTPATIENT
Start: 2024-12-18 | End: 2024-12-20 | Stop reason: HOSPADM

## 2024-12-18 RX ORDER — ONDANSETRON 2 MG/ML
4 INJECTION INTRAMUSCULAR; INTRAVENOUS EVERY 6 HOURS PRN
Status: DISCONTINUED | OUTPATIENT
Start: 2024-12-18 | End: 2024-12-20 | Stop reason: HOSPADM

## 2024-12-18 RX ORDER — POLYETHYLENE GLYCOL 3350 17 G/17G
17 POWDER, FOR SOLUTION ORAL DAILY
Status: DISCONTINUED | OUTPATIENT
Start: 2024-12-18 | End: 2024-12-20 | Stop reason: HOSPADM

## 2024-12-18 RX ORDER — POTASSIUM CHLORIDE 1500 MG/1
40 TABLET, EXTENDED RELEASE ORAL PRN
Status: DISCONTINUED | OUTPATIENT
Start: 2024-12-18 | End: 2024-12-20 | Stop reason: HOSPADM

## 2024-12-18 RX ORDER — ACETAMINOPHEN 325 MG/1
650 TABLET ORAL EVERY 6 HOURS PRN
Status: DISCONTINUED | OUTPATIENT
Start: 2024-12-18 | End: 2024-12-20 | Stop reason: HOSPADM

## 2024-12-18 RX ADMIN — BUDESONIDE AND FORMOTEROL FUMARATE DIHYDRATE 2 PUFF: 160; 4.5 AEROSOL RESPIRATORY (INHALATION) at 20:38

## 2024-12-18 RX ADMIN — SODIUM CHLORIDE: 9 INJECTION, SOLUTION INTRAVENOUS at 20:35

## 2024-12-18 RX ADMIN — FAMOTIDINE 40 MG: 20 TABLET, FILM COATED ORAL at 20:29

## 2024-12-18 RX ADMIN — SODIUM CHLORIDE, PRESERVATIVE FREE 10 ML: 5 INJECTION INTRAVENOUS at 20:30

## 2024-12-18 RX ADMIN — SODIUM CHLORIDE 3828 ML: 9 INJECTION, SOLUTION INTRAVENOUS at 16:14

## 2024-12-18 RX ADMIN — APIXABAN 5 MG: 5 TABLET, FILM COATED ORAL at 20:29

## 2024-12-18 RX ADMIN — MEROPENEM 1000 MG: 1 INJECTION INTRAVENOUS at 16:34

## 2024-12-18 RX ADMIN — FENTANYL CITRATE 50 MCG: 50 INJECTION INTRAMUSCULAR; INTRAVENOUS at 15:14

## 2024-12-18 ASSESSMENT — PAIN SCALES - GENERAL: PAINLEVEL_OUTOF10: 10

## 2024-12-18 ASSESSMENT — PAIN DESCRIPTION - LOCATION
LOCATION: FLANK
LOCATION: FLANK

## 2024-12-18 ASSESSMENT — PAIN - FUNCTIONAL ASSESSMENT: PAIN_FUNCTIONAL_ASSESSMENT: 0-10

## 2024-12-18 ASSESSMENT — PAIN DESCRIPTION - ORIENTATION: ORIENTATION: RIGHT

## 2024-12-18 NOTE — OP NOTE
[address] [address]                            OPERATIVE REPORT      PATIENT NAME: YEE JONES                  : 1965  MED REC NO: [id number]                          ROOM: [facility]  ACCOUNT NO: [id number]                       ADMIT DATE: 2024  PROVIDER: Rosalio Calzada MD      DATE OF PROCEDURE:  2024    SURGEON:  Rosalio Calzada MD    ASSISTANT:  None.    PREOPERATIVE DIAGNOSIS:  Right renal calculus.    POSTOPERATIVE DIAGNOSIS:  Right renal calculus.    PROCEDURE:  Right extracorporeal shockwave lithotripsy.    ANESTHESIA:  General.    COMPLICATIONS:  None.    ESTIMATED BLOOD LOSS:  Minimal.    SPECIMENS:  None.    PROSTHESIS:  None.    DISPOSITION:  Stable.    FINDINGS:  10 mm right renal stone.    INDICATIONS:  The patient is a 59-year-old female with a known right-sided stone, here now for definitive therapy in the form of ESWL.    DESCRIPTION OF PROCEDURE:  The patient was taken back to the operating room after informed consent including all risks, benefits, and alternatives were obtained.  The patient was transferred from the [facility] onto the operative table where she was induced under general anesthesia, placed in the supine position.  To begin the case, she was placed in the supine position on the lithotripsy table.  Stone was identified on biplanar fluoroscopy.  We were then able to ablate the stone.  We did use power level range between 3 and 8, frequency between 60 and 90 at 3000 shocks.  We did see fluoroscopic ablation of the stone.  She was then awoken from general anesthesia, transferred to [facility], and taken to PACU in satisfactory condition by Nursing and Anesthesia Teams.    PLAN:  The patient will be discharged home per PACU criteria and follow up with us in 3 months to repeat KUB.          ROSALIO CALZADA MD      D:  2024 17:04:08     T:  2024 22:46:08     THEO/ZARA  Job #:   [id number]     Doc#:  [id number]

## 2024-12-18 NOTE — ED PROVIDER NOTES
[facility]  EMERGENCY DEPARTMENT ENCOUNTER      Pt Name: Tess Echeverria  MRN: [id number]  Birthdate 1965  Date of evaluation: 12/18/2024  Provider: Roque Kathleen MD  Time Note started  2:37 PM EST  12/18/24           NURSING NOTES REVIEWED     Pt evaluated in a timely manner      CURRENT MEDICATIONS       Previous Medications    ALBUTEROL SULFATE HFA (PROAIR HFA) 108 (90 BASE) MCG/ACT INHALER    Inhale 2 puffs into the lungs 4 times daily as needed for Wheezing or Shortness of Breath    ALPRAZOLAM (XANAX) 0.25 MG TABLET    Take 1 tablet by mouth nightly as needed for Sleep.    APIXABAN (ELIQUIS) 5 MG TABS TABLET    Take 1 tablet by mouth 2 times daily    ASPIRIN 81 MG EC TABLET    Take 1 tablet by mouth daily    BLOOD GLUCOSE MONITOR KIT AND SUPPLIES    Dispense sufficient amount for indicated testing frequency plus additional to accommodate QD testing needs. Dispense all needed supplies to include: monitor, strips, lancing device, lancets, control solutions, alcohol swabs.    BLOOD GLUCOSE MONITOR STRIPS    Test twice  times a day & as needed for symptoms of irregular blood glucose. Dispense sufficient amount for indicated testing frequency plus additional to accommodate PRN testing needs.    BLOOD GLUCOSE MONITORING SUPPL (PRODIGY AUTOCODE BLOOD GLUCOSE) JACKIE    1 each by Does not apply route 2 times daily    BUDESONIDE-FORMOTEROL (SYMBICORT) 160-4.5 MCG/ACT AERO    Inhale 2 puffs into the lungs 2 times daily    FAMOTIDINE (PEPCID) 40 MG TABLET    Take 1 tablet by mouth every evening    LINACLOTIDE (LINZESS PO)    Take by mouth daily as needed    METOPROLOL SUCCINATE (TOPROL XL) 25 MG EXTENDED RELEASE TABLET    Take 1 tablet by mouth daily    PANTOPRAZOLE (PROTONIX) 40 MG TABLET    Take 1 tablet by mouth daily Every 3 days or so    POLYETHYLENE GLYCOL (MIRALAX) 17 G PACK PACKET    Take 17 g by mouth daily    PREVIDENT 5000 BOOSTER PLUS 1.1 % PSTE        PRODIGY LANCETS 28G MISC    1 each

## 2024-12-19 ENCOUNTER — TELEPHONE (OUTPATIENT)
Dept: UROLOGY | Age: 59
End: 2024-12-19

## 2024-12-19 PROBLEM — R10.9 RIGHT FLANK PAIN: Status: ACTIVE | Noted: 2024-12-18

## 2024-12-19 PROBLEM — N30.00 ACUTE CYSTITIS WITHOUT HEMATURIA: Status: ACTIVE | Noted: 2024-12-18

## 2024-12-19 PROBLEM — N30.00 ACUTE CYSTITIS WITHOUT HEMATURIA: Status: ACTIVE | Noted: 2024-12-19

## 2024-12-19 LAB
ALBUMIN SERPL-MCNC: 3.3 G/DL (ref 3.5–5.2)
ALBUMIN/GLOB SERPL: 1.2 {RATIO} (ref 1–2.5)
ALP SERPL-CCNC: 94 U/L (ref 35–104)
ALT SERPL-CCNC: 20 U/L (ref 10–35)
ANION GAP SERPL CALCULATED.3IONS-SCNC: 9 MMOL/L (ref 9–16)
AST SERPL-CCNC: 27 U/L (ref 10–35)
BASOPHILS # BLD: 0.04 K/UL (ref 0–0.2)
BASOPHILS NFR BLD: 0 % (ref 0–2)
BILIRUB SERPL-MCNC: 0.5 MG/DL (ref 0–1.2)
BUN SERPL-MCNC: 9 MG/DL (ref 6–20)
BUN/CREAT SERPL: 11 (ref 9–20)
CALCIUM SERPL-MCNC: 8.4 MG/DL (ref 8.6–10.4)
CHLORIDE SERPL-SCNC: 110 MMOL/L (ref 98–107)
CO2 SERPL-SCNC: 24 MMOL/L (ref 20–31)
CREAT SERPL-MCNC: 0.8 MG/DL (ref 0.5–0.9)
EOSINOPHIL # BLD: 0.07 K/UL (ref 0–0.44)
EOSINOPHILS RELATIVE PERCENT: 1 % (ref 1–4)
ERYTHROCYTE [DISTWIDTH] IN BLOOD BY AUTOMATED COUNT: 13.2 % (ref 11.8–14.4)
GFR, ESTIMATED: >90 ML/MIN/1.73M2
GLUCOSE SERPL-MCNC: 132 MG/DL (ref 74–99)
HCT VFR BLD AUTO: 39.7 % (ref 36.3–47.1)
HGB BLD-MCNC: 13.6 G/DL (ref 11.9–15.1)
IMM GRANULOCYTES # BLD AUTO: <0.03 K/UL (ref 0–0.3)
IMM GRANULOCYTES NFR BLD: 0 %
LYMPHOCYTES NFR BLD: 4.46 K/UL (ref 1.1–3.7)
LYMPHOCYTES RELATIVE PERCENT: 43 % (ref 24–43)
MCH RBC QN AUTO: 30.2 PG (ref 25.2–33.5)
MCHC RBC AUTO-ENTMCNC: 34.3 G/DL (ref 28.4–34.8)
MCV RBC AUTO: 88.2 FL (ref 82.6–102.9)
MICROORGANISM SPEC CULT: NO GROWTH
MONOCYTES NFR BLD: 0.62 K/UL (ref 0.1–1.2)
MONOCYTES NFR BLD: 6 % (ref 3–12)
NEUTROPHILS NFR BLD: 49 % (ref 36–65)
NEUTS SEG NFR BLD: 5.07 K/UL (ref 1.5–8.1)
NRBC BLD-RTO: 0 PER 100 WBC
PLATELET # BLD AUTO: 193 K/UL (ref 138–453)
PMV BLD AUTO: 10.1 FL (ref 8.1–13.5)
POTASSIUM SERPL-SCNC: 3.9 MMOL/L (ref 3.7–5.3)
PROT SERPL-MCNC: 6.1 G/DL (ref 6.6–8.7)
RBC # BLD AUTO: 4.5 M/UL (ref 3.95–5.11)
SERVICE CMNT-IMP: NORMAL
SODIUM SERPL-SCNC: 143 MMOL/L (ref 136–145)
SPECIMEN DESCRIPTION: NORMAL
WBC OTHER # BLD: 10.3 K/UL (ref 3.5–11.3)

## 2024-12-19 PROCEDURE — 85025 COMPLETE CBC W/AUTO DIFF WBC: CPT

## 2024-12-19 PROCEDURE — 80053 COMPREHEN METABOLIC PANEL: CPT

## 2024-12-19 PROCEDURE — 2500000003 HC RX 250 WO HCPCS: Performed by: INTERNAL MEDICINE

## 2024-12-19 PROCEDURE — 6360000002 HC RX W HCPCS: Performed by: INTERNAL MEDICINE

## 2024-12-19 PROCEDURE — 2580000003 HC RX 258: Performed by: INTERNAL MEDICINE

## 2024-12-19 PROCEDURE — 94640 AIRWAY INHALATION TREATMENT: CPT

## 2024-12-19 PROCEDURE — 1200000000 HC SEMI PRIVATE

## 2024-12-19 PROCEDURE — 6370000000 HC RX 637 (ALT 250 FOR IP): Performed by: INTERNAL MEDICINE

## 2024-12-19 PROCEDURE — 82365 CALCULUS SPECTROSCOPY: CPT

## 2024-12-19 PROCEDURE — 6370000000 HC RX 637 (ALT 250 FOR IP)

## 2024-12-19 PROCEDURE — 36415 COLL VENOUS BLD VENIPUNCTURE: CPT

## 2024-12-19 PROCEDURE — 94761 N-INVAS EAR/PLS OXIMETRY MLT: CPT

## 2024-12-19 RX ORDER — BUDESONIDE AND FORMOTEROL FUMARATE DIHYDRATE 160; 4.5 UG/1; UG/1
AEROSOL RESPIRATORY (INHALATION)
Status: DISPENSED
Start: 2024-12-19 | End: 2024-12-19

## 2024-12-19 RX ORDER — BUDESONIDE AND FORMOTEROL FUMARATE DIHYDRATE 160; 4.5 UG/1; UG/1
AEROSOL RESPIRATORY (INHALATION)
Status: COMPLETED
Start: 2024-12-19 | End: 2024-12-19

## 2024-12-19 RX ADMIN — SODIUM CHLORIDE, PRESERVATIVE FREE 10 ML: 5 INJECTION INTRAVENOUS at 07:59

## 2024-12-19 RX ADMIN — BUDESONIDE AND FORMOTEROL FUMARATE DIHYDRATE 2 PUFF: 160; 4.5 AEROSOL RESPIRATORY (INHALATION) at 08:57

## 2024-12-19 RX ADMIN — METOPROLOL SUCCINATE 25 MG: 25 TABLET, EXTENDED RELEASE ORAL at 07:59

## 2024-12-19 RX ADMIN — PANTOPRAZOLE SODIUM 40 MG: 40 TABLET, DELAYED RELEASE ORAL at 07:59

## 2024-12-19 RX ADMIN — APIXABAN 5 MG: 5 TABLET, FILM COATED ORAL at 07:59

## 2024-12-19 RX ADMIN — BUDESONIDE AND FORMOTEROL FUMARATE DIHYDRATE 2 PUFF: 160; 4.5 AEROSOL RESPIRATORY (INHALATION) at 20:32

## 2024-12-19 RX ADMIN — POLYETHYLENE GLYCOL 3350 17 G: 17 POWDER, FOR SOLUTION ORAL at 07:59

## 2024-12-19 RX ADMIN — MEROPENEM 1000 MG: 1 INJECTION INTRAVENOUS at 00:55

## 2024-12-19 RX ADMIN — FAMOTIDINE 40 MG: 20 TABLET, FILM COATED ORAL at 17:01

## 2024-12-19 RX ADMIN — MEROPENEM 1000 MG: 1 INJECTION INTRAVENOUS at 08:02

## 2024-12-19 RX ADMIN — SODIUM CHLORIDE: 9 INJECTION, SOLUTION INTRAVENOUS at 06:36

## 2024-12-19 RX ADMIN — SODIUM CHLORIDE: 9 INJECTION, SOLUTION INTRAVENOUS at 20:38

## 2024-12-19 RX ADMIN — MEROPENEM 1000 MG: 1 INJECTION INTRAVENOUS at 16:04

## 2024-12-19 RX ADMIN — APIXABAN 5 MG: 5 TABLET, FILM COATED ORAL at 20:37

## 2024-12-19 RX ADMIN — ACETAMINOPHEN 650 MG: 325 TABLET ORAL at 18:35

## 2024-12-19 RX ADMIN — ACETAMINOPHEN 650 MG: 325 TABLET ORAL at 06:35

## 2024-12-19 ASSESSMENT — PAIN DESCRIPTION - LOCATION
LOCATION: HEAD
LOCATION: HEAD

## 2024-12-19 ASSESSMENT — PAIN DESCRIPTION - DESCRIPTORS
DESCRIPTORS: ACHING
DESCRIPTORS: ACHING;SHARP

## 2024-12-19 ASSESSMENT — PAIN SCALES - GENERAL
PAINLEVEL_OUTOF10: 8
PAINLEVEL_OUTOF10: 4
PAINLEVEL_OUTOF10: 3

## 2024-12-19 ASSESSMENT — PAIN - FUNCTIONAL ASSESSMENT
PAIN_FUNCTIONAL_ASSESSMENT: ACTIVITIES ARE NOT PREVENTED
PAIN_FUNCTIONAL_ASSESSMENT: ACTIVITIES ARE NOT PREVENTED

## 2024-12-19 ASSESSMENT — PAIN DESCRIPTION - ORIENTATION
ORIENTATION: INNER
ORIENTATION: POSTERIOR;RIGHT

## 2024-12-19 NOTE — PROGRESS NOTES
Pt resting in chair, tylenol given for headache. Vitals and assessment completed. Call light in reach.

## 2024-12-19 NOTE — RT PROTOCOL NOTE
RESPIRATORY ASSESSMENT PROTOCOL                                                                                              Patient Name: Tess Echeverria Room#: 0332/0332-01 : 1965     Admitting diagnosis: Ureterolithiasis [N20.1]  Flank pain [R10.9]  SIRS (systemic inflammatory response syndrome) (HCC) [R65.10]  Elevated lactic acid level [R79.89]  Intractable pain [R52]  Sepsis due to urinary tract infection (HCC) [A41.9, N39.0]       Medical History:   Past Medical History:   Diagnosis Date    Acute pulmonary embolism (HCC) 2015    Anxiety     Asthma     Constipation, chronic     COVID-19 2021    CPAP (continuous positive airway pressure) dependence     Diabetes mellitus (HCC)     pt states she was prediabetic at one point    Embolism - blood clot     right lung and right leg    Esophageal reflux     Factor V Leiden (HCC)     Heart murmur     HTN (hypertension)     Hyperlipidemia     Irritable bladder     Kidney stone     MTHFR mutation     MVP (mitral valve prolapse)     Palpitations     Pulmonary embolism (HCC) 10/15/2012    Rhinitis, allergic 10/15/2012    Sleep apnea     Venous thromboembolism 2016       PATIENT ASSESSMENT    LABORATORY DATA  Hematology:   Lab Results   Component Value Date/Time    WBC 14.6 2024 02:34 PM    RBC 5.30 2024 02:34 PM    RBC 4.67 2011 08:41 AM    HGB 16.2 2024 02:34 PM    HCT 46.0 2024 02:34 PM     2024 02:34 PM     2011 08:41 AM     Chemistry:  No results found for: \"PHART\", \"VIB2XXV\", \"PO2ART\", \"A2PWVQPN\", \"SON9ASR\", \"PBEA\", \"NBEA\"    VITALS  Pulse: 62   Respirations: 18  BP: (!) 129/50  SpO2: 99 % O2 Device: None (Room air)  Temp: 97.7 °F (36.5 °C)    SKIN COLOR  [x] Normal  [] Pale  [] Dusky  [] Cyanotic    RESPIRATORY PATTERN  [x] Normal  [] Dyspnea  [] Cheyne-Gómez  [] Kussmaul  [] Biots    AMBULATORY  [x] Yes  [] No  [] With Assistance        Patient Acuity 0 1 2 3 4 Score   Level of Consciousness  (LOC) [x]  Alert & Oriented or Pt normal LOC []  Confused;follows directions []  Confused & uncooper-ative []  Obtunded []  Comatose 0   Respiratory Rate  (RR) [x]  Reg. rate & pattern. 12 - 20 bpm  []  Increased RR. Greater than 20 bpm   []  SOB w/ exertion or RR greater than 24 bpm []  Access- ory muscle use at rest. Abn.  resp. []  SOB at rest.   0   Bilateral Breath Sounds (BBS) []  Clear [x]  Diminish-ed bases  []  Diminish-ed t/o, or rales   []  Sporadic, scattered wheezes or rhonchi []  Persistentwheezes and, or absent BBS 1   Cough [x]  Strong, effective, & non-prod. []  Effective & prod. Less than 25 ml (2 TBSP) over past 24 hrs []  Ineffective & non-prod to less than 25 ML over past 24 hrs []  Ineffective and, or greater than 25 ml sputum prod. past 24 hrs. []  Nonspon- taneous; Requires suctioning 0   Pulmonary History  (PULM HX) []  No smoking and no chronic pulmonary history []  Former smoker. Quit over 12 mos. ago []  Current smoker or quit w/ in 12 mos [x]  Pulm. History and, or 20 pk/yr smoking hx []  Admitted w/ acute pulm. dx and, or has been admitted w/ pulm. dx 2 or more times over past 12 mos 3   Surgical History this Admit  (SURG HX) [x]  No surgery []  General surgery []  Lower abdominal []  Thoracic or upper abdominal   []  Thoracic w/ pulm. disease 0   Chest X-Ray (CXR)/CT Scan [x]  Clear or not applicable []  Not available []  Atelectasis or pleural effusions []  Localized infiltrate or pulm. edema []  Con-solidated Infiltrates, bilateral, or in more than 1 lobe 0   TOTAL ACUITY: 4       CARE PLAN    If Acuity Level is 2, 3, or 4 in any of the following:    [] BILATERAL BREATH SOUNDS (BBS)     [x] PULMONARY HISTORY (PULM HX)  [] Respiratory Rate  (RR)    Goal: Improve respiratory functions in patients with airway disease and decrease WOB    [] AEROSOL PROTOCOL    Total Acuity:   14-28  []  Secondary Assessment in 24 hrs Total Acuity:  9-13  []  Secondary Assessment in 24 hrs Total

## 2024-12-19 NOTE — ACP (ADVANCE CARE PLANNING)
Advance Care Planning     Palliative Team Advance Care Planning (ACP) Conversation    Date of Conversation: 12/19/24    Individuals present for the conversation: Patient with decision making capacity     ACP documents on file prior to discussion:  -Power of  for Healthcare  -Living Will    Previously completed document/s not on file: Patient / participant reports that there are no previously executed ACP documents.    Healthcare Decision Maker:    Primary Decision Maker: Susan Echeverriasay - Child - [phone]    Secondary Decision Maker: Carlita Copeland - Child - [phone]     Conversation Summary:  New health care power of  and living will completed today. Tess is currently a full code and wishes to remain so.     Resuscitation Status:   Code Status: Full Code     Documentation Completed:  -Power of  for Healthcare and -Living Will    I spent 25 minutes with the patient and/or surrogate decision maker discussing the patient's wishes and goals.      Spring Ruffin RN

## 2024-12-19 NOTE — CONSULTS
PHARMACY TO DOSE Meropenem     The following ordered dose of Meropenem has been utilized to optimize its pharmacodynamic profile as approved by the Patient Care Review Committee.      Recent Labs     12/18/24  1434 12/19/24  0615   CREATININE 1.0* 0.8     Estimated Creatinine Clearance: 104 mL/min (based on SCr of 0.8 mg/dL).    CrCl Dose   >50 to <130 500mg-2gm q8hrs over 3 hours   >25 to <49.9 1gm-2gm q12 hrs over 3 hours   10 to <25 500mg-1gm q12hrs over 3 hours   <10 500mg-1gm q24hrs over 3 hours   HD 500mg-1gm q24hrs over 3 hours given after HD   CRRT 500mg-1gm q12hrs over 3 hours       Meropenem Dose    Meropenem   1 gm  IV q 8 hrs  over 3 hours.      Pharmacists should be contacted for issues concerning drug compatibility with multiple IV medications.  All doses will be prepared using 100ml bag to be infused over 3-hours at a rate of 33.3 ml/hr.    Thank You,  Audrey Taylor [facility]   12/19/2024    12:27 PM

## 2024-12-19 NOTE — PROGRESS NOTES
Spiritual Services Interventions  0332/0332-01   12/19/2024  Jamir Mullins    Tess Echeverria  59 y.o. year old female    Encounter Summary  Encounter Overview/Reason: (P) Spiritual/Emotional Needs  Service Provided For: (P) Patient  Referral/Consult From: (P) Rounding  Support System: (P) Spouse, Children  Last Encounter : (P) 12/19/24  Complexity of Encounter: (P) Moderate  Begin Time: (P) 0930  End Time : (P) 0945  Total Time Calculated: (P) 15 min     Spiritual/Emotional needs  Type: (P) Spiritual Support                    Assessment/Intervention/Outcome  Assessment: (P) Calm  Intervention: (P) Discussed belief system/Gnosticist practices/eliceo, Discussed illness injury and it’s impact, Prayer (assurance of)/[place]  Outcome: (P) Encouraged, Engaged in conversation, Comfort

## 2024-12-19 NOTE — CARE COORDINATION
Case Management Assessment  Initial Evaluation    Date/Time of Evaluation: 12/19/2024 10:11 AM  Assessment Completed by: Jossue Garcia RN    If patient is discharged prior to next notation, then this note serves as note for discharge by case management.    Patient Name: Tess Echeverria                   YOB: 1965  Diagnosis: Ureterolithiasis [N20.1]  Flank pain [R10.9]  SIRS (systemic inflammatory response syndrome) (HCC) [R65.10]  Elevated lactic acid level [R79.89]  Intractable pain [R52]  Sepsis due to urinary tract infection (HCC) [A41.9, N39.0]                   Date / Time: 12/18/2024  2:26 PM    Patient Admission Status: Inpatient   Readmission Risk (Low < 19, Mod (19-27), High > 27): Readmission Risk Score: 9.6    Current PCP: Maximino Hansen MD  PCP verified by CM? (P) Yes    Chart Reviewed: Yes      History Provided by: (P) Patient  Patient Orientation: (P) Alert and Oriented, Person, Place, Situation    Patient Cognition: (P) Alert    Hospitalization in the last 30 days (Readmission):  No    If yes, Readmission Assessment in CM Navigator will be completed.    Advance Directives:      Code Status: Full Code   Patient's Primary Decision Maker is: (P) Named in Scanned ACP Document    Primary Decision Maker: Michael Echeverria - Spouse - [phone]    Secondary Decision Maker: Devika Echeverria - Child - [phone]    Supplemental (Other) Decision Maker: Carlita Copeland - Child - [phone]    Discharge Planning:    Patient lives with: (P) Spouse/Significant Other Type of Home: (P) House  Primary Care Giver: (P) Self  Patient Support Systems include: (P) Spouse/Significant Other   Current Financial resources: (P) Other (Comment) (commercial insurance.)  Current community resources: (P) None  Current services prior to admission: (P) None            Current DME:              Type of Home Care services:  (P) None    ADLS  Prior functional level: (P) Independent in ADLs/IADLs  Current functional level: (P)  by insurance. She denies any unmet needs at this time.  The plan for discharge is home with no additional services at this time.    The Plan for Transition of Care is related to the following treatment goals of Ureterolithiasis [N20.1]  Flank pain [R10.9]  SIRS (systemic inflammatory response syndrome) (HCC) [R65.10]  Elevated lactic acid level [R79.89]  Intractable pain [R52]  Sepsis due to urinary tract infection (HCC) [A41.9, N39.0]    IF APPLICABLE: The Patient and/or patient representative Tess and her family were provided with a choice of provider and agrees with the discharge plan. Freedom of choice list with basic dialogue that supports the patient's individualized plan of care/goals and shares the quality data associated with the providers was provided to:     Patient Representative Name:       The Patient and/or Patient Representative Agree with the Discharge Plan?  Yes.    Jossue Garcia RN  Case Management Department  Ph: [phone] Fax: [phone]

## 2024-12-19 NOTE — PROGRESS NOTES
Dr shoemaker will be in tomorrow to see pt. Dr shoemaker waiting for urine culture before discharge.

## 2024-12-19 NOTE — PROGRESS NOTES
Comprehensive Nutrition Assessment    Type and Reason for Visit:  Initial, Patient education    Nutrition Recommendations/Plan:   Encourage healthy eating behaviors for diabetes and kidney stone prevention.      Malnutrition Assessment:  Malnutrition Status:  No malnutrition (12/19/24 1123)    Context:  Acute Illness     Findings of the 6 clinical characteristics of malnutrition:  Energy Intake:  No decrease in energy intake  Weight Loss:  No weight loss     Body Fat Loss:  No body fat loss     Muscle Mass Loss:  No muscle mass loss    Fluid Accumulation:  No fluid accumulation     Strength:  Not Performed    Nutrition Assessment:    Food and nutrition related knowledge deficit r/t endocrine and renal system dysfunctions, AEB worsening A1C and kidney stones (1st occurrance). A1C up to 7.6, not  recently on hypoglycemic agents but had been on metformin some years ago. Lots of home stress reported. Previous education with diabetes educator but not with dietitian. Will provide and discuss both eating strategies for reducing kidney stones as well as basic carbohydrate and meal planning. Would recommend o/p follow up for further education. History of low vitamin D on repletive D at home.    Nutrition Related Findings:    active b/s. no edema. Wound Type: None       Current Nutrition Intake & Therapies:    Average Meal Intake: %  Average Supplements Intake: None Ordered  ADULT DIET; Regular; 4 carb choices (60 gm/meal)    Anthropometric Measures:  Height: 167.6 cm (5' 6\")  Ideal Body Weight (IBW): 130 lbs (59 kg)    Admission Body Weight:  (not recorded)  Current Body Weight: 127.6 kg (281 lb 3.2 oz), 216.3 % IBW. Weight Source: Bed scale  Current BMI (kg/m2): 45.4  Usual Body Weight: 128.8 kg (284 lb) (< 2 weeks ago)     % Weight Change (Calculated): -1  Weight Adjustment For: No Adjustment                 BMI Categories: Obese Class 3 (BMI 40.0 or greater)    Estimated Daily Nutrient Needs:  Energy

## 2024-12-19 NOTE — H&P
History and Physical    Patient:  Tess Echeverria  MRN: [id number]    Chief Complaint: Flank pain    History Obtained From:  patient, electronic medical record    PCP: Maximino Hansen MD    History of Present Illness:   The patient is a 59 y.o. female with a history of SUSY on CPAP, type 2 diabetes, GERD, DVT/PE on Eliquis, hypertension and asthma who presents with right-sided flank pain.  Patient underwent a right extracorporal shockwave lithotripsy yesterday with Dr. Calzada for a 10 mm right renal stone.  Patient states she felt okay at discharge yesterday.  She states around noon today she developed severe right-sided flank pain.  Shortly after developing the pain, she began to have hematuria.  She had nausea and vomiting as well.  She denies any fever, chills, diarrhea, abdominal pain or chest pain.  CT scan of the abdomen pelvis showed 3 distal right ureteral stones with approximately 3 mm stone at the UVJ and 2 similar sized or slightly smaller stones are short distance proximal to this with mild associated hydronephrosis, 2 right intrarenal calculi measuring up to 8 mm and mild colonic diverticulosis.  Urinalysis showed red cloudy urine with 2+ protein, trace leukocyte esterase and 5-10 WBCs.  WBC 14.6.  Creatinine 1.0 with a baseline of 0.8.  Initial lactic 3.6, improved to 1.7 after IV fluids.  Patient states her pain is currently tolerable.    Past Medical History:        Diagnosis Date    Acute pulmonary embolism (HCC) 11/02/2015    Anxiety     Asthma     Constipation, chronic     COVID-19 09/2021    CPAP (continuous positive airway pressure) dependence     Diabetes mellitus (HCC)     pt states she was prediabetic at one point    Embolism - blood clot     right lung and right leg    Esophageal reflux     Factor V Leiden (HCC)     Heart murmur     HTN (hypertension)     Hyperlipidemia     Irritable bladder     Kidney stone     MTHFR mutation     MVP (mitral valve prolapse)     Palpitations     Pulmonary embolism  ordered today  Medications: Medications not indicated at this time      Hypertension  Condition is a chronic stable condition  Treatment plan: Continue current treatment  Imaging: no further imaging studies ordered today  Medications: Continue metoprolol    Nutrition status:   morbid obesity  Dietician consult initiated    Hospital Prophylaxis:   DVT: Eliquis   Stress Ulcer: PPI     MDM Data:   Test interpretation:  My independent EKG interpretation: N/A  My independent X-ray interpretation: CT abdomen pelvis showed right ureteral stones with mild hydronephrosis and 2 right 8 mm intrarenal stones   Management and/or test interpretation discussed with ER MD at time of admission  Consults and Nursing notes were personally reviewed, all current labs and imaging were personally reviewed, tests ordered: CBC, BMP, and history obtained by independent historian       Disposition:  Shared decision making: All test results, treatment options and disposition options were discussed with the patient today  Social determinants of health that may impact management: none  Code status: Full Code   Disposition: Discharge plan is home        Critical Care Time:  Total critical care time caring for this patient with life threatening, unstable organ failure, including direct patient contact, management of life support systems, review of data including imaging and labs, discussions with other team members and physicians at least 0 minutes so far today, excluding separately billable procedures.      [facility] Medication Reconciliation documentation:    [x] I have utilized all available immediate resources to obtain, update, or review the patient's current medications (including all prescriptions, over-the-counter products, herbals, cannabinoid products and bitamin/mineral/dietary/nutritional supplements.  [If 'yes\", STOP HERE]     []  The patient is not eligible for medication reconciliation; the patient is in an emergent medical situation

## 2024-12-19 NOTE — PROGRESS NOTES
Patients rings place in specimen cup with patient label and left next to patient on bedside table.

## 2024-12-19 NOTE — PROGRESS NOTES
Patient admitted to room 332 at this time. Patient is alert and oriented x4 and currently denies pain. Patient states that the right side of her abdomen does feel tender. Patients urine strained at this time as well, urine is now yellow in color and no stones present at this time. Patient vitals and assessment completed along with navigator and medication list. Writer brought patient a snack to eat. Patients  took patients medications home with him. Patient states no current needs, call light is in reach, plan of care is ongoing.   Visit Information Date & Time Provider Department Dept. Phone Encounter #  
 10/11/2017  8:15 AM Elmer Wolf  [place]  [id number] Follow-up Instructions Return in about 4 months (around 2/11/2018) for routine follow up. Upcoming Health Maintenance Date Due MICROALBUMIN Q1 7/7/2017 Pneumococcal 65+ Low/Medium Risk (2 of 2 - PPSV23) 10/11/2017 HEMOGLOBIN A1C Q6M 12/14/2017 EYE EXAM RETINAL OR DILATED Q1 2/28/2018 LIPID PANEL Q1 6/14/2018 MEDICARE YEARLY EXAM 6/15/2018 FOOT EXAM Q1 10/11/2018 GLAUCOMA SCREENING Q2Y 2/28/2019 COLONOSCOPY 3/13/2023 DTaP/Tdap/Td series (2 - Td) 10/11/2026 Allergies as of 10/11/2017  Review Complete On: 10/11/2017 By: Elmer Wolf MD  
 No Known Allergies Current Immunizations  Reviewed on 10/11/2017 No immunizations on file. Reviewed by Elmer Wolf MD on 10/11/2017 at  8:36 AM  
You Were Diagnosed With   
  
 Codes Comments Type 2 diabetes mellitus with hyperglycemia, without long-term current use of insulin (HCC)    -  Primary ICD-10-CM: E11.65 ICD-9-CM: 250.00, 790.29 Essential hypertension     ICD-10-CM: I10 
ICD-9-CM: 401.9 Idiopathic gout, unspecified chronicity, unspecified site     ICD-10-CM: M10.00 ICD-9-CM: 274.9 Dyslipidemia     ICD-10-CM: E78.5 ICD-9-CM: 272.4 Vitals BP Pulse Temp Height(growth percentile) Weight(growth percentile) SpO2  
 139/59 (BP 1 Location: Right arm, BP Patient Position: At rest) 76 96.3 °F (35.7 °C) (Oral) 5' 4\" (1.626 m) 164 lb (74.4 kg) 100% BMI OB Status Smoking Status 28.15 kg/m2 Postmenopausal Never Smoker Vitals History BMI and BSA Data Body Mass Index Body Surface Area  
 28.15 kg/m 2 1.83 m 2 Preferred Pharmacy Pharmacy Name Phone [address], [address] [facility] [place] [address] [phone] Your Updated Medication List  
  
   
 This list is accurate as of: 10/11/17  9:42 AM.  Always use your most recent med list.  
  
  
  
  
 allopurinol 100 mg tablet Commonly known as:  Lonni Gray Take 1 tab daily. aspirin delayed-release 81 mg tablet Take 1 tablet by mouth daily. dilTIAZem 300 mg SR capsule Commonly known as:  [facility] Take 1 Cap by mouth daily. For blood pressure  
  
 indomethacin 25 mg capsule Commonly known as:  INDOCIN  
TAKE ONE CAPSULE   BY MOUTH   THREE TIMES A DAY AS NEEDED  
  
 lisinopril-hydroCHLOROthiazide 20-12.5 mg per tablet Commonly known as:  Loyce Harden Take 1 Tab by mouth daily. For blood pressure  
  
 lovastatin 40 mg tablet Commonly known as:  MEVACOR Take 1 Tab by mouth nightly. For cholesterol  
  
 metFORMIN 500 mg tablet Commonly known as:  GLUCOPHAGE Take 1 Tab by mouth daily. Prescriptions Sent to Pharmacy Refills  
 metFORMIN (GLUCOPHAGE) 500 mg tablet 3 Sig: Take 1 Tab by mouth daily. Class: Normal  
 Pharmacy: [address] [place] [address] Ph #: [phone] Route: Oral  
 allopurinol (ZYLOPRIM) 100 mg tablet 1 Sig: Take 1 tab daily. Class: Normal  
 Pharmacy: [address] [place] [address] Ph #: [phone] We Performed the Following AMB POC HEMOGLOBIN A1C [46227 CPT(R)] MICROALBUMIN, UR, RAND W/ MICROALBUMIN/CREA RATIO [id number] CPT(R)] Follow-up Instructions Return in about 4 months (around 2/11/2018) for routine follow up. Patient Instructions Purine-Restricted Diet: Care Instructions Your Care Instructions Purines are substances that are found in some foods. Your body turns purines into uric acid. High levels of uric acid can cause gout, which is a form of arthritis that causes pain and inflammation in joints. You may be able to help control the amount of uric acid in your body by limiting high-purine foods in your diet. Follow-up care is a key part of your treatment and safety. Be sure to make and go to all appointments, and call your doctor if you are having problems. It's also a good idea to know your test results and keep a list of the medicines you take. How can you care for yourself at home? · Plan your meals and snacks around foods that are low in purines and are safe for you to eat. These foods include: ¨ Green vegetables and tomatoes. ¨ Fruits. ¨ Whole-grain breads, rice, and cereals. ¨ Eggs, peanut butter, and nuts. ¨ Low-fat milk, cheese, and other milk products. ¨ Popcorn. ¨ Gelatin desserts, chocolate, cocoa, and cakes and sweets, in small amounts. · You can eat certain foods that are medium-high in purines, but eat them only once in a while. These foods include: ¨ Legumes, such as dried beans and dried peas. You can have 1 cup cooked legumes each day. ¨ Asparagus, cauliflower, spinach, mushrooms, and green peas. ¨ Fish and seafood (other than very high-purine seafood). ¨ Oatmeal, wheat bran, and wheat germ. · Limit very high-purine foods, including: ¨ Organ meats, such as liver, kidneys, sweetbreads, and brains. ¨ Meats, including blank, beef, pork, and lamb. ¨ Game meats and any other meats in large amounts. ¨ Anchovies, sardines, herring, mackerel, and scallops. ¨ Gravy. ¨ Beer. Where can you learn more? Go to http://kedar-laverne.info/. Enter F448 in the search box to learn more about \"Purine-Restricted Diet: Care Instructions. \" Current as of: July 26, 2016 Content Version: 11.3 © 4272-8357 [employer], [employer]. Care instructions adapted under license by [employer] (which disclaims liability or warranty for this information). If you have questions about a medical condition or this instruction, always ask your healthcare professional. [place] [address] any warranty or liability for your use of this information. Introducing [facility]! Adwoa Tomas introduces [employer] patient portal. Now you can access parts of your medical record, email your doctor's office, and request medication refills online. 1. In your internet browser, go to [email or web address] 2. Click on the First Time User? Click Here link in the Sign In box. You will see the New Member Sign Up page. 3. Enter your [employer] Access Code exactly as it appears below. You will not need to use this code after youve completed the sign-up process. If you do not sign up before the expiration date, you must request a new code. · [employer] Access Code: [email or web address] Expires: 1/9/2018  8:00 AM 
 
4. Enter the last four digits of your Social Security Number (xxxx) and Date of Birth (mm/dd/yyyy) as indicated and click Submit. You will be taken to the next sign-up page. 5. Create a [employer] ID. This will be your [employer] login ID and cannot be changed, so think of one that is secure and easy to remember. 6. Create a [employer] password. You can change your password at any time. 7. Enter your Password Reset Question and Answer. This can be used at a later time if you forget your password. 8. Enter your e-mail address. You will receive e-mail notification when new information is available in [address] 9. Click Sign Up. You can now view and download portions of your medical record. 10. Click the Download Summary menu link to download a portable copy of your medical information. If you have questions, please visit the Frequently Asked Questions section of the [employer] website. Remember, [employer] is NOT to be used for urgent needs. For medical emergencies, dial 911. Now available from your iPhone and Android! Please provide this summary of care documentation to your next provider. Your primary care clinician is listed as Grace Johnson. If you have any questions after today's visit, please call [phone].

## 2024-12-19 NOTE — PROGRESS NOTES
Progress Note    SUBJECTIVE:    Patient seen for f/u of Right flank pain.  She resting in chair no distress. Feels much better. Afebrile. No complaint.  Pain controlled     ROS:   Constitutional: negative  for fevers, and negative for chills.  Respiratory: negative for shortness of breath, negative for cough, and negative for wheezing  Cardiovascular: negative for chest pain, and negative for palpitations  Gastrointestinal: negative for abdominal pain, negative for nausea,negative for vomiting, negative for diarrhea, and negative for constipation     All other systems were reviewed with the patient and are negative unless otherwise stated in HPI      OBJECTIVE:      Vitals:   Vitals:    12/19/24 0635   BP: 125/67   Pulse: 52   Resp: 18   Temp: (!) 96.6 °F (35.9 °C)   SpO2: 97%               Weight  Wt Readings from Last 3 Encounters:   12/17/24 127.6 kg (281 lb 3.2 oz)   12/16/24 122.5 kg (270 lb)   12/06/24 128.8 kg (284 lb)     There is no height or weight on file to calculate BMI.    24HR INTAKE/OUTPUT:      Intake/Output Summary (Last 24 hours) at 12/19/2024 0709  Last data filed at 12/19/2024 0644  Gross per 24 hour   Intake 1066.98 ml   Output 600 ml   Net 466.98 ml     -----------------------------------------------------------------  Exam:    GEN:    Awake, alert and oriented x3.   EYES:  EOMI, pupils equal   NECK: Supple. No lymphadenopathy.  No carotid bruit  CVS:    regular rate and rhythm, no audible murmur  PULM:  CTA, no wheezes, rales or rhonchi, no acute respiratory distress  ABD:    Bowels sounds normal.  Abdomen is soft.  No distention.  no tenderness to palpation.   EXT:   no edema bilaterally .  No calf tenderness.   NEURO: Moves all extremities.  Motor and sensory are grossly intact  SKIN:  No rashes.  No skin lesions.    -----------------------------------------------------------------    Diagnostic Data:      Complete Blood Count:   Recent Labs     12/18/24  1434 12/19/24  0615   WBC 14.6*  10.3   RBC 5.30* 4.50   HGB 16.2* 13.6   HCT 46.0 39.7   MCV 86.8 88.2   MCH 30.6 30.2   MCHC 35.2* 34.3   RDW 12.7 13.2    193   MPV 10.4 10.1        Last 3 Blood Glucose:   Recent Labs     12/18/24  1434 12/19/24  0615   GLUCOSE 258* 132*        Comprehensive Metabolic Profile:   Recent Labs     12/18/24  1434 12/19/24  0615    143   K 4.2 3.9   CL 99 110*   CO2 25 24   BUN 11 9   CREATININE 1.0* 0.8   GLUCOSE 258* 132*   CALCIUM 9.4 8.4*   BILITOT 0.4 0.5   ALKPHOS 125* 94   AST 41* 27   ALT 28 20        Urinalysis:   Lab Results   Component Value Date/Time    NITRU NEGATIVE 12/18/2024 04:59 PM    COLORU Red 12/18/2024 04:59 PM    PHUR 5.5 12/18/2024 04:59 PM    PHUR 5 11/14/2024 08:24 AM    PHUR 6.0 10/30/2023 02:26 PM    WBCUA 5 TO 10 12/18/2024 04:59 PM    RBCUA GREATER THAN 100 12/18/2024 04:59 PM    BACTERIA TRACE 12/18/2024 04:59 PM    CLARITYU cloudy 11/14/2024 08:24 AM    SPECGRAV 1.03 11/14/2024 08:24 AM    LEUKOCYTESUR TRACE 12/18/2024 04:59 PM    UROBILINOGEN Normal 12/18/2024 04:59 PM    BILIRUBINUR NEGATIVE 12/18/2024 04:59 PM    BLOODU large 11/14/2024 08:24 AM    GLUCOSEU NEGATIVE 12/18/2024 04:59 PM    KETUA NEGATIVE 12/18/2024 04:59 PM    AMORPHOUS 2+ 12/18/2024 04:59 PM       HgBA1c:    Lab Results   Component Value Date/Time    LABA1C 7.6 12/16/2024 09:50 AM       Lactic Acid:   Lab Results   Component Value Date/Time    LACTA 3.6 12/18/2024 03:07 PM        Troponin: No results for input(s): \"TROPONINI\" in the last 72 hours.    CRP:  No results for input(s): \"CRP\" in the last 72 hours.      Radiology/Imaging:  XR CHEST PORTABLE   Final Result   No acute process.         CT ABDOMEN PELVIS WO CONTRAST Additional Contrast? None   Final Result   1. 3 distal right ureteral stones with approximately 3 mm stone at the   ureterovesicular junction and 2 similar-sized or slightly smaller stones a   short distance proximal to this with mild associated hydronephrosis.   2. 2 right intrarenal

## 2024-12-19 NOTE — TELEPHONE ENCOUNTER
[facility]. She was an ESWL on 12/17/24. She voices DR was consulted and made aware last night that Tess was admitted for pain. She is screening her urine and reports she is passing stone fragments. She is inquiring if she should send specimens for analysis. Please advise.

## 2024-12-19 NOTE — CONSULTS
Palliative Care Inpatient    Patient: Tess Echeverria  Room: 0332/0332-01    Reason For Consult   Goals of care evaluation  Distress management  Guidance and support  Facilitate communications  Assistance in coordinating care    Code Status: Full code      Impression: Tess Echeverria is a 59 y.o. year old female  has a past medical history of Acute cystitis without hematuria, Acute pulmonary embolism (HCC), Anxiety, Asthma, Constipation, chronic, COVID-19, CPAP (continuous positive airway pressure) dependence, Diabetes mellitus (HCC), Embolism - blood clot, Esophageal reflux, Factor V Leiden (HCC), Heart murmur, HTN (hypertension), Hyperlipidemia, Irritable bladder, Kidney stone, MTHFR mutation, MVP (mitral valve prolapse), Palpitations, Pulmonary embolism (HCC), Rhinitis, allergic, Right flank pain, Sleep apnea, and Venous thromboembolism..  Currently hospitalized for the management of flank pain.  The Palliative Care Team is following to assist with advanced directives.    Vital Signs  /67   Pulse 61   Temp (!) 96.6 °F (35.9 °C) (Temporal)   Resp 18   Ht 1.676 m (5' 6\")   LMP 02/14/2024 (Approximate)   SpO2 97%   BMI 45.39 kg/m²     Patient Active Problem List   Diagnosis    Bronchial asthma    Obesity    Thrombophilia (HCC)    Hyperlipidemia    Sleep apnea, obstructive    DVT, lower extremity, distal, chronic (HCC)    Essential hypertension    Acute pulmonary embolism (HCC)    Long term current use of anticoagulant therapy    History of colon polyps    Pneumonia due to COVID-19 virus    Hypernatremia    Hypoxia    Retching    Adenomatous polyps    Gastroesophageal reflux disease    Diverticulosis    Body mass index (BMI) 45.0-49.9, adult    Excessive sweating    Motion sickness    Nausea    Constipation    Bilateral calf pain    Abnormal echocardiogram    Recurrent UTI    Urinary tract infection without hematuria    Acute respiratory failure with hypoxia    Chronic diastolic (congestive) heart failure

## 2024-12-19 NOTE — PROGRESS NOTES
Dr. Calzada consulted at this time. No orders other then to strain urine tonight. Plan of care ongoing.

## 2024-12-20 VITALS
SYSTOLIC BLOOD PRESSURE: 148 MMHG | HEIGHT: 66 IN | TEMPERATURE: 97.2 F | HEART RATE: 58 BPM | OXYGEN SATURATION: 96 % | BODY MASS INDEX: 45.39 KG/M2 | RESPIRATION RATE: 18 BRPM | DIASTOLIC BLOOD PRESSURE: 80 MMHG

## 2024-12-20 LAB
ALBUMIN SERPL-MCNC: 3.5 G/DL (ref 3.5–5.2)
ALBUMIN/GLOB SERPL: 1.2 {RATIO} (ref 1–2.5)
ALP SERPL-CCNC: 100 U/L (ref 35–104)
ALT SERPL-CCNC: 23 U/L (ref 10–35)
ANION GAP SERPL CALCULATED.3IONS-SCNC: 10 MMOL/L (ref 9–16)
AST SERPL-CCNC: 27 U/L (ref 10–35)
BASOPHILS # BLD: 0.04 K/UL (ref 0–0.2)
BASOPHILS NFR BLD: 1 % (ref 0–2)
BILIRUB SERPL-MCNC: 0.6 MG/DL (ref 0–1.2)
BUN SERPL-MCNC: 11 MG/DL (ref 6–20)
BUN/CREAT SERPL: 14 (ref 9–20)
CALCIUM SERPL-MCNC: 9 MG/DL (ref 8.6–10.4)
CHLORIDE SERPL-SCNC: 108 MMOL/L (ref 98–107)
CO2 SERPL-SCNC: 24 MMOL/L (ref 20–31)
CREAT SERPL-MCNC: 0.8 MG/DL (ref 0.5–0.9)
EOSINOPHIL # BLD: 0.1 K/UL (ref 0–0.44)
EOSINOPHILS RELATIVE PERCENT: 1 % (ref 1–4)
ERYTHROCYTE [DISTWIDTH] IN BLOOD BY AUTOMATED COUNT: 13.2 % (ref 11.8–14.4)
GFR, ESTIMATED: 87 ML/MIN/1.73M2
GLUCOSE SERPL-MCNC: 137 MG/DL (ref 74–99)
HCT VFR BLD AUTO: 40.8 % (ref 36.3–47.1)
HGB BLD-MCNC: 13.8 G/DL (ref 11.9–15.1)
IMM GRANULOCYTES # BLD AUTO: <0.03 K/UL (ref 0–0.3)
IMM GRANULOCYTES NFR BLD: 0 %
LYMPHOCYTES NFR BLD: 3.19 K/UL (ref 1.1–3.7)
LYMPHOCYTES RELATIVE PERCENT: 45 % (ref 24–43)
MCH RBC QN AUTO: 29.9 PG (ref 25.2–33.5)
MCHC RBC AUTO-ENTMCNC: 33.8 G/DL (ref 28.4–34.8)
MCV RBC AUTO: 88.5 FL (ref 82.6–102.9)
MONOCYTES NFR BLD: 0.53 K/UL (ref 0.1–1.2)
MONOCYTES NFR BLD: 8 % (ref 3–12)
NEUTROPHILS NFR BLD: 45 % (ref 36–65)
NEUTS SEG NFR BLD: 3.15 K/UL (ref 1.5–8.1)
NRBC BLD-RTO: 0 PER 100 WBC
PLATELET # BLD AUTO: 197 K/UL (ref 138–453)
PMV BLD AUTO: 10 FL (ref 8.1–13.5)
POTASSIUM SERPL-SCNC: 4 MMOL/L (ref 3.7–5.3)
PROT SERPL-MCNC: 6.4 G/DL (ref 6.6–8.7)
RBC # BLD AUTO: 4.61 M/UL (ref 3.95–5.11)
SODIUM SERPL-SCNC: 142 MMOL/L (ref 136–145)
WBC OTHER # BLD: 7 K/UL (ref 3.5–11.3)

## 2024-12-20 PROCEDURE — 2580000003 HC RX 258: Performed by: INTERNAL MEDICINE

## 2024-12-20 PROCEDURE — 6360000002 HC RX W HCPCS: Performed by: INTERNAL MEDICINE

## 2024-12-20 PROCEDURE — 2500000003 HC RX 250 WO HCPCS: Performed by: INTERNAL MEDICINE

## 2024-12-20 PROCEDURE — 36415 COLL VENOUS BLD VENIPUNCTURE: CPT

## 2024-12-20 PROCEDURE — 6370000000 HC RX 637 (ALT 250 FOR IP): Performed by: NURSE PRACTITIONER

## 2024-12-20 PROCEDURE — 80053 COMPREHEN METABOLIC PANEL: CPT

## 2024-12-20 PROCEDURE — 6370000000 HC RX 637 (ALT 250 FOR IP): Performed by: INTERNAL MEDICINE

## 2024-12-20 PROCEDURE — 94761 N-INVAS EAR/PLS OXIMETRY MLT: CPT

## 2024-12-20 PROCEDURE — 85025 COMPLETE CBC W/AUTO DIFF WBC: CPT

## 2024-12-20 RX ORDER — BUDESONIDE AND FORMOTEROL FUMARATE DIHYDRATE 160; 4.5 UG/1; UG/1
AEROSOL RESPIRATORY (INHALATION)
Status: DISCONTINUED
Start: 2024-12-20 | End: 2024-12-20 | Stop reason: HOSPADM

## 2024-12-20 RX ORDER — ONDANSETRON 4 MG/1
4 TABLET, ORALLY DISINTEGRATING ORAL EVERY 8 HOURS PRN
Qty: 10 TABLET | Refills: 0 | Status: SHIPPED | OUTPATIENT
Start: 2024-12-20

## 2024-12-20 RX ADMIN — PANTOPRAZOLE SODIUM 40 MG: 40 TABLET, DELAYED RELEASE ORAL at 08:45

## 2024-12-20 RX ADMIN — ACETAMINOPHEN 650 MG: 325 TABLET ORAL at 00:09

## 2024-12-20 RX ADMIN — APIXABAN 5 MG: 5 TABLET, FILM COATED ORAL at 08:45

## 2024-12-20 RX ADMIN — SODIUM CHLORIDE, PRESERVATIVE FREE 10 ML: 5 INJECTION INTRAVENOUS at 08:46

## 2024-12-20 RX ADMIN — ACETAMINOPHEN 650 MG: 325 TABLET ORAL at 06:52

## 2024-12-20 RX ADMIN — MEROPENEM 1000 MG: 1 INJECTION INTRAVENOUS at 00:07

## 2024-12-20 RX ADMIN — SPIRONOLACTONE 25 MG: 25 TABLET ORAL at 08:45

## 2024-12-20 RX ADMIN — METOPROLOL SUCCINATE 25 MG: 25 TABLET, EXTENDED RELEASE ORAL at 08:45

## 2024-12-20 ASSESSMENT — PAIN DESCRIPTION - LOCATION
LOCATION: HEAD
LOCATION: HEAD

## 2024-12-20 ASSESSMENT — PAIN SCALES - GENERAL
PAINLEVEL_OUTOF10: 0
PAINLEVEL_OUTOF10: 3
PAINLEVEL_OUTOF10: 4

## 2024-12-20 ASSESSMENT — PAIN DESCRIPTION - DESCRIPTORS
DESCRIPTORS: ACHING
DESCRIPTORS: ACHING

## 2024-12-20 ASSESSMENT — PAIN DESCRIPTION - ORIENTATION
ORIENTATION: INNER
ORIENTATION: LEFT;ANTERIOR

## 2024-12-20 NOTE — CONSULTS
Urology Consultation    Patient:  Tess Echeverria  MRN: [id number]  YOB: 1965  Consult requested from Dr. rangel  for purpose of evaluation of flank pain.    CHIEF COMPLAINT: Flank pain    HISTORY OF PRESENT ILLNESS:   The patient is a 59 y.o. female who presents with flank pain.  Patient was admitted to the hospital with right-sided flank pain.  Patient is immediately status post a right-sided ESWL.  She is postoperative day 1.  Patient was admitted to the medicine service.  She did pass several stone fragments.  Patient CT scan did show fragmented stone.  There were some larger fragments in the kidney.  Patient did have a white count elevated to 14.1.  She did come down with hydration and antibiotics.  Patient's urine culture eventually was negative.  Patient is feeling well.  She would like to be discharged home..  Patient has no pain today    Patient's old records, notes and chart reviewed and summarized above.    Past Medical History:    Past Medical History:   Diagnosis Date    Acute cystitis without hematuria 12/19/2024    Acute pulmonary embolism (HCC) 11/02/2015    Anxiety     Asthma     Constipation, chronic     COVID-19 09/2021    CPAP (continuous positive airway pressure) dependence     Diabetes mellitus (HCC)     pt states she was prediabetic at one point    Embolism - blood clot     right lung and right leg    Esophageal reflux     Factor V Leiden (HCC)     Heart murmur     HTN (hypertension)     Hyperlipidemia     Irritable bladder     Kidney stone     MTHFR mutation     MVP (mitral valve prolapse)     Palpitations     Pulmonary embolism (HCC) 10/15/2012    Rhinitis, allergic 10/15/2012    Right flank pain 12/18/2024    Sleep apnea     Venous thromboembolism 05/02/2016       Past Surgical History:    Past Surgical History:   Procedure Laterality Date    CATARACT REMOVAL WITH IMPLANT Left 04/02/2018    CHOLECYSTECTOMY      around 2000    COLONOSCOPY  07/12/2021    COLONOSCOPY N/A 7/12/2021

## 2024-12-20 NOTE — PROGRESS NOTES
Progress Note    SUBJECTIVE:    Patient seen for f/u of Right flank pain.  She resting in chair no distress. Feels much better. Afebrile. No complaint.  No pain.  Awaiting urology    ROS:   Constitutional: negative  for fevers, and negative for chills.  Respiratory: negative for shortness of breath, negative for cough, and negative for wheezing  Cardiovascular: negative for chest pain, and negative for palpitations  Gastrointestinal: negative for abdominal pain, negative for nausea,negative for vomiting, negative for diarrhea, and negative for constipation     All other systems were reviewed with the patient and are negative unless otherwise stated in HPI      OBJECTIVE:      Vitals:   Vitals:    12/20/24 0645   BP: (!) 148/80   Pulse: 58   Resp: 18   Temp:    SpO2: 96%         Height: 167.6 cm (5' 6\")     Weight  Wt Readings from Last 3 Encounters:   12/17/24 127.6 kg (281 lb 3.2 oz)   12/16/24 122.5 kg (270 lb)   12/06/24 128.8 kg (284 lb)     Body mass index is 45.39 kg/m².    24HR INTAKE/OUTPUT:      Intake/Output Summary (Last 24 hours) at 12/20/2024 0659  Last data filed at 12/20/2024 0307  Gross per 24 hour   Intake 1280 ml   Output 2225 ml   Net -945 ml     -----------------------------------------------------------------  Exam:    GEN:    Awake, alert and oriented x3.   EYES:  EOMI, pupils equal   NECK: Supple. No lymphadenopathy.  No carotid bruit  CVS:    regular rate and rhythm, no audible murmur  PULM:  CTA, no wheezes, rales or rhonchi, no acute respiratory distress  ABD:    Bowels sounds normal.  Abdomen is soft.  No distention.  no tenderness to palpation.   EXT:   no edema bilaterally .  No calf tenderness.   NEURO: Moves all extremities.  Motor and sensory are grossly intact  SKIN:  No rashes.  No skin lesions.    -----------------------------------------------------------------    Diagnostic Data:      Complete Blood Count:   Recent Labs     12/18/24  1434 12/19/24  0615 12/20/24  0540   WBC 14.6*

## 2024-12-20 NOTE — DISCHARGE SUMMARY
Discharge Summary    Tess Echeverria  :  1965  MRN:  [id number]    Admit date:  2024      Discharge date: 2024     Admitting Physician:  Maximino Hansen MD    Discharge Diagnoses:    Principal Problem:    Right flank pain  Active Problems:    Essential hypertension    SUSY (obstructive sleep apnea)    Renal lithiasis    Acute cystitis without hematuria  Resolved Problems:    * No resolved hospital problems. *      Hospital Course:   Tess Echeverria is a 59 y.o. female admitted with right flank pain. She then to the emergency room with complaints of right flank pain. Patient underwent right extracorporal shockwave lithotripsy with Dr. Calzada 10 mm right renal stone. She was discharged after procedure. Patient reported that she developed severe pain with associated nausea vomiting and hematuria. She denied fever chills. She denied other complaints. CT scan was completed while in the emergency room that showed a 3 mm distal right ureteral stones with approximately 3 mm stone at the UVJ and to similar sized and slightly smaller stones are short distance proximal to this with mild associated hydronephrosis, to right interim renal calculi measuring up to 8 mm and mild colonic diverticulosis. Urinalysis was negative for UTI. Initial lactic acid was 3.6 and improved to 1.7 after IV fluids and likely reactive due to severe nausea vomiting. Patient's pain is tolerable. During patient's admission she was initially given IV fluids as well as empiric antibiotics. No signs of sepsis were seen. Patient had no fever, hypotension, tachycardia, tachypnea or hypoxia. Blood cultures were negative and urine culture was negative. Antibiotic stopped. Patient was evaluated by radiology with no further workup at this time. Plan will be for her to follow up with urology as an outpatient. Hemodynamically she is stable plan will be to discharge today.    Consultants:  Dr. Calzada, urology    Procedures: none    Complications:  by mouth once a week           * This list has 2 medication(s) that are the same as other medications prescribed for you. Read the directions carefully, and ask your doctor or other care provider to review them with you.                STOP taking these medications      aspirin 81 MG EC tablet               Where to Get Your Medications        These medications were sent to [facility] [id number] [address] - P [phone] - F [phone]  [address] [address]      Phone: [phone]   ondansetron 4 MG disintegrating tablet         Patient Instructions:   Activity: activity as tolerated  Diet: encourage fluids  Wound Care: none needed  Other: na     Disposition:   Discharge to Home    Follow up:  Patient will be followed by Maximino Hansen MD in 1-2 weeks    CORE MEASURES on Discharge (if applicable)  ACE/ARB in CHF: NA  Statin in MI: NA  ASA in MI: NA  Statin in CVA: NA  Antiplatelet in CVA: NA    Total time spent on discharge services: 40 minutes    Including the following activities:  Evaluation and Management of patient  Discussion with patient and/or surrogate about current care plan  Coordination with Case Management and/or   Coordination of care with Consultants (if applicable)   Coordination of care with Receiving Facility Physician (if applicable)  Completion of DME forms (if applicable)  Preparation of Discharge Summary  Preparation of Medication Reconciliation  Preparation of Discharge Prescriptions    Signed:  ZACARIAS Bear - CNP, ZACARIAS, NP-C  12/20/2024, 10:22 AM

## 2024-12-20 NOTE — PROGRESS NOTES
Patient shift assessment and vitals completed at this time as charted. Patient is alert and oriented x4 and currently rates pain 3/10 due to a headache. Patient given PRN tylenol, see MAR. Patient states no further needs, call light is in reach, plan of care is ongonig.

## 2024-12-20 NOTE — PLAN OF CARE
Problem: Chronic Conditions and Co-morbidities  Goal: Patient's chronic conditions and co-morbidity symptoms are monitored and maintained or improved  12/20/2024 0659 by Carine Jay RN  Outcome: Progressing  Flowsheets (Taken 12/20/2024 0659)  Care Plan - Patient's Chronic Conditions and Co-Morbidity Symptoms are Monitored and Maintained or Improved:   Monitor and assess patient's chronic conditions and comorbid symptoms for stability, deterioration, or improvement   Collaborate with multidisciplinary team to address chronic and comorbid conditions and prevent exacerbation or deterioration   Update acute care plan with appropriate goals if chronic or comorbid symptoms are exacerbated and prevent overall improvement and discharge  12/19/2024 1957 by Treva Lim RN  Outcome: Progressing     Problem: Discharge Planning  Goal: Discharge to home or other facility with appropriate resources  12/20/2024 0659 by Carine Jay RN  Outcome: Progressing  Flowsheets (Taken 12/20/2024 0659)  Discharge to home or other facility with appropriate resources:   Identify barriers to discharge with patient and caregiver   Identify discharge learning needs (meds, wound care, etc)   Arrange for needed discharge resources and transportation as appropriate  12/19/2024 1957 by Treva Lim RN  Outcome: Progressing     Problem: Pain  Goal: Verbalizes/displays adequate comfort level or baseline comfort level  12/20/2024 0659 by Carine Jay RN  Outcome: Progressing  Flowsheets (Taken 12/20/2024 0659)  Verbalizes/displays adequate comfort level or baseline comfort level:   Encourage patient to monitor pain and request assistance   Administer analgesics based on type and severity of pain and evaluate response   Assess pain using appropriate pain scale   Implement non-pharmacological measures as appropriate and evaluate response   Notify Licensed Independent Practitioner if interventions unsuccessful or patient

## 2024-12-20 NOTE — PROGRESS NOTES
Discharge instructions reviewed with patient and sister at bedside.  Educated patient on new prescription Zofran and how/when to take.  Reviewed follow-up appointments with Dr. Hansen office post discharge.  Pt and sister deny any questions at this time.  Currently having no pain.  Patient escorted on foot to private vehicle.

## 2024-12-20 NOTE — PROGRESS NOTES
Nurse at patient bedside for morning shift assessment.  Upon entering, patient sitting up in chair.  Vitals and assessment obtained and documented.  Patient currently on room air oxygen.  Patient denies flank pain at this time, states headache is 3/10.   White board updated.  Water refreshed.  Patient states all other needs met at this time. Call light and items in reach. Care ongoing.

## 2024-12-20 NOTE — PLAN OF CARE
Problem: Nutrition Deficit:  Goal: Optimize nutritional status  12/19/2024 1957 by Treva Lim, RN  Outcome: Progressing     Problem: Safety - Adult  Goal: Free from fall injury  Outcome: Progressing     Problem: Pain  Goal: Verbalizes/displays adequate comfort level or baseline comfort level  Outcome: Progressing     Problem: Discharge Planning  Goal: Discharge to home or other facility with appropriate resources  Outcome: Progressing     Problem: Chronic Conditions and Co-morbidities  Goal: Patient's chronic conditions and co-morbidity symptoms are monitored and maintained or improved  Outcome: Progressing

## 2024-12-20 NOTE — PLAN OF CARE
Problem: Chronic Conditions and Co-morbidities  Goal: Patient's chronic conditions and co-morbidity symptoms are monitored and maintained or improved  12/20/2024 0849 by Carine Jay RN  Outcome: Adequate for Discharge  12/20/2024 0659 by Carine Jay RN  Outcome: Progressing  Flowsheets (Taken 12/20/2024 0659)  Care Plan - Patient's Chronic Conditions and Co-Morbidity Symptoms are Monitored and Maintained or Improved:   Monitor and assess patient's chronic conditions and comorbid symptoms for stability, deterioration, or improvement   Collaborate with multidisciplinary team to address chronic and comorbid conditions and prevent exacerbation or deterioration   Update acute care plan with appropriate goals if chronic or comorbid symptoms are exacerbated and prevent overall improvement and discharge  12/19/2024 1957 by Treva Lim RN  Outcome: Progressing     Problem: Discharge Planning  Goal: Discharge to home or other facility with appropriate resources  12/20/2024 0849 by Carine Jay RN  Outcome: Adequate for Discharge  12/20/2024 0659 by Carine Jay RN  Outcome: Progressing  Flowsheets (Taken 12/20/2024 0659)  Discharge to home or other facility with appropriate resources:   Identify barriers to discharge with patient and caregiver   Identify discharge learning needs (meds, wound care, etc)   Arrange for needed discharge resources and transportation as appropriate  12/19/2024 1957 by Treva Lim RN  Outcome: Progressing     Problem: Pain  Goal: Verbalizes/displays adequate comfort level or baseline comfort level  12/20/2024 0849 by Carine Jay RN  Outcome: Adequate for Discharge  12/20/2024 0659 by Carine Jay RN  Outcome: Progressing  Flowsheets (Taken 12/20/2024 0659)  Verbalizes/displays adequate comfort level or baseline comfort level:   Encourage patient to monitor pain and request assistance   Administer analgesics based on type and severity of pain and

## 2024-12-22 LAB
MICROORGANISM SPEC CULT: NORMAL
MICROORGANISM SPEC CULT: NORMAL
SERVICE CMNT-IMP: NORMAL
SERVICE CMNT-IMP: NORMAL
SPECIMEN DESCRIPTION: NORMAL
SPECIMEN DESCRIPTION: NORMAL

## 2024-12-23 ENCOUNTER — TELEPHONE (OUTPATIENT)
Dept: UROLOGY | Age: 59
End: 2024-12-23

## 2024-12-23 ENCOUNTER — CARE COORDINATION (OUTPATIENT)
Dept: OTHER | Facility: CLINIC | Age: 59
End: 2024-12-23

## 2024-12-23 DIAGNOSIS — N20.0 RENAL CALCULUS: Primary | ICD-10-CM

## 2024-12-23 NOTE — TELEPHONE ENCOUNTER
Can I please have KUB order for three month post op? Writer will call patient to schedule f/u appt in office. Thank you.

## 2024-12-23 NOTE — CARE COORDINATION
Care Transitions Note    Initial Call - Call within 2 business days of discharge: Yes    Attempted to reach patient for transitions of care follow up. Unable to reach patient.    Outreach Attempts:   HIPAA compliant voicemail left for patient.     Patient: Tess Echeverria    Patient : 1965   MRN: [id number]    Reason for Admission: Right flank pain  Discharge Date: 24  RURS: Readmission Risk Score: 8.2    Last Discharge Facility       Date Complaint Diagnosis Description Type Department Provider    24 Flank Pain Flank pain ... ED to Hosp-Admission (Discharged) (ADMITTED) [facility] Maximino Hansen MD; Neal Kathleen...            Was this an external facility discharge? No    Follow Up Appointment:   Patient has hospital follow up appointment scheduled within 7 days of discharge.    Future Appointments         Provider Specialty Dept Phone    2025 8:45 AM Maximino Hansen MD Primary Care [phone]    3/26/2025 8:00 AM Maximino Hansen MD Primary Care [phone]    2025 10:15 AM Berta Mccarthy, APRN - [facility] Obstetrics and Gynecology [phone]    2025 11:45 AM Keegan Cancino MD Pulmonology [phone]    12/15/2025 10:00 AM Noris Hopkins MD Cardiology [phone]            Plan for follow-up on next business day.      Kristine Paulino RN

## 2024-12-24 ENCOUNTER — CARE COORDINATION (OUTPATIENT)
Dept: OTHER | Facility: CLINIC | Age: 59
End: 2024-12-24

## 2024-12-24 LAB
STONE COMPOSITION: NORMAL
STONE DESCRIPTION: NORMAL
STONE MASS: 20 MG

## 2024-12-24 NOTE — TELEPHONE ENCOUNTER
Patient called office. Writer advised 3 month follow up needed scheduled. Patient agreed to appointment 3/24/25 1PM and aware of KUB.

## 2024-12-24 NOTE — CARE COORDINATION
Care Transitions Note    Initial Call - Call within 2 business days of discharge: Yes    Outreach Attempts:   Multiple attempts to contact patient at phone numbers on file.   HIPAA compliant voicemail left for patient.   Font message sent.     Patient: Tess Echeverria    Patient : 1965   MRN: [id number]    Reason for Admission: Right flank pain  Discharge Date: 24  RURS: Readmission Risk Score: 8.2    Last Discharge Facility       Date Complaint Diagnosis Description Type Department Provider    24 Flank Pain Flank pain ... ED to Hosp-Admission (Discharged) (ADMITTED) [facility] Maximino Hansen MD; Neal Kathleen...            Was this an external facility discharge? No    Follow Up Appointment:   Patient has hospital follow up appointment scheduled within 14 days of discharge.    Future Appointments         Provider Specialty Dept Phone    2025 8:45 AM Maximino Hansen MD Primary Care [phone]    3/24/2025 1:00 PM Juan Ernst, PA-C Urology [phone]    3/26/2025 8:00 AM Maximino Hansen MD Primary Care [phone]    2025 10:15 AM Berta Mccarthy APRN - [facility]stetrics [facility] Gynecology [phone]    2025 11:45 AM Keegan Cancino MD Pulmonology [phone]    12/15/2025 10:00 AM Noris Hopkins MD Cardiology [phone]            Plan for follow-up call in 6-10 days     Kristine Paulino RN

## 2024-12-24 NOTE — PROGRESS NOTES
Physician Progress Note      PATIENT:               YEE JONES  [facility] #:                  [id number]  :                       1965  ADMIT DATE:       2024 2:26 PM  DISCH DATE:        2024 10:40 AM  RESPONDING  PROVIDER #:        JAQUAN Reeves CNP          QUERY TEXT:    Patient admitted with pyonephrosis. Noted documentation of UTI in H&P on   . In order to support the diagnosis of UTI, please include additional   clinical indicators in your documentation.  Or please document if the   diagnosis of UTI has been ruled out after further study.    The medical record reflects the following:  Risk Factors: Female, hydronephrosis  Clinical Indicators: H&P -Sepsis due to urinary tract infection  DS - Right flank pain, Urinalysis was negative for UTI.  Urine culture - no growth  UA :  protein- 2+  leucocyte esterase- trace  WBC- 5 to 10  Bacteria- trace  Treatment:  s/p extracorporeal shockwave lithotripsy,Meropenem   1 gm IV q 8   hrs  over 3 hours, cefazolin (ANCEF) 3000 mg in sterile water 30 mL IV   syringe, IV fluids, urology consult    Thank you,  VANESSA Wilkerson CDS  Options provided:  -- UTI present as evidenced by, Please document evidence.  -- UTI was ruled out  -- Other - I will add my own diagnosis  -- Disagree - Not applicable / Not valid  -- Disagree - Clinically unable to determine / Unknown  -- Refer to Clinical Documentation Reviewer    PROVIDER RESPONSE TEXT:    UTI was ruled out after study.    Query created by: Sachin Fernandez on 2024 7:49 AM      Electronically signed by:  JAQUAN Reeves CNP 2024 8:54 AM

## 2024-12-24 NOTE — TELEPHONE ENCOUNTER
Attempted to call patient to scheduled 3 month follow with a KUB, no answer. LVM asking patient to return our call to schedule.

## 2024-12-27 ENCOUNTER — CARE COORDINATION (OUTPATIENT)
Dept: OTHER | Facility: CLINIC | Age: 59
End: 2024-12-27

## 2024-12-27 RX ORDER — ACETAMINOPHEN 500 MG
500 TABLET ORAL EVERY 6 HOURS PRN
COMMUNITY
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
## 2025-01-02 ENCOUNTER — OFFICE VISIT (OUTPATIENT)
Dept: PRIMARY CARE CLINIC | Age: 60
End: 2025-01-02

## 2025-01-02 VITALS
OXYGEN SATURATION: 96 % | DIASTOLIC BLOOD PRESSURE: 82 MMHG | SYSTOLIC BLOOD PRESSURE: 128 MMHG | BODY MASS INDEX: 45.68 KG/M2 | WEIGHT: 283 LBS | HEART RATE: 110 BPM | RESPIRATION RATE: 18 BRPM

## 2025-01-02 DIAGNOSIS — E11.69 TYPE 2 DIABETES MELLITUS WITH OTHER SPECIFIED COMPLICATION, WITHOUT LONG-TERM CURRENT USE OF INSULIN (HCC): ICD-10-CM

## 2025-01-02 DIAGNOSIS — N20.0 NEPHROLITHIASIS: ICD-10-CM

## 2025-01-02 DIAGNOSIS — R31.9 HEMATURIA, UNSPECIFIED TYPE: ICD-10-CM

## 2025-01-02 DIAGNOSIS — I50.32 CHRONIC DIASTOLIC (CONGESTIVE) HEART FAILURE (HCC): ICD-10-CM

## 2025-01-02 DIAGNOSIS — Z09 HOSPITAL DISCHARGE FOLLOW-UP: Primary | ICD-10-CM

## 2025-01-02 DIAGNOSIS — D68.59 THROMBOPHILIA (HCC): ICD-10-CM

## 2025-01-02 DIAGNOSIS — R05.9 COUGH, UNSPECIFIED TYPE: ICD-10-CM

## 2025-01-02 DIAGNOSIS — J40 BRONCHITIS: ICD-10-CM

## 2025-01-02 DIAGNOSIS — R53.83 OTHER FATIGUE: ICD-10-CM

## 2025-01-02 DIAGNOSIS — R52 BODY ACHES: ICD-10-CM

## 2025-01-02 DIAGNOSIS — J96.01 ACUTE RESPIRATORY FAILURE WITH HYPOXIA: ICD-10-CM

## 2025-01-02 DIAGNOSIS — I26.99 OTHER ACUTE PULMONARY EMBOLISM, UNSPECIFIED WHETHER ACUTE COR PULMONALE PRESENT (HCC): ICD-10-CM

## 2025-01-02 LAB
INFLUENZA A ANTIGEN, POC: NEGATIVE
INFLUENZA B ANTIGEN, POC: NEGATIVE
LOT EXPIRE DATE: NORMAL
LOT KIT NUMBER: NORMAL
SARS-COV-2 RNA, POC: NEGATIVE
VALID INTERNAL CONTROL: PRESENT
VENDOR AND KIT NAME POC: NORMAL

## 2025-01-02 RX ORDER — AZITHROMYCIN 250 MG/1
TABLET, FILM COATED ORAL
Qty: 6 TABLET | Refills: 0 | Status: SHIPPED | OUTPATIENT
Start: 2025-01-02 | End: 2025-01-12

## 2025-01-02 ASSESSMENT — PATIENT HEALTH QUESTIONNAIRE - PHQ9
SUM OF ALL RESPONSES TO PHQ QUESTIONS 1-9: 0
SUM OF ALL RESPONSES TO PHQ QUESTIONS 1-9: 0
SUM OF ALL RESPONSES TO PHQ9 QUESTIONS 1 & 2: 0
1. LITTLE INTEREST OR PLEASURE IN DOING THINGS: NOT AT ALL
SUM OF ALL RESPONSES TO PHQ QUESTIONS 1-9: 0
SUM OF ALL RESPONSES TO PHQ QUESTIONS 1-9: 0
2. FEELING DOWN, DEPRESSED OR HOPELESS: NOT AT ALL

## 2025-01-02 NOTE — PROGRESS NOTES
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 If you have any questions, ask your nurse or doctor.                START taking these medications      azithromycin 250 MG tablet  Commonly known as: ZITHROMAX  500mg on day 1 followed by 250mg on days 2 - 5  Started by: Dr. Maximino Hansen MD            CONTINUE taking these medications      acetaminophen 500 MG tablet  Commonly known as: TYLENOL     albuterol sulfate  (90 Base) MCG/ACT inhaler  Commonly known as: ProAir HFA  Inhale 2 puffs into the lungs 4 times daily as needed for Wheezing or Shortness of Breath     ALPRAZolam 0.25 MG tablet  Commonly known as: XANAX     apixaban 5 MG Tabs tablet  Commonly known as: Eliquis  Take 1 tablet by mouth 2 times daily     * blood glucose monitor kit and supplies  Dispense sufficient amount for indicated testing frequency plus additional to accommodate QD testing needs. Dispense all needed supplies to include: monitor, strips, lancing device, lancets, control solutions, alcohol swabs.     * Prodigy Autocode Blood Glucose Joyce  1 each by Does not apply route 2 times daily     blood

## 2025-01-06 ENCOUNTER — OFFICE VISIT (OUTPATIENT)
Dept: PRIMARY CARE CLINIC | Age: 60
End: 2025-01-06
Payer: COMMERCIAL

## 2025-01-06 VITALS
DIASTOLIC BLOOD PRESSURE: 78 MMHG | SYSTOLIC BLOOD PRESSURE: 126 MMHG | OXYGEN SATURATION: 95 % | HEIGHT: 66 IN | HEART RATE: 82 BPM | WEIGHT: 281 LBS | BODY MASS INDEX: 45.16 KG/M2

## 2025-01-06 DIAGNOSIS — R11.0 NAUSEA: ICD-10-CM

## 2025-01-06 DIAGNOSIS — R05.1 ACUTE COUGH: Primary | ICD-10-CM

## 2025-01-06 DIAGNOSIS — J40 BRONCHITIS: ICD-10-CM

## 2025-01-06 PROCEDURE — 3074F SYST BP LT 130 MM HG: CPT | Performed by: STUDENT IN AN ORGANIZED HEALTH CARE EDUCATION/TRAINING PROGRAM

## 2025-01-06 PROCEDURE — G2211 COMPLEX E/M VISIT ADD ON: HCPCS | Performed by: STUDENT IN AN ORGANIZED HEALTH CARE EDUCATION/TRAINING PROGRAM

## 2025-01-06 PROCEDURE — 3078F DIAST BP <80 MM HG: CPT | Performed by: STUDENT IN AN ORGANIZED HEALTH CARE EDUCATION/TRAINING PROGRAM

## 2025-01-06 PROCEDURE — 99214 OFFICE O/P EST MOD 30 MIN: CPT | Performed by: STUDENT IN AN ORGANIZED HEALTH CARE EDUCATION/TRAINING PROGRAM

## 2025-01-06 RX ORDER — ONDANSETRON 4 MG/1
4 TABLET, FILM COATED ORAL DAILY PRN
Qty: 30 TABLET | Refills: 0 | Status: SHIPPED | OUTPATIENT
Start: 2025-01-06

## 2025-01-06 RX ORDER — CODEINE PHOSPHATE AND GUAIFENESIN 10; 100 MG/5ML; MG/5ML
5 SOLUTION ORAL 3 TIMES DAILY PRN
Qty: 118 ML | Refills: 0 | Status: SHIPPED | OUTPATIENT
Start: 2025-01-06 | End: 2025-01-20

## 2025-01-06 RX ORDER — METHYLPREDNISOLONE 4 MG/1
TABLET ORAL
Qty: 1 KIT | Refills: 0 | Status: SHIPPED | OUTPATIENT
Start: 2025-01-06 | End: 2025-01-12

## 2025-01-06 RX ORDER — DOXYCYCLINE HYCLATE 100 MG
100 TABLET ORAL 2 TIMES DAILY
Qty: 14 TABLET | Refills: 0 | Status: SHIPPED | OUTPATIENT
Start: 2025-01-06 | End: 2025-01-13

## 2025-01-06 RX ORDER — ALBUTEROL SULFATE 0.83 MG/ML
2.5 SOLUTION RESPIRATORY (INHALATION) 4 TIMES DAILY PRN
Qty: 120 EACH | Refills: 3 | Status: SHIPPED | OUTPATIENT
Start: 2025-01-06

## 2025-01-06 RX ORDER — BENZONATATE 200 MG/1
200 CAPSULE ORAL 3 TIMES DAILY PRN
Qty: 42 CAPSULE | Refills: 0 | Status: SHIPPED | OUTPATIENT
Start: 2025-01-06 | End: 2025-01-20

## 2025-01-06 NOTE — PROGRESS NOTES
Pulmonary/Chest: Effort normal and breath sounds normal. No respiratory distress. Patient has posterior lung wheezing.  Abdominal: Soft. Bowel sounds are normal. Patient exhibits no distension. There is no tenderness.   Musculoskeletal:  Patient exhibits no edema and tenderness. Patient exhibits no deformity.   Neurological: Patient is alert and oriented to person, place, and time.   Skin: Skin is warm and dry. Patient is not diaphoretic.   Psychiatric: Patient's speech is normal and behavior is normal. Thought content normal.   Vitals reviewed.      Lab Results   Component Value Date    WBC 7.0 12/20/2024    HGB 13.8 12/20/2024    HCT 40.8 12/20/2024     12/20/2024    CHOL 180 12/08/2023    TRIG 154 (H) 12/08/2023    HDL 33 (L) 12/08/2023    ALT 23 12/20/2024    AST 27 12/20/2024     12/20/2024    K 4.0 12/20/2024     (H) 12/20/2024    CREATININE 0.8 12/20/2024    BUN 11 12/20/2024    CO2 24 12/20/2024    TSH 2.77 10/18/2022    INR 1.1 09/26/2021    LABA1C 7.6 12/16/2024     Lab Results   Component Value Date    CALCIUM 9.0 12/20/2024    PHOS 3.6 09/07/2012     No results found for: \"LDLDIRECT\"    Please note that this chart was generated using voice recognition Dragon dictation software. Although every effort was made to ensure the accuracy of this automated transcription, some errors in transcription may have occurred.    The patient (or guardian, if applicable) and other individuals in attendance with the patient were advised that Artificial Intelligence will be utilized during this visit to record, process the conversation to generate a clinical note, and support improvement of the AI technology. The patient (or guardian, if applicable) and other individuals in attendance at the appointment consented to the use of AI, including the recording.      Electronically signed by Dr. Maximino Hansen MD on 1/6/2025 at 1:16 PM

## 2025-01-08 ENCOUNTER — CARE COORDINATION (OUTPATIENT)
Dept: OTHER | Facility: CLINIC | Age: 60
End: 2025-01-08

## 2025-01-10 ENCOUNTER — CARE COORDINATION (OUTPATIENT)
Dept: OTHER | Facility: CLINIC | Age: 60
End: 2025-01-10

## 2025-01-10 NOTE — CARE COORDINATION
Care Transitions Note    Follow Up Call     Outreach Attempts:   HIPAA compliant voicemail left for patient.     Follow Up Appointment:   Future Appointments         Provider Specialty Dept Phone    3/24/2025 1:00 PM Juan Ernst PA-C Urology [phone]    3/26/2025 8:00 AM Maximino Hansen MD Primary Care [phone]    5/20/2025 10:15 AM Berta Mccarthy APRN [facility] Obstetrics and Gynecology [phone]    8/18/2025 11:45 AM Keegan Cancino MD Pulmonology [phone]    12/15/2025 10:00 AM Noris Hopkins MD Cardiology [phone]            Plan for follow-up call in 6-10 days based on severity of symptoms and risk factors. Plan for next call:     symptom management-urinary issues  self management-blood sugars  follow-up appointment-completed appt with Dr. Hansen on 1/2/25  medication management-Med changes, resumed Aspirin                 Kristine Paulino RN

## 2025-01-17 ENCOUNTER — CARE COORDINATION (OUTPATIENT)
Dept: OTHER | Facility: CLINIC | Age: 60
End: 2025-01-17

## 2025-01-17 NOTE — CARE COORDINATION
Care Transitions Note    Follow Up Call     Outreach Attempts:   Multiple attempts to contact patient at phone numbers on file.   HIPAA compliant voicemail left for patient.     Follow Up Appointment:   Future Appointments         Provider Specialty Dept Phone    3/24/2025 1:00 PM Juan Ernst PA-C Urology [phone]    3/26/2025 8:00 AM Maximino Hansen MD Primary Care [phone]    5/20/2025 10:15 AM Berta Mccarthy APRN - [facility] Obstetrics and Gynecology [phone]    8/18/2025 11:45 AM Keegan Cancino MD Pulmonology [phone]    12/15/2025 10:00 AM Noris Hopkins MD Cardiology [phone]            Plan for follow-up call in 6-10 days based on severity of symptoms and risk factors. Plan for next call:   symptom management-urinary issues  self management-blood sugars  follow-up appointment-completed appt with Dr. Hansen on 1/2/25  medication management-Med changes, resumed Aspirin     Kristine Paulino RN

## 2025-01-23 ENCOUNTER — CARE COORDINATION (OUTPATIENT)
Dept: OTHER | Facility: CLINIC | Age: 60
End: 2025-01-23

## 2025-01-24 ENCOUNTER — CARE COORDINATION (OUTPATIENT)
Dept: OTHER | Facility: CLINIC | Age: 60
End: 2025-01-24

## 2025-01-24 NOTE — CARE COORDINATION
Care Transitions Note      Received vm from patient stating:    Noticed missed several calls from this number or perhaps all from you and maybe others as well  Assume you are calling about my previous hospital stay for the kidney stones  Not totally resolved  Haven't had a lot of pain lately  I know they haven't all come out yet  Have a visit with him in March to readress that issue  Did have a respiratory infection that started New Year's Alejandra  For the most part is gone but still residual cough here and there  I don't really need a phone call back  I just thought I'd return your call since I missed a couple  Let you know that is everything is just fine      Patient closed (declined further CARMEN[facility] services) from the Care Transitions program on 1/24/25    Upcoming Appointments:    Future Appointments         Provider Specialty Dept Phone    3/24/2025 1:00 PM Juan Enrst, PA-C Urology [phone]    3/26/2025 8:00 AM Maximino Hansen MD Primary Care [phone]    5/20/2025 10:15 AM Berta Mccarthy APRN - [facility] Obstetrics and Gynecology [phone]    8/18/2025 11:45 AM Keegan Cancino MD Pulmonology [phone]    12/15/2025 10:00 AM Noris Hopkins MD Cardiology [phone]          This ACM to sign off and close program.     Kristine Paulino RN

## 2025-01-27 DIAGNOSIS — F41.9 ANXIETY: Primary | ICD-10-CM

## 2025-01-27 RX ORDER — ALPRAZOLAM 0.25 MG/1
0.25 TABLET ORAL NIGHTLY PRN
Qty: 30 TABLET | Refills: 0 | Status: SHIPPED | OUTPATIENT
Start: 2025-01-27 | End: 2025-02-26

## 2025-02-01 PROBLEM — Z09 HOSPITAL DISCHARGE FOLLOW-UP: Status: RESOLVED | Noted: 2025-01-02 | Resolved: 2025-02-01

## 2025-02-01 PROBLEM — R05.9 COUGH: Status: RESOLVED | Noted: 2025-01-02 | Resolved: 2025-02-01

## 2025-02-10 RX ORDER — PANTOPRAZOLE SODIUM 40 MG/1
40 TABLET, DELAYED RELEASE ORAL DAILY
Qty: 90 TABLET | Refills: 3 | Status: SHIPPED | OUTPATIENT
Start: 2025-02-10

## 2025-02-10 NOTE — TELEPHONE ENCOUNTER
Patient called in to get a refill on her protonix 40mg, order is pended and pharmacy is verified.

## 2025-02-11 ENCOUNTER — HOSPITAL ENCOUNTER (EMERGENCY)
Age: 60
Discharge: HOME | End: 2025-02-11
Payer: COMMERCIAL

## 2025-02-11 ENCOUNTER — APPOINTMENT (OUTPATIENT)
Dept: CT IMAGING | Age: 60
End: 2025-02-11
Payer: COMMERCIAL

## 2025-02-11 VITALS
OXYGEN SATURATION: 95 % | HEART RATE: 67 BPM | RESPIRATION RATE: 14 BRPM | SYSTOLIC BLOOD PRESSURE: 168 MMHG | DIASTOLIC BLOOD PRESSURE: 89 MMHG | BODY MASS INDEX: 44.87 KG/M2 | WEIGHT: 278 LBS | TEMPERATURE: 98.1 F

## 2025-02-11 DIAGNOSIS — N20.1 CALCULUS OF URETEROVESICAL JUNCTION (UVJ): Primary | ICD-10-CM

## 2025-02-11 LAB
ALBUMIN SERPL-MCNC: 4.4 G/DL (ref 3.5–5.2)
ALBUMIN/GLOB SERPL: 1.3 {RATIO} (ref 1–2.5)
ALP SERPL-CCNC: 120 U/L (ref 35–104)
ALT SERPL-CCNC: 41 U/L (ref 10–35)
ANION GAP SERPL CALCULATED.3IONS-SCNC: 13 MMOL/L (ref 9–16)
AST SERPL-CCNC: 40 U/L (ref 10–35)
BACTERIA URNS QL MICRO: ABNORMAL
BASOPHILS # BLD: 0.04 K/UL (ref 0–0.2)
BASOPHILS NFR BLD: 1 % (ref 0–2)
BILIRUB SERPL-MCNC: 0.6 MG/DL (ref 0–1.2)
BILIRUB UR QL STRIP: NEGATIVE
BUN SERPL-MCNC: 14 MG/DL (ref 6–20)
BUN/CREAT SERPL: 13 (ref 9–20)
CALCIUM SERPL-MCNC: 9.5 MG/DL (ref 8.6–10.4)
CHLORIDE SERPL-SCNC: 102 MMOL/L (ref 98–107)
CLARITY UR: ABNORMAL
CO2 SERPL-SCNC: 24 MMOL/L (ref 20–31)
COLOR UR: YELLOW
CREAT SERPL-MCNC: 1.1 MG/DL (ref 0.5–0.9)
EOSINOPHIL # BLD: 0.08 K/UL (ref 0–0.44)
EOSINOPHILS RELATIVE PERCENT: 1 % (ref 1–4)
EPI CELLS #/AREA URNS HPF: ABNORMAL /HPF (ref 0–25)
ERYTHROCYTE [DISTWIDTH] IN BLOOD BY AUTOMATED COUNT: 13.1 % (ref 11.8–14.4)
GFR, ESTIMATED: 56 ML/MIN/1.73M2
GLUCOSE SERPL-MCNC: 180 MG/DL (ref 74–99)
GLUCOSE UR STRIP-MCNC: NEGATIVE MG/DL
HCT VFR BLD AUTO: 46.8 % (ref 36.3–47.1)
HGB BLD-MCNC: 16.2 G/DL (ref 11.9–15.1)
HGB UR QL STRIP.AUTO: ABNORMAL
IMM GRANULOCYTES # BLD AUTO: <0.03 K/UL (ref 0–0.3)
IMM GRANULOCYTES NFR BLD: 0 %
KETONES UR STRIP-MCNC: ABNORMAL MG/DL
LACTATE BLDV-SCNC: 2.6 MMOL/L (ref 0.5–2.2)
LEUKOCYTE ESTERASE UR QL STRIP: NEGATIVE
LIPASE SERPL-CCNC: 49 U/L (ref 13–60)
LYMPHOCYTES NFR BLD: 3.36 K/UL (ref 1.1–3.7)
LYMPHOCYTES RELATIVE PERCENT: 38 % (ref 24–43)
MCH RBC QN AUTO: 30.3 PG (ref 25.2–33.5)
MCHC RBC AUTO-ENTMCNC: 34.6 G/DL (ref 28.4–34.8)
MCV RBC AUTO: 87.5 FL (ref 82.6–102.9)
MONOCYTES NFR BLD: 0.69 K/UL (ref 0.1–1.2)
MONOCYTES NFR BLD: 8 % (ref 3–12)
MUCOUS THREADS URNS QL MICRO: ABNORMAL
NEUTROPHILS NFR BLD: 52 % (ref 36–65)
NEUTS SEG NFR BLD: 4.58 K/UL (ref 1.5–8.1)
NITRITE UR QL STRIP: NEGATIVE
NRBC BLD-RTO: 0 PER 100 WBC
PH UR STRIP: 7.5 [PH] (ref 5–9)
PLATELET # BLD AUTO: 267 K/UL (ref 138–453)
PMV BLD AUTO: 10.1 FL (ref 8.1–13.5)
POTASSIUM SERPL-SCNC: 4.1 MMOL/L (ref 3.7–5.3)
PROT SERPL-MCNC: 7.7 G/DL (ref 6.6–8.7)
PROT UR STRIP-MCNC: ABNORMAL MG/DL
RBC # BLD AUTO: 5.35 M/UL (ref 3.95–5.11)
RBC #/AREA URNS HPF: ABNORMAL /HPF (ref 0–2)
SODIUM SERPL-SCNC: 139 MMOL/L (ref 136–145)
SP GR UR STRIP: 1.02 (ref 1.01–1.02)
UROBILINOGEN UR STRIP-ACNC: NORMAL EU/DL (ref 0–1)
WBC #/AREA URNS HPF: ABNORMAL /HPF (ref 0–5)
WBC OTHER # BLD: 8.8 K/UL (ref 3.5–11.3)

## 2025-02-11 PROCEDURE — 6360000002 HC RX W HCPCS: Performed by: PHYSICIAN ASSISTANT

## 2025-02-11 PROCEDURE — 81001 URINALYSIS AUTO W/SCOPE: CPT

## 2025-02-11 PROCEDURE — 83605 ASSAY OF LACTIC ACID: CPT

## 2025-02-11 PROCEDURE — 85025 COMPLETE CBC W/AUTO DIFF WBC: CPT

## 2025-02-11 PROCEDURE — 96374 THER/PROPH/DIAG INJ IV PUSH: CPT

## 2025-02-11 PROCEDURE — 83690 ASSAY OF LIPASE: CPT

## 2025-02-11 PROCEDURE — 99285 EMERGENCY DEPT VISIT HI MDM: CPT

## 2025-02-11 PROCEDURE — 74177 CT ABD & PELVIS W/CONTRAST: CPT

## 2025-02-11 PROCEDURE — 96375 TX/PRO/DX INJ NEW DRUG ADDON: CPT

## 2025-02-11 PROCEDURE — 2580000003 HC RX 258: Performed by: PHYSICIAN ASSISTANT

## 2025-02-11 PROCEDURE — 6370000000 HC RX 637 (ALT 250 FOR IP): Performed by: PHYSICIAN ASSISTANT

## 2025-02-11 PROCEDURE — 6360000004 HC RX CONTRAST MEDICATION: Performed by: PHYSICIAN ASSISTANT

## 2025-02-11 PROCEDURE — 80053 COMPREHEN METABOLIC PANEL: CPT

## 2025-02-11 RX ORDER — 0.9 % SODIUM CHLORIDE 0.9 %
1000 INTRAVENOUS SOLUTION INTRAVENOUS ONCE
Status: COMPLETED | OUTPATIENT
Start: 2025-02-11 | End: 2025-02-11

## 2025-02-11 RX ORDER — IOPAMIDOL 755 MG/ML
75 INJECTION, SOLUTION INTRAVASCULAR
Status: COMPLETED | OUTPATIENT
Start: 2025-02-11 | End: 2025-02-11

## 2025-02-11 RX ORDER — MORPHINE SULFATE 4 MG/ML
4 INJECTION, SOLUTION INTRAMUSCULAR; INTRAVENOUS ONCE
Status: COMPLETED | OUTPATIENT
Start: 2025-02-11 | End: 2025-02-11

## 2025-02-11 RX ORDER — KETOROLAC TROMETHAMINE 10 MG/1
10 TABLET, FILM COATED ORAL EVERY 6 HOURS PRN
Qty: 10 TABLET | Refills: 0 | Status: SHIPPED | OUTPATIENT
Start: 2025-02-11

## 2025-02-11 RX ORDER — TAMSULOSIN HYDROCHLORIDE 0.4 MG/1
0.4 CAPSULE ORAL DAILY
Qty: 7 CAPSULE | Refills: 0 | Status: SHIPPED | OUTPATIENT
Start: 2025-02-11 | End: 2025-02-18

## 2025-02-11 RX ORDER — KETOROLAC TROMETHAMINE 30 MG/ML
30 INJECTION, SOLUTION INTRAMUSCULAR; INTRAVENOUS ONCE
Status: COMPLETED | OUTPATIENT
Start: 2025-02-11 | End: 2025-02-11

## 2025-02-11 RX ORDER — TAMSULOSIN HYDROCHLORIDE 0.4 MG/1
0.4 CAPSULE ORAL DAILY
Status: DISCONTINUED | OUTPATIENT
Start: 2025-02-11 | End: 2025-02-11 | Stop reason: HOSPADM

## 2025-02-11 RX ORDER — ONDANSETRON 2 MG/ML
4 INJECTION INTRAMUSCULAR; INTRAVENOUS ONCE
Status: COMPLETED | OUTPATIENT
Start: 2025-02-11 | End: 2025-02-11

## 2025-02-11 RX ORDER — HYDROCODONE BITARTRATE AND ACETAMINOPHEN 5; 325 MG/1; MG/1
1 TABLET ORAL EVERY 4 HOURS PRN
Qty: 42 TABLET | Refills: 0 | Status: SHIPPED | OUTPATIENT
Start: 2025-02-11 | End: 2025-02-18

## 2025-02-11 RX ADMIN — ONDANSETRON 4 MG: 2 INJECTION, SOLUTION INTRAMUSCULAR; INTRAVENOUS at 11:31

## 2025-02-11 RX ADMIN — SODIUM CHLORIDE 1000 ML: 9 INJECTION, SOLUTION INTRAVENOUS at 11:33

## 2025-02-11 RX ADMIN — TAMSULOSIN HYDROCHLORIDE 0.4 MG: 0.4 CAPSULE ORAL at 14:03

## 2025-02-11 RX ADMIN — MORPHINE SULFATE 4 MG: 4 INJECTION, SOLUTION INTRAMUSCULAR; INTRAVENOUS at 11:29

## 2025-02-11 RX ADMIN — KETOROLAC TROMETHAMINE 30 MG: 30 INJECTION, SOLUTION INTRAMUSCULAR at 14:03

## 2025-02-11 RX ADMIN — IOPAMIDOL 75 ML: 755 INJECTION, SOLUTION INTRAVENOUS at 13:29

## 2025-02-11 ASSESSMENT — ENCOUNTER SYMPTOMS
COUGH: 0
DIARRHEA: 0
ABDOMINAL PAIN: 1
VOMITING: 1
CONSTIPATION: 1
NAUSEA: 1
SHORTNESS OF BREATH: 0

## 2025-02-11 ASSESSMENT — PAIN SCALES - GENERAL
PAINLEVEL_OUTOF10: 10
PAINLEVEL_OUTOF10: 6
PAINLEVEL_OUTOF10: 7
PAINLEVEL_OUTOF10: 7

## 2025-02-11 ASSESSMENT — LIFESTYLE VARIABLES
HOW MANY STANDARD DRINKS CONTAINING ALCOHOL DO YOU HAVE ON A TYPICAL DAY: PATIENT DOES NOT DRINK
HOW OFTEN DO YOU HAVE A DRINK CONTAINING ALCOHOL: NEVER

## 2025-02-11 ASSESSMENT — PAIN DESCRIPTION - FREQUENCY: FREQUENCY: CONTINUOUS

## 2025-02-11 ASSESSMENT — PAIN DESCRIPTION - ORIENTATION
ORIENTATION: RIGHT;LOWER

## 2025-02-11 ASSESSMENT — PAIN - FUNCTIONAL ASSESSMENT: PAIN_FUNCTIONAL_ASSESSMENT: 0-10

## 2025-02-11 ASSESSMENT — PAIN DESCRIPTION - LOCATION
LOCATION: ABDOMEN

## 2025-02-11 ASSESSMENT — PAIN DESCRIPTION - DESCRIPTORS: DESCRIPTORS: SHARP

## 2025-02-11 ASSESSMENT — PAIN DESCRIPTION - PAIN TYPE: TYPE: ACUTE PAIN

## 2025-02-11 NOTE — ED PROVIDER NOTES
0 minutes, excluding separately reportable procedures.  There was a high probability of clinically significant/life threatening deterioration in the patient's condition which required my urgent intervention.      PROCEDURES:  Unless otherwise noted below, none     Procedures    FINAL IMPRESSION      1. Calculus of ureterovesical junction (UVJ)          DISPOSITION/PLAN     DISPOSITION Decision To Discharge 02/11/2025 02:30:28 PM   DISPOSITION CONDITION Stable           PATIENT REFERRED TO:  Rosalio Calzada MD  [address], [facility] [address]  [place] [address]  [phone]    Schedule an appointment as soon as possible for a visit         DISCHARGE MEDICATIONS:  New Prescriptions    HYDROCODONE-ACETAMINOPHEN (NORCO) 5-325 MG PER TABLET    Take 1 tablet by mouth every 4 hours as needed for Pain for up to 7 days. Intended supply: 7 days. Take lowest dose possible to manage pain Max Daily Amount: 6 tablets    KETOROLAC (TORADOL) 10 MG TABLET    Take 1 tablet by mouth every 6 hours as needed for Pain    TAMSULOSIN (FLOMAX) 0.4 MG CAPSULE    Take 1 capsule by mouth daily for 7 days       (Please note that portions of this note were completed with a voice recognition program.  Efforts were made to edit the dictations but occasionally words are mis-transcribed.)    Sahara Coppola PA-C (electronically signed)  Attending Emergency Physician           Sahara Coppola PA-C  02/11/25 8825

## 2025-02-12 ENCOUNTER — TELEPHONE (OUTPATIENT)
Dept: UROLOGY | Age: 60
End: 2025-02-12

## 2025-02-12 ENCOUNTER — OFFICE VISIT (OUTPATIENT)
Dept: UROLOGY | Age: 60
End: 2025-02-12

## 2025-02-12 ENCOUNTER — TELEPHONE (OUTPATIENT)
Dept: PRIMARY CARE CLINIC | Age: 60
End: 2025-02-12

## 2025-02-12 VITALS
DIASTOLIC BLOOD PRESSURE: 84 MMHG | WEIGHT: 284 LBS | BODY MASS INDEX: 45.84 KG/M2 | HEART RATE: 77 BPM | TEMPERATURE: 97.7 F | SYSTOLIC BLOOD PRESSURE: 134 MMHG

## 2025-02-12 DIAGNOSIS — N20.1 URETERAL STONE: ICD-10-CM

## 2025-02-12 DIAGNOSIS — N20.0 RENAL CALCULUS: ICD-10-CM

## 2025-02-12 DIAGNOSIS — N20.1 URETERAL STONE: Primary | ICD-10-CM

## 2025-02-12 NOTE — PROGRESS NOTES
Patient instructed on the pre-operative, intra-operative, and post-operative process. Patient instructed on NPO status. Medication instructions and pre operative instruction sheet reviewed with the patient. NPO status (including no gum, hard candy, mints, water, coffee, or smoking) was reviewed and patient verbalizes understanding. Patient was instructed to take her metoprolol and protonix with a small sip of water on the morning of her procedure. She was instructed to use her symbicort inhaler on the morning of her procedure and bring her albuterol inhaler with her. Patient will stop her eliquis 3 days prior to her procedure per cardiology.

## 2025-02-12 NOTE — TELEPHONE ENCOUNTER
Please have her have an appointment today-ER, ureteral stone make sure she is n.p.o. just in case we need to do a stent today.

## 2025-02-12 NOTE — TELEPHONE ENCOUNTER
Patient calls in stating she is going to cx her procedure at [facility] today. She voices she called insurance and Dr Calzada's bill would be covered, but the hospital or anesthesia would not be.  Notified Dr Calzada of this. He states she may cancel, but needs to return to ER if she has any fever, vomiting, or excessive pain, or any of her symptoms become worse.  Plan to keep procedure on 2/18/25 if she does not pass stone or come to ER and get transferred out for procedure.

## 2025-02-12 NOTE — TELEPHONE ENCOUNTER
Writer calls Tess. She will hold eliquis for three days based off of cardiology's instruction.  Cardiac clearance note from CARMEN Roberts present in telephone enc.

## 2025-02-12 NOTE — TELEPHONE ENCOUNTER
Patient called in to state she was supposed to have a stent placed today by Dr. Calzada at [facility] but due raine nsurance it was not going to be covered. She has a procedure on 2/18/25 as well but she is wondering is there someone else you recommend her going to as she isnt truly sure if this is a Kidney stone issue which was why she was in the ED yesterday or if this is more of a bowel issue. Please Advise she is in a lot of pain and just wants to know what to do. She was instructed by Dr. Calzada to return to the ED if symptoms got worse or if she has a fever.

## 2025-02-12 NOTE — PROGRESS NOTES
HPI:    Patient is a 59 y.o. female in no acute distress.  She is alert and oriented to person, place, and time.      11/2024 - Patient being seen here today as a new patient.  Patient was referred to us by AIXA ADAMES.  The patient was referred here for flank pain.  Patient has been having right sided lower abdominal pain as well.  Patient did have a recent hysterectomy.  She is having issues with bowel movements.  She does take Linzess.  Patient has never seen urology in the past.  She reports no history of stones.  She has had some intermittent gross hematuria.  She reports no current baseline lower urinary tract symptoms.  No nausea vomiting fevers recorded today.     12/17/24 - right ESWL    Today:  Patient is here after ER visit.  Patient was seen in the emergency room on 2/11/2025.  She presented with right sided flank pain x 3 to 4 days.  She did have a right ESWL in December.  She states that she had previously passed fragment of stone.  In the ER her creatinine was 1.1.  This is slightly worse than labs approximately 2 months prior.  White blood cell count was normal.  Patient did have significant blood in her urine.  CT scan abdomen pelvis with IV contrast was obtained showing a 8 mm right sided UPJ stone with associated hydronephrosis.  Patient also has a additional 8 mm right renal calculi.  She was started on Flomax and pain medicine.  She was told to follow-up in our office.      Past Medical History:   Diagnosis Date    Acute cystitis without hematuria 12/19/2024    Acute pulmonary embolism (HCC) 11/02/2015    Anxiety     Asthma     Constipation, chronic     COVID-19 09/2021    CPAP (continuous positive airway pressure) dependence     Diabetes mellitus (HCC)     pt states she was prediabetic at one point    Embolism - blood clot     right lung and right leg    Esophageal reflux     Factor V Leiden (HCC)     Heart murmur     HTN (hypertension)     Hyperlipidemia     Irritable bladder     Kidney

## 2025-02-12 NOTE — TELEPHONE ENCOUNTER
Patient is scheduled to have a right ureteroscopy/holmium laser lithotripsy with possible right stent placement/exchange procedure scheduled for 02/18/25.    Surgery requires the following medications ELIQUIS to be stopped prior to surgery Please advise on how many days anticoagulation should be stopped.    Please indicate if patient is cleared for surgery

## 2025-02-13 ENCOUNTER — APPOINTMENT (OUTPATIENT)
Dept: CT IMAGING | Age: 60
End: 2025-02-13
Payer: COMMERCIAL

## 2025-02-13 ENCOUNTER — HOSPITAL ENCOUNTER (EMERGENCY)
Age: 60
Discharge: HOME | End: 2025-02-14
Attending: EMERGENCY MEDICINE
Payer: COMMERCIAL

## 2025-02-13 DIAGNOSIS — N20.1 RIGHT URETERAL STONE: Primary | ICD-10-CM

## 2025-02-13 LAB
ALBUMIN SERPL-MCNC: 4 G/DL (ref 3.5–5.2)
ALBUMIN/GLOB SERPL: 1.2 {RATIO} (ref 1–2.5)
ALP SERPL-CCNC: 117 U/L (ref 35–104)
ALT SERPL-CCNC: 42 U/L (ref 10–35)
ANION GAP SERPL CALCULATED.3IONS-SCNC: 12 MMOL/L (ref 9–16)
AST SERPL-CCNC: 42 U/L (ref 10–35)
BACTERIA URNS QL MICRO: ABNORMAL
BASOPHILS # BLD: <0.03 K/UL (ref 0–0.2)
BASOPHILS NFR BLD: 0 % (ref 0–2)
BILIRUB DIRECT SERPL-MCNC: <0.2 MG/DL (ref 0–0.3)
BILIRUB INDIRECT SERPL-MCNC: ABNORMAL MG/DL (ref 0–1)
BILIRUB SERPL-MCNC: 0.3 MG/DL (ref 0–1.2)
BILIRUB UR QL STRIP: NEGATIVE
BUN SERPL-MCNC: 14 MG/DL (ref 6–20)
BUN/CREAT SERPL: 14 (ref 9–20)
CALCIUM SERPL-MCNC: 9.4 MG/DL (ref 8.6–10.4)
CHLORIDE SERPL-SCNC: 103 MMOL/L (ref 98–107)
CLARITY UR: CLEAR
CO2 SERPL-SCNC: 26 MMOL/L (ref 20–31)
COLOR UR: YELLOW
CREAT SERPL-MCNC: 1 MG/DL (ref 0.5–0.9)
EOSINOPHIL # BLD: 0.16 K/UL (ref 0–0.44)
EOSINOPHILS RELATIVE PERCENT: 2 % (ref 1–4)
EPI CELLS #/AREA URNS HPF: ABNORMAL /HPF (ref 0–25)
ERYTHROCYTE [DISTWIDTH] IN BLOOD BY AUTOMATED COUNT: 13.2 % (ref 11.8–14.4)
GFR, ESTIMATED: 67 ML/MIN/1.73M2
GLUCOSE SERPL-MCNC: 184 MG/DL (ref 74–99)
GLUCOSE UR STRIP-MCNC: NEGATIVE MG/DL
HCT VFR BLD AUTO: 44.1 % (ref 36.3–47.1)
HGB BLD-MCNC: 15.5 G/DL (ref 11.9–15.1)
HGB UR QL STRIP.AUTO: ABNORMAL
IMM GRANULOCYTES # BLD AUTO: <0.03 K/UL (ref 0–0.3)
IMM GRANULOCYTES NFR BLD: 0 %
KETONES UR STRIP-MCNC: NEGATIVE MG/DL
LACTATE BLDV-SCNC: 2.2 MMOL/L (ref 0.5–1.9)
LEUKOCYTE ESTERASE UR QL STRIP: NEGATIVE
LIPASE SERPL-CCNC: 62 U/L (ref 13–60)
LYMPHOCYTES NFR BLD: 4.49 K/UL (ref 1.1–3.7)
LYMPHOCYTES RELATIVE PERCENT: 49 % (ref 24–43)
MCH RBC QN AUTO: 30.2 PG (ref 25.2–33.5)
MCHC RBC AUTO-ENTMCNC: 35.1 G/DL (ref 28.4–34.8)
MCV RBC AUTO: 86 FL (ref 82.6–102.9)
MONOCYTES NFR BLD: 0.61 K/UL (ref 0.1–1.2)
MONOCYTES NFR BLD: 7 % (ref 3–12)
NEUTROPHILS NFR BLD: 42 % (ref 36–65)
NEUTS SEG NFR BLD: 3.87 K/UL (ref 1.5–8.1)
NITRITE UR QL STRIP: NEGATIVE
NRBC BLD-RTO: 0 PER 100 WBC
PH UR STRIP: 6 [PH] (ref 5–9)
PLATELET # BLD AUTO: 242 K/UL (ref 138–453)
PMV BLD AUTO: 10.3 FL (ref 8.1–13.5)
POTASSIUM SERPL-SCNC: 4 MMOL/L (ref 3.7–5.3)
PROT SERPL-MCNC: 7.4 G/DL (ref 6.6–8.7)
PROT UR STRIP-MCNC: NEGATIVE MG/DL
RBC # BLD AUTO: 5.13 M/UL (ref 3.95–5.11)
RBC #/AREA URNS HPF: ABNORMAL /HPF (ref 0–2)
SODIUM SERPL-SCNC: 141 MMOL/L (ref 136–145)
SP GR UR STRIP: 1.01 (ref 1.01–1.02)
UROBILINOGEN UR STRIP-ACNC: NORMAL EU/DL (ref 0–1)
WBC #/AREA URNS HPF: ABNORMAL /HPF (ref 0–5)
WBC OTHER # BLD: 9.2 K/UL (ref 3.5–11.3)

## 2025-02-13 PROCEDURE — 83605 ASSAY OF LACTIC ACID: CPT

## 2025-02-13 PROCEDURE — 2580000003 HC RX 258: Performed by: EMERGENCY MEDICINE

## 2025-02-13 PROCEDURE — 36415 COLL VENOUS BLD VENIPUNCTURE: CPT

## 2025-02-13 PROCEDURE — 83690 ASSAY OF LIPASE: CPT

## 2025-02-13 PROCEDURE — 81001 URINALYSIS AUTO W/SCOPE: CPT

## 2025-02-13 PROCEDURE — 96374 THER/PROPH/DIAG INJ IV PUSH: CPT

## 2025-02-13 PROCEDURE — 6360000002 HC RX W HCPCS: Performed by: EMERGENCY MEDICINE

## 2025-02-13 PROCEDURE — 80048 BASIC METABOLIC PNL TOTAL CA: CPT

## 2025-02-13 PROCEDURE — 85025 COMPLETE CBC W/AUTO DIFF WBC: CPT

## 2025-02-13 PROCEDURE — 99284 EMERGENCY DEPT VISIT MOD MDM: CPT

## 2025-02-13 PROCEDURE — 96375 TX/PRO/DX INJ NEW DRUG ADDON: CPT

## 2025-02-13 PROCEDURE — 74176 CT ABD & PELVIS W/O CONTRAST: CPT

## 2025-02-13 PROCEDURE — 80076 HEPATIC FUNCTION PANEL: CPT

## 2025-02-13 RX ORDER — ONDANSETRON 2 MG/ML
4 INJECTION INTRAMUSCULAR; INTRAVENOUS ONCE
Status: COMPLETED | OUTPATIENT
Start: 2025-02-13 | End: 2025-02-13

## 2025-02-13 RX ORDER — KETOROLAC TROMETHAMINE 15 MG/ML
15 INJECTION, SOLUTION INTRAMUSCULAR; INTRAVENOUS ONCE
Status: COMPLETED | OUTPATIENT
Start: 2025-02-13 | End: 2025-02-13

## 2025-02-13 RX ORDER — 0.9 % SODIUM CHLORIDE 0.9 %
1000 INTRAVENOUS SOLUTION INTRAVENOUS ONCE
Status: COMPLETED | OUTPATIENT
Start: 2025-02-13 | End: 2025-02-14

## 2025-02-13 RX ADMIN — SODIUM CHLORIDE 1000 ML: 9 INJECTION, SOLUTION INTRAVENOUS at 21:47

## 2025-02-13 RX ADMIN — ONDANSETRON 4 MG: 2 INJECTION, SOLUTION INTRAMUSCULAR; INTRAVENOUS at 21:46

## 2025-02-13 RX ADMIN — KETOROLAC TROMETHAMINE 15 MG: 15 INJECTION, SOLUTION INTRAMUSCULAR; INTRAVENOUS at 21:47

## 2025-02-13 ASSESSMENT — PAIN DESCRIPTION - ORIENTATION: ORIENTATION: RIGHT

## 2025-02-13 ASSESSMENT — PAIN DESCRIPTION - DESCRIPTORS: DESCRIPTORS: SHARP

## 2025-02-13 ASSESSMENT — PAIN SCALES - GENERAL: PAINLEVEL_OUTOF10: 8

## 2025-02-13 ASSESSMENT — PAIN - FUNCTIONAL ASSESSMENT: PAIN_FUNCTIONAL_ASSESSMENT: 0-10

## 2025-02-13 ASSESSMENT — PAIN DESCRIPTION - LOCATION: LOCATION: FLANK

## 2025-02-13 NOTE — TELEPHONE ENCOUNTER
Hello, I reviewed the imaging/labs, and chart. I do believe that the current issue is the stones which are still present. The abdominal pain is likely from this - and need to likely be removed for there to be resolution.    Thank you.  Electronically signed by Maximino Hansen MD on 2/12/2025 at 7:59 PM      \"   IMPRESSION:  1. 6 mm stone at the right ureteropelvic junction with mild to moderate  associated hydronephrosis and a delayed right nephrogram.  2. 8 mm stone at the lower pole of the right kidney.  3. Hepatic steatosis.\"    I

## 2025-02-14 ENCOUNTER — DIRECT ADMIT ORDERS (OUTPATIENT)
Dept: INTERNAL MEDICINE CLINIC | Age: 60
End: 2025-02-14

## 2025-02-14 VITALS
RESPIRATION RATE: 16 BRPM | HEART RATE: 85 BPM | SYSTOLIC BLOOD PRESSURE: 131 MMHG | OXYGEN SATURATION: 98 % | DIASTOLIC BLOOD PRESSURE: 68 MMHG | TEMPERATURE: 98 F

## 2025-02-14 LAB — LACTATE BLDV-SCNC: 1.6 MMOL/L (ref 0.5–1.9)

## 2025-02-14 PROCEDURE — 2580000003 HC RX 258

## 2025-02-14 PROCEDURE — 6360000002 HC RX W HCPCS: Performed by: EMERGENCY MEDICINE

## 2025-02-14 PROCEDURE — 6360000002 HC RX W HCPCS

## 2025-02-14 PROCEDURE — 6370000000 HC RX 637 (ALT 250 FOR IP)

## 2025-02-14 PROCEDURE — 83605 ASSAY OF LACTIC ACID: CPT

## 2025-02-14 PROCEDURE — 96376 TX/PRO/DX INJ SAME DRUG ADON: CPT

## 2025-02-14 PROCEDURE — 2500000003 HC RX 250 WO HCPCS: Performed by: EMERGENCY MEDICINE

## 2025-02-14 PROCEDURE — 96375 TX/PRO/DX INJ NEW DRUG ADDON: CPT

## 2025-02-14 RX ORDER — DEXTROSE MONOHYDRATE 100 MG/ML
INJECTION, SOLUTION INTRAVENOUS CONTINUOUS PRN
OUTPATIENT
Start: 2025-02-14

## 2025-02-14 RX ORDER — KETOROLAC TROMETHAMINE 10 MG/1
10 TABLET, FILM COATED ORAL EVERY 6 HOURS PRN
Qty: 20 TABLET | Refills: 0 | Status: SHIPPED | OUTPATIENT
Start: 2025-02-14 | End: 2025-02-19

## 2025-02-14 RX ORDER — INSULIN LISPRO 100 [IU]/ML
0-4 INJECTION, SOLUTION INTRAVENOUS; SUBCUTANEOUS
OUTPATIENT
Start: 2025-02-14

## 2025-02-14 RX ORDER — MORPHINE SULFATE 4 MG/ML
4 INJECTION, SOLUTION INTRAMUSCULAR; INTRAVENOUS
OUTPATIENT
Start: 2025-02-14

## 2025-02-14 RX ORDER — METOPROLOL SUCCINATE 25 MG/1
25 TABLET, EXTENDED RELEASE ORAL DAILY
Status: DISCONTINUED | OUTPATIENT
Start: 2025-02-14 | End: 2025-02-14 | Stop reason: HOSPADM

## 2025-02-14 RX ORDER — ONDANSETRON 2 MG/ML
4 INJECTION INTRAMUSCULAR; INTRAVENOUS EVERY 6 HOURS PRN
OUTPATIENT
Start: 2025-02-14

## 2025-02-14 RX ORDER — SODIUM CHLORIDE 9 MG/ML
INJECTION, SOLUTION INTRAVENOUS PRN
OUTPATIENT
Start: 2025-02-14

## 2025-02-14 RX ORDER — CEFDINIR 300 MG/1
300 CAPSULE ORAL 2 TIMES DAILY
Qty: 14 CAPSULE | Refills: 0 | Status: SHIPPED | OUTPATIENT
Start: 2025-02-14 | End: 2025-02-21

## 2025-02-14 RX ORDER — ONDANSETRON 4 MG/1
4 TABLET, ORALLY DISINTEGRATING ORAL EVERY 8 HOURS PRN
OUTPATIENT
Start: 2025-02-14

## 2025-02-14 RX ORDER — TAMSULOSIN HYDROCHLORIDE 0.4 MG/1
0.4 CAPSULE ORAL DAILY
Qty: 30 CAPSULE | Refills: 0 | Status: SHIPPED | OUTPATIENT
Start: 2025-02-14

## 2025-02-14 RX ORDER — SODIUM CHLORIDE 0.9 % (FLUSH) 0.9 %
5-40 SYRINGE (ML) INJECTION EVERY 12 HOURS SCHEDULED
OUTPATIENT
Start: 2025-02-14

## 2025-02-14 RX ORDER — GLUCAGON 1 MG/ML
1 KIT INJECTION PRN
OUTPATIENT
Start: 2025-02-14

## 2025-02-14 RX ORDER — SODIUM CHLORIDE 0.9 % (FLUSH) 0.9 %
5-40 SYRINGE (ML) INJECTION PRN
OUTPATIENT
Start: 2025-02-14

## 2025-02-14 RX ORDER — KETOROLAC TROMETHAMINE 15 MG/ML
15 INJECTION, SOLUTION INTRAMUSCULAR; INTRAVENOUS ONCE
Status: COMPLETED | OUTPATIENT
Start: 2025-02-14 | End: 2025-02-14

## 2025-02-14 RX ORDER — POLYETHYLENE GLYCOL 3350 17 G/17G
17 POWDER, FOR SOLUTION ORAL DAILY PRN
OUTPATIENT
Start: 2025-02-14

## 2025-02-14 RX ORDER — SODIUM CHLORIDE 9 MG/ML
INJECTION, SOLUTION INTRAVENOUS CONTINUOUS
Status: DISCONTINUED | OUTPATIENT
Start: 2025-02-14 | End: 2025-02-14 | Stop reason: HOSPADM

## 2025-02-14 RX ORDER — TAMSULOSIN HYDROCHLORIDE 0.4 MG/1
0.4 CAPSULE ORAL DAILY
OUTPATIENT
Start: 2025-02-14

## 2025-02-14 RX ADMIN — SODIUM CHLORIDE: 0.9 INJECTION, SOLUTION INTRAVENOUS at 07:44

## 2025-02-14 RX ADMIN — KETOROLAC TROMETHAMINE 15 MG: 15 INJECTION, SOLUTION INTRAMUSCULAR; INTRAVENOUS at 07:41

## 2025-02-14 RX ADMIN — WATER 1000 MG: 1 INJECTION INTRAMUSCULAR; INTRAVENOUS; SUBCUTANEOUS at 00:32

## 2025-02-14 RX ADMIN — KETOROLAC TROMETHAMINE 15 MG: 15 INJECTION, SOLUTION INTRAMUSCULAR; INTRAVENOUS at 17:27

## 2025-02-14 RX ADMIN — METOPROLOL SUCCINATE 25 MG: 25 TABLET, EXTENDED RELEASE ORAL at 13:52

## 2025-02-14 NOTE — DISCHARGE INSTRUCTIONS
CT ABDOMEN PELVIS WO CONTRAST Additional Contrast? None   Final Result   1. 8 mm calculus in the mid to distal right ureter that appears to have   migrated from the right ureteropelvic junction. Mild right hydronephrosis and   hydroureter with trace right perinephric fat stranding.   2. Stable nonobstructive 8 mm calculus in the lower pole of the right kidney.

## 2025-02-14 NOTE — ED PROVIDER NOTES
EMERGENCY DEPARTMENT ENCOUNTER    Pt Name: Tess Echeverria  MRN: [id number]  Birthdate 1965  Date of evaluation: 2/13/25  CHIEF COMPLAINT       Chief Complaint   Patient presents with    Flank Pain     Right side. Pt. Reports known kidney stone, pt. States in Dr. CHINCHILLA's office & unable to get stented at that time. Pt. Reports laser supposed to be next week but pain uncontrolled at this time.     HISTORY OF PRESENT ILLNESS   This is a 59-year-old female who presents with complaints of right-sided flank pain.  Patient has a known kidney stone, is scheduled on Tuesday for holmium laser and stenting.  She states her pain has been worsening over the past 24 hours and she came in for further evaluation.  Patient denies any stool changes, she denies any dysuria.  She describes her symptoms as severe.           REVIEW OF SYSTEMS     Review of Systems  PASTMEDICAL HISTORY     Past Medical History:   Diagnosis Date    Acute cystitis without hematuria 12/19/2024    Acute pulmonary embolism (HCC) 11/02/2015    Anxiety     Asthma     Constipation, chronic     COVID-19 09/2021    CPAP (continuous positive airway pressure) dependence     Diabetes mellitus (HCC)     pt states she was prediabetic at one point    Embolism - blood clot     right lung and right leg    Esophageal reflux     Factor V Leiden ([facility])     Heart murmur     HTN (hypertension)     Hyperlipidemia     Irritable bladder     Kidney stone     MTHFR mutation     MVP (mitral valve prolapse)     Palpitations     Pulmonary embolism ([facility]) 10/15/2012    Rhinitis, allergic 10/15/2012    Right flank pain 12/18/2024    Sleep apnea     Venous thromboembolism 05/02/2016     Past Problem List  Patient Active Problem List   Diagnosis Code    Bronchial asthma J45.909    Obesity E66.9    Thrombophilia ([facility]) D68.59    Hyperlipidemia E78.5    Sleep apnea, obstructive G47.33    DVT, lower extremity, distal, chronic ([facility]) I82.5Z9    Essential hypertension I10    Acute pulmonary embolism

## 2025-02-14 NOTE — ED NOTES
Contacted [facility] requesting update on bed assignment.  Will call bed board to see if they can find out any more details.

## 2025-02-14 NOTE — ED PROVIDER NOTES
[facility]  EMERGENCY DEPARTMENT TRANSITION OF CARE    Pt Name: Tess Echeverria  MRN: [id number]  Birthdate 1965  Date of evaluation: 2/13/2025  Provider: Marisel Denson MD    Care was assumed from Dr. Kidd at 7am.    SUMMARY OF CARE   59-year-old female who presents with complaints of right-sided flank pain. Patient has a known kidney stone, is scheduled on Tuesday for holmium laser and stenting. She states her pain has been worsening over the past 24 hours and she came in for further evaluation. Patient denies any stool changes, she denies any dysuria. She describes her symptoms as severe.  Patient was found to have a 8 mm stone today distal right ureter and 8 mm stone to the lower right kidney.  Labs were largely unremarkable with normal kidney function normal electrolytes no leukocytosis urinalysis negative for UTI patient was given Rocephin IV fluids, Toradol and Zofran.  Her pain has been controlled while she waits for transfer to [facility]        PLAN AS DISCUSSED WITH PRIOR CLINICIAN   Continue to monitor pending transfer      MY PHYSICAL EXAM       Physical Exam  Constitutional:       General: She is not in acute distress.     Appearance: Normal appearance. She is normal weight. She is not ill-appearing, toxic-appearing or diaphoretic.   HENT:      Head: Normocephalic and atraumatic.      Right Ear: External ear normal.      Left Ear: External ear normal.      Nose: Nose normal. No congestion or rhinorrhea.      Mouth/Throat:      Mouth: Mucous membranes are moist.      Pharynx: Oropharynx is clear. No oropharyngeal exudate or posterior oropharyngeal erythema.   Eyes:      Conjunctiva/sclera: Conjunctivae normal.      Pupils: Pupils are equal, round, and reactive to light.   Cardiovascular:      Rate and Rhythm: Normal rate and regular rhythm.      Pulses: Normal pulses.      Heart sounds: Normal heart sounds.   Pulmonary:      Effort: Pulmonary effort is normal.      Breath sounds: Normal

## 2025-02-14 NOTE — ED NOTES
[facility] called stating no bed at this time at [facility] for pt.  Writer asked for any kind of time frame.  [facility] stated they are hoping for some discharges later tonight but that was all the information that could be secured.

## 2025-02-14 NOTE — ED NOTES
Received call from [facility] stating no bed is available at this time at [facility] for patient

## 2025-02-14 NOTE — PROGRESS NOTES
Spiritual Services Interventions  04/04 2/14/2025  Jamir TIMMONS Jacki  59 y.o. year old female    Encounter Summary  Encounter Overview/Reason: (P) Spiritual/Emotional Needs  Service Provided For: (P) Patient  Referral/Consult From: (P) Rounding  Support System: (P) Spouse, Children  Last Encounter : (P) 02/14/25  Complexity of Encounter: (P) Moderate  Begin Time: (P) 0830  End Time : (P) 0845  Total Time Calculated: (P) 15 min     Spiritual/Emotional needs  Type: (P) Spiritual Support                    Assessment/Intervention/Outcome  Assessment: (P) Calm  Intervention: (P) Discussed illness injury and it’s impact, Active listening, Prayer (assurance of)/[place]  Outcome: (P) Encouraged, Engaged in conversation

## 2025-02-17 ENCOUNTER — TELEPHONE (OUTPATIENT)
Dept: UROLOGY | Age: 60
End: 2025-02-17

## 2025-02-17 ENCOUNTER — ANESTHESIA EVENT (OUTPATIENT)
Dept: OPERATING ROOM | Age: 60
End: 2025-02-17
Payer: COMMERCIAL

## 2025-02-17 ENCOUNTER — CARE COORDINATION (OUTPATIENT)
Dept: OTHER | Facility: CLINIC | Age: 60
End: 2025-02-17

## 2025-02-17 NOTE — TELEPHONE ENCOUNTER
Writer calls Tess to clarify. Yes she IS having surgery tomorrow 2/18/25 in [place]. She said [facility]'s did not have a bed for her.

## 2025-02-17 NOTE — TELEPHONE ENCOUNTER
Please call patient, we did see that she was in the emergency room.  Did she end up going to a tertiary care facility to have stone treatment? If not, we will plan on surgery on Tuesday.

## 2025-02-18 ENCOUNTER — ANESTHESIA (OUTPATIENT)
Dept: OPERATING ROOM | Age: 60
End: 2025-02-18
Payer: COMMERCIAL

## 2025-02-18 ENCOUNTER — APPOINTMENT (OUTPATIENT)
Dept: GENERAL RADIOLOGY | Age: 60
End: 2025-02-18
Attending: UROLOGY
Payer: COMMERCIAL

## 2025-02-18 ENCOUNTER — HOSPITAL ENCOUNTER (OUTPATIENT)
Age: 60
Setting detail: OUTPATIENT SURGERY
Discharge: HOME | End: 2025-02-18
Attending: UROLOGY | Admitting: UROLOGY
Payer: COMMERCIAL

## 2025-02-18 VITALS
DIASTOLIC BLOOD PRESSURE: 68 MMHG | OXYGEN SATURATION: 94 % | HEART RATE: 78 BPM | RESPIRATION RATE: 16 BRPM | WEIGHT: 281 LBS | SYSTOLIC BLOOD PRESSURE: 148 MMHG | TEMPERATURE: 97.3 F | BODY MASS INDEX: 45.35 KG/M2

## 2025-02-18 DIAGNOSIS — N20.1 URETERAL STONE: ICD-10-CM

## 2025-02-18 PROCEDURE — 7100000010 HC PHASE II RECOVERY - FIRST 15 MIN: Performed by: UROLOGY

## 2025-02-18 PROCEDURE — 7100000011 HC PHASE II RECOVERY - ADDTL 15 MIN: Performed by: UROLOGY

## 2025-02-18 PROCEDURE — 2709999900 HC NON-CHARGEABLE SUPPLY: Performed by: UROLOGY

## 2025-02-18 PROCEDURE — 2500000003 HC RX 250 WO HCPCS: Performed by: NURSE ANESTHETIST, CERTIFIED REGISTERED

## 2025-02-18 PROCEDURE — 6370000000 HC RX 637 (ALT 250 FOR IP): Performed by: NURSE ANESTHETIST, CERTIFIED REGISTERED

## 2025-02-18 PROCEDURE — 7100000001 HC PACU RECOVERY - ADDTL 15 MIN: Performed by: UROLOGY

## 2025-02-18 PROCEDURE — C2617 STENT, NON-COR, TEM W/O DEL: HCPCS | Performed by: UROLOGY

## 2025-02-18 PROCEDURE — C1747 HC ENDOSCOPE, SINGLE, URINARY TRACT: HCPCS | Performed by: UROLOGY

## 2025-02-18 PROCEDURE — 6360000002 HC RX W HCPCS: Performed by: UROLOGY

## 2025-02-18 PROCEDURE — 7100000000 HC PACU RECOVERY - FIRST 15 MIN: Performed by: UROLOGY

## 2025-02-18 PROCEDURE — 6360000002 HC RX W HCPCS: Performed by: NURSE ANESTHETIST, CERTIFIED REGISTERED

## 2025-02-18 PROCEDURE — 3700000000 HC ANESTHESIA ATTENDED CARE: Performed by: UROLOGY

## 2025-02-18 PROCEDURE — 3600000004 HC SURGERY LEVEL 4 BASE: Performed by: UROLOGY

## 2025-02-18 PROCEDURE — 2580000003 HC RX 258: Performed by: NURSE ANESTHETIST, CERTIFIED REGISTERED

## 2025-02-18 PROCEDURE — C1769 GUIDE WIRE: HCPCS | Performed by: UROLOGY

## 2025-02-18 PROCEDURE — 6370000000 HC RX 637 (ALT 250 FOR IP): Performed by: UROLOGY

## 2025-02-18 PROCEDURE — 82365 CALCULUS SPECTROSCOPY: CPT

## 2025-02-18 PROCEDURE — 2720000010 HC SURG SUPPLY STERILE: Performed by: UROLOGY

## 2025-02-18 PROCEDURE — 3700000001 HC ADD 15 MINUTES (ANESTHESIA): Performed by: UROLOGY

## 2025-02-18 PROCEDURE — 3600000014 HC SURGERY LEVEL 4 ADDTL 15MIN: Performed by: UROLOGY

## 2025-02-18 DEVICE — URETERAL STENT
Type: IMPLANTABLE DEVICE | Site: URETER | Status: FUNCTIONAL
Brand: PERCUFLEX™ PLUS

## 2025-02-18 RX ORDER — FENTANYL CITRATE 50 UG/ML
INJECTION, SOLUTION INTRAMUSCULAR; INTRAVENOUS
Status: DISCONTINUED | OUTPATIENT
Start: 2025-02-18 | End: 2025-02-18 | Stop reason: SDUPTHER

## 2025-02-18 RX ORDER — SODIUM CHLORIDE 9 MG/ML
INJECTION, SOLUTION INTRAVENOUS CONTINUOUS
Status: DISCONTINUED | OUTPATIENT
Start: 2025-02-18 | End: 2025-02-18 | Stop reason: HOSPADM

## 2025-02-18 RX ORDER — SODIUM CHLORIDE 0.9 % (FLUSH) 0.9 %
5-40 SYRINGE (ML) INJECTION EVERY 12 HOURS SCHEDULED
Status: DISCONTINUED | OUTPATIENT
Start: 2025-02-18 | End: 2025-02-18 | Stop reason: HOSPADM

## 2025-02-18 RX ORDER — PROPOFOL 10 MG/ML
INJECTION, EMULSION INTRAVENOUS
Status: DISCONTINUED | OUTPATIENT
Start: 2025-02-18 | End: 2025-02-18 | Stop reason: SDUPTHER

## 2025-02-18 RX ORDER — METOCLOPRAMIDE HYDROCHLORIDE 5 MG/ML
5 INJECTION INTRAMUSCULAR; INTRAVENOUS ONCE
Status: COMPLETED | OUTPATIENT
Start: 2025-02-18 | End: 2025-02-18

## 2025-02-18 RX ORDER — NALOXONE HYDROCHLORIDE 0.4 MG/ML
INJECTION, SOLUTION INTRAMUSCULAR; INTRAVENOUS; SUBCUTANEOUS PRN
Status: DISCONTINUED | OUTPATIENT
Start: 2025-02-18 | End: 2025-02-18 | Stop reason: HOSPADM

## 2025-02-18 RX ORDER — DEXAMETHASONE SODIUM PHOSPHATE 4 MG/ML
INJECTION, SOLUTION INTRA-ARTICULAR; INTRALESIONAL; INTRAMUSCULAR; INTRAVENOUS; SOFT TISSUE
Status: DISCONTINUED | OUTPATIENT
Start: 2025-02-18 | End: 2025-02-18 | Stop reason: SDUPTHER

## 2025-02-18 RX ORDER — LIDOCAINE HYDROCHLORIDE 20 MG/ML
JELLY TOPICAL PRN
Status: DISCONTINUED | OUTPATIENT
Start: 2025-02-18 | End: 2025-02-18 | Stop reason: ALTCHOICE

## 2025-02-18 RX ORDER — SODIUM CHLORIDE 9 MG/ML
INJECTION, SOLUTION INTRAVENOUS PRN
Status: DISCONTINUED | OUTPATIENT
Start: 2025-02-18 | End: 2025-02-18 | Stop reason: HOSPADM

## 2025-02-18 RX ORDER — ONDANSETRON 2 MG/ML
4 INJECTION INTRAMUSCULAR; INTRAVENOUS
Status: DISCONTINUED | OUTPATIENT
Start: 2025-02-18 | End: 2025-02-18 | Stop reason: HOSPADM

## 2025-02-18 RX ORDER — OXYCODONE HYDROCHLORIDE 5 MG/1
5 TABLET ORAL
Status: COMPLETED | OUTPATIENT
Start: 2025-02-18 | End: 2025-02-18

## 2025-02-18 RX ORDER — SODIUM CHLORIDE 0.9 % (FLUSH) 0.9 %
5-40 SYRINGE (ML) INJECTION PRN
Status: DISCONTINUED | OUTPATIENT
Start: 2025-02-18 | End: 2025-02-18 | Stop reason: HOSPADM

## 2025-02-18 RX ORDER — ACETAMINOPHEN 325 MG/1
650 TABLET ORAL ONCE
Status: COMPLETED | OUTPATIENT
Start: 2025-02-18 | End: 2025-02-18

## 2025-02-18 RX ORDER — DIMENHYDRINATE 50 MG
50 TABLET ORAL ONCE
Status: COMPLETED | OUTPATIENT
Start: 2025-02-18 | End: 2025-02-18

## 2025-02-18 RX ORDER — EPHEDRINE SULFATE/0.9% NACL/PF 25 MG/5 ML
SYRINGE (ML) INTRAVENOUS
Status: DISCONTINUED | OUTPATIENT
Start: 2025-02-18 | End: 2025-02-18 | Stop reason: SDUPTHER

## 2025-02-18 RX ORDER — FENTANYL CITRATE 50 UG/ML
25 INJECTION, SOLUTION INTRAMUSCULAR; INTRAVENOUS EVERY 5 MIN PRN
Status: DISCONTINUED | OUTPATIENT
Start: 2025-02-18 | End: 2025-02-18 | Stop reason: HOSPADM

## 2025-02-18 RX ORDER — FENTANYL CITRATE 50 UG/ML
50 INJECTION, SOLUTION INTRAMUSCULAR; INTRAVENOUS EVERY 5 MIN PRN
Status: DISCONTINUED | OUTPATIENT
Start: 2025-02-18 | End: 2025-02-18 | Stop reason: HOSPADM

## 2025-02-18 RX ORDER — LIDOCAINE HYDROCHLORIDE 20 MG/ML
INJECTION, SOLUTION EPIDURAL; INFILTRATION; INTRACAUDAL; PERINEURAL
Status: DISCONTINUED | OUTPATIENT
Start: 2025-02-18 | End: 2025-02-18 | Stop reason: SDUPTHER

## 2025-02-18 RX ORDER — ONDANSETRON 2 MG/ML
INJECTION INTRAMUSCULAR; INTRAVENOUS
Status: DISCONTINUED | OUTPATIENT
Start: 2025-02-18 | End: 2025-02-18 | Stop reason: SDUPTHER

## 2025-02-18 RX ORDER — MIDAZOLAM HYDROCHLORIDE 1 MG/ML
INJECTION, SOLUTION INTRAMUSCULAR; INTRAVENOUS
Status: DISCONTINUED | OUTPATIENT
Start: 2025-02-18 | End: 2025-02-18 | Stop reason: SDUPTHER

## 2025-02-18 RX ADMIN — OXYCODONE HYDROCHLORIDE 5 MG: 5 TABLET ORAL at 15:26

## 2025-02-18 RX ADMIN — FENTANYL CITRATE 25 MCG: 50 INJECTION INTRAMUSCULAR; INTRAVENOUS at 13:17

## 2025-02-18 RX ADMIN — PROPOFOL 30 MG: 10 INJECTION, EMULSION INTRAVENOUS at 13:30

## 2025-02-18 RX ADMIN — SODIUM CHLORIDE: 9 INJECTION, SOLUTION INTRAVENOUS at 12:07

## 2025-02-18 RX ADMIN — FENTANYL CITRATE 50 MCG: 50 INJECTION INTRAMUSCULAR; INTRAVENOUS at 13:02

## 2025-02-18 RX ADMIN — MIDAZOLAM 1 MG: 1 INJECTION INTRAMUSCULAR; INTRAVENOUS at 13:00

## 2025-02-18 RX ADMIN — ONDANSETRON 4 MG: 2 INJECTION, SOLUTION INTRAMUSCULAR; INTRAVENOUS at 13:09

## 2025-02-18 RX ADMIN — PROPOFOL 150 MG: 10 INJECTION, EMULSION INTRAVENOUS at 13:02

## 2025-02-18 RX ADMIN — LIDOCAINE HYDROCHLORIDE 100 MG: 20 INJECTION, SOLUTION EPIDURAL; INFILTRATION; INTRACAUDAL; PERINEURAL at 13:02

## 2025-02-18 RX ADMIN — METOCLOPRAMIDE 5 MG: 5 INJECTION, SOLUTION INTRAMUSCULAR; INTRAVENOUS at 12:06

## 2025-02-18 RX ADMIN — FENTANYL CITRATE 25 MCG: 50 INJECTION INTRAMUSCULAR; INTRAVENOUS at 13:30

## 2025-02-18 RX ADMIN — DIMENHYDRINATE 50 MG: 50 TABLET ORAL at 11:56

## 2025-02-18 RX ADMIN — EPHEDRINE SULFATE 10 MG: 5 INJECTION INTRAVENOUS at 13:24

## 2025-02-18 RX ADMIN — Medication 3000 MG: at 13:14

## 2025-02-18 RX ADMIN — DEXAMETHASONE SODIUM PHOSPHATE 4 MG: 4 INJECTION INTRA-ARTICULAR; INTRALESIONAL; INTRAMUSCULAR; INTRAVENOUS; SOFT TISSUE at 13:09

## 2025-02-18 RX ADMIN — ACETAMINOPHEN 650 MG: 325 TABLET ORAL at 11:56

## 2025-02-18 RX ADMIN — EPHEDRINE SULFATE 10 MG: 5 INJECTION INTRAVENOUS at 13:22

## 2025-02-18 ASSESSMENT — PAIN SCALES - GENERAL
PAINLEVEL_OUTOF10: 3
PAINLEVEL_OUTOF10: 5
PAINLEVEL_OUTOF10: 3
PAINLEVEL_OUTOF10: 4
PAINLEVEL_OUTOF10: 5
PAINLEVEL_OUTOF10: 3

## 2025-02-18 ASSESSMENT — PAIN DESCRIPTION - ORIENTATION: ORIENTATION: RIGHT

## 2025-02-18 ASSESSMENT — PAIN DESCRIPTION - DESCRIPTORS: DESCRIPTORS: CRAMPING

## 2025-02-18 ASSESSMENT — PAIN DESCRIPTION - LOCATION: LOCATION: FLANK

## 2025-02-18 ASSESSMENT — PAIN - FUNCTIONAL ASSESSMENT: PAIN_FUNCTIONAL_ASSESSMENT: 0-10

## 2025-02-18 ASSESSMENT — PAIN DESCRIPTION - PAIN TYPE: TYPE: SURGICAL PAIN

## 2025-02-18 NOTE — H&P
History and Physical    Patient:  Tess Echeverria  MRN: [id number]    CHIEF COMPLAINT:  right flank pain    HISTORY OF PRESENT ILLNESS:   The patient is a 59 y.o. female who presents with right flank pain. Patient is here after ER visit.  Patient was seen in the emergency room on 2/11/2025.  She presented with right sided flank pain x 3 to 4 days.  She did have a right ESWL in December.  She states that she had previously passed fragment of stone.  In the ER her creatinine was 1.1.  This is slightly worse than labs approximately 2 months prior.  White blood cell count was normal.  Patient did have significant blood in her urine.  CT scan abdomen pelvis with IV contrast was obtained showing a 8 mm right sided UPJ stone with associated hydronephrosis.  Patient also has a additional 8 mm right renal calculi.  She was started on Flomax and pain medicine.  She was told to follow-up in our office.     Past Medical History:    Past Medical History:   Diagnosis Date    Acute cystitis without hematuria 12/19/2024    Acute pulmonary embolism (HCC) 11/02/2015    Anxiety     Asthma     Constipation, chronic     COVID-19 09/2021    CPAP (continuous positive airway pressure) dependence     Diabetes mellitus (HCC)     pt states she was prediabetic at one point    Embolism - blood clot     right lung and right leg    Esophageal reflux     Factor V Leiden (HCC)     Heart murmur     HTN (hypertension)     Hyperlipidemia     Irritable bladder     Kidney stone     MTHFR mutation     MVP (mitral valve prolapse)     Palpitations     Pulmonary embolism (HCC) 10/15/2012    Rhinitis, allergic 10/15/2012    Right flank pain 12/18/2024    Sleep apnea     Venous thromboembolism 05/02/2016       Past Surgical History:    Past Surgical History:   Procedure Laterality Date    CATARACT REMOVAL WITH IMPLANT Left 04/02/2018    CHOLECYSTECTOMY      around 2000    COLONOSCOPY  07/12/2021    COLONOSCOPY N/A 7/12/2021    COLONOSCOPY POLYPECTOMY CECUM COLD

## 2025-02-18 NOTE — ANESTHESIA PRE PROCEDURE
Department of Anesthesiology  Preprocedure Note       Name:  Tess Echeverria   Age:  59 y.o.  :  1965                                          MRN:  [id number]         Date:  2025      Surgeon: Surgeon(s):  Rosalio Calzada MD    Procedure: Procedure(s):  CYSTOSCOPY URETEROSCOPY LASER- right ureteroscopy/holmium laser lithotripsy with possible right ureteral stent placement/exchange    Medications prior to admission:   Prior to Admission medications    Medication Sig Start Date End Date Taking? Authorizing Provider   cefdinir (OMNICEF) 300 MG capsule Take 1 capsule by mouth 2 times daily for 7 days 25 Yes Marisel Denson MD   ketorolac (TORADOL) 10 MG tablet Take 1 tablet by mouth every 6 hours as needed for Pain Patient received IV/IM dose of ketorolac in ED without adverse effect 25 Yes Marisel Denson MD   ketorolac (TORADOL) 10 MG tablet Take 1 tablet by mouth every 6 hours as needed for Pain 25  Yes Sahara Coppola PA-C   HYDROcodone-acetaminophen (NORCO) 5-325 MG per tablet Take 1 tablet by mouth every 4 hours as needed for Pain for up to 7 days. Intended supply: 7 days. Take lowest dose possible to manage pain Max Daily Amount: 6 tablets 25 Yes Sahara Coppola PA-C   tamsulosin (FLOMAX) 0.4 MG capsule Take 1 capsule by mouth daily for 7 days 25 Yes Sahara Coppola PA-C   pantoprazole (PROTONIX) 40 MG tablet Take 1 tablet by mouth daily Every 3 days or so 2/10/25  Yes Maximino Hansen MD   ALPRAZolam (XANAX) 0.25 MG tablet Take 1 tablet by mouth nightly as needed for Sleep for up to 30 days. Max Daily Amount: 0.25 mg 25 Yes Maximino Hansen MD   ondansetron (ZOFRAN) 4 MG tablet Take 1 tablet by mouth daily as needed for Nausea or Vomiting 25  Yes Maximino Hansen MD   ondansetron (ZOFRAN-ODT) 4 MG disintegrating tablet Take 1 tablet by mouth every 8 hours as needed for Nausea or Vomiting 24  Yes Merlyn Kumar,

## 2025-02-18 NOTE — DISCHARGE INSTRUCTIONS
SAME DAY SURGERY DISCHARGE INSTRUCTIONS    1.  Do not drive or operate hazardous machinery for 24 hours.    2.  Do not make important personal or business decisions for 24 hours.    3.  Do not drink alcoholic beverages for 24 hours.    4.  Do not smoke tobacco products for 24 hours.    5.  Eat light foods (Jell-O, soups, etc....) and drink plenty of fluids (water, Sprite, etc...) up to 8 glasses per day, as you can tolerate.    6.  Limit your activities for 24 hours.  Do not engage in heavy work until your surgeon gives you permission.      7.  Patient should not be left alone for 12-24 hours following surgical procedure.    8.  Wash hands before and after incision care.  It is important to practice good personal hygiene during the post op period.    9.  Call your surgeon for any questions regarding your surgery.    CYSTOSCOPY DISCHARGE INSTRUCTIONS    Possible burning during urination and/or blood tinged urine.    Drink 6-8 glasses of water for the next day or so.  (This helps to flush the urinary tract.)    Call Dr. Calzada ([phone]) if you develop:    Fever over 100 degrees  Prolonged soreness/pain  Unusual bleeding/bruising  Unable to urinate or if urine is bloody  You cannot pass urine 8 hours after the test.  You have pain in your belly or your back just below your rib cage.  (This is called flank pain.)  You have frequent urge to urinate but can pass only small amounts of urine.    Call Dr. Calzada office for follow-up appointment ([phone]).

## 2025-02-18 NOTE — ANESTHESIA POSTPROCEDURE EVALUATION
Department of Anesthesiology  Postprocedure Note    Patient: Tess Echeverria  MRN: [id number]  YOB: 1965  Date of evaluation: 2/18/2025    Procedure Summary       Date: 02/18/25 Room / Location: [facility]    Anesthesia Start: 1258 Anesthesia Stop: 1359    Procedure: CYSTOSCOPY URETEROSCOPY LASER- right ureteroscopy/holmium laser lithotripsy with right ureteral stent placement (Right) Diagnosis:       Ureteral stone      (Ureteral stone [N20.1])    Surgeons: Rosalio Calzada MD Responsible Provider: Nancy Ernst APRN - CRNA    Anesthesia Type: general ASA Status: 3            Anesthesia Type: No value filed.    Nate Phase I: Nate Score: 8    Nate Phase II: Nate Score: 10    Anesthesia Post Evaluation    Patient location during evaluation: PACU  Patient participation: complete - patient participated  Level of consciousness: awake  Airway patency: patent  Nausea & Vomiting: no vomiting and no nausea  Cardiovascular status: blood pressure returned to baseline and hemodynamically stable  Respiratory status: acceptable, spontaneous ventilation and room air  Hydration status: stable  Multimodal analgesia pain management approach  Pain management: satisfactory to patient        No notable events documented.

## 2025-02-19 ENCOUNTER — TELEPHONE (OUTPATIENT)
Dept: CARDIOLOGY | Age: 60
End: 2025-02-19

## 2025-02-19 ENCOUNTER — TELEPHONE (OUTPATIENT)
Dept: UROLOGY | Age: 60
End: 2025-02-19

## 2025-02-19 NOTE — TELEPHONE ENCOUNTER
Thank you. Information relayed to Tess, she reports she will keep an eye on things and let us know if she develops constant leaking.  She again denies constant leaking.

## 2025-02-19 NOTE — TELEPHONE ENCOUNTER
Patient c/o of incontinence since she was discharged post HLL yesterday. She reports she is wearing diapers.  She is soaking pads and diapers.  [facility] education read to Tess.  Writer asked if stent is still taped in place, or possibly pulled out. She reports she did retape stent to inner thigh, and reports only being able to see the wire, no colored stent.  Please advise.

## 2025-02-19 NOTE — TELEPHONE ENCOUNTER
Writer calls patient again to make appointment. She states when she stands up to go to bathroom, she has incontinence. She reports feeling urge to go all the time, but just cannot make it to the BR when she feels the urge. She states she does not feel like it she is constantly leaking. She wanted me to relay this information before the stent comes out. Please advise again, thank you.

## 2025-02-19 NOTE — TELEPHONE ENCOUNTER
Patient calls in asking if she should continue taking her flomax. She reports she is still taking and will continue to unless I call and tell her not to take it. She is also asking about eliquis.  Writer transfers call to cardiology.

## 2025-02-19 NOTE — TELEPHONE ENCOUNTER
Then we need to try and keep the stent in. Leaking, frequency urgency are normal with a stent. It should not be constant leaking

## 2025-02-19 NOTE — TELEPHONE ENCOUNTER
Urology called office questioning when patient can start Elliquis back up after surgery, per Dr. Hopkins if pt does not have blood in urine she may start today.

## 2025-02-19 NOTE — OP NOTE
[address] [address]                            OPERATIVE REPORT      PATIENT NAME: YEE JONES                  : 1965  MED REC NO: [id number]                          ROOM: [facility]  ACCOUNT NO: [id number]                       ADMIT DATE: 2025  PROVIDER: Rosalio Calzada MD      DATE OF PROCEDURE:  2025    SURGEON:  Rosalio Calzada MD    ASSISTANT:  None.    PREOPERATIVE DIAGNOSES:    1. Right ureteral calculus.  2. Right renal calculus.    POSTOPERATIVE DIAGNOSES:    1. Right ureteral calculus.  2. Right renal calculus.    PROCEDURES:  Cystoscopy, right ureteroscopy, right holmium laser lithotripsy, right ureteral stent placement.    ANESTHESIA:  General.    COMPLICATIONS:  None.    ESTIMATED BLOOD LOSS:  Minimal.    SPECIMENS:  Right ureteral calculus.    PROSTHESIS:  6-Kinyarwanda x 26 cm double-J ureteral stent.    DISPOSITION:  Stable.    FINDINGS:    1. Large stone in the right ureter.  2. Large stone in the right kidney.    INDICATIONS:  The patient is a 59-year-old female with right flank pain and known ureteral stone, here now for definitive therapy.    DESCRIPTION OF PROCEDURE:  The patient was taken back to the operating room after informed consent including all risks, benefits, and alternatives were obtained.  The patient was transferred from the [facility] onto the operating room table, where she was induced under general anesthesia, given IV Ancef for preoperative antibiotic prophylaxis.  To begin the case, she was prepped and draped in normal sterile fashion, placed in dorsal lithotomy.  She had a 22-Kinyarwanda sheath, 30-degree lens passed through the urethra into the bladder.  Once in the bladder, we identified the right ureteral orifice.  A 0.035-inch wire was passed up.  We then placed a dual-lumen catheter.  We placed additional Glidewire up.  We then placed a flexible ureteroscope, identified the

## 2025-02-19 NOTE — TELEPHONE ENCOUNTER
With constant incontinence, it sounds like her stent is dislodged. Come in today for stent removal

## 2025-02-21 LAB
STONE COMPOSITION: NORMAL
STONE DESCRIPTION: NORMAL
STONE MASS: 34 MG

## 2025-02-23 ENCOUNTER — APPOINTMENT (OUTPATIENT)
Dept: GENERAL RADIOLOGY | Age: 60
End: 2025-02-23
Payer: COMMERCIAL

## 2025-02-23 ENCOUNTER — NURSE TRIAGE (OUTPATIENT)
Dept: OTHER | Facility: CLINIC | Age: 60
End: 2025-02-23

## 2025-02-23 ENCOUNTER — HOSPITAL ENCOUNTER (EMERGENCY)
Age: 60
Discharge: HOME | End: 2025-02-23
Attending: EMERGENCY MEDICINE
Payer: COMMERCIAL

## 2025-02-23 VITALS
DIASTOLIC BLOOD PRESSURE: 59 MMHG | WEIGHT: 281 LBS | TEMPERATURE: 98.1 F | SYSTOLIC BLOOD PRESSURE: 155 MMHG | OXYGEN SATURATION: 98 % | BODY MASS INDEX: 45.16 KG/M2 | HEIGHT: 66 IN | HEART RATE: 67 BPM | RESPIRATION RATE: 18 BRPM

## 2025-02-23 DIAGNOSIS — N39.0 URINARY TRACT INFECTION ASSOCIATED WITH NEPHROSTOMY CATHETER, INITIAL ENCOUNTER: Primary | ICD-10-CM

## 2025-02-23 DIAGNOSIS — T83.512A URINARY TRACT INFECTION ASSOCIATED WITH NEPHROSTOMY CATHETER, INITIAL ENCOUNTER: Primary | ICD-10-CM

## 2025-02-23 LAB
AMORPH SED URNS QL MICRO: ABNORMAL
ANION GAP SERPL CALCULATED.3IONS-SCNC: 13 MMOL/L (ref 9–16)
BASOPHILS # BLD: 0.04 K/UL (ref 0–0.2)
BASOPHILS NFR BLD: 1 % (ref 0–2)
BILIRUB UR QL STRIP: ABNORMAL
BUN SERPL-MCNC: 14 MG/DL (ref 6–20)
BUN/CREAT SERPL: 16 (ref 9–20)
CALCIUM SERPL-MCNC: 8.7 MG/DL (ref 8.6–10.4)
CHARACTER UR: ABNORMAL
CHLORIDE SERPL-SCNC: 102 MMOL/L (ref 98–107)
CLARITY UR: ABNORMAL
CO2 SERPL-SCNC: 26 MMOL/L (ref 20–31)
COLOR UR: YELLOW
CREAT SERPL-MCNC: 0.9 MG/DL (ref 0.5–0.9)
EOSINOPHIL # BLD: 0.07 K/UL (ref 0–0.44)
EOSINOPHILS RELATIVE PERCENT: 1 % (ref 1–4)
EPI CELLS #/AREA URNS HPF: ABNORMAL /HPF (ref 0–25)
ERYTHROCYTE [DISTWIDTH] IN BLOOD BY AUTOMATED COUNT: 13.2 % (ref 11.8–14.4)
GFR, ESTIMATED: 71 ML/MIN/1.73M2
GLUCOSE SERPL-MCNC: 142 MG/DL (ref 74–99)
GLUCOSE UR STRIP-MCNC: NEGATIVE MG/DL
HCT VFR BLD AUTO: 43.7 % (ref 36.3–47.1)
HGB BLD-MCNC: 15 G/DL (ref 11.9–15.1)
HGB UR QL STRIP.AUTO: ABNORMAL
IMM GRANULOCYTES # BLD AUTO: <0.03 K/UL (ref 0–0.3)
IMM GRANULOCYTES NFR BLD: 0 %
KETONES UR STRIP-MCNC: ABNORMAL MG/DL
LEUKOCYTE ESTERASE UR QL STRIP: ABNORMAL
LYMPHOCYTES NFR BLD: 2.78 K/UL (ref 1.1–3.7)
LYMPHOCYTES RELATIVE PERCENT: 44 % (ref 24–43)
MCH RBC QN AUTO: 29.8 PG (ref 25.2–33.5)
MCHC RBC AUTO-ENTMCNC: 34.3 G/DL (ref 28.4–34.8)
MCV RBC AUTO: 86.7 FL (ref 82.6–102.9)
MONOCYTES NFR BLD: 0.5 K/UL (ref 0.1–1.2)
MONOCYTES NFR BLD: 8 % (ref 3–12)
NEUTROPHILS NFR BLD: 46 % (ref 36–65)
NEUTS SEG NFR BLD: 2.87 K/UL (ref 1.5–8.1)
NITRITE UR QL STRIP: POSITIVE
NRBC BLD-RTO: 0 PER 100 WBC
PH UR STRIP: 5.5 [PH] (ref 5–9)
PLATELET # BLD AUTO: 192 K/UL (ref 138–453)
PMV BLD AUTO: 10.7 FL (ref 8.1–13.5)
POTASSIUM SERPL-SCNC: 3.5 MMOL/L (ref 3.7–5.3)
PROT UR STRIP-MCNC: ABNORMAL MG/DL
RBC # BLD AUTO: 5.04 M/UL (ref 3.95–5.11)
RBC #/AREA URNS HPF: ABNORMAL /HPF (ref 0–2)
SODIUM SERPL-SCNC: 141 MMOL/L (ref 136–145)
SP GR UR STRIP: >1.03 (ref 1.01–1.02)
TROPONIN I SERPL HS-MCNC: 10 NG/L (ref 0–14)
UROBILINOGEN UR STRIP-ACNC: NORMAL EU/DL (ref 0–1)
WBC #/AREA URNS HPF: ABNORMAL /HPF (ref 0–5)
WBC OTHER # BLD: 6.3 K/UL (ref 3.5–11.3)

## 2025-02-23 PROCEDURE — 93005 ELECTROCARDIOGRAM TRACING: CPT | Performed by: EMERGENCY MEDICINE

## 2025-02-23 PROCEDURE — 85025 COMPLETE CBC W/AUTO DIFF WBC: CPT

## 2025-02-23 PROCEDURE — 80048 BASIC METABOLIC PNL TOTAL CA: CPT

## 2025-02-23 PROCEDURE — 6360000002 HC RX W HCPCS: Performed by: EMERGENCY MEDICINE

## 2025-02-23 PROCEDURE — 81001 URINALYSIS AUTO W/SCOPE: CPT

## 2025-02-23 PROCEDURE — 84484 ASSAY OF TROPONIN QUANT: CPT

## 2025-02-23 PROCEDURE — 99285 EMERGENCY DEPT VISIT HI MDM: CPT

## 2025-02-23 PROCEDURE — 96374 THER/PROPH/DIAG INJ IV PUSH: CPT

## 2025-02-23 PROCEDURE — 87086 URINE CULTURE/COLONY COUNT: CPT

## 2025-02-23 PROCEDURE — 2500000003 HC RX 250 WO HCPCS: Performed by: EMERGENCY MEDICINE

## 2025-02-23 PROCEDURE — 74018 RADEX ABDOMEN 1 VIEW: CPT

## 2025-02-23 RX ORDER — CEPHALEXIN 500 MG/1
500 CAPSULE ORAL 2 TIMES DAILY
Qty: 20 CAPSULE | Refills: 0 | Status: SHIPPED | OUTPATIENT
Start: 2025-02-23 | End: 2025-03-05

## 2025-02-23 RX ADMIN — WATER 1000 MG: 1 INJECTION INTRAMUSCULAR; INTRAVENOUS; SUBCUTANEOUS at 04:05

## 2025-02-23 ASSESSMENT — PAIN DESCRIPTION - LOCATION
LOCATION: FLANK
LOCATION: FLANK

## 2025-02-23 ASSESSMENT — PAIN - FUNCTIONAL ASSESSMENT: PAIN_FUNCTIONAL_ASSESSMENT: 0-10

## 2025-02-23 ASSESSMENT — PAIN DESCRIPTION - ORIENTATION
ORIENTATION: RIGHT
ORIENTATION: RIGHT

## 2025-02-23 ASSESSMENT — PAIN SCALES - GENERAL
PAINLEVEL_OUTOF10: 2
PAINLEVEL_OUTOF10: 3

## 2025-02-23 ASSESSMENT — PAIN DESCRIPTION - DESCRIPTORS: DESCRIPTORS: SHARP

## 2025-02-23 NOTE — DISCHARGE INSTRUCTIONS
Take pain mediation as prescribed if needed, take your antibiotics.   Return to ER for worsening pain, fever, difficulty urination.

## 2025-02-23 NOTE — ED PROVIDER NOTES
APRN - CNP   blood glucose monitor kit and supplies Dispense sufficient amount for indicated testing frequency plus additional to accommodate QD testing needs. Dispense all needed supplies to include: monitor, strips, lancing device, lancets, control solutions, alcohol swabs. 6/19/24   Liliane Mcintyre, APRN - CNP   linaCLOtide (LINZESS PO) Take by mouth daily as needed    Alex Rodrigues MD   polyethylene glycol (MIRALAX) 17 g PACK packet Take 17 g by mouth daily 5/1/21   Alex Rodrigues MD   PREVIDENT 5000 BOOSTER PLUS 1.1 % PSTE  3/12/15   Alex Rodrigues MD         REVIEW OF SYSTEMS       See hpi    PHYSICAL EXAM      INITIAL VITALS:   BP (!) 155/59   Pulse 67   Temp 98.1 °F (36.7 °C) (Oral)   Resp 18   Ht 1.676 m (5' 6\")   Wt 127.5 kg (281 lb)   LMP 02/14/2024 (Approximate)   SpO2 98%   BMI 45.35 kg/m²     Physical Exam  Constitutional:       General: She is not in acute distress.     Appearance: Normal appearance. She is not ill-appearing.   HENT:      Head: Normocephalic and atraumatic.   Eyes:      General:         Right eye: No discharge.         Left eye: No discharge.      Extraocular Movements: Extraocular movements intact.      Pupils: Pupils are equal, round, and reactive to light.   Cardiovascular:      Rate and Rhythm: Normal rate.   Pulmonary:      Effort: Pulmonary effort is normal. No respiratory distress.      Breath sounds: No stridor. No wheezing, rhonchi or rales.   Chest:      Chest wall: No tenderness.   Abdominal:      General: There is no distension.      Palpations: Abdomen is soft. There is no mass.      Tenderness: There is no abdominal tenderness. There is no right CVA tenderness, left CVA tenderness, guarding or rebound.      Hernia: No hernia is present.   Musculoskeletal:      Right lower leg: No edema.      Left lower leg: No edema.   Neurological:      General: No focal deficit present.      Mental Status: She is alert and oriented to person, place, and

## 2025-02-24 ENCOUNTER — TELEPHONE (OUTPATIENT)
Dept: UROLOGY | Age: 60
End: 2025-02-24

## 2025-02-24 DIAGNOSIS — N20.0 RENAL CALCULUS: Primary | ICD-10-CM

## 2025-02-24 DIAGNOSIS — E11.69 TYPE 2 DIABETES MELLITUS WITH OTHER SPECIFIED COMPLICATION, WITHOUT LONG-TERM CURRENT USE OF INSULIN (HCC): Primary | ICD-10-CM

## 2025-02-24 DIAGNOSIS — N20.1 URETERAL STONE: ICD-10-CM

## 2025-02-24 LAB
EKG ATRIAL RATE: 65 BPM
EKG P-R INTERVAL: 222 MS
EKG Q-T INTERVAL: 432 MS
EKG QRS DURATION: 120 MS
EKG QTC CALCULATION (BAZETT): 449 MS
EKG R AXIS: -42 DEGREES
EKG T AXIS: 12 DEGREES
EKG VENTRICULAR RATE: 65 BPM
MICROORGANISM SPEC CULT: NORMAL
SERVICE CMNT-IMP: NORMAL
SPECIMEN DESCRIPTION: NORMAL

## 2025-02-24 PROCEDURE — 93010 ELECTROCARDIOGRAM REPORT: CPT | Performed by: FAMILY MEDICINE

## 2025-02-24 RX ORDER — GLUCOSAMINE HCL/CHONDROITIN SU 500-400 MG
CAPSULE ORAL
Qty: 100 STRIP | Refills: 0 | Status: SHIPPED | OUTPATIENT
Start: 2025-02-24 | End: 2025-02-25 | Stop reason: SDUPTHER

## 2025-02-24 NOTE — TELEPHONE ENCOUNTER
I called patient and cancelled the two appointments and scheduled for 4/9/2025 with a KUB prior. She wanted you to know that she passed out in the shower Friday night and her head is sore from that.  She also has a UTI that the ER is treating.  She used pain meds yesterday because she was having terrible pain after the stent was out.  Today she is doing okay.

## 2025-02-24 NOTE — TELEPHONE ENCOUNTER
Okay, noted, please let her know her urine culture from the ER was negative for infection    Follow-up as scheduled with KUB prior

## 2025-02-24 NOTE — PROGRESS NOTES
Stent removed in ER. F/U in 6 weeks with TYREL prior. Ok to cancel apt Tuesday and 3/24 with Juan

## 2025-02-24 NOTE — TELEPHONE ENCOUNTER
Shayna Ridlde, APRN - CNP filed at 2/24/2025  4:20 AM    Status: Signed   Stent removed in ER. F/U in 6 weeks with TYREL prior. Ok to cancel apt Tuesday and 3/24 with Juan

## 2025-02-24 NOTE — TELEPHONE ENCOUNTER
Pt requesting refills of the One Touch Ultra test strips, states the wrong ones have been sent in in the past. Please review pending script for accuracy

## 2025-02-25 ENCOUNTER — TELEPHONE (OUTPATIENT)
Dept: UROLOGY | Age: 60
End: 2025-02-25

## 2025-02-25 ENCOUNTER — NURSE ONLY (OUTPATIENT)
Dept: UROLOGY | Age: 60
End: 2025-02-25
Payer: COMMERCIAL

## 2025-02-25 ENCOUNTER — HOSPITAL ENCOUNTER (OUTPATIENT)
Age: 60
Setting detail: SPECIMEN
Discharge: HOME | End: 2025-02-25
Payer: COMMERCIAL

## 2025-02-25 VITALS
HEART RATE: 80 BPM | WEIGHT: 283 LBS | TEMPERATURE: 97.3 F | OXYGEN SATURATION: 96 % | SYSTOLIC BLOOD PRESSURE: 130 MMHG | DIASTOLIC BLOOD PRESSURE: 80 MMHG | BODY MASS INDEX: 45.68 KG/M2

## 2025-02-25 DIAGNOSIS — N95.8 GENITOURINARY SYNDROME OF MENOPAUSE: ICD-10-CM

## 2025-02-25 DIAGNOSIS — E11.69 TYPE 2 DIABETES MELLITUS WITH OTHER SPECIFIED COMPLICATION, WITHOUT LONG-TERM CURRENT USE OF INSULIN (HCC): ICD-10-CM

## 2025-02-25 DIAGNOSIS — R30.0 DYSURIA: Primary | ICD-10-CM

## 2025-02-25 DIAGNOSIS — R10.2 SUPRAPUBIC PAIN: ICD-10-CM

## 2025-02-25 DIAGNOSIS — R30.0 DYSURIA: ICD-10-CM

## 2025-02-25 LAB
MICROORGANISM/AGENT SPEC: NORMAL
SPECIMEN DESCRIPTION: NORMAL

## 2025-02-25 PROCEDURE — 87210 SMEAR WET MOUNT SALINE/INK: CPT

## 2025-02-25 PROCEDURE — 51798 US URINE CAPACITY MEASURE: CPT | Performed by: NURSE PRACTITIONER

## 2025-02-25 PROCEDURE — 3079F DIAST BP 80-89 MM HG: CPT | Performed by: NURSE PRACTITIONER

## 2025-02-25 PROCEDURE — 3075F SYST BP GE 130 - 139MM HG: CPT | Performed by: NURSE PRACTITIONER

## 2025-02-25 PROCEDURE — 99214 OFFICE O/P EST MOD 30 MIN: CPT | Performed by: NURSE PRACTITIONER

## 2025-02-25 PROCEDURE — 87086 URINE CULTURE/COLONY COUNT: CPT

## 2025-02-25 RX ORDER — FLUCONAZOLE 150 MG/1
TABLET ORAL
Qty: 2 TABLET | Refills: 0 | Status: SHIPPED | OUTPATIENT
Start: 2025-02-25

## 2025-02-25 RX ORDER — ESTRADIOL 0.1 MG/G
CREAM VAGINAL
Qty: 42.5 G | Refills: 3 | Status: SHIPPED | OUTPATIENT
Start: 2025-02-25

## 2025-02-25 RX ORDER — GLUCOSAMINE HCL/CHONDROITIN SU 500-400 MG
CAPSULE ORAL
Qty: 100 STRIP | Refills: 5 | Status: SHIPPED | OUTPATIENT
Start: 2025-02-25

## 2025-02-25 NOTE — TELEPHONE ENCOUNTER
Patient said she feels like she has to urinate and very little comes out.  She is very uncomfortable in the front and only dribbles.

## 2025-02-25 NOTE — PROGRESS NOTES
History  11/2024 Referred by AIXA ADAMES for flank pain and right sided lower abdominal.  Patient did have a recent hysterectomy.  She is having issues with bowel movements.  She does take Linzess.  Patient has never seen urology in the past.  She reports no history of stones.  She has had some intermittent gross hematuria.  She reports no current baseline lower urinary tract symptoms.  No nausea vomiting fevers recorded today.     12/2024 right ESWL    2/2025 right HLL    Today  Here today with complaints of difficulty voiding, small voids, and suprapubic pain.  She was recently in the ER.  The urine culture from 2/23/2025 was negative for infection.  She is currently on Keflex.  She also had a KUB performed at that time.  This did show appropriate stent position and no obvious ureteral stones.  The stent was removed while in the ER. She is taking miralax and is have a BM. PVR is low. She denies fevers, nausea, or vomiting.    Pelvic exam: Significant vaginal atrophy and erythema    Plan  Stop Keflex, but we are going to repeat urine culture now    Urine culture and vaginal culture    Diflucan    Start estradiol Insert one inch of cream inside the vagina and place an additional dime size amount to the urethra and inner labia three nights per week. Do NOT use plastic applicator.     Take flomax for 7 days    Drink 80 ounces of water    Keep f/u as scheduled with KUB prior

## 2025-02-25 NOTE — TELEPHONE ENCOUNTER
She can come in for a pvr    Telephone Encounter by Siena Lorenzo RN at 06/27/18 11:28 AM     Author:  Siena Lorenzo RN Service:  (none) Author Type:  Registered Nurse     Filed:  06/27/18 11:28 AM Encounter Date:  6/27/2018 Status:  Signed     :  Siena Lorenzo RN (Registered Nurse)            Message sent to non clinical pool to fax referral.[AA1.1M]      Revision History        User Key Date/Time User Provider Type Action    > AA1.1 06/27/18 11:28 AM Siena Lorenzo RN Registered Nurse Sign    M - Manual

## 2025-02-25 NOTE — PATIENT INSTRUCTIONS
Estradiol: Insert one inch of cream inside the vagina and place an additional dime size amount to the urethra and inner labia three nights per week. Do NOT use plastic applicator.     Take flomax for 7 days    Drink 80 ounces of water    STOP keflex      SURVEY:    You may be receiving a survey from [employer] regarding your visit today.    Please complete the survey to enable us to provide the highest quality of care to you and your family.    If you cannot score us a very good on any question, please call the office to discuss how we could have made your experience a very good one.    Thank you.

## 2025-02-26 LAB
MICROORGANISM SPEC CULT: NO GROWTH
SERVICE CMNT-IMP: NORMAL
SPECIMEN DESCRIPTION: NORMAL

## 2025-02-27 NOTE — RESULT ENCOUNTER NOTE
Please let her know her vaginal swab was negative.  Her urine culture was also negative for urinary tract infection

## 2025-03-03 ENCOUNTER — CARE COORDINATION (OUTPATIENT)
Dept: OTHER | Facility: CLINIC | Age: 60
End: 2025-03-03

## 2025-03-03 NOTE — CARE COORDINATION
Ambulatory Care Coordination Note     3/3/2025 3:39 PM     Patient outreach attempt by this ACM today to perform care management follow up . ACM was unable to reach the patient by telephone today;   left voice message requesting a return phone call to this ACM.     ACM: SHIVANI WHEELER RN     Care Summary Note: N/A    PCP/Specialist follow up:   Future Appointments         Provider Specialty Dept Phone    3/26/2025 8:00 AM Maximino Hansen MD Primary Care [phone]    3/26/2025 3:15 PM Maximino Hansen MD Primary Care [phone]    4/9/2025 11:00 AM Rosalio Calzada MD Urology [phone]    5/20/2025 10:15 AM Berta Mccarthy APRN - [facility] Obstetrics and Gynecology [phone]    8/18/2025 11:45 AM Keegan Cancino MD Pulmonology [phone]    12/15/2025 10:00 AM Noris Hopkins MD Cardiology [phone]            Follow Up:   Plan for next ACM outreach in approximately 1 week to complete:  - disease specific assessments  - medication review.

## 2025-03-10 ENCOUNTER — CARE COORDINATION (OUTPATIENT)
Dept: OTHER | Facility: CLINIC | Age: 60
End: 2025-03-10

## 2025-03-10 NOTE — CARE COORDINATION
Ambulatory Care Coordination Note     3/10/2025 4:57 PM     Patient Current Location:  [place]     ACM contacted the patient by telephone. Verified name and  with patient as identifiers.         ACM: SHIVANI WHEELER RN     Challenges to be reviewed by the provider   Additional needs identified to be addressed with provider No  none               Method of communication with provider: none.    Utilization: Has the patient been seen in the ED since your last call? Yes,   Discharge Date: 25   Discharge Facility: [facility]  Reason for ED Visit: Right flank pain  Visit Diagnosis: UTI associated with Nephrostomy Catheter    Number of ED visits in the last 6 months: 5      Do you have any ongoing symptoms? Yes, there has been no change in my symptoms.   Current symptoms: Urinary Urgency.    Did you call your PCP prior to going to the ED? No, did not call the PCP office.     Review of Discharge Instructions:   [] AVS discharge instructions  [] Right Care, Right Place, Right Time document  [x] Medication changes  [x] Follow up appointments  [] Referral follow up   [] None       Care Summary Note: ACM able to contact patient. Patient agreeable to speak with this ACM. Patient stated that she is back to work. Patient stated that she had a right ureteral stent in place for 5 days and was removed for possible infection. Patient had laser procedure to break up 2 kidney stones. Patient has urinary urgency. Patient also stated that she was diagnosed with vaginal atrophy. Patient complains of ongoing urinary urgency. Patient denies fever. Patient denies pain. No complaints of blood in urine or difficulty urinating. ACM to contact provider for a refill of Flomax 0.4mg  1 tablet daily. Patient agreeable to f/u calls from this ACM    Offered patient enrollment in the Remote Patient Monitoring (RPM) program for in-home monitoring: Patient is not eligible for RPM program because: insurance coverage.   .  Assessments

## 2025-03-11 ENCOUNTER — CARE COORDINATION (OUTPATIENT)
Dept: OTHER | Facility: CLINIC | Age: 60
End: 2025-03-11

## 2025-03-11 RX ORDER — TAMSULOSIN HYDROCHLORIDE 0.4 MG/1
0.4 CAPSULE ORAL DAILY
Qty: 30 CAPSULE | Refills: 0 | Status: SHIPPED | OUTPATIENT
Start: 2025-03-11 | End: 2025-04-10

## 2025-03-11 NOTE — CARE COORDINATION
JANE spoke with Melvina from Dr. Hansen's office. Melvina will send prescription to doctor to sign and will send to [facility] to be refilled.

## 2025-03-11 NOTE — TELEPHONE ENCOUNTER
Patient needing a refill on her flomax 0.4mg until her upcoming appointment on 3/26 order is pended and her pharmacy verified to [facility]

## 2025-03-21 ENCOUNTER — CARE COORDINATION (OUTPATIENT)
Dept: OTHER | Facility: CLINIC | Age: 60
End: 2025-03-21

## 2025-03-21 NOTE — CARE COORDINATION
Ambulatory Care Coordination Note     3/21/2025 11:05 AM     ACM outreach attempt by this ACM today to perform care management follow up . ACM was unable to reach the patient by telephone today;   left voice message requesting a return phone call to this ACM.     ACM: Ashlyn Robert RN     Care Summary Note:  Covering for primary ACM, DIOMEDES Morley.     PCP/Specialist follow up:   Future Appointments         Provider Specialty Dept Phone    3/26/2025 8:00 AM Maximino Hansen MD Primary Care [phone]    3/26/2025 3:15 PM Maximino Hansen MD Primary Care [phone]    4/9/2025 11:00 AM Rosalio Calzada MD Urology [phone]    5/20/2025 10:15 AM Berta Mccarthy APRN - [facility] Obstetrics and Gynecology [phone]    8/18/2025 11:45 AM Keegan Cancino MD Pulmonology [phone]    12/15/2025 10:00 AM Noris Hopkins MD Cardiology [phone]            Follow Up:   Plan for next ACM outreach in approximately 1 week to complete:  - goal progression  - education .

## 2025-03-26 ENCOUNTER — OFFICE VISIT (OUTPATIENT)
Dept: PRIMARY CARE CLINIC | Age: 60
End: 2025-03-26
Payer: COMMERCIAL

## 2025-03-26 VITALS
BODY MASS INDEX: 45.16 KG/M2 | OXYGEN SATURATION: 92 % | SYSTOLIC BLOOD PRESSURE: 126 MMHG | HEIGHT: 66 IN | RESPIRATION RATE: 16 BRPM | WEIGHT: 281 LBS | HEART RATE: 74 BPM | DIASTOLIC BLOOD PRESSURE: 62 MMHG

## 2025-03-26 DIAGNOSIS — E55.9 VITAMIN D DEFICIENCY: ICD-10-CM

## 2025-03-26 DIAGNOSIS — Z00.00 ENCOUNTER FOR WELL ADULT EXAM WITHOUT ABNORMAL FINDINGS: Primary | ICD-10-CM

## 2025-03-26 DIAGNOSIS — F41.9 ANXIETY: ICD-10-CM

## 2025-03-26 DIAGNOSIS — E11.69 TYPE 2 DIABETES MELLITUS WITH OTHER SPECIFIED COMPLICATION, WITHOUT LONG-TERM CURRENT USE OF INSULIN: ICD-10-CM

## 2025-03-26 PROCEDURE — 99396 PREV VISIT EST AGE 40-64: CPT | Performed by: STUDENT IN AN ORGANIZED HEALTH CARE EDUCATION/TRAINING PROGRAM

## 2025-03-26 PROCEDURE — 3074F SYST BP LT 130 MM HG: CPT | Performed by: STUDENT IN AN ORGANIZED HEALTH CARE EDUCATION/TRAINING PROGRAM

## 2025-03-26 PROCEDURE — 3078F DIAST BP <80 MM HG: CPT | Performed by: STUDENT IN AN ORGANIZED HEALTH CARE EDUCATION/TRAINING PROGRAM

## 2025-03-26 NOTE — PROGRESS NOTES
[facility] PRIMARY CARE  [address] [place] [address] , [address]  [place], [place], [address]    Tess Echeverria is a 59 y.o. female with  has a past medical history of Acute cystitis without hematuria, Acute pulmonary embolism (HCC), Anxiety, Asthma, Constipation, chronic, COVID-19, CPAP (continuous positive airway pressure) dependence, Diabetes mellitus (HCC), Embolism - blood clot, Esophageal reflux, Factor V Leiden, Heart murmur, HTN (hypertension), Hyperlipidemia, Irritable bladder, Kidney stone, MTHFR mutation, MVP (mitral valve prolapse), Palpitations, Pulmonary embolism (HCC), Rhinitis, allergic, Right flank pain, Sleep apnea, and Venous thromboembolism.  Presented to the office today for:  Chief Complaint   Patient presents with    Annual Exam    Gastroesophageal Reflux    Anxiety       Assessment/Plan   1. Encounter for well adult exam without abnormal findings  2. Anxiety  -     Vitamin B12 & Folate; Future  -     TSH reflex to FT4; Future  3. Type 2 diabetes mellitus with other specified complication, without long-term current use of insulin (HCC)  -     CBC with Auto Differential; Future  -     Comprehensive Metabolic Panel; Future  -     Lipid Panel; Future  -     Hemoglobin A1C; Future  -     Albumin/Creatinine Ratio, Urine; Future  -     Vitamin B12 & Folate; Future  -     TSH reflex to FT4; Future  4. Vitamin D deficiency  -     Vitamin D 25 Hydroxy; Future  Return in about 3 months (around 6/26/2025) for F/U Med Management.  Assessment & Plan  Continue w/ meds at the current doses  F/u with Specialists per prior plans  Reviewed concept of anxiety as biochemical imbalance of neurotransmitters and rationale for treatment.  Instructed patient to contact office or on-call physician promptly should condition worsen or any new symptoms appear and provided on-call telephone numbers. IF THE PATIENT HAS ANY SUICIDAL OR HOMICIDAL IDEATIONS, CALL THE OFFICE, DISCUSS WITH A SUPPORT MEMBER, OR GO TO THE ER

## 2025-03-27 ENCOUNTER — CARE COORDINATION (OUTPATIENT)
Dept: OTHER | Facility: CLINIC | Age: 60
End: 2025-03-27

## 2025-03-27 NOTE — CARE COORDINATION
Ambulatory Care Coordination Note     3/27/2025 12:48 PM     ACM outreach attempt by this ACM today to perform care management follow up . ACM was unable to reach the patient by telephone today;   left voice message requesting a return phone call to this ACM.  [employer] message sent requesting patient to contact this ACM.     ACM: Ashlyn Robert RN     Care Summary Note:  Covering for primary ACM, DIOMEDES Morley.     PCP/Specialist follow up:   Future Appointments         Provider Specialty Dept Phone    4/9/2025 11:00 AM Rosalio Calzada MD Urology [phone]    5/20/2025 10:15 AM Berta Mccarthy APRN - [facility] Obstetrics and Gynecology [phone]    7/2/2025 10:30 AM Maximino Hansen MD Primary Care [phone]    8/18/2025 11:45 AM Keegan Cancino MD Pulmonology [phone]    12/15/2025 10:00 AM Noris Hopkins MD Cardiology [phone]            Follow Up:   Plan for next ACM outreach in approximately 2 weeks to complete:  - goal progression  - education .

## 2025-04-07 ENCOUNTER — HOSPITAL ENCOUNTER (OUTPATIENT)
Age: 60
Discharge: HOME | End: 2025-04-09
Payer: COMMERCIAL

## 2025-04-07 ENCOUNTER — HOSPITAL ENCOUNTER (OUTPATIENT)
Dept: GENERAL RADIOLOGY | Age: 60
Discharge: HOME | End: 2025-04-09
Payer: COMMERCIAL

## 2025-04-07 DIAGNOSIS — N20.0 RENAL CALCULUS: ICD-10-CM

## 2025-04-07 PROCEDURE — 74018 RADEX ABDOMEN 1 VIEW: CPT

## 2025-04-09 ENCOUNTER — OFFICE VISIT (OUTPATIENT)
Dept: UROLOGY | Age: 60
End: 2025-04-09
Payer: COMMERCIAL

## 2025-04-09 ENCOUNTER — RESULTS FOLLOW-UP (OUTPATIENT)
Dept: UROLOGY | Age: 60
End: 2025-04-09

## 2025-04-09 VITALS
TEMPERATURE: 98 F | WEIGHT: 280 LBS | SYSTOLIC BLOOD PRESSURE: 134 MMHG | HEIGHT: 66 IN | BODY MASS INDEX: 45 KG/M2 | DIASTOLIC BLOOD PRESSURE: 84 MMHG

## 2025-04-09 DIAGNOSIS — N39.46 MIXED INCONTINENCE: ICD-10-CM

## 2025-04-09 DIAGNOSIS — N95.8 GENITOURINARY SYNDROME OF MENOPAUSE: ICD-10-CM

## 2025-04-09 DIAGNOSIS — N20.0 RENAL CALCULUS: Primary | ICD-10-CM

## 2025-04-09 PROCEDURE — 3079F DIAST BP 80-89 MM HG: CPT | Performed by: PHYSICIAN ASSISTANT

## 2025-04-09 PROCEDURE — 99214 OFFICE O/P EST MOD 30 MIN: CPT | Performed by: PHYSICIAN ASSISTANT

## 2025-04-09 PROCEDURE — 3075F SYST BP GE 130 - 139MM HG: CPT | Performed by: PHYSICIAN ASSISTANT

## 2025-04-10 ENCOUNTER — CARE COORDINATION (OUTPATIENT)
Dept: OTHER | Facility: CLINIC | Age: 60
End: 2025-04-10

## 2025-04-10 NOTE — CARE COORDINATION
Ambulatory Care Coordination Note     4/10/2025 12:53 PM     patient outreach attempt by this ACM today to perform care management follow up . AC was unable to reach the patient by telephone today;   left voice message requesting a return phone call to this ACM.  [employer] message sent with no reply or return call     Covering for primary ACM, DIOMEDES Morley.     Patient closed (patient disengaged) from the High Risk Care Management program on 4/10/2025.  Patient  lost to follow up .  No further Ambulatory Care Manager follow up scheduled.

## 2025-04-15 RX ORDER — TAMSULOSIN HYDROCHLORIDE 0.4 MG/1
0.4 CAPSULE ORAL DAILY
Qty: 30 CAPSULE | Refills: 0 | Status: SHIPPED | OUTPATIENT
Start: 2025-04-15 | End: 2025-05-20

## 2025-04-25 PROBLEM — Z00.00 ENCOUNTER FOR WELL ADULT EXAM WITHOUT ABNORMAL FINDINGS: Status: RESOLVED | Noted: 2025-03-26 | Resolved: 2025-04-25

## 2025-05-14 NOTE — CARE COORDINATION
Ambulatory Care Coordination Note     2025 2:16 PM     Patient Current Location:  [place]     This patient was received as a referral from [employer] .    Patient contacted the ACM by telephone. Verified name and  with patient as identifiers. Provided introduction to self, and explanation of the ACM role.   Patient accepted care management services at this time.          ACM: SHIVANI HWEELER RN     Challenges to be reviewed by the provider   Additional needs identified to be addressed with provider No  none               Method of communication with provider: none.      Care Summary Note: Patient returned call to this ACM from message left. Patient agreeable to speak with this ACM. Patient complains of right flank pain and back pain. Patient rates her pain on average as a 5 on a 0-10 scale. Patient is straining her urine. Patient stated that she had some gravel in her urine after her lithotripsy. Patient denies any blood in her urine. Patient is not having difficulty urinating but stated that she feels that she is not emptying her bladder completely. She stated that she often feels like she could go again after urinating the first time. Patient stated that she is scheduled to have laser treatment for two larger kidney stones with possible stent placement on 2025. Patient stated this will be outpatient. Patient agreeable to f/u calls from this ACM.    Offered patient enrollment in the Remote Patient Monitoring (RPM) program for in-home monitoring: Patient is not eligible for RPM program because: insurance coverage.     Assessments Completed:       2025     2:39 PM   Amb Fall Risk Assessment and TUG Test   Do you feel unsteady or are you worried about falling?  no   2 or more falls in past year? no   Fall with injury in past year? no    ,   Ambulatory Care Coordination Assessment    Care Coordination Protocol  Referral from Primary Care Provider: No  Week 1 - Initial Assessment     Do you  Procedure now shows as scheduled in Care Everywhere, for 5/30/25 at [place].    Called and spoke with Shirley at [facility].  Confirmed that patient is scheduled for ortho procedure on 5/30/25.  States that patient will need to hold warfarin for 5 days to target pre-procedure INR < 1.5.  States last dose of Lovenox 24 hours prior to procedure.  States may resume warfarin evening after procedure.  Awaiting directions from Dr. Rosado as to when Lovenox may be resumed post-op.  [facility] telephone and fax number provided to Shirley.    Called and spoke with patient, advised that due to being scheduled for 2 procedures 10 days apart we will clarify with PCP that he is able to do so as warfarin is needing to be interrupted for both procedures.  Verbalized understanding.

## 2025-05-20 ENCOUNTER — OFFICE VISIT (OUTPATIENT)
Dept: OBGYN | Age: 60
End: 2025-05-20
Payer: COMMERCIAL

## 2025-05-20 VITALS
DIASTOLIC BLOOD PRESSURE: 82 MMHG | BODY MASS INDEX: 44.84 KG/M2 | HEIGHT: 66 IN | WEIGHT: 279 LBS | SYSTOLIC BLOOD PRESSURE: 138 MMHG

## 2025-05-20 DIAGNOSIS — Z12.31 SCREENING MAMMOGRAM, ENCOUNTER FOR: ICD-10-CM

## 2025-05-20 DIAGNOSIS — Z01.419 ENCOUNTER FOR ANNUAL ROUTINE GYNECOLOGICAL EXAMINATION: Primary | ICD-10-CM

## 2025-05-20 PROCEDURE — 3079F DIAST BP 80-89 MM HG: CPT | Performed by: ADVANCED PRACTICE MIDWIFE

## 2025-05-20 PROCEDURE — 99396 PREV VISIT EST AGE 40-64: CPT | Performed by: ADVANCED PRACTICE MIDWIFE

## 2025-05-20 PROCEDURE — 3075F SYST BP GE 130 - 139MM HG: CPT | Performed by: ADVANCED PRACTICE MIDWIFE

## 2025-05-20 ASSESSMENT — ENCOUNTER SYMPTOMS
SHORTNESS OF BREATH: 0
BACK PAIN: 0
ABDOMINAL PAIN: 0

## 2025-05-20 NOTE — PROGRESS NOTES
YEARLY PHYSICAL    Date of service: 2025    Tess Echeverria  Is a 59 y.o.  , female    PT's PCP is: Maximino Hansen MD     : 1965                                             Subjective:       Patient's last menstrual period was 2024 (approximate).     Are your menses regular: not applicable    OB History    Para Term  AB Living   2 2 0   2   SAB IAB Ectopic Molar Multiple Live Births        2      # Outcome Date GA Lbr Daron/2nd Weight Sex Type Anes PTL Lv   2 Para 97 40w0d  3.345 kg (7 lb 6 oz) F Vag-Spont   EARLE      Complications: Diabetes in pregnancy   1 Para 93 40w0d  3.629 kg (8 lb) F Vag-Spont   EARLE        Social History     Tobacco Use   Smoking Status Never   Smokeless Tobacco Never        Social History     Substance and Sexual Activity   Alcohol Use Yes    Comment: Possible 1 glass a month of wine       Family History   Problem Relation Age of Onset    Breast Cancer Mother 70    Cancer Mother         breast ca    Diabetes Mother     Depression Mother     Thyroid Disease Mother     High Blood Pressure Mother     Heart Disease Mother     Cancer Father         prostate ca    COPD Father     Heart Failure Father     Heart Disease Father     High Blood Pressure Father     Deep Vein Thrombosis Sister     Breast Cancer Sister 57    Deep Vein Thrombosis Sister        Any family history of breast or ovarian cancer: Yes    Any family history of blood clots: Yes    Allergies: Pcn [penicillins] and Levaquin [levofloxacin in d5w]      Current Outpatient Medications:     tamsulosin (FLOMAX) 0.4 MG capsule, TAKE 1 CAPSULE BY MOUTH DAILY, Disp: 30 capsule, Rfl: 0    estradiol (ESTRACE VAGINAL) 0.1 MG/GM vaginal cream, Insert one inch of cream inside the vagina and place an additional dime size amount to the urethra and inner labia three nights per week. Do NOT use plastic applicator., Disp: 42.5 g, Rfl: 3

## 2025-06-06 ENCOUNTER — HOSPITAL ENCOUNTER (OUTPATIENT)
Age: 60
Discharge: HOME | End: 2025-06-06
Payer: COMMERCIAL

## 2025-06-06 DIAGNOSIS — E55.9 VITAMIN D DEFICIENCY: ICD-10-CM

## 2025-06-06 DIAGNOSIS — F41.9 ANXIETY: ICD-10-CM

## 2025-06-06 DIAGNOSIS — E11.69 TYPE 2 DIABETES MELLITUS WITH OTHER SPECIFIED COMPLICATION, WITHOUT LONG-TERM CURRENT USE OF INSULIN (HCC): ICD-10-CM

## 2025-06-06 LAB
25(OH)D3 SERPL-MCNC: 24.4 NG/ML (ref 30–100)
ALBUMIN SERPL-MCNC: 4.2 G/DL (ref 3.5–5.2)
ALBUMIN/GLOB SERPL: 1.3 {RATIO} (ref 1–2.5)
ALP SERPL-CCNC: 107 U/L (ref 35–104)
ALT SERPL-CCNC: 42 U/L (ref 10–35)
ANION GAP SERPL CALCULATED.3IONS-SCNC: 10 MMOL/L (ref 9–16)
AST SERPL-CCNC: 46 U/L (ref 10–35)
BASOPHILS # BLD: <0.03 K/UL (ref 0–0.2)
BASOPHILS NFR BLD: 0 % (ref 0–2)
BILIRUB SERPL-MCNC: 0.7 MG/DL (ref 0–1.2)
BUN SERPL-MCNC: 13 MG/DL (ref 6–20)
BUN/CREAT SERPL: 14 (ref 9–20)
CALCIUM SERPL-MCNC: 9.6 MG/DL (ref 8.6–10.4)
CHLORIDE SERPL-SCNC: 105 MMOL/L (ref 98–107)
CHOLEST SERPL-MCNC: 208 MG/DL (ref 0–199)
CHOLESTEROL/HDL RATIO: 5.8
CO2 SERPL-SCNC: 26 MMOL/L (ref 20–31)
CREAT SERPL-MCNC: 0.9 MG/DL (ref 0.5–0.9)
CREAT UR-MCNC: 162 MG/DL (ref 28–217)
EOSINOPHIL # BLD: 0.14 K/UL (ref 0–0.44)
EOSINOPHILS RELATIVE PERCENT: 2 % (ref 1–4)
ERYTHROCYTE [DISTWIDTH] IN BLOOD BY AUTOMATED COUNT: 13.2 % (ref 11.8–14.4)
EST. AVERAGE GLUCOSE BLD GHB EST-MCNC: 154 MG/DL
FOLATE SERPL-MCNC: 7.1 NG/ML (ref 4.8–24.2)
GFR, ESTIMATED: 77 ML/MIN/1.73M2
GLUCOSE SERPL-MCNC: 138 MG/DL (ref 74–99)
HBA1C MFR BLD: 7 % (ref 4–6)
HCT VFR BLD AUTO: 44.3 % (ref 36.3–47.1)
HDLC SERPL-MCNC: 36 MG/DL
HGB BLD-MCNC: 15.1 G/DL (ref 11.9–15.1)
IMM GRANULOCYTES # BLD AUTO: <0.03 K/UL (ref 0–0.3)
IMM GRANULOCYTES NFR BLD: 0 %
LDLC SERPL CALC-MCNC: 144 MG/DL (ref 0–100)
LYMPHOCYTES NFR BLD: 2.47 K/UL (ref 1.1–3.7)
LYMPHOCYTES RELATIVE PERCENT: 40 % (ref 24–43)
MCH RBC QN AUTO: 29.9 PG (ref 25.2–33.5)
MCHC RBC AUTO-ENTMCNC: 34.1 G/DL (ref 28.4–34.8)
MCV RBC AUTO: 87.7 FL (ref 82.6–102.9)
MICROALBUMIN UR-MCNC: <12 MG/L (ref 0–20)
MICROALBUMIN/CREAT UR-RTO: NORMAL MCG/MG CREAT (ref 0–25)
MONOCYTES NFR BLD: 0.44 K/UL (ref 0.1–1.2)
MONOCYTES NFR BLD: 7 % (ref 3–12)
NEUTROPHILS NFR BLD: 51 % (ref 36–65)
NEUTS SEG NFR BLD: 3.07 K/UL (ref 1.5–8.1)
NRBC BLD-RTO: 0 PER 100 WBC
PLATELET # BLD AUTO: 212 K/UL (ref 138–453)
PMV BLD AUTO: 10.3 FL (ref 8.1–13.5)
POTASSIUM SERPL-SCNC: 4.1 MMOL/L (ref 3.7–5.3)
PROT SERPL-MCNC: 7.4 G/DL (ref 6.6–8.7)
RBC # BLD AUTO: 5.05 M/UL (ref 3.95–5.11)
SODIUM SERPL-SCNC: 141 MMOL/L (ref 136–145)
TRIGL SERPL-MCNC: 141 MG/DL
TSH SERPL DL<=0.05 MIU/L-ACNC: 1.72 UIU/ML (ref 0.27–4.2)
VIT B12 SERPL-MCNC: 376 PG/ML (ref 232–1245)
VLDLC SERPL CALC-MCNC: 28 MG/DL (ref 1–30)
WBC OTHER # BLD: 6.2 K/UL (ref 3.5–11.3)

## 2025-06-06 PROCEDURE — 84443 ASSAY THYROID STIM HORMONE: CPT

## 2025-06-06 PROCEDURE — 82043 UR ALBUMIN QUANTITATIVE: CPT

## 2025-06-06 PROCEDURE — 80053 COMPREHEN METABOLIC PANEL: CPT

## 2025-06-06 PROCEDURE — 83036 HEMOGLOBIN GLYCOSYLATED A1C: CPT

## 2025-06-06 PROCEDURE — 82306 VITAMIN D 25 HYDROXY: CPT

## 2025-06-06 PROCEDURE — 82746 ASSAY OF FOLIC ACID SERUM: CPT

## 2025-06-06 PROCEDURE — 82570 ASSAY OF URINE CREATININE: CPT

## 2025-06-06 PROCEDURE — 82607 VITAMIN B-12: CPT

## 2025-06-06 PROCEDURE — 36415 COLL VENOUS BLD VENIPUNCTURE: CPT

## 2025-06-06 PROCEDURE — 80061 LIPID PANEL: CPT

## 2025-06-06 PROCEDURE — 85025 COMPLETE CBC W/AUTO DIFF WBC: CPT

## 2025-06-08 ENCOUNTER — RESULTS FOLLOW-UP (OUTPATIENT)
Dept: INTERNAL MEDICINE CLINIC | Age: 60
End: 2025-06-08

## 2025-06-09 DIAGNOSIS — F41.9 ANXIETY: ICD-10-CM

## 2025-06-09 RX ORDER — ALPRAZOLAM 0.25 MG
0.25 TABLET ORAL NIGHTLY PRN
Qty: 30 TABLET | Refills: 0 | Status: SHIPPED | OUTPATIENT
Start: 2025-06-09 | End: 2025-07-09

## 2025-06-09 RX ORDER — MULTIVIT-MIN/IRON/FOLIC ACID/K 18-600-40
2000 CAPSULE ORAL DAILY
Qty: 90 CAPSULE | Refills: 3 | Status: SHIPPED | OUTPATIENT
Start: 2025-06-09 | End: 2025-09-07

## 2025-06-10 ENCOUNTER — TELEPHONE (OUTPATIENT)
Dept: DIABETES SERVICES | Age: 60
End: 2025-06-10

## 2025-06-23 LAB — DIABETIC RETINOPATHY: NEGATIVE

## 2025-07-01 ENCOUNTER — OFFICE VISIT (OUTPATIENT)
Dept: PRIMARY CARE CLINIC | Age: 60
End: 2025-07-01
Payer: COMMERCIAL

## 2025-07-01 VITALS
BODY MASS INDEX: 45.1 KG/M2 | WEIGHT: 279.4 LBS | HEART RATE: 72 BPM | OXYGEN SATURATION: 97 % | DIASTOLIC BLOOD PRESSURE: 80 MMHG | RESPIRATION RATE: 18 BRPM | SYSTOLIC BLOOD PRESSURE: 132 MMHG

## 2025-07-01 DIAGNOSIS — M54.9 BACK PAIN, UNSPECIFIED BACK LOCATION, UNSPECIFIED BACK PAIN LATERALITY, UNSPECIFIED CHRONICITY: ICD-10-CM

## 2025-07-01 DIAGNOSIS — E11.69 TYPE 2 DIABETES MELLITUS WITH OTHER SPECIFIED COMPLICATION, WITHOUT LONG-TERM CURRENT USE OF INSULIN (HCC): Primary | ICD-10-CM

## 2025-07-01 DIAGNOSIS — N64.4 BREAST PAIN: ICD-10-CM

## 2025-07-01 DIAGNOSIS — F41.9 ANXIETY: ICD-10-CM

## 2025-07-01 DIAGNOSIS — R73.03 PREDIABETES: ICD-10-CM

## 2025-07-01 PROCEDURE — 99214 OFFICE O/P EST MOD 30 MIN: CPT | Performed by: STUDENT IN AN ORGANIZED HEALTH CARE EDUCATION/TRAINING PROGRAM

## 2025-07-01 PROCEDURE — 3079F DIAST BP 80-89 MM HG: CPT | Performed by: STUDENT IN AN ORGANIZED HEALTH CARE EDUCATION/TRAINING PROGRAM

## 2025-07-01 PROCEDURE — 3051F HG A1C>EQUAL 7.0%<8.0%: CPT | Performed by: STUDENT IN AN ORGANIZED HEALTH CARE EDUCATION/TRAINING PROGRAM

## 2025-07-01 PROCEDURE — 3075F SYST BP GE 130 - 139MM HG: CPT | Performed by: STUDENT IN AN ORGANIZED HEALTH CARE EDUCATION/TRAINING PROGRAM

## 2025-07-01 PROCEDURE — G2211 COMPLEX E/M VISIT ADD ON: HCPCS | Performed by: STUDENT IN AN ORGANIZED HEALTH CARE EDUCATION/TRAINING PROGRAM

## 2025-07-01 NOTE — PROGRESS NOTES
[facility] PRIMARY [facility]  [address] [place] [address] , [address]  [place], [place], [address]    Tess Echeverria is a 59 y.o. female with  has a past medical history of Acute cystitis without hematuria, Acute pulmonary embolism (HCC), Anxiety, Asthma, Constipation, chronic, COVID-19, CPAP (continuous positive airway pressure) dependence, Diabetes mellitus (HCC), Embolism - blood clot, Esophageal reflux, Factor V Leiden, Heart murmur, HTN (hypertension), Hyperlipidemia, Irritable bladder, Kidney stone, MTHFR mutation, MVP (mitral valve prolapse), Palpitations, Pulmonary embolism (HCC), Rhinitis, allergic, Right flank pain, Sleep apnea, and Venous thromboembolism.  Presented to the office today for:  Chief Complaint   Patient presents with    Diabetes    Anxiety    Dizziness    Breast reduction       Assessment/Plan   1. Type 2 diabetes mellitus with other specified complication, without long-term current use of insulin (HCC)  -     Lipid Panel; Future  -     Hemoglobin A1C; Future  -     CBC with Auto Differential; Future  -     Comprehensive Metabolic Panel; Future  2. Anxiety  3. Back pain, unspecified back location, unspecified back pain laterality, unspecified chronicity  4. Breast pain  5. Prediabetes  -     Lipid Panel; Future  -     Hemoglobin A1C; Future  -     CBC with Auto Differential; Future  -     Comprehensive Metabolic Panel; Future    Return in about 3 months (around 10/1/2025) for F/U Med Management.    Assessment & Plan  T2DM - stable and nearly at goal, continue to monitor at this time  Anxiety - stable at this time. Xanax PRN to be continued at this time. Discussed starting Zoloft/SSRI next  Plastic Surgery consultation.     All patient questions answered.  Pt voiced understanding.   There are no discontinued medications.    Patient received counseling on the following healthy behaviors: nutrition, exercise and medication adherence. I encouraged and discussed lifestyle modifications including diet and

## 2025-07-16 ENCOUNTER — TELEPHONE (OUTPATIENT)
Dept: OBGYN | Age: 60
End: 2025-07-16

## 2025-07-16 NOTE — TELEPHONE ENCOUNTER
----- Message from Berta NATION sent at 6/20/2025 10:47 AM EDT -----  Patient had a lot going on in her personal life. She did say she would do this though.  Can we follow it through.  (Breast imaging)

## (undated) DEVICE — GUIDEWIRE VASC NITINOL HYDROPHIL STR 3 CM 0.035 INX150 CM STD NIT ZIPWIRE

## (undated) DEVICE — SINGLE ACTION PUMPING SYSTEM

## (undated) DEVICE — CANNULA ORAL NSL AD CO2 N INTUB O2 DEL DISP TRU LNK

## (undated) DEVICE — SOLUTION IRRIG 1000ML 0.9% SOD CHL USP POUR PLAS BTL

## (undated) DEVICE — FORCEPS BX L240CM JAW DIA2.8MM L CAP W/ NDL MIC MESH TOOTH

## (undated) DEVICE — KNIFE OPHTH DIA22MM 45DEG SLT W HNDL SHRP ANG PNT DEL DBL

## (undated) DEVICE — FLUFF UNDERPAD,MODERATE: Brand: WINGS

## (undated) DEVICE — MERCY TIFFIN CYSTO-LF: Brand: MEDLINE INDUSTRIES, INC.

## (undated) DEVICE — MEDI-VAC NON-CONDUCTIVE TUBING7MM X 30.5 (100FT): Brand: CARDINAL HEALTH

## (undated) DEVICE — BASIN EMSIS 700ML GRAPHITE PLAS KID SHP GRAD

## (undated) DEVICE — 4-PORT MANIFOLD: Brand: NEPTUNE 2

## (undated) DEVICE — FORCEPS BX L240CM JAW DIA2.4MM ORNG L CAP W/ NDL DISP RAD

## (undated) DEVICE — URETEROSCOPE (SEE ITEM COMMENTS) DIGITAL URS FLX SU MODEL E NORM DEFL AXIS II

## (undated) DEVICE — GAUZE,SPONGE,3"X3",4PLY,NS,LF: Brand: MEDLINE

## (undated) DEVICE — CANNULA IV 18GA L15IN BLNT FILL LUERLOCK HUB MJCT

## (undated) DEVICE — GOWN,SIRUS,POLYRNF,BRTHSLV,LG,30/CS: Brand: MEDLINE

## (undated) DEVICE — SOLUTION IV IRRIG POUR BRL 0.9% SODIUM CHL 2F7124

## (undated) DEVICE — AIRLIFE™ NASAL OXYGEN CANNULA CURVED, FLARED TIP, WITH 7 FEET (2.1 M) CRUSH RESISTANT TUBING, OVER-THE-EAR STYLE: Brand: AIRLIFE™

## (undated) DEVICE — CONTROL SYRINGE LUER-LOCK TIP: Brand: MONOJECT

## (undated) DEVICE — SOLUTION IRRIG 3000ML 0.9% SOD CHL USP UROMATIC PLAS CONT

## (undated) DEVICE — TUBING, SUCTION, 3/16" X 10', STRAIGHT: Brand: MEDLINE

## (undated) DEVICE — TIP COVER ACCESSORY

## (undated) DEVICE — NEEDLE 25GAX5.0MM INJ CARR LOCKE

## (undated) DEVICE — GLOVE SURG SZ 65 CRM LTX FREE POLYISOPRENE POLYMER BEAD ANTI

## (undated) DEVICE — NITINOL STONE RETRIEVAL BASKET: Brand: ZERO TIP

## (undated) DEVICE — BITEBLOCK 54FR W/ DENT RIM BLOX

## (undated) DEVICE — YANKAUER,BULB TIP,W/O VENT,RIGID,STERILE: Brand: MEDLINE

## (undated) DEVICE — JELLY,LUBE,STERILE,FLIP TOP,TUBE,2-OZ: Brand: MEDLINE

## (undated) DEVICE — SYRINGE MED 50ML LUERLOCK TIP

## (undated) DEVICE — Device: Brand: UTERINE ELEVATOR PRO

## (undated) DEVICE — SOLUTION IRRIG 1000ML STRL H2O USP PLAS POUR BTL

## (undated) DEVICE — GOWN NONREINF XLG: Brand: MEDLINE INDUSTRIES, INC.

## (undated) DEVICE — ENDOSCOPIC KIT CLN SWAB MICROFIBER CLTH SCP CLEANOR DISP

## (undated) DEVICE — ACUSNARE POLYPECTOMY SNARE: Brand: ACUSNARE

## (undated) DEVICE — GOWN,POLY REINFORCED,LG: Brand: MEDLINE

## (undated) DEVICE — BLADE LARYNGOSCOPE LP 3 NS GLIDESCOPE SPECTRUM DISP

## (undated) DEVICE — INSUFFLATION NEEDLE TO ESTABLISH PNEUMOPERITONEUM.: Brand: INSUFFLATION NEEDLE

## (undated) DEVICE — ARM DRAPE

## (undated) DEVICE — BLADELESS OBTURATOR: Brand: WECK VISTA

## (undated) DEVICE — C-ARM: Brand: UNBRANDED

## (undated) DEVICE — ELECTRODE ES AD CRD L15FT DISP FOR PT BELOW 30LB REM

## (undated) DEVICE — CONNECTOR TBNG AUX H2O JET DISP FOR OLY 160/180 SER

## (undated) DEVICE — TRAP SURG QUAD PARABOLA SLOT DSGN SFTY SCRN TRAPEASE

## (undated) DEVICE — Device: Brand: DEFENDO VALVE AND CONNECTOR KIT

## (undated) DEVICE — Device

## (undated) DEVICE — SEAL

## (undated) DEVICE — SOFT SHIELD® COLLAGEN SHIELD, 12 HOURS (CE): Brand: SOFT SHIELD® COLLAGEN SHIELDS

## (undated) DEVICE — PAD PT POS 36 IN SURGYPAD DISP

## (undated) DEVICE — ELECTRO LUBE IS A SINGLE PATIENT USE DEVICE THAT IS INTENDED TO BE USED ON ELECTROSURGICAL ELECTRODES TO REDUCE STICKING.: Brand: KEY SURGICAL ELECTRO LUBE

## (undated) DEVICE — TUBING INSUFFLATION CAP W/ EXT CARBON DIOX ENDO SMARTCAP

## (undated) DEVICE — FIBER LASER EXCALIBUR HOLM

## (undated) DEVICE — NEEDLE SYR 18GA L1.5IN RED PLAS HUB S STL BLNT FILL W/O

## (undated) DEVICE — ADAPTER URO SCP UROLOK LL

## (undated) DEVICE — MTHZ GYN LAPAROSCOPY: Brand: MEDLINE INDUSTRIES, INC.

## (undated) DEVICE — CLIPPING DEVICE: Brand: RESOLUTION CLIP

## (undated) DEVICE — SOLUTION IV 1000ML 0.9% SOD CHL FOR IRRIG PLAS CONT

## (undated) DEVICE — SUTURE V-LOC 180 SZ 2-0 L9IN ABSRB GRN L27MM GS-22 1/2 CIR VLOCL2145

## (undated) DEVICE — SUTURE MONOCRYL SZ 4-0 L27IN ABSRB UD L19MM PS-2 1/2 CIR PRIM Y426H

## (undated) DEVICE — ADAPTER,CATHETER/SYRINGE/LUER,STERILE: Brand: MEDLINE